# Patient Record
Sex: FEMALE | Race: WHITE | NOT HISPANIC OR LATINO | Employment: OTHER | URBAN - METROPOLITAN AREA
[De-identification: names, ages, dates, MRNs, and addresses within clinical notes are randomized per-mention and may not be internally consistent; named-entity substitution may affect disease eponyms.]

---

## 2017-11-12 RX ORDER — UREA 40 %
CREAM (GRAM) TOPICAL
Qty: 198.4 G | Refills: 0 | OUTPATIENT
Start: 2017-11-12

## 2018-02-08 ENCOUNTER — OFFICE VISIT (OUTPATIENT)
Dept: ENDOCRINOLOGY | Facility: CLINIC | Age: 68
End: 2018-02-08
Payer: COMMERCIAL

## 2018-02-08 VITALS
WEIGHT: 109 LBS | SYSTOLIC BLOOD PRESSURE: 106 MMHG | HEART RATE: 71 BPM | DIASTOLIC BLOOD PRESSURE: 62 MMHG | HEIGHT: 63 IN | BODY MASS INDEX: 19.31 KG/M2

## 2018-02-08 DIAGNOSIS — R13.10 DYSPHAGIA, UNSPECIFIED TYPE: ICD-10-CM

## 2018-02-08 DIAGNOSIS — E03.9 HYPOTHYROIDISM, UNSPECIFIED TYPE: Primary | ICD-10-CM

## 2018-02-08 PROCEDURE — 3008F BODY MASS INDEX DOCD: CPT | Mod: S$GLB,,, | Performed by: INTERNAL MEDICINE

## 2018-02-08 PROCEDURE — 99204 OFFICE O/P NEW MOD 45 MIN: CPT | Mod: S$GLB,,, | Performed by: INTERNAL MEDICINE

## 2018-02-08 PROCEDURE — 99999 PR PBB SHADOW E&M-EST. PATIENT-LVL III: CPT | Mod: PBBFAC,,, | Performed by: INTERNAL MEDICINE

## 2018-02-08 NOTE — PROGRESS NOTES
CHIEF COMPLAINT: Hypothyroid  67 year old being seen as a new patient. Hypothyroid since mid 30's. Had I-131 for hyperthyroidism. On synthroid 88 mcg. Wanted to have levels checked by a specialist. No Palpitations. No tremors. No diarrhea or constipation.       PAST MEDICAL HISTORY/PAST SURGICAL HISTORY:  Reviewed in [facility]    SOCIAL HISTORY: No T/A    FAMILY HISTORY:  + Hypothyroidism/ + hyperthyroidism. No DM    MEDICATIONS/ALLERGIES: The patient's MedCard has been updated and reviewed.      ROS:   Constitutional: No recent significant weight change  Eyes: No recent visual changes  ENT: + dysphagia. No allergies or GERD.   Cardiovascular: Denies current anginal symptoms  Respiratory: Denies current respiratory difficulty  Gastrointestinal: Denies recent bowel disturbances  GenitoUrinary - No dysuria  Skin: No new skin rash  Neurologic: No focal neurologic complaints  Remainder ROS negative        PE:    GENERAL: Well developed, well nourished.  PSYCH:  appropriate mood and affect  EYES:  PERRL, EOM intact.  ENT: Nares patent, oropharynx clear, mucosa pink,   NECK: Supple, trachea midline, thyroid firm and palpable. There is some irregularity but no palpable nodule.   CHEST: Resp even and unlabored, CTA bilateral.  CARDIAC: RRR, S1, S2 heard, no murmurs, rubs, S3, or S4  ABDOMEN: Soft, non-tender, non-distended;  No organomegaly  VASCULAR:  DP pulses +2/4 bilaterally, no edema  NEURO: Gait steady, CN II-VII grossly intact  SKIN: No areas of breakdown, no acanthosis nigracans.    LABS   Results for FARNAZ HENNING (MRN [id number]) as of 2/8/2018 08:08   Ref. Range 1/16/2018 11:23   TSH Latest Ref Range: 0.450 - 4.500 uIU/mL 0.322 (L)       ASSESSMENT/PLAN:  1. Hypothyroidism- Hx of I-131 for hyperthyroidism. Unclear if a hot nodule vs autoimmune etiology. Will get an US. She missed a few doses so will do labs in 4 weeks.     2. Dysphagia- will get US. Discussed unlikely to be du eto thyroid since unable to palpate a  significant size nodule. Discussed that if US does not show a potential etiology would need to see GI      FOLLOWUP  4 weeks Fasting TSH, Ft4, CMP, Thyroid US

## 2018-03-08 ENCOUNTER — HOSPITAL ENCOUNTER (OUTPATIENT)
Dept: RADIOLOGY | Facility: HOSPITAL | Age: 68
Discharge: HOME | End: 2018-03-08
Attending: INTERNAL MEDICINE
Payer: COMMERCIAL

## 2018-03-08 DIAGNOSIS — E03.9 HYPOTHYROIDISM, UNSPECIFIED TYPE: Primary | ICD-10-CM

## 2018-03-08 DIAGNOSIS — E03.9 HYPOTHYROIDISM, UNSPECIFIED TYPE: ICD-10-CM

## 2018-03-08 PROCEDURE — 76536 US EXAM OF HEAD AND NECK: CPT | Mod: 26,,, | Performed by: RADIOLOGY

## 2018-03-08 PROCEDURE — 76536 US EXAM OF HEAD AND NECK: CPT | Mod: TC,PO

## 2018-03-09 RX ORDER — LEVOTHYROXINE SODIUM 75 UG/1
75 TABLET ORAL DAILY
Qty: 30 TABLET | Refills: 3 | Status: SHIPPED | OUTPATIENT
Start: 2018-03-09 | End: 2018-03-09 | Stop reason: SDUPTHER

## 2018-03-09 RX ORDER — LEVOTHYROXINE SODIUM 75 UG/1
75 TABLET ORAL DAILY
Qty: 90 TABLET | Refills: 3 | Status: SHIPPED | OUTPATIENT
Start: 2018-03-09 | End: 2018-04-26

## 2018-03-09 NOTE — TELEPHONE ENCOUNTER
Spoke with pt and informed of decrease synthroid to 75 mcg wants to go to [facility]  Verbalized understanding  6 week lab booked

## 2018-03-15 ENCOUNTER — TELEPHONE (OUTPATIENT)
Dept: ENDOCRINOLOGY | Facility: CLINIC | Age: 68
End: 2018-03-15

## 2018-03-15 NOTE — TELEPHONE ENCOUNTER
----- Message from Diana Sahu sent at 3/15/2018  1:10 PM CDT -----  Contact: patient  Type:  Test Results    Who Called:  Patient  Name of Test (Lab/Mammo/Etc):  Ultrasound (thyroid)  Date of Test:  3/8/18  Ordering Provider:  Dr Keaton Aguilar  Where the test was performed:  [facility]  Best Call Back Number:    Additional Information:  N/A

## 2018-03-16 NOTE — TELEPHONE ENCOUNTER
US shows no thyroid mass. It does show some LN. Not what is causing difficulty swallowing. WIll need to f/u with PCP in regards to LN

## 2018-03-16 NOTE — TELEPHONE ENCOUNTER
----- Message from Darren Boone sent at 3/16/2018 10:52 AM CDT -----  Contact: same  Type:  Test Results    Who Called:  patient  Name of Test (Lab/Mammo/Etc):  Ultrasound  Date of Test:  3/8/18  Ordering Provider:  Keaton  Where the test was performed:  [facility]  Best Call Back Number:  [phone]  Additional Information:  Patient already received lab results that were done on the same day & never heard back about her US results.

## 2018-04-23 ENCOUNTER — LAB VISIT (OUTPATIENT)
Dept: LAB | Facility: HOSPITAL | Age: 68
End: 2018-04-23
Attending: INTERNAL MEDICINE
Payer: COMMERCIAL

## 2018-04-23 DIAGNOSIS — E03.9 HYPOTHYROIDISM, UNSPECIFIED TYPE: ICD-10-CM

## 2018-04-23 LAB
T4 FREE SERPL-MCNC: 1.2 NG/DL
TSH SERPL DL<=0.005 MIU/L-ACNC: 0.16 UIU/ML

## 2018-04-23 PROCEDURE — 36415 COLL VENOUS BLD VENIPUNCTURE: CPT | Mod: PN

## 2018-04-23 PROCEDURE — 84443 ASSAY THYROID STIM HORMONE: CPT

## 2018-04-23 PROCEDURE — 84439 ASSAY OF FREE THYROXINE: CPT

## 2018-04-26 DIAGNOSIS — E03.9 HYPOTHYROIDISM, UNSPECIFIED TYPE: Primary | ICD-10-CM

## 2018-04-26 RX ORDER — LEVOTHYROXINE SODIUM 50 UG/1
50 TABLET ORAL DAILY
Qty: 30 TABLET | Refills: 11 | Status: SHIPPED | OUTPATIENT
Start: 2018-04-26 | End: 2018-04-27 | Stop reason: SDUPTHER

## 2018-04-26 NOTE — TELEPHONE ENCOUNTER
Let her know her labs shows that she is on too much thyroid hormone. Decrease synthroid from 75 mcg to 50 mcg. Check TSH in 6 weeks.

## 2018-04-26 NOTE — TELEPHONE ENCOUNTER
----- Message from Diana Sahu sent at 4/26/2018  3:32 PM CDT -----  Contact: patient  Type:  Test Results    Who Called: Patient  Name of Test (Lab/Mammo/Etc):  Lab  Date of Test:4/23/18  Ordering Provider: Dr Jeff Pugh  Where the test was performed:  [facility]  Best Call Back Number:   Additional Information: Calling to find out if the test results are back. Please advise. Call to pod. No answer.

## 2018-04-27 RX ORDER — LEVOTHYROXINE SODIUM 50 UG/1
50 TABLET ORAL DAILY
Qty: 30 TABLET | Refills: 11 | Status: SHIPPED | OUTPATIENT
Start: 2018-04-27 | End: 2018-07-30 | Stop reason: SDUPTHER

## 2018-04-27 NOTE — TELEPHONE ENCOUNTER
Pt advised of Dr. Pugh's message and verbalized understanding.  6 week lab appt scheduled.  Would like Rx sent to mail order pharmacy.  Cancelled Rx w/ [facility].

## 2018-06-11 ENCOUNTER — LAB VISIT (OUTPATIENT)
Dept: LAB | Facility: HOSPITAL | Age: 68
End: 2018-06-11
Attending: INTERNAL MEDICINE
Payer: COMMERCIAL

## 2018-06-11 DIAGNOSIS — E03.9 HYPOTHYROIDISM, UNSPECIFIED TYPE: ICD-10-CM

## 2018-06-11 LAB — TSH SERPL DL<=0.005 MIU/L-ACNC: 2.77 UIU/ML

## 2018-06-11 PROCEDURE — 84443 ASSAY THYROID STIM HORMONE: CPT

## 2018-06-11 PROCEDURE — 36415 COLL VENOUS BLD VENIPUNCTURE: CPT | Mod: PO

## 2018-06-14 ENCOUNTER — TELEPHONE (OUTPATIENT)
Dept: ENDOCRINOLOGY | Facility: CLINIC | Age: 68
End: 2018-06-14

## 2018-06-14 NOTE — TELEPHONE ENCOUNTER
----- Message from Johnathon Kee sent at 6/14/2018  3:09 PM CDT -----  Type:  Test Results    Who Called: self   Name of Test (Lab/Mammo/Etc):  Lab   Date of Test:  063/11/2018Ordering Provider:  Dr Pugh   Where the test was performed:  [facility]   Best Call Back Number: [phone]  Additional Information:

## 2018-07-16 ENCOUNTER — PATIENT OUTREACH (OUTPATIENT)
Dept: ADMINISTRATIVE | Facility: HOSPITAL | Age: 68
End: 2018-07-16

## 2018-07-16 NOTE — LETTER
July 16, 2018    Karina Mathur  [address] [place] [address]             [facility]  [address]  [facility] [address]  Phone: [phone] Dear Mrs. Mathur:    [facility] is committed to your overall health.  To help you get the most out of each of your visits, we will review your information to make sure you are up to date on all of your recommended tests and/or procedures.      As a new patient to Dr. Mateus Cardoso, we may not have your complete medical records.  He has found that your chart shows you may be due for One time Hepatitis C screening lab test ( a viral condition that harms the liver), Hemoglobin A1C, tetanus, pneumonia and shingles vaccine, DEXA scan, colon cancer screening, annual diabetic eye and foot exam.    If you have had any of the above done at another facility, please bring the records or information with you so that your record at [facility] will be complete.      If you are currently taking medication, please bring it with you to your appointment for review.    Also, if you have any type of Advanced Directives, please bring them with you to your office visit so we may scan them into your chart.        If you have any questions or concerns, please don't hesitate to call.    Sincerely,        Jeet Recinos LPN Clinical Care Coordinator  [facility]  [address] [facility] [address]  [place] [address]  [phone] (p)   [phone] (f)

## 2018-07-30 ENCOUNTER — LAB VISIT (OUTPATIENT)
Dept: LAB | Facility: HOSPITAL | Age: 68
End: 2018-07-30
Attending: INTERNAL MEDICINE
Payer: COMMERCIAL

## 2018-07-30 ENCOUNTER — OFFICE VISIT (OUTPATIENT)
Dept: FAMILY MEDICINE | Facility: CLINIC | Age: 68
End: 2018-07-30
Payer: COMMERCIAL

## 2018-07-30 VITALS
RESPIRATION RATE: 16 BRPM | HEART RATE: 63 BPM | BODY MASS INDEX: 19.87 KG/M2 | TEMPERATURE: 98 F | HEIGHT: 63 IN | DIASTOLIC BLOOD PRESSURE: 68 MMHG | OXYGEN SATURATION: 97 % | WEIGHT: 112.13 LBS | SYSTOLIC BLOOD PRESSURE: 102 MMHG

## 2018-07-30 DIAGNOSIS — R63.6 UNDERWEIGHT: ICD-10-CM

## 2018-07-30 DIAGNOSIS — E78.00 PURE HYPERCHOLESTEROLEMIA: Primary | ICD-10-CM

## 2018-07-30 DIAGNOSIS — Z00.00 HEALTHCARE MAINTENANCE: ICD-10-CM

## 2018-07-30 DIAGNOSIS — R35.0 FREQUENCY OF MICTURITION: ICD-10-CM

## 2018-07-30 DIAGNOSIS — T88.7XXA SIDE EFFECT OF MEDICATION: ICD-10-CM

## 2018-07-30 DIAGNOSIS — E03.9 HYPOTHYROIDISM, UNSPECIFIED TYPE: ICD-10-CM

## 2018-07-30 DIAGNOSIS — M85.80 OSTEOPENIA, UNSPECIFIED LOCATION: ICD-10-CM

## 2018-07-30 DIAGNOSIS — E78.00 PURE HYPERCHOLESTEROLEMIA: ICD-10-CM

## 2018-07-30 DIAGNOSIS — Z12.11 COLON CANCER SCREENING: ICD-10-CM

## 2018-07-30 LAB
25(OH)D3+25(OH)D2 SERPL-MCNC: 36 NG/ML
ALBUMIN SERPL BCP-MCNC: 4.3 G/DL
ALP SERPL-CCNC: 95 U/L
ALT SERPL W/O P-5'-P-CCNC: 21 U/L
ANION GAP SERPL CALC-SCNC: 9 MMOL/L
AST SERPL-CCNC: 23 U/L
BASOPHILS # BLD AUTO: 0.05 K/UL
BASOPHILS NFR BLD: 0.7 %
BILIRUB SERPL-MCNC: 0.7 MG/DL
BUN SERPL-MCNC: 16 MG/DL
CALCIUM SERPL-MCNC: 9.9 MG/DL
CHLORIDE SERPL-SCNC: 101 MMOL/L
CHOLEST SERPL-MCNC: 219 MG/DL
CHOLEST/HDLC SERPL: 2.6 {RATIO}
CO2 SERPL-SCNC: 27 MMOL/L
CREAT SERPL-MCNC: 0.7 MG/DL
DIFFERENTIAL METHOD: NORMAL
EOSINOPHIL # BLD AUTO: 0.3 K/UL
EOSINOPHIL NFR BLD: 3.8 %
ERYTHROCYTE [DISTWIDTH] IN BLOOD BY AUTOMATED COUNT: 14 %
EST. GFR  (AFRICAN AMERICAN): >60 ML/MIN/1.73 M^2
EST. GFR  (NON AFRICAN AMERICAN): >60 ML/MIN/1.73 M^2
GLUCOSE SERPL-MCNC: 88 MG/DL
HCT VFR BLD AUTO: 44.6 %
HDLC SERPL-MCNC: 85 MG/DL
HDLC SERPL: 38.8 %
HGB BLD-MCNC: 14.4 G/DL
IMM GRANULOCYTES # BLD AUTO: 0.02 K/UL
IMM GRANULOCYTES NFR BLD AUTO: 0.3 %
LDLC SERPL CALC-MCNC: 122.6 MG/DL
LYMPHOCYTES # BLD AUTO: 1.9 K/UL
LYMPHOCYTES NFR BLD: 26.6 %
MCH RBC QN AUTO: 30.1 PG
MCHC RBC AUTO-ENTMCNC: 32.3 G/DL
MCV RBC AUTO: 93 FL
MONOCYTES # BLD AUTO: 0.5 K/UL
MONOCYTES NFR BLD: 6.5 %
NEUTROPHILS # BLD AUTO: 4.4 K/UL
NEUTROPHILS NFR BLD: 62.1 %
NONHDLC SERPL-MCNC: 134 MG/DL
NRBC BLD-RTO: 0 /100 WBC
PLATELET # BLD AUTO: 246 K/UL
PMV BLD AUTO: 10.7 FL
POTASSIUM SERPL-SCNC: 4.1 MMOL/L
PROT SERPL-MCNC: 7.7 G/DL
RBC # BLD AUTO: 4.79 M/UL
SODIUM SERPL-SCNC: 137 MMOL/L
T3FREE SERPL-MCNC: 2.1 PG/ML
T4 FREE SERPL-MCNC: 0.84 NG/DL
TRIGL SERPL-MCNC: 57 MG/DL
TSH SERPL DL<=0.005 MIU/L-ACNC: 5.42 UIU/ML
WBC # BLD AUTO: 7.1 K/UL

## 2018-07-30 PROCEDURE — 86803 HEPATITIS C AB TEST: CPT

## 2018-07-30 PROCEDURE — 85025 COMPLETE CBC W/AUTO DIFF WBC: CPT

## 2018-07-30 PROCEDURE — 82306 VITAMIN D 25 HYDROXY: CPT

## 2018-07-30 PROCEDURE — 80053 COMPREHEN METABOLIC PANEL: CPT

## 2018-07-30 PROCEDURE — 99999 PR PBB SHADOW E&M-EST. PATIENT-LVL IV: CPT | Mod: PBBFAC,,, | Performed by: INTERNAL MEDICINE

## 2018-07-30 PROCEDURE — 84439 ASSAY OF FREE THYROXINE: CPT

## 2018-07-30 PROCEDURE — 84481 FREE ASSAY (FT-3): CPT

## 2018-07-30 PROCEDURE — 3008F BODY MASS INDEX DOCD: CPT | Mod: CPTII,S$GLB,, | Performed by: INTERNAL MEDICINE

## 2018-07-30 PROCEDURE — 80061 LIPID PANEL: CPT

## 2018-07-30 PROCEDURE — 99204 OFFICE O/P NEW MOD 45 MIN: CPT | Mod: S$GLB,,, | Performed by: INTERNAL MEDICINE

## 2018-07-30 PROCEDURE — 36415 COLL VENOUS BLD VENIPUNCTURE: CPT | Mod: PN

## 2018-07-30 PROCEDURE — 84443 ASSAY THYROID STIM HORMONE: CPT

## 2018-07-30 RX ORDER — MULTIVITAMIN
1 TABLET ORAL DAILY
COMMUNITY

## 2018-07-30 RX ORDER — GLUCOSAMINE/CHONDR SU A SOD 750-600 MG
1 TABLET ORAL
COMMUNITY
End: 2023-01-23 | Stop reason: ALTCHOICE

## 2018-07-30 RX ORDER — LEVOTHYROXINE SODIUM 125 UG/1
TABLET ORAL
Qty: 30 TABLET | Refills: 1 | Status: SHIPPED | OUTPATIENT
Start: 2018-07-30 | End: 2018-11-15 | Stop reason: SDUPTHER

## 2018-07-30 RX ORDER — SIMVASTATIN 20 MG/1
20 TABLET, FILM COATED ORAL NIGHTLY
Qty: 30 TABLET | Refills: 2 | Status: SHIPPED | OUTPATIENT
Start: 2018-07-30 | End: 2018-11-15 | Stop reason: SDUPTHER

## 2018-07-30 RX ORDER — MELOXICAM 15 MG/1
15 TABLET ORAL DAILY
Qty: 30 TABLET | Refills: 2 | Status: SHIPPED | OUTPATIENT
Start: 2018-07-30 | End: 2018-11-15

## 2018-07-30 RX ORDER — ASCORBIC ACID 500 MG
500 TABLET ORAL DAILY
COMMUNITY

## 2018-07-30 RX ORDER — LEVOTHYROXINE SODIUM 50 UG/1
50 TABLET ORAL DAILY
Qty: 30 TABLET | Refills: 2 | Status: SHIPPED | OUTPATIENT
Start: 2018-07-30 | End: 2018-07-30

## 2018-07-30 NOTE — PROGRESS NOTES
Subjective:       Patient ID: Karina Mathur is a 67 y.o. female.    Chief Complaint: Establish Care    HPI  Here to get established w me as her PCP at [facility]. PMH/surgical hx delineated and noted. SMH/FMH also reviewed and noted. ROS obtained at length prior to physical being performed. No prior PCP in computer at [facility].  Osteopenia w last DEXA 1/18/17; repeat in 2 yrs. Reviewed w pt.  Report discussed w pt; L fem neck -1.7; Lsp -1.2. Weight bearting exercises 5x a week for 30 min; calcium and vit D supplements   1/18/17 Mammogram was neg; needs mammo this year yet; sees Gyn in [place] for her breast exams, pelvics, and paps. Needs update along w mammo.   Hypothyroidism; takes her thyroid supplements appropriately. On 50 mcg a day now; reduced in May 2018.  2010 TC w 2 polyps removed; benign; told to repeat in 5 yrs; wants to wait til 10 yrs due to finances. Will get 2020.  HLP; not on low fat high fiber diet; would help a lot; on simvastatin.  toatal time: 9647-2628; >50% time spent on counseling, discussion, and review; old records and labs reviewed as well.      Review of Systems   Constitutional: Negative for appetite change, fever and unexpected weight change.   HENT: Positive for postnasal drip. Negative for congestion, rhinorrhea and sinus pressure.         Perennial allergies.   Eyes: Negative for discharge and itching.        Dry eyes on systane drops.   Respiratory: Negative for cough, chest tightness, shortness of breath and wheezing.    Cardiovascular: Negative for chest pain, palpitations and leg swelling.   Gastrointestinal: Negative for abdominal distention, abdominal pain, blood in stool, constipation, diarrhea, nausea and vomiting.   Endocrine: Negative for polydipsia, polyphagia and polyuria.   Genitourinary: Negative for dysuria, hematuria and urgency.        Frequency for 1 year; has been hyperthyroid; strong flow. No dysuria or foul odor.   Musculoskeletal: Negative for  "arthralgias and myalgias.   Skin: Negative for rash.   Allergic/Immunologic: Negative for environmental allergies and food allergies.   Neurological: Negative for tremors, seizures and syncope.   Hematological: Negative for adenopathy. Does not bruise/bleed easily.   Psychiatric/Behavioral:        Denies anxiety or depression; worries a lot but no meds felt needed.       Objective:      Vitals:    07/30/18 0851   BP: 102/68   BP Location: Left arm   Patient Position: Sitting   Pulse: 63   Resp: 16   Temp: 97.5 °F (36.4 °C)   TempSrc: Oral   SpO2: 97%   Weight: 50.8 kg (112 lb 1.7 oz)   Height: 5' 3" (1.6 m)     Body mass index is 19.86 kg/m².    Physical Exam   Constitutional: She is oriented to person, place, and time. She appears well-developed and well-nourished.   HENT:   Head: Normocephalic and atraumatic.   Eyes: EOM are normal.   Neck: Normal range of motion. Neck supple. No thyromegaly present.   No carotid bruits heard.   Cardiovascular: Normal rate, regular rhythm and normal heart sounds.  Exam reveals no gallop.    No murmur heard.  Pulmonary/Chest: Effort normal and breath sounds normal. No respiratory distress. She has no wheezes. She has no rales.   Abdominal: Soft. Bowel sounds are normal. She exhibits no distension. There is no tenderness. There is no rebound and no guarding.   Musculoskeletal: Normal range of motion. She exhibits no edema.   Lymphadenopathy:     She has no cervical adenopathy.   Neurological: She is alert and oriented to person, place, and time.   Moves all 4 extremities fine.   Skin: No rash noted.   Psychiatric: She has a normal mood and affect. Her behavior is normal. Thought content normal.   Vitals reviewed.      Assessment:       1. Pure hypercholesterolemia    2. Osteopenia, unspecified location    3. Hypothyroidism, unspecified type    4. Side effect of medication    5. Frequency of micturition    6. Underweight    7. Colon cancer screening    8. Healthcare maintenance      "   Plan:       Pure hypercholesterolemia; Maintain low fat high fiber diet, exercise regularly. On simvastatin  -     Lipid panel; Future; Expected date: 07/30/2018  -     T3, free; Future; Expected date: 07/30/2018  -     T4, free; Future; Expected date: 07/30/2018  -     TSH; Future; Expected date: 07/30/2018    Osteopenia, unspecified location. Weight bearing exercises, begin calcium and vit D supplements. Citracal -D 2 a day; Vit D 2000 iu a day     DEXA scan at 1/18/2019  -     Vitamin D; Future; Expected date: 07/30/2018    Hypothyroidism, unspecified type; same thyroid dosing; TFT's pending.  -     T3, free; Future; Expected date: 07/30/2018  -     T4, free; Future; Expected date: 07/30/2018  -     TSH; Future; Expected date: 07/30/2018    Side effect of medication; hyperthyroid due to levothyroxine supplements which have been reduced in May, 2018. TFT's to be repeated.    Frequency of micturition; for 1 year w no signs of UTI. Estimated 10x a day; usual volume; drinks a lot of fluids during day; can decrease her fluids; 2 cups of coffee; 1 coke a day; needs to decrease her fluid; helps her w BM's; to add citrucel 1-2x a day.   -     CBC auto differential; Future; Expected date: 07/30/2018  -     Comprehensive metabolic panel; Future; Expected date: 07/30/2018  -     Urinalysis; Future; Expected date: 07/30/2018  -     Urinalysis Microscopic; Future; Expected date: 07/30/2018  -     Urine culture; Future; Expected date: 07/30/2018  -     T3, free; Future; Expected date: 07/30/2018  -     T4, free; Future; Expected date: 07/30/2018  -     TSH; Future; Expected date: 07/30/2018    Underweight  -     Comprehensive metabolic panel; Future; Expected date: 07/30/2018  -     T3, free; Future; Expected date: 07/30/2018  -     T4, free; Future; Expected date: 07/30/2018  -     TSH; Future; Expected date: 07/30/2018    Colon cancer screening; wants to wait on TC til 2020.  -     Fecal Immunochemical Test (iFOBT);  Future; Expected date: 07/30/2018    Healthcare maintenance  -     Mammo Digital Screening Bilateral With CAD; Future; Expected date: 07/30/2018

## 2018-07-30 NOTE — PATIENT INSTRUCTIONS
Pure hypercholesterolemia; Maintain low fat high fiber diet, exercise regularly. On simvastatin  -     Lipid panel; Future; Expected date: 07/30/2018  -     T3, free; Future; Expected date: 07/30/2018  -     T4, free; Future; Expected date: 07/30/2018  -     TSH; Future; Expected date: 07/30/2018    Osteopenia, unspecified location. Weight bearing exercises, begin calcium and vit D supplements. Citracal -D 2 a day; Vit D 2000 iu a day     DEXA scan at 1/18/2019  -     Vitamin D; Future; Expected date: 07/30/2018    Hypothyroidism, unspecified type; same thyroid dosing; TFT's pending.  -     T3, free; Future; Expected date: 07/30/2018  -     T4, free; Future; Expected date: 07/30/2018  -     TSH; Future; Expected date: 07/30/2018    Side effect of medication; hyperthyroid due to levothyroxine supplements which have been reduced in May, 2018. TFT's to be repeated.    Frequency of micturition; for 1 year w no signs of UTI. Estimated 10x a day; usual volume; drinks a lot of fluids during day; can decrease her fluids; 2 cups of coffee; 1 coke a day; needs to decrease her fluid; helps her w BM's; to add citrucel 1-2x a day.   -     CBC auto differential; Future; Expected date: 07/30/2018  -     Comprehensive metabolic panel; Future; Expected date: 07/30/2018  -     Urinalysis; Future; Expected date: 07/30/2018  -     Urinalysis Microscopic; Future; Expected date: 07/30/2018  -     Urine culture; Future; Expected date: 07/30/2018  -     T3, free; Future; Expected date: 07/30/2018  -     T4, free; Future; Expected date: 07/30/2018  -     TSH; Future; Expected date: 07/30/2018    Underweight  -     Comprehensive metabolic panel; Future; Expected date: 07/30/2018  -     T3, free; Future; Expected date: 07/30/2018  -     T4, free; Future; Expected date: 07/30/2018  -     TSH; Future; Expected date: 07/30/2018    Colon cancer screening; wants to wait on TC til 2020.  -     Fecal Immunochemical Test (iFOBT); Future;  Expected date: 07/30/2018    Healthcare maintenance  -     Mammo Digital Screening Bilateral With CAD; Future; Expected date: 07/30/2018

## 2018-07-31 LAB — HCV AB SERPL QL IA: NEGATIVE

## 2018-08-01 ENCOUNTER — PATIENT OUTREACH (OUTPATIENT)
Dept: ADMINISTRATIVE | Facility: HOSPITAL | Age: 68
End: 2018-08-01

## 2018-08-01 NOTE — LETTER
August 1, 2018    Karina Mathur  [address] [place] [address]             [facility]  [address]  [facility] [address]  Phone: [phone] Dear Mrs. Mathur:    [facility] is committed to your overall health.  To help you get the most out of each of your visits, we will review your information to make sure you are up to date on all of your recommended tests and/or procedures.      Dr. Cardoso has found that your chart shows you may be due for Hemoglobin A1C, tetanus, pneumonia, shingles and flu vaccine, DEXA scan, colon cancer screening, annual diabetic foot and eye exam.    If you have had any of the above done at another facility, please bring the records or information with you so that your record at [facility] will be complete.  If you would like to schedule any of these, please contact me.    If you are currently taking medication, please bring it with you to your appointment for review.    Also, if you have any type of Advanced Directives, please bring them with you to your office visit so we may scan them into your chart.        If you have any questions or concerns, please don't hesitate to call.    Sincerely,        Jeet Recinos LPN Clinical Care Coordinator  [facility]  [address] [facility] [address]  [place] [address]  [phone] (p)   [phone] (f)

## 2018-08-02 ENCOUNTER — TELEPHONE (OUTPATIENT)
Dept: FAMILY MEDICINE | Facility: CLINIC | Age: 68
End: 2018-08-02

## 2018-08-03 NOTE — TELEPHONE ENCOUNTER
Please notify patient that her urine culture was contaminated.  Please have her pass by the lab to resubmit a urine culture as a clean-catch midstream specimen for repeat cuture.

## 2018-08-08 ENCOUNTER — LAB VISIT (OUTPATIENT)
Dept: LAB | Facility: HOSPITAL | Age: 68
End: 2018-08-08
Attending: INTERNAL MEDICINE
Payer: COMMERCIAL

## 2018-08-08 DIAGNOSIS — Z12.11 COLON CANCER SCREENING: ICD-10-CM

## 2018-08-08 DIAGNOSIS — R35.0 FREQUENCY OF MICTURITION: ICD-10-CM

## 2018-08-08 PROCEDURE — 82274 ASSAY TEST FOR BLOOD FECAL: CPT

## 2018-08-08 PROCEDURE — 87086 URINE CULTURE/COLONY COUNT: CPT

## 2018-08-09 LAB — HEMOCCULT STL QL IA: NEGATIVE

## 2018-08-10 ENCOUNTER — TELEPHONE (OUTPATIENT)
Dept: FAMILY MEDICINE | Facility: CLINIC | Age: 68
End: 2018-08-10

## 2018-08-10 LAB — BACTERIA UR CULT: NO GROWTH

## 2018-08-10 NOTE — TELEPHONE ENCOUNTER
----- Message from Yoly Velarde sent at 8/10/2018  8:49 AM CDT -----  Contact: Patient  Type:  Patient Returning Call    Who Called:  Karina  Who Left Message for Patient:  Anibal  Does the patient know what this is regarding?:  Test results  Best Call Back Number:  [phone]  Additional Information:

## 2018-08-10 NOTE — TELEPHONE ENCOUNTER
"Reviewed labs with patient. Patient verbalized understanding. Patient also asked if urine culture was resulted. Notified patient urine culture states "no growth" patient verbalized understanding    "

## 2018-08-15 ENCOUNTER — OFFICE VISIT (OUTPATIENT)
Dept: FAMILY MEDICINE | Facility: CLINIC | Age: 68
End: 2018-08-15
Payer: COMMERCIAL

## 2018-08-15 VITALS
WEIGHT: 109.69 LBS | DIASTOLIC BLOOD PRESSURE: 72 MMHG | BODY MASS INDEX: 20.71 KG/M2 | TEMPERATURE: 98 F | SYSTOLIC BLOOD PRESSURE: 110 MMHG | HEART RATE: 74 BPM | HEIGHT: 61 IN

## 2018-08-15 DIAGNOSIS — J30.89 PERENNIAL ALLERGIC RHINITIS: ICD-10-CM

## 2018-08-15 DIAGNOSIS — M85.89 OSTEOPENIA OF MULTIPLE SITES: Primary | ICD-10-CM

## 2018-08-15 DIAGNOSIS — E78.00 PURE HYPERCHOLESTEROLEMIA: ICD-10-CM

## 2018-08-15 DIAGNOSIS — T88.7XXA SIDE EFFECT OF DRUG: ICD-10-CM

## 2018-08-15 DIAGNOSIS — E89.0 POSTABLATIVE HYPOTHYROIDISM: ICD-10-CM

## 2018-08-15 DIAGNOSIS — E55.9 VITAMIN D INSUFFICIENCY: ICD-10-CM

## 2018-08-15 PROCEDURE — 99999 PR PBB SHADOW E&M-EST. PATIENT-LVL III: CPT | Mod: PBBFAC,,, | Performed by: INTERNAL MEDICINE

## 2018-08-15 PROCEDURE — 3008F BODY MASS INDEX DOCD: CPT | Mod: CPTII,S$GLB,, | Performed by: INTERNAL MEDICINE

## 2018-08-15 PROCEDURE — 99214 OFFICE O/P EST MOD 30 MIN: CPT | Mod: S$GLB,,, | Performed by: INTERNAL MEDICINE

## 2018-08-15 NOTE — PATIENT INSTRUCTIONS
Osteopenia of multiple sites. Weight bearing exercises, continue calcium and vit D supplements. DEXA due after 1/18/19.    Vitamin D insufficiency; increase vit D to 2000 iu a day.    Pure hypercholesterolemia. Maintain low fat high fiber diet, exercise regularly. Cont same dose of simvastatin.  -     T3, free; Future; Expected date: 08/15/2018  -     T4, free; Future; Expected date: 08/15/2018  -     TSH; Future; Expected date: 08/15/2018  -     Lipid panel; Future; Expected date: 08/15/2018  -     Hepatic function panel; Future; Expected date: 08/15/2018    Postablative hypothyroidism; have just increased levothyroxine to 1/2 of 125 mcg a day due to elevated TSH 5.42. And low freeT3 2.1; free T4 0.8.  -     T3, free; Future; Expected date: 08/15/2018  -     T4, free; Future; Expected date: 08/15/2018  -     TSH; Future; Expected date: 08/15/2018    Side effect of drug; caffeine causing increased urination during day and pt drinking lots of fluids; improved fair amount w decreasing caffeine;   can decrease to 1/2 cut coffee or decaff.     Perennial allergic rhinitis; claritin 10 mg a day as needed for congestion/post nasal drip. Simply saline 1 spray 1-2x a day as needed for post nasal drip/congestion.

## 2018-08-15 NOTE — PROGRESS NOTES
Subjective:       Patient ID: Karina Mathur is a 67 y.o. female.    Chief Complaint: Follow-up (2 weeks)    HPI  Overall doing fine. Here to go over her labs.   HLP: on low fat diet but not all the time; high fiber; tolerates her simvastatin fine. Lipids have worsened some w thyroid as well.  Hypothyroidism; advised on proper way to take thyroid supplements; have changed thyroid supplements recently to 62.5 mcg up from 50 mcg a day due to labs.  Osteopenia; weight bearing w walking 2x a week; goal is 5x a week for 30 min. Pt does take care of her Mom who is wheelchair bound.  1/18/17 DEXa w Lsp T score -1.7; 3.7% decrease; L fem neck -1.2; 2.6% decrease; DEXA this 1/2019; discussed w pt.  Perennial allergies; active but not taking anything.  Frequency; has decreased her cokes and has helped her frequency a fair amount; still w 2 cups coffee per day as well. Would decrease caffeine further.    Review of Systems   Constitutional: Negative for appetite change, fever and unexpected weight change.   HENT: Positive for postnasal drip. Negative for congestion, rhinorrhea and sinus pressure.         Perennial allergies   Eyes: Negative for discharge and itching.   Respiratory: Negative for cough, chest tightness, shortness of breath and wheezing.    Cardiovascular: Negative for chest pain, palpitations and leg swelling.   Gastrointestinal: Negative for abdominal distention, abdominal pain, blood in stool, constipation, diarrhea, nausea and vomiting.   Endocrine: Negative for polydipsia, polyphagia and polyuria.   Genitourinary: Negative for dysuria and hematuria.   Musculoskeletal: Negative for arthralgias and myalgias.   Skin: Negative for rash.   Allergic/Immunologic: Negative for environmental allergies and food allergies.   Neurological: Negative for tremors, seizures and syncope.   Hematological: Negative for adenopathy. Does not bruise/bleed easily.   Psychiatric/Behavioral:        Denies anxiety or depresssion.  "      Objective:      Vitals:    08/15/18 0840   BP: 110/72   Pulse: 74   Temp: 98 °F (36.7 °C)   Weight: 49.7 kg (109 lb 10.9 oz)   Height: 5' 1" (1.549 m)     Body mass index is 20.72 kg/m².    Physical Exam   Constitutional: She is oriented to person, place, and time. She appears well-developed and well-nourished.   HENT:   Head: Normocephalic and atraumatic.   Eyes: EOM are normal.   Neck: Normal range of motion. Neck supple. No thyromegaly present.   Cardiovascular: Normal rate, regular rhythm and normal heart sounds. Exam reveals no gallop.   No murmur heard.  Pulmonary/Chest: Effort normal and breath sounds normal. No respiratory distress. She has no wheezes. She has no rales.   Abdominal: Soft. Bowel sounds are normal. She exhibits no distension. There is no tenderness. There is no rebound and no guarding.   Musculoskeletal: Normal range of motion. She exhibits no edema.   Lymphadenopathy:     She has no cervical adenopathy.   Neurological: She is alert and oriented to person, place, and time.   Moves all 4 extremities fine.   Skin: No rash noted.   Psychiatric: She has a normal mood and affect. Her behavior is normal. Thought content normal.   Vitals reviewed.      Assessment:       1. Osteopenia of multiple sites    2. Vitamin D insufficiency    3. Pure hypercholesterolemia    4. Postablative hypothyroidism    5. Side effect of drug    6. Perennial allergic rhinitis        Plan:       Osteopenia of multiple sites. Weight bearing exercises, continue calcium and vit D supplements. DEXA due after 1/18/19.    Vitamin D insufficiency; increase vit D to 2000 iu a day.    Pure hypercholesterolemia. Maintain low fat high fiber diet, exercise regularly. Cont same dose of simvastatin. Adhere to diet better and increase her exercise regimen.  -     T3, free; Future; Expected date: 08/15/2018  -     T4, free; Future; Expected date: 08/15/2018  -     TSH; Future; Expected date: 08/15/2018   -     Lipid panel; Future; " Expected date: 08/15/2018  -     Hepatic function panel; Future; Expected date: 08/15/2018    Postablative hypothyroidism; have just increased levothyroxine to 1/2 of 125 mcg a day due to elevated TSH 5.42. And low freeT3 2.1; free T4 0.8.  -     T3, free; Future; Expected date: 08/15/2018  -     T4, free; Future; Expected date: 08/15/2018  -     TSH; Future; Expected date: 08/15/2018    Side effect of drug; caffeine causing increased urination during day and pt drinking lots of fluids; improved fair amount w decreasing caffeine;   can decrease to 1/2 cut coffee or decaff.     Perennial allergic rhinitis; claritin 10 mg a day as needed for congestion/post nasal drip. Simply saline 1 spray 1-2x a day as needed for post nasal drip/congestion.

## 2018-11-08 ENCOUNTER — LAB VISIT (OUTPATIENT)
Dept: LAB | Facility: HOSPITAL | Age: 68
End: 2018-11-08
Attending: INTERNAL MEDICINE
Payer: COMMERCIAL

## 2018-11-08 DIAGNOSIS — E89.0 POSTABLATIVE HYPOTHYROIDISM: ICD-10-CM

## 2018-11-08 DIAGNOSIS — E78.00 PURE HYPERCHOLESTEROLEMIA: ICD-10-CM

## 2018-11-08 LAB
ALBUMIN SERPL BCP-MCNC: 3.9 G/DL
ALP SERPL-CCNC: 82 U/L
ALT SERPL W/O P-5'-P-CCNC: 17 U/L
AST SERPL-CCNC: 17 U/L
BILIRUB DIRECT SERPL-MCNC: 0.2 MG/DL
BILIRUB SERPL-MCNC: 0.6 MG/DL
CHOLEST SERPL-MCNC: 185 MG/DL
CHOLEST/HDLC SERPL: 2.2 {RATIO}
HDLC SERPL-MCNC: 84 MG/DL
HDLC SERPL: 45.4 %
LDLC SERPL CALC-MCNC: 92.4 MG/DL
NONHDLC SERPL-MCNC: 101 MG/DL
PROT SERPL-MCNC: 7 G/DL
T4 FREE SERPL-MCNC: 0.98 NG/DL
TRIGL SERPL-MCNC: 43 MG/DL
TSH SERPL DL<=0.005 MIU/L-ACNC: 0.94 UIU/ML

## 2018-11-08 PROCEDURE — 36415 COLL VENOUS BLD VENIPUNCTURE: CPT | Mod: PN

## 2018-11-08 PROCEDURE — 84439 ASSAY OF FREE THYROXINE: CPT

## 2018-11-08 PROCEDURE — 80061 LIPID PANEL: CPT

## 2018-11-08 PROCEDURE — 84443 ASSAY THYROID STIM HORMONE: CPT

## 2018-11-08 PROCEDURE — 80076 HEPATIC FUNCTION PANEL: CPT

## 2018-11-09 ENCOUNTER — TELEPHONE (OUTPATIENT)
Dept: FAMILY MEDICINE | Facility: CLINIC | Age: 68
End: 2018-11-09

## 2018-11-09 NOTE — TELEPHONE ENCOUNTER
----- Message from Johnathon Kee sent at 2018  9:42 AM CST -----  Type:  Sooner Apoointment Request    Caller is requesting a sooner appointment.  Caller declined first available appointment listed below.  Caller will not accept being placed on the waitlist and is requesting a message be sent to doctor.    Name of Caller:  self  When is the first available appointment?  2018Symptoms:    Best Call Back Number:  [phone]  Additional Information:  Patient asking to reschedule appointment for an earlier time before the month of December. Patient is going to a  on Thursday, a family friend passed.

## 2018-11-15 ENCOUNTER — OFFICE VISIT (OUTPATIENT)
Dept: FAMILY MEDICINE | Facility: CLINIC | Age: 68
End: 2018-11-15
Payer: COMMERCIAL

## 2018-11-15 VITALS
OXYGEN SATURATION: 97 % | RESPIRATION RATE: 16 BRPM | SYSTOLIC BLOOD PRESSURE: 100 MMHG | TEMPERATURE: 99 F | HEIGHT: 61 IN | DIASTOLIC BLOOD PRESSURE: 60 MMHG | BODY MASS INDEX: 20.46 KG/M2 | WEIGHT: 108.38 LBS | HEART RATE: 72 BPM

## 2018-11-15 DIAGNOSIS — E78.00 PURE HYPERCHOLESTEROLEMIA: ICD-10-CM

## 2018-11-15 DIAGNOSIS — M54.2 NECK PAIN: ICD-10-CM

## 2018-11-15 DIAGNOSIS — E55.9 VITAMIN D INSUFFICIENCY: ICD-10-CM

## 2018-11-15 DIAGNOSIS — E03.9 HYPOTHYROIDISM, UNSPECIFIED TYPE: ICD-10-CM

## 2018-11-15 DIAGNOSIS — E89.0 POSTABLATIVE HYPOTHYROIDISM: Primary | ICD-10-CM

## 2018-11-15 DIAGNOSIS — Z78.0 POST-MENOPAUSAL: ICD-10-CM

## 2018-11-15 DIAGNOSIS — M85.89 OSTEOPENIA OF MULTIPLE SITES: ICD-10-CM

## 2018-11-15 PROCEDURE — 1101F PT FALLS ASSESS-DOCD LE1/YR: CPT | Mod: CPTII,S$GLB,, | Performed by: INTERNAL MEDICINE

## 2018-11-15 PROCEDURE — 99214 OFFICE O/P EST MOD 30 MIN: CPT | Mod: S$GLB,,, | Performed by: INTERNAL MEDICINE

## 2018-11-15 PROCEDURE — 99999 PR PBB SHADOW E&M-EST. PATIENT-LVL IV: CPT | Mod: PBBFAC,,, | Performed by: INTERNAL MEDICINE

## 2018-11-15 RX ORDER — LEVOTHYROXINE SODIUM 125 UG/1
TABLET ORAL
Qty: 45 TABLET | Refills: 1 | Status: SHIPPED | OUTPATIENT
Start: 2018-11-15 | End: 2019-02-13 | Stop reason: SDUPTHER

## 2018-11-15 RX ORDER — SIMVASTATIN 20 MG/1
20 TABLET, FILM COATED ORAL NIGHTLY
Qty: 90 TABLET | Refills: 1 | Status: SHIPPED | OUTPATIENT
Start: 2018-11-15 | End: 2019-02-13 | Stop reason: SDUPTHER

## 2018-11-15 RX ORDER — MELOXICAM 15 MG/1
TABLET ORAL
Qty: 90 TABLET | Refills: 0 | Status: SHIPPED | OUTPATIENT
Start: 2018-11-15 | End: 2019-05-15

## 2018-11-15 NOTE — PROGRESS NOTES
Subjective:       Patient ID: Karina Mathur is a 68 y.o. female.    Chief Complaint: Results (lab work and medication review )    HPI  Overall doinfg fine.  HLP: on low fat high fiber diet; no problems w simvastatin; refilled for pt today.  Post-ablative hypothyroidism. Takes appropriately.  Osteopenia; weight bearing exercise needed 5x a week; presently 2x a week; does lift her Mom regularly during week w her care. Last DEXA 2 yrs or greater. Needs update  Vit D insufficiency: On vit D 2000 iu a day.  Neck pain; intermittent w lifting her mom. Doesn't want to see ortho. Will change from daily mobic 15 mg a day to as needed. Use tylenol arthritis initially for pain as needed; then go to mobic behind that as needed for pain relief. On 1-10 when present a 4. Doesn't radiate down her arms.  Total time 905-1040; >50% time spent on counseling, discussion, and review.    Review of Systems   Constitutional: Negative for appetite change and fever.   HENT: Negative for congestion, postnasal drip, rhinorrhea and sinus pressure.    Eyes: Negative for discharge and itching.   Respiratory: Negative for cough, chest tightness, shortness of breath and wheezing.    Cardiovascular: Negative for chest pain, palpitations and leg swelling.   Gastrointestinal: Negative for abdominal distention, abdominal pain, blood in stool, constipation, diarrhea, nausea and vomiting.   Endocrine: Negative for polydipsia, polyphagia and polyuria.   Genitourinary: Negative for dysuria and hematuria.   Musculoskeletal: Negative for arthralgias and myalgias.   Skin: Negative for rash.   Allergic/Immunologic: Negative for environmental allergies and food allergies.   Neurological: Negative for tremors, seizures and syncope.   Hematological: Negative for adenopathy. Does not bruise/bleed easily.       Objective:      Vitals:    11/15/18 0839   BP: 100/60   Pulse: 72   Resp: 16   Temp: 98.5 °F (36.9 °C)   TempSrc: Oral   SpO2: 97%   Weight: 49.2 kg (108  "lb 5.7 oz)   Height: 5' 1" (1.549 m)     Body mass index is 20.47 kg/m².    Physical Exam   Constitutional: She is oriented to person, place, and time. She appears well-developed and well-nourished.   HENT:   Head: Normocephalic and atraumatic.   Eyes: EOM are normal.   Neck: Normal range of motion. Neck supple. No thyromegaly present.   Cardiovascular: Normal rate, regular rhythm and normal heart sounds. Exam reveals no gallop.   No murmur heard.  Pulmonary/Chest: Effort normal and breath sounds normal. No respiratory distress. She has no wheezes. She has no rales.   Abdominal: Soft. Bowel sounds are normal. She exhibits no distension. There is no tenderness. There is no rebound and no guarding.   Musculoskeletal: Normal range of motion. She exhibits no edema.   Lymphadenopathy:     She has no cervical adenopathy.   Neurological: She is alert and oriented to person, place, and time.   Moves all 4 extremities fine.   Skin: No rash noted.   Psychiatric: She has a normal mood and affect. Her behavior is normal. Thought content normal.   Vitals reviewed.      Assessment:       1. Postablative hypothyroidism    2. Pure hypercholesterolemia    3. Osteopenia of multiple sites    4. Vitamin D insufficiency    5. Post-menopausal    6. Hypothyroidism, unspecified type    7. Neck pain        Plan:       Postablative hypothyroidism; same dose of 62.5 mcg a day of levothyroxine  -     Lipid panel; Future; Expected date: 11/15/2018  -     TSH; Future; Expected date: 11/15/2018  -     T4, free; Future; Expected date: 11/15/2018  -     T3, free; Future; Expected date: 11/15/2018    Pure hypercholesterolemia. Maintain low fat high fiber diet, exercise regularly. Cont simvastatin dose.  -     Comprehensive metabolic panel; Future; Expected date: 11/15/2018  -     Lipid panel; Future; Expected date: 11/15/2018  -     TSH; Future; Expected date: 11/15/2018  -     T4, free; Future; Expected date: 11/15/2018  -     T3, free; Future; " Expected date: 11/15/2018  -     simvastatin (ZOCOR) 20 MG tablet; Take 1 tablet (20 mg total) by mouth every evening.  Dispense: 90 tablet; Refill: 1    Osteopenia of multiple sites. Weight bearing exercises, continue calcium and vit D supplements. DEXA needed before f/u.  -     Comprehensive metabolic panel; Future; Expected date: 11/15/2018  -     Magnesium; Future; Expected date: 11/15/2018  -     DXA Bone Density Spine And Hip; Future; Expected date: 11/15/2018    Vitamin D insufficiency  -     Vitamin D; Future; Expected date: 11/15/2018  -     DXA Bone Density Spine And Hip; Future; Expected date: 11/15/2018    Post-menopausal  -     DXA Bone Density Spine And Hip; Future; Expected date: 11/15/2018    Neck pain; Neck pain; intermittent w lifting her mom. Doesn't want to see ortho. Will change from daily mobic 15 mg a day to as needed. Use tylenol arthritis initially for pain as needed; then go to mobic behind that as needed for pain relief. Cold pask applications initially for pain; after 3 days can change to thermal applications.

## 2018-11-15 NOTE — PATIENT INSTRUCTIONS
Neck pain; intermittent w lifting her mom. Doesn't want to see ortho. Will change from daily mobic 15 mg a day to as needed. Use tylenol arthritis initially for pain as needed; then go to mobic behind that as needed for pain relief.     Postablative hypothyroidism; same dose of 62.5 mcg a day of levothyroxine  -     Lipid panel; Future; Expected date: 11/15/2018  -     TSH; Future; Expected date: 11/15/2018  -     T4, free; Future; Expected date: 11/15/2018  -     T3, free; Future; Expected date: 11/15/2018    Pure hypercholesterolemia. Maintain low fat high fiber diet, exercise regularly. Cont simvastatin dose.  -     Comprehensive metabolic panel; Future; Expected date: 11/15/2018  -     Lipid panel; Future; Expected date: 11/15/2018  -     TSH; Future; Expected date: 11/15/2018  -     T4, free; Future; Expected date: 11/15/2018  -     T3, free; Future; Expected date: 11/15/2018  -     simvastatin (ZOCOR) 20 MG tablet; Take 1 tablet (20 mg total) by mouth every evening.  Dispense: 90 tablet; Refill: 1    Osteopenia of multiple sites. Weight bearing exercises, continue calcium and vit D supplements. DEXA needed before f/u.  -     Comprehensive metabolic panel; Future; Expected date: 11/15/2018  -     Magnesium; Future; Expected date: 11/15/2018  -     DXA Bone Density Spine And Hip; Future; Expected date: 11/15/2018    Vitamin D insufficiency  -     Vitamin D; Future; Expected date: 11/15/2018  -     DXA Bone Density Spine And Hip; Future; Expected date: 11/15/2018    Post-menopausal  -     DXA Bone Density Spine And Hip; Future; Expected date: 11/15/2018    Neck pain; Neck pain; intermittent w lifting her mom. Doesn't want to see ortho. Will change from daily mobic 15 mg a day to as needed. Use tylenol arthritis initially for pain as needed; then go to mobic behind that as needed for pain relief. Cold pask applications initially for pain; after 3 days can change to thermal applications.

## 2019-02-08 ENCOUNTER — OFFICE VISIT (OUTPATIENT)
Dept: FAMILY MEDICINE | Facility: CLINIC | Age: 69
End: 2019-02-08
Payer: COMMERCIAL

## 2019-02-08 ENCOUNTER — HOSPITAL ENCOUNTER (OUTPATIENT)
Dept: RADIOLOGY | Facility: HOSPITAL | Age: 69
Discharge: HOME | End: 2019-02-08
Attending: NURSE PRACTITIONER
Payer: COMMERCIAL

## 2019-02-08 VITALS
OXYGEN SATURATION: 94 % | DIASTOLIC BLOOD PRESSURE: 58 MMHG | WEIGHT: 107.06 LBS | TEMPERATURE: 98 F | HEIGHT: 61 IN | HEART RATE: 70 BPM | SYSTOLIC BLOOD PRESSURE: 94 MMHG | BODY MASS INDEX: 20.21 KG/M2

## 2019-02-08 DIAGNOSIS — R05.9 COUGH: ICD-10-CM

## 2019-02-08 DIAGNOSIS — R06.2 WHEEZE: ICD-10-CM

## 2019-02-08 DIAGNOSIS — J11.1 INFLUENZA: Primary | ICD-10-CM

## 2019-02-08 DIAGNOSIS — J11.1 INFLUENZA: ICD-10-CM

## 2019-02-08 DIAGNOSIS — E86.0 DEHYDRATION: ICD-10-CM

## 2019-02-08 PROCEDURE — 99214 OFFICE O/P EST MOD 30 MIN: CPT | Mod: S$GLB,,, | Performed by: NURSE PRACTITIONER

## 2019-02-08 PROCEDURE — 1101F PT FALLS ASSESS-DOCD LE1/YR: CPT | Mod: CPTII,S$GLB,, | Performed by: NURSE PRACTITIONER

## 2019-02-08 PROCEDURE — 99999 PR PBB SHADOW E&M-EST. PATIENT-LVL V: ICD-10-PCS | Mod: PBBFAC,,, | Performed by: NURSE PRACTITIONER

## 2019-02-08 PROCEDURE — 71046 X-RAY EXAM CHEST 2 VIEWS: CPT | Mod: 26,,, | Performed by: RADIOLOGY

## 2019-02-08 PROCEDURE — 99999 PR PBB SHADOW E&M-EST. PATIENT-LVL V: CPT | Mod: PBBFAC,,, | Performed by: NURSE PRACTITIONER

## 2019-02-08 PROCEDURE — 99214 PR OFFICE/OUTPT VISIT, EST, LEVL IV, 30-39 MIN: ICD-10-PCS | Mod: S$GLB,,, | Performed by: NURSE PRACTITIONER

## 2019-02-08 PROCEDURE — 71046 X-RAY EXAM CHEST 2 VIEWS: CPT | Mod: TC,PN

## 2019-02-08 PROCEDURE — 71046 XR CHEST PA AND LATERAL: ICD-10-PCS | Mod: 26,,, | Performed by: RADIOLOGY

## 2019-02-08 PROCEDURE — 1101F PR PT FALLS ASSESS DOC 0-1 FALLS W/OUT INJ PAST YR: ICD-10-PCS | Mod: CPTII,S$GLB,, | Performed by: NURSE PRACTITIONER

## 2019-02-08 RX ORDER — ALBUTEROL SULFATE 90 UG/1
2 AEROSOL, METERED RESPIRATORY (INHALATION) EVERY 6 HOURS PRN
Qty: 8 G | Refills: 0 | Status: SHIPPED | OUTPATIENT
Start: 2019-02-08 | End: 2019-05-15

## 2019-02-08 RX ORDER — OSELTAMIVIR PHOSPHATE 75 MG/1
75 CAPSULE ORAL DAILY
Qty: 10 CAPSULE | Refills: 0 | Status: SHIPPED | OUTPATIENT
Start: 2019-02-08 | End: 2019-02-18

## 2019-02-08 NOTE — PROGRESS NOTES
This dictation has been generated using Modal Fluency Dictation some phonetic errors may occur. Please contact author for clarification if needed.     Problem List Items Addressed This Visit     None      Visit Diagnoses     Influenza    -  Primary    Relevant Orders    X-Ray Chest PA And Lateral    Cough        Relevant Orders    X-Ray Chest PA And Lateral    Wheeze        Relevant Orders    X-Ray Chest PA And Lateral    Dehydration            Orders Placed This Encounter    X-Ray Chest PA And Lateral    albuterol (VENTOLIN HFA) 90 mcg/actuation inhaler    oseltamivir (TAMIFLU) 75 MG capsule     Influenza symptoms have been present will beyond window for treatment.  Known exposure.  I will give her a prescription for her  for Tamiflu.  Pharmacy can review meds.  Cough and wheeze.  Check chest x-ray as above.  I will review images address accordingly.  Patient Instructions   Increase your fluids. You are getting dehydrated.  If you are feeling worse or have dizziness, weakness, ms cramps go to the ER. Diarrhea would make it more important to go to the ER.      Follow-up in about 3 days (around 2/11/2019).    ________________________________________________________________  ________________________________________________________________      Chief Complaint   Patient presents with    Nasal Congestion     body aches, headache sx started tue     History of present illness  This 68 y.o. presents today for complaint of flu-like symptoms.  Patient notes Tuesday night she started having fever and chills.  Wednesday symptoms progressed.  She did have body aches and cold symptoms.  Her grandchildren have tested positive for the flu as well as her mother.  Patient started taking a Z-Jose Martin yesterday.  This is inappropriate use of antibiotics and we discussed that. She is 2 days in so I will have her complete but discussed with her that this is only preventive for secondary bacterial infection and she should have  consulted with us for Tamiflu or such.   Review of systems  No fever chills or body aches  Patient denies sinus pain or sore throat.  She has had some ear pain, Which resolved  No chest pain or shortness of breath.  She does note some wheezing which started today.  No nausea or vomiting.  Patient had couple episodes of diarrhea but seems to improve.  Patient denies rash or itching  Abnormal bruising or bleeding  Denies joint pains  Patient denies dizziness or lightheadedness.  She denies generalized weakness.    Past medical and social history reviewed.  Patient is new to me.  I am seeing her for the 1st time.  She does follow the clinic.  Her mother is known me.  Past Medical History:   Diagnosis Date    Hyperlipidemia     on simvastatin    Hypothyroidism     hx of iodine cocktail around 1988 for goiter.    Osteopenia     1/18/17; L fem neck w Tscore -1.7; Lsp -1.2; DEXA in 2 yrs.       Past Surgical History:   Procedure Laterality Date    INGUINAL HERNIA REPAIR         History reviewed. No pertinent family history.    Social History     Socioeconomic History    Marital status:      Spouse name: None    Number of children: None    Years of education: None    Highest education level: None   Social Needs    Financial resource strain: None    Food insecurity - worry: None    Food insecurity - inability: None    Transportation needs - medical: None    Transportation needs - non-medical: None   Occupational History    None   Tobacco Use    Smoking status: Never Smoker    Smokeless tobacco: Never Used   Substance and Sexual Activity    Alcohol use: Yes     Alcohol/week: 0.6 oz     Types: 1 Glasses of wine per week     Comment: 1-2 glasses a month    Drug use: No    Sexual activity: None   Other Topics Concern    None   Social History Narrative    None       Current Outpatient Medications   Medication Sig Dispense Refill    ascorbic acid, vitamin C, (VITAMIN C) 500 MG tablet Take 500 mg by  mouth once daily.      biotin 2,500 mcg Cap Take 1 capsule by mouth.       CYANOCOBALAMIN, VITAMIN B-12, (VITAMIN B-12 ORAL) Take 1 Film by mouth once daily.      levothyroxine (SYNTHROID) 125 MCG tablet Take 1/2 of 125 mcg tab po daily 45 tablet 1    multivitamin (ONE DAILY MULTIVITAMIN) per tablet Take 1 tablet by mouth once daily.      simvastatin (ZOCOR) 20 MG tablet Take 1 tablet (20 mg total) by mouth every evening. 90 tablet 1    vitamin D 1000 units Tab Take 185 mg by mouth once daily.      albuterol (VENTOLIN HFA) 90 mcg/actuation inhaler Inhale 2 puffs into the lungs every 6 (six) hours as needed for Wheezing. Rescue 8 g 0    meloxicam (MOBIC) 15 MG tablet 15 mg po as needed daily for pain. 90 tablet 0    oseltamivir (TAMIFLU) 75 MG capsule Take 1 capsule (75 mg total) by mouth once daily. Dispense to  Carlos Zimmermanhannah. for 10 days 10 capsule 0     No current facility-administered medications for this visit.        Review of patient's allergies indicates:  No Known Allergies    Physical examination  Vitals Reviewed  Gen. Well-dressed well-nourished   Skin warm dry and intact.  No rashes noted.  HEENT.  TM intact bilateral with normal light reflex.  No mastoid tenderness during percussion.  Nares patent bilateral.  Pharynx minimally erythematous changes and no exudate.  Postnasal drip is noted.  No maxillary or frontal sinus tenderness when percussed.    Neck is supple without adenopathy  Chest.  Respirations are even unlabored.  Lungs are clear to auscultation.  Cardiac regular rate and rhythm.  No chest wall adenopathy noted.  Neuro. Awake alert oriented x4.  Normal judgment and cognition noted.  Extremities no clubbing cyanosis or edema noted.      Call or return to clinic prn if these symptoms worsen or fail to improve as anticipated.

## 2019-02-12 ENCOUNTER — TELEPHONE (OUTPATIENT)
Dept: FAMILY MEDICINE | Facility: CLINIC | Age: 69
End: 2019-02-12

## 2019-02-13 DIAGNOSIS — E78.00 PURE HYPERCHOLESTEROLEMIA: ICD-10-CM

## 2019-02-13 DIAGNOSIS — E03.9 HYPOTHYROIDISM, UNSPECIFIED TYPE: ICD-10-CM

## 2019-02-16 RX ORDER — SIMVASTATIN 20 MG/1
20 TABLET, FILM COATED ORAL NIGHTLY
Qty: 90 TABLET | Refills: 1 | Status: SHIPPED | OUTPATIENT
Start: 2019-02-16 | End: 2019-05-15 | Stop reason: SDUPTHER

## 2019-02-16 RX ORDER — LEVOTHYROXINE SODIUM 125 UG/1
TABLET ORAL
Qty: 45 TABLET | Refills: 1 | Status: SHIPPED | OUTPATIENT
Start: 2019-02-16 | End: 2019-05-15

## 2019-05-02 ENCOUNTER — HOSPITAL ENCOUNTER (OUTPATIENT)
Dept: RADIOLOGY | Facility: HOSPITAL | Age: 69
Discharge: HOME | End: 2019-05-02
Attending: INTERNAL MEDICINE
Payer: COMMERCIAL

## 2019-05-02 DIAGNOSIS — Z78.0 POST-MENOPAUSAL: ICD-10-CM

## 2019-05-02 DIAGNOSIS — E55.9 VITAMIN D INSUFFICIENCY: ICD-10-CM

## 2019-05-02 DIAGNOSIS — M85.89 OSTEOPENIA OF MULTIPLE SITES: ICD-10-CM

## 2019-05-02 PROCEDURE — 77080 DXA BONE DENSITY AXIAL: CPT | Mod: 26,,, | Performed by: RADIOLOGY

## 2019-05-02 PROCEDURE — 77080 DEXA BONE DENSITY SPINE HIP: ICD-10-PCS | Mod: 26,,, | Performed by: RADIOLOGY

## 2019-05-02 PROCEDURE — 77080 DXA BONE DENSITY AXIAL: CPT | Mod: TC,PO

## 2019-05-15 ENCOUNTER — OFFICE VISIT (OUTPATIENT)
Dept: FAMILY MEDICINE | Facility: CLINIC | Age: 69
End: 2019-05-15
Payer: COMMERCIAL

## 2019-05-15 VITALS
OXYGEN SATURATION: 98 % | SYSTOLIC BLOOD PRESSURE: 96 MMHG | BODY MASS INDEX: 18.98 KG/M2 | DIASTOLIC BLOOD PRESSURE: 56 MMHG | WEIGHT: 100.5 LBS | HEART RATE: 60 BPM | TEMPERATURE: 98 F | HEIGHT: 61 IN

## 2019-05-15 DIAGNOSIS — F41.9 ANXIETY: ICD-10-CM

## 2019-05-15 DIAGNOSIS — E55.9 VITAMIN D INSUFFICIENCY: ICD-10-CM

## 2019-05-15 DIAGNOSIS — I95.9 HYPOTENSION, UNSPECIFIED HYPOTENSION TYPE: ICD-10-CM

## 2019-05-15 DIAGNOSIS — M85.89 OSTEOPENIA OF MULTIPLE SITES: ICD-10-CM

## 2019-05-15 DIAGNOSIS — E78.00 PURE HYPERCHOLESTEROLEMIA: Primary | ICD-10-CM

## 2019-05-15 DIAGNOSIS — R63.6 UNDERWEIGHT: ICD-10-CM

## 2019-05-15 DIAGNOSIS — E89.0 POSTABLATIVE HYPOTHYROIDISM: ICD-10-CM

## 2019-05-15 PROCEDURE — 1101F PT FALLS ASSESS-DOCD LE1/YR: CPT | Mod: CPTII,S$GLB,, | Performed by: INTERNAL MEDICINE

## 2019-05-15 PROCEDURE — 99999 PR PBB SHADOW E&M-EST. PATIENT-LVL III: ICD-10-PCS | Mod: PBBFAC,,, | Performed by: INTERNAL MEDICINE

## 2019-05-15 PROCEDURE — 99214 PR OFFICE/OUTPT VISIT, EST, LEVL IV, 30-39 MIN: ICD-10-PCS | Mod: S$GLB,,, | Performed by: INTERNAL MEDICINE

## 2019-05-15 PROCEDURE — 1101F PR PT FALLS ASSESS DOC 0-1 FALLS W/OUT INJ PAST YR: ICD-10-PCS | Mod: CPTII,S$GLB,, | Performed by: INTERNAL MEDICINE

## 2019-05-15 PROCEDURE — 99999 PR PBB SHADOW E&M-EST. PATIENT-LVL III: CPT | Mod: PBBFAC,,, | Performed by: INTERNAL MEDICINE

## 2019-05-15 PROCEDURE — 99214 OFFICE O/P EST MOD 30 MIN: CPT | Mod: S$GLB,,, | Performed by: INTERNAL MEDICINE

## 2019-05-15 RX ORDER — LEVOTHYROXINE SODIUM 50 UG/1
TABLET ORAL
Qty: 60 TABLET | Refills: 1 | Status: SHIPPED | OUTPATIENT
Start: 2019-05-15 | End: 2019-08-22

## 2019-05-15 RX ORDER — LEVOTHYROXINE SODIUM 125 UG/1
TABLET ORAL
Qty: 25 TABLET | Refills: 1 | Status: SHIPPED | OUTPATIENT
Start: 2019-05-15 | End: 2019-08-22

## 2019-05-15 RX ORDER — SIMVASTATIN 20 MG/1
20 TABLET, FILM COATED ORAL NIGHTLY
Qty: 90 TABLET | Refills: 1 | Status: SHIPPED | OUTPATIENT
Start: 2019-05-15 | End: 2019-11-07 | Stop reason: SDUPTHER

## 2019-05-15 NOTE — PROGRESS NOTES
Subjective:       Patient ID: Karina Mathur is a 68 y.o. female.    Chief Complaint: Follow-up    HPI  today for reassessment and go over her labs.  Overall she has been doing fine.     Hyperlipidemia:  On low-fat high-fiber diet; tolerates simvastatin fine.     Post ablated of hypothyroidism:  Takes her levothyroxine appropriately; presently on half of 125 mcg daily; free T3 and free T4 levels are therapeutic per TSH has dropped from 0.939 to  0.567; with history of osteopenia will reduce levothyroxine from 62.5 mcg a day to 50 mcg a day and recheck her levels in 3 months     Osteopenia:  05/02/2019 DEXA [id number] for lumbar spine -1.1; T-score for left femoral neck -1.0; stressed the need for regular weight-bearing exercise as well as calcium and vitamin-D supplementation.  Would repeat DEXA scan in 2 years.     Vitamin-D insufficiency:  05/02/19 hydroxy vitamin-D level therapeutic at 46. Same vit D3 supplements.     Under weight with body mass index 18.99; reducing her thyroid supplementation will help this entity as well; to increase her portions with her meals; needs to drink more fluids w her day. 6-7, 8 oz of fluid a day. Will also help her BP; 96/56.    Review of Systems   Constitutional: Negative for appetite change, fever and unexpected weight change.   HENT: Negative for congestion, postnasal drip, rhinorrhea and sinus pressure.    Eyes: Negative for discharge and itching.   Respiratory: Negative for cough, chest tightness, shortness of breath and wheezing.    Cardiovascular: Negative for chest pain, palpitations and leg swelling.   Gastrointestinal: Negative for abdominal distention, abdominal pain, blood in stool, constipation, diarrhea, nausea and vomiting.   Endocrine: Negative for polydipsia, polyphagia and polyuria.   Genitourinary: Negative for dysuria and hematuria.   Musculoskeletal: Positive for arthralgias. Negative for myalgias.        Diffuse and interm. Uses tylenol arthritis prn;  "agree w stopping mobic.   Skin: Negative for rash.   Allergic/Immunologic: Negative for environmental allergies and food allergies.   Neurological: Negative for tremors, seizures and syncope.   Hematological: Negative for adenopathy. Does not bruise/bleed easily.   Psychiatric/Behavioral:        Anxiety but no depression. Limit her caffeine. Recommend decaff.or 1/2 cut.        Objective:      Vitals:    05/15/19 0756   BP: (!) 96/56   BP Location: Right arm   Patient Position: Sitting   BP Method: Medium (Manual)   Pulse: 60   Temp: 97.6 °F (36.4 °C)   TempSrc: Oral   SpO2: 98%   Weight: 45.6 kg (100 lb 8.5 oz)   Height: 5' 1" (1.549 m)     Body mass index is 18.99 kg/m².    Physical Exam   Constitutional: She is oriented to person, place, and time. She appears well-developed and well-nourished.   HENT:   Head: Normocephalic and atraumatic.   Eyes: EOM are normal.   Neck: Normal range of motion. Neck supple. No thyromegaly present.   Cardiovascular: Normal rate, regular rhythm and normal heart sounds. Exam reveals no gallop.   No murmur heard.  Pulmonary/Chest: Effort normal and breath sounds normal. No respiratory distress. She has no wheezes. She has no rales.   Abdominal: Soft. Bowel sounds are normal. She exhibits no distension. There is no tenderness. There is no rebound and no guarding.   Musculoskeletal: Normal range of motion. She exhibits no edema.   Lymphadenopathy:     She has no cervical adenopathy.   Neurological: She is alert and oriented to person, place, and time.   Moves all 4 extremities fine.   Skin: No rash noted.   Psychiatric: She has a normal mood and affect. Her behavior is normal. Thought content normal.   Vitals reviewed.      Assessment:       1. Pure hypercholesterolemia    2. Postablative hypothyroidism    3. Osteopenia of multiple sites    4. Vitamin D insufficiency    5. Underweight    6. Anxiety    7. Hypotension, unspecified hypotension type        Plan:       Pure " hypercholesterolemia.Maintain low fat high fiber diet, exercise regularly. Cont simvastatin  -     levothyroxine (SYNTHROID) 50 MCG tablet; Take 50 mcg on Tues/Thurs/Sat/ Sun  Dispense: 60 tablet; Refill: 1  -     levothyroxine (SYNTHROID) 125 MCG tablet; 1/2 of 125 mcg po on Mon/Wed/Fri  Dispense: 25 tablet; Refill: 1  -     simvastatin (ZOCOR) 20 MG tablet; Take 1 tablet (20 mg total) by mouth every evening.  Dispense: 90 tablet; Refill: 1  -     T3, free; Future; Expected date: 05/15/2019  -     T4, free; Future; Expected date: 05/15/2019  -     TSH; Future; Expected date: 05/15/2019    Postablative hypothyroidism; decrease thyroid dosing from 1/2 of 125 mcg a day to changes below.  -     levothyroxine (SYNTHROID) 50 MCG tablet; Take 50 mcg on Tues/Thurs/Sat/ Sun  Dispense: 60 tablet; Refill: 1  -     levothyroxine (SYNTHROID) 125 MCG tablet; 1/2 of 125 mcg po on Mon/Wed/Fri  Dispense: 25 tablet; Refill: 1  -     T3, free; Future; Expected date: 05/15/2019  -     T4, free; Future; Expected date: 05/15/2019  -     TSH; Future; Expected date: 05/15/2019    Osteopenia of multiple sites.Weight bearing exercises, continue calcium and vit D supplements. DEXA scan at 2 yr intervals as needed. Decreasing thyroid supplements will help.     Vitamin D insufficiency; same vit D dosing for supplements.     Underweight. Trty to eat 3 meals a day and increase her portions some. Decreasing thyroid supplements will help.  -     levothyroxine (SYNTHROID) 50 MCG tablet; Take 50 mcg on Tues/Thurs/Sat/ Sun  Dispense: 60 tablet; Refill: 1  -     levothyroxine (SYNTHROID) 125 MCG tablet; 1/2 of 125 mcg po on Mon/Wed/Fri  Dispense: 25 tablet; Refill: 1  -     T3, free; Future; Expected date: 05/15/2019  -     T4, free; Future; Expected date: 05/15/2019  -     TSH; Future; Expected date: 05/15/2019    Anxiety; exerrcise and limit caffeine.   -     T3, free; Future; Expected date: 05/15/2019  -     T4, free; Future; Expected date:  05/15/2019  -     TSH; Future; Expected date: 05/15/2019    Hypotension; needs to drink more fluids during day; min 6-7, 8oz of fluid a day; also needs to decrease her caffeine intake as well. 96/56 today.

## 2019-05-15 NOTE — PATIENT INSTRUCTIONS
Pure hypercholesterolemia.Maintain low fat high fiber diet, exercise regularly. Cont simvastatin  -     levothyroxine (SYNTHROID) 50 MCG tablet; Take 50 mcg on Tues/Thurs/Sat/ Sun  Dispense: 60 tablet; Refill: 1  -     levothyroxine (SYNTHROID) 125 MCG tablet; 1/2 of 125 mcg po on Mon/Wed/Fri  Dispense: 25 tablet; Refill: 1  -     simvastatin (ZOCOR) 20 MG tablet; Take 1 tablet (20 mg total) by mouth every evening.  Dispense: 90 tablet; Refill: 1  -     T3, free; Future; Expected date: 05/15/2019  -     T4, free; Future; Expected date: 05/15/2019  -     TSH; Future; Expected date: 05/15/2019    Postablative hypothyroidism; decrease thyroid dosing from 1/2 of 125 mcg a day to changes below.  -     levothyroxine (SYNTHROID) 50 MCG tablet; Take 50 mcg on Tues/Thurs/Sat/ Sun  Dispense: 60 tablet; Refill: 1  -     levothyroxine (SYNTHROID) 125 MCG tablet; 1/2 of 125 mcg po on Mon/Wed/Fri  Dispense: 25 tablet; Refill: 1  -     T3, free; Future; Expected date: 05/15/2019  -     T4, free; Future; Expected date: 05/15/2019  -     TSH; Future; Expected date: 05/15/2019    Osteopenia of multiple sites.Weight bearing exercises, continue calcium and vit D supplements. DEXA scan at 2 yr intervals as needed. Decreasing thyroid supplements will help.     Vitamin D insufficiency; same vit D dosing for supplements.     Underweight. Trty to eat 3 meals a day and increase her portions some. Decreasing thyroid supplements will help.  -     levothyroxine (SYNTHROID) 50 MCG tablet; Take 50 mcg on Tues/Thurs/Sat/ Sun  Dispense: 60 tablet; Refill: 1  -     levothyroxine (SYNTHROID) 125 MCG tablet; 1/2 of 125 mcg po on Mon/Wed/Fri  Dispense: 25 tablet; Refill: 1  -     T3, free; Future; Expected date: 05/15/2019  -     T4, free; Future; Expected date: 05/15/2019  -     TSH; Future; Expected date: 05/15/2019    Anxiety; exerrcise and limit caffeine.   -     T3, free; Future; Expected date: 05/15/2019  -     T4, free; Future; Expected date:  05/15/2019  -     TSH; Future; Expected date: 05/15/2019    Hypotension; needs to drink more fluids during day; min 6-7, 8oz of fluid a day; also needs to decrease her caffeine intake as well.

## 2019-06-17 ENCOUNTER — HOSPITAL ENCOUNTER (OUTPATIENT)
Dept: RADIOLOGY | Facility: HOSPITAL | Age: 69
Discharge: HOME | End: 2019-06-17
Attending: PHYSICAL MEDICINE & REHABILITATION
Payer: COMMERCIAL

## 2019-06-17 ENCOUNTER — OFFICE VISIT (OUTPATIENT)
Dept: SPINE | Facility: CLINIC | Age: 69
End: 2019-06-17
Payer: COMMERCIAL

## 2019-06-17 VITALS
HEART RATE: 62 BPM | DIASTOLIC BLOOD PRESSURE: 52 MMHG | SYSTOLIC BLOOD PRESSURE: 94 MMHG | HEIGHT: 61 IN | BODY MASS INDEX: 18.98 KG/M2 | WEIGHT: 100.5 LBS

## 2019-06-17 DIAGNOSIS — M54.2 CERVICALGIA: ICD-10-CM

## 2019-06-17 DIAGNOSIS — M54.5 CHRONIC MIDLINE LOW BACK PAIN, WITH SCIATICA PRESENCE UNSPECIFIED: ICD-10-CM

## 2019-06-17 DIAGNOSIS — G89.29 CHRONIC MIDLINE LOW BACK PAIN, WITH SCIATICA PRESENCE UNSPECIFIED: ICD-10-CM

## 2019-06-17 DIAGNOSIS — M54.2 CERVICALGIA: Primary | ICD-10-CM

## 2019-06-17 PROCEDURE — 72040 X-RAY EXAM NECK SPINE 2-3 VW: CPT | Mod: TC,FY

## 2019-06-17 PROCEDURE — 1101F PT FALLS ASSESS-DOCD LE1/YR: CPT | Mod: ,,, | Performed by: PHYSICAL MEDICINE & REHABILITATION

## 2019-06-17 PROCEDURE — 72100 X-RAY EXAM L-S SPINE 2/3 VWS: CPT | Mod: 26,,, | Performed by: RADIOLOGY

## 2019-06-17 PROCEDURE — 72040 X-RAY EXAM NECK SPINE 2-3 VW: CPT | Mod: 26,,, | Performed by: RADIOLOGY

## 2019-06-17 PROCEDURE — 72040 XR CERVICAL SPINE AP LATERAL: ICD-10-PCS | Mod: 26,,, | Performed by: RADIOLOGY

## 2019-06-17 PROCEDURE — 99204 PR OFFICE/OUTPT VISIT, NEW, LEVL IV, 45-59 MIN: ICD-10-PCS | Mod: ,,, | Performed by: PHYSICAL MEDICINE & REHABILITATION

## 2019-06-17 PROCEDURE — 72100 X-RAY EXAM L-S SPINE 2/3 VWS: CPT | Mod: TC,FY

## 2019-06-17 PROCEDURE — 1101F PR PT FALLS ASSESS DOC 0-1 FALLS W/OUT INJ PAST YR: ICD-10-PCS | Mod: ,,, | Performed by: PHYSICAL MEDICINE & REHABILITATION

## 2019-06-17 PROCEDURE — 99204 OFFICE O/P NEW MOD 45 MIN: CPT | Mod: ,,, | Performed by: PHYSICAL MEDICINE & REHABILITATION

## 2019-06-17 PROCEDURE — 72100 XR LUMBAR SPINE AP AND LATERAL: ICD-10-PCS | Mod: 26,,, | Performed by: RADIOLOGY

## 2019-06-17 NOTE — PROGRESS NOTES
SUBJECTIVE:    Patient ID: Karina Mathur is a 68 y.o. female.    Chief Complaint: Back Pain (lower back pain x2> mo )    This is a 68-year-old woman who sees Dr. Cardoso for her primary care. Other than hyperlipidemia she has no chronic major medical problems.  She has a long history of low back and neck pain intermittently.  She presents with several months history of low back pain at the lumbosacral junction occasionally associated with left leg pain.  She has had the leg pain for several months.  No weakness or bowel or bladder dysfunction.  No fever chills sweats or unexpected weight loss.  Also has posterior neck discomfort that at times is worse than the lower back pain.  Again no radicular symptoms or arm pain.  Current pain level is 4/10.  She describes as stiffness in the morning that worsens as the day goes on.  She says that Aleve helps but she has been advised not to take that.  She is allowed to have Tylenol for pain.  She says that she attributes some of her lower back discomfort to having to assist her elderly mother who is wheelchair bound        Past Medical History:   Diagnosis Date    Hyperlipidemia     on simvastatin    Hypothyroidism     hx of iodine cocktail around 1988 for goiter.    Osteopenia     1/18/17; L fem neck w Tscore -1.7; Lsp -1.2; DEXA in 2 yrs.     Social History     Socioeconomic History    Marital status:      Spouse name: Not on file    Number of children: Not on file    Years of education: Not on file    Highest education level: Not on file   Occupational History    Not on file   Social Needs    Financial resource strain: Not on file    Food insecurity:     Worry: Not on file     Inability: Not on file    Transportation needs:     Medical: Not on file     Non-medical: Not on file   Tobacco Use    Smoking status: Never Smoker    Smokeless tobacco: Never Used   Substance and Sexual Activity    Alcohol use: Yes     Alcohol/week: 0.6 oz     Types: 1  "Glasses of wine per week     Comment: 1-2 glasses a month    Drug use: No    Sexual activity: Not on file   Lifestyle    Physical activity:     Days per week: Not on file     Minutes per session: Not on file    Stress: Not on file   Relationships    Social connections:     Talks on phone: Not on file     Gets together: Not on file     Attends [employer] service: Not on file     Active member of club or organization: Not on file     Attends meetings of clubs or organizations: Not on file     Relationship status: Not on file   Other Topics Concern    Not on file   Social History Narrative    Not on file     Past Surgical History:   Procedure Laterality Date    INGUINAL HERNIA REPAIR       History reviewed. No pertinent family history.  Vitals:    06/17/19 1405   BP: (!) 94/52   Pulse: 62   Weight: 45.6 kg (100 lb 8.5 oz)   Height: 5' 1" (1.549 m)       Review of Systems   Constitutional: Negative for chills, diaphoresis, fatigue, fever and unexpected weight change.   HENT: Negative for trouble swallowing.    Eyes: Negative for visual disturbance.   Respiratory: Negative for shortness of breath.    Cardiovascular: Negative for chest pain.   Gastrointestinal: Negative for abdominal pain, constipation, nausea and vomiting.   Genitourinary: Negative for difficulty urinating.   Musculoskeletal: Negative for arthralgias, back pain, gait problem, joint swelling, myalgias, neck pain and neck stiffness.   Neurological: Negative for dizziness, speech difficulty, weakness, light-headedness, numbness and headaches.          Objective:      Physical Exam   Constitutional: She is oriented to person, place, and time. She appears well-developed and well-nourished.   Neurological: She is alert and oriented to person, place, and time.   She is awake and in no acute distress  No point tenderness or palpable masses about the lumbar spine  She can heel and toe walk normally  Deep tendon reflexes are +2 at both knees and +1 at both " ankles  Strength is normal in both lower extremities  Straight leg raising negative bilaterally  SELINA testing negative bilaterally           Assessment:       1. Cervicalgia    2. Chronic midline low back pain, with sciatica presence unspecified           Plan:     she has a nonfocal examination from a neurological standpoint and no historical red flags.  She has low back pain and neck pain most likely on the basis of degenerative disc disease.  I recommend outpatient physical therapy.  We will get some x-rays of the neck and the low back.  I can report those to her over the phone unless I see something unexpected.  Otherwise follow-up in 6 weeks      Cervicalgia  -     X-Ray Cervical Spine AP And Lateral; Future; Expected date: 06/17/2019  -     Ambulatory Referral to Physical/Occupational Therapy    Chronic midline low back pain, with sciatica presence unspecified  -     X-Ray Lumbar Spine Ap And Lateral; Future; Expected date: 06/17/2019  -     Ambulatory Referral to Physical/Occupational Therapy

## 2019-06-18 NOTE — PROGRESS NOTES
She has expected degenerative disc disease of the neck and low back.  No unexpected findings.  Proceed with physical therapy

## 2019-06-24 ENCOUNTER — TELEPHONE (OUTPATIENT)
Dept: SPINE | Facility: CLINIC | Age: 69
End: 2019-06-24

## 2019-06-24 NOTE — TELEPHONE ENCOUNTER
----- Message from Arthur Zapata sent at 6/24/2019 11:56 AM CDT -----  Contact: Patient  Type: Needs Medical Advice    Who Called:  Patient  Best Call Back Number: [phone]  Additional Information: Patient would like to discuss test results. Please call to advise. Thanks!

## 2019-07-01 ENCOUNTER — CLINICAL SUPPORT (OUTPATIENT)
Dept: REHABILITATION | Facility: HOSPITAL | Age: 69
End: 2019-07-01
Attending: PHYSICAL MEDICINE & REHABILITATION
Payer: COMMERCIAL

## 2019-07-01 DIAGNOSIS — G89.29 CHRONIC MIDLINE LOW BACK PAIN, WITH SCIATICA PRESENCE UNSPECIFIED: ICD-10-CM

## 2019-07-01 DIAGNOSIS — M54.5 CHRONIC MIDLINE LOW BACK PAIN, WITH SCIATICA PRESENCE UNSPECIFIED: ICD-10-CM

## 2019-07-01 DIAGNOSIS — M54.2 CERVICALGIA: Primary | ICD-10-CM

## 2019-07-01 PROBLEM — M54.50 CHRONIC MIDLINE LOW BACK PAIN: Status: ACTIVE | Noted: 2019-07-01

## 2019-07-01 PROCEDURE — 97161 PT EVAL LOW COMPLEX 20 MIN: CPT | Mod: PN

## 2019-07-01 NOTE — PLAN OF CARE
TIME RECORD    Date: 07/01/2019    Start Time:  1700  Stop Time:  1750    PROCEDURES:    TIMED  Procedure Time Min.    Start:  Stop:     Start:  Stop:     Start:  Stop:     Start:  Stop:          UNTIMED  Procedure Time Min.   EVAL Start:  Stop:     Start:  Stop:      Total Timed Minutes:    Total Timed Units:    Total Untimed Units:  1  Charges Billed/# of units:  1    OUTPATIENT PHYSICAL THERAPY   PATIENT EVALUATION  Onset Date: Insidious.  Primary Diagnosis:   1. Cervicalgia     2. Chronic midline low back pain, with sciatica presence unspecified       Treatment Diagnosis: Neck pain,LBP  Past Medical History:   Diagnosis Date    Hyperlipidemia     on simvastatin    Hypothyroidism     hx of iodine cocktail around 1988 for goiter.    Osteopenia     1/18/17; L fem neck w Tscore -1.7; Lsp -1.2; DEXA in 2 yrs.     Precautions: STANDARD.  Prior Therapy: No.  Medications: Karina Mathur has a current medication list which includes the following prescription(s): ascorbic acid (vitamin c), biotin, cyanocobalamin (vitamin b-12), levothyroxine, levothyroxine, multivitamin, simvastatin, and vitamin d.  Nutrition:  Normal  History of Present Illness: Neck pain and LBP started ~ 3/12 ago without trauma.  Prior Level of Function: Independent  Social History: Retired.  Place of Residence (Steps/Adaptations): In [address] house with .  Functional Deficits Leading to Referral/Nature of Injury: Difficulties with ADLs,functional activities,homemaking.  Patient Therapy Goals: Decrease pain.    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    Pain:  Location: back  and neck   Description: Dull, Throbbing, Sharp and Variable  Activities Which Increase Pain: Standing, Bending, Walking, Extension and Lifting  Activities Which Decrease Pain: relaxation, pain medication and rest  Pain Scale: 3/10 at best 5/10 now  7/10 at worst    Objective     Posture: Head  forward,scoliosis,th/kyphosis, left shoulder elevated.  Palpation: C3-T1 paraspinals, both upper traps tender  L2-S1 tender bilaterally.   Sensation: Intact.  DTRs:   Range of Motion/Strength: CS strength 3-/5.  Cervical/Thoracic/Lumbar AROM: Pain/Dysfunction with Movement:   Flexion   WNL    Extension  WNL    Right side bending   25    Left side bending  30    Right rotation   60    Left rotation   50       ROM of LS is grossly WFL/WNL. Strength is ~ 3+/5 in all planes.  Flexibility: Both HS tight.  Gait: Without AD  Analysis: SBA - guarded,flexed hips.  Bed Mobility:Independent  Transfers: Independent  Special Tests: Slump test neg (-) bilaterally. SLR  LT;90, RT;90 neg (-).  Other: NDI 9/50 18% IMPAIRED. OSWESTRY 10/50 20% IMPAIRED.  Treatment: EVAL    Assessment       Initial Assessment (Pertinent finding, problem list and factors affecting outcome): Pt presents ROM and strength deficits,poor posture,decreased functional status due to pain and above listed deficits.  Rehab Potiential: good    Short Term Goals (3 Weeks): 1.Decrease pain x 1 grade. 2.Start HEP.  Long Term Goals (6 Weeks): 1.Pain 0-4/10. 2.Independent HEP. 3.ROM WFL/WNL. 4.Strength 5-/5. 5. NDI 6/50. 6.OSWESTRY 7/50.     Plan     Certification Period: 7/1/19 to 9/1/19.  Recommended Treatment Plan: 2 times per week for 6 weeks: Cervical/Lumbar Traction, Electrical Stimulation PRN, Manual Therapy, Moist Heat/ Ice, Patient Education, Therapeutic Activites, Therapeutic Exercise and Ultrasound  Other Recommendations: Dry needling PRN. IMS PRN.      Therapist: Steven Curran, PT    I CERTIFY THE NEED FOR THESE SERVICES FURNISHED UNDER THIS PLAN OF TREATMENT AND WHILE UNDER MY CARE    Physician's comments: ________________________________________________________________________________________________________________________________________________      Physician's Name: ___________________________________

## 2019-08-08 ENCOUNTER — PATIENT OUTREACH (OUTPATIENT)
Dept: ADMINISTRATIVE | Facility: HOSPITAL | Age: 69
End: 2019-08-08

## 2019-08-08 ENCOUNTER — CLINICAL SUPPORT (OUTPATIENT)
Dept: REHABILITATION | Facility: HOSPITAL | Age: 69
End: 2019-08-08
Attending: PHYSICAL MEDICINE & REHABILITATION
Payer: COMMERCIAL

## 2019-08-08 DIAGNOSIS — G89.29 CHRONIC MIDLINE LOW BACK PAIN, WITH SCIATICA PRESENCE UNSPECIFIED: ICD-10-CM

## 2019-08-08 DIAGNOSIS — M54.5 CHRONIC MIDLINE LOW BACK PAIN, WITH SCIATICA PRESENCE UNSPECIFIED: ICD-10-CM

## 2019-08-08 DIAGNOSIS — M54.2 CERVICALGIA: Primary | ICD-10-CM

## 2019-08-08 PROCEDURE — 97110 THERAPEUTIC EXERCISES: CPT | Mod: PN

## 2019-08-08 NOTE — PROGRESS NOTES
"Name: Karina Mathur  Clinic Number: [id number]  Date of Treatment: 08/08/2019   Diagnosis:   Encounter Diagnoses   Name Primary?    Cervicalgia Yes    Chronic midline low back pain, with sciatica presence unspecified        Time in: 1605  Time Out: 1705  Total Treatment Time: 60    Subjective:    Karina reports L sided neck and UT discomfort which travels down her L side of her back to her LB. Pain in LB is equal R/L. All pain is described as aching. Sharp pains in her L UT area sometimes when she is doing shores around the house.  Patient reports their pain to be 5/10 on a 0-10 scale with 0 being no pain and 10 being the worst pain imaginable. "I think it may be arthritis and this (PT) might not help)." Reports occasional L thigh discomfort.     Objective:    Patient received individual therapy to increase strength, endurance, ROM, flexibility, posture and core stabilization with activities as follows:     Karina received therapeutic exercises to develop strength, endurance, ROM, flexibility, posture and core stabilization for 60 minutes including:     NuStep L2 10' UE's and LE's  Seated TE:     C/s AROM 10     Chin tucks 10/2     Scap retraction B 10/2  Seated hamstring stretches 3/30s L/R  Supine TE 10/2:     PPT     Clams RTB     Ballsquemarii  Standing lat trunk shift correction R 10 x 10s    Purse weight 4.07#    Written and pictorial HEP of ex's in EPIC reviewed and issued to pt. Pt instructed to perform HEP daily and stop if symptoms increase.  Issued RTB  for HEP with instruction to keep away from small children, animals, and anyone with latex allergies. Pt verbalizes understanding. Issued handout on purse safety with instruction to decrease load.    Pt demo good understanding of the education provided. Patient demonstrated good return demonstration of activities.     Assessment:     Good tolerance for ex's. L thigh discomfort with lat shift correction reported afterwards, which resolved after stopping " activity.     Pt will continue to benefit from skilled PT intervention. Medical Necessity is demonstrated by:  Requires skilled supervision to complete and progress HEP and Weakness.    Patient is making good progress towards established goals.    Plan:    Continue with established Plan of Care towards PT goals.

## 2019-08-08 NOTE — PATIENT INSTRUCTIONS
AROM: Neck Rotation        Turn head slowly to look over one shoulder, then the other. Hold each position ____ seconds.  Repeat ____ times per set. Do ____ sets per session. Do ____ sessions per day.     https://[employer]/294     Copyright © [employer]. All rights reserved.   AROM: Lateral Neck Flexion        Slowly tilt head toward one shoulder, then the other. Hold each position ____ seconds.  Repeat ____ times per set. Do ____ sets per session. Do ____ sessions per day.     https://[employer]/296     Copyright © [employer]. All rights reserved.   AROM: Neck Flexion        Bend head forward. Hold ____ seconds.  Repeat ____ times per set. Do ____ sets per session. Do ____ sessions per day.     https://[employer]/298     Copyright © [employer]. All rights reserved.   AROM: Neck Extension        Bend head backward. Hold ____ seconds.  Repeat ____ times per set. Do ____ sets per session. Do ____ sessions per day.     https://[employer]/300     Copyright © [employer]. All rights reserved.   Flexibility: Neck Retraction        Pull head straight back, keeping eyes and jaw level.  Repeat ____ times per set. Do ____ sets per session. Do ____ sessions per day.     https://[employer]/344     Copyright © [employer]. All rights reserved.   Flexibility: Neck Stretch        Grasp left arm above wrist and pull down across body while gently tilting head same direction. Hold ____ seconds. Relax.  Repeat ____ times per set. Do ____ sets per session. Do ____ sessions per day.     https://[employer]/346     Copyright © [employer]. All rights reserved.   Scapular Retraction: Elbow Flexion (Standing)        With elbows bent to 90°, pinch shoulder blades together and rotate arms out, keeping elbows bent.  Repeat ____ times per set. Do ____ sets per session. Do ____ sessions per day.     https://[employer]/948     Copyright © [employer]. All rights reserved.   Pelvic Tilt        Flatten back by tightening stomach muscles and buttocks.  Repeat ____ times per set. Do ____  sets per session. Do ____ sessions per day.     [email or web address]://[email or web address]/134     Copyright © [employer]. All rights reserved.   Strengthening: Hip Adduction - Isometric        With ball or folded pillow between knees, squeeze knees together. Hold ____ seconds.  Repeat ____ times per set. Do ____ sets per session. Do ____ sessions per day.     [email or web address]://[email or web address]/612     Copyright © [employer]. All rights reserved.   Strengthening: Hip Abductor - Resisted        With band looped around both legs above knees, push thighs apart.  Repeat ____ times per set. Do ____ sets per session. Do ____ sessions per day.     [email or web address]/688     Copyright © [employer]. All rights reserved.

## 2019-08-12 ENCOUNTER — CLINICAL SUPPORT (OUTPATIENT)
Dept: REHABILITATION | Facility: HOSPITAL | Age: 69
End: 2019-08-12
Attending: PHYSICAL MEDICINE & REHABILITATION
Payer: COMMERCIAL

## 2019-08-12 DIAGNOSIS — M54.2 NECK PAIN: Primary | ICD-10-CM

## 2019-08-12 PROCEDURE — 97012 MECHANICAL TRACTION THERAPY: CPT | Mod: PN

## 2019-08-12 PROCEDURE — 97110 THERAPEUTIC EXERCISES: CPT | Mod: PN

## 2019-08-12 NOTE — PROGRESS NOTES
TIME RECORD    Date:  08/12/2019  Start Time:  1608  Stop Time:  1702    PROCEDURES:    TIMED  Procedure Time Min.   TE Start:1608  Stop:1646 38    Start:  Stop:     Start:  Stop:     Start:  Stop:          UNTIMED  Procedure Time Min.   TX Start:1647  Stop:1702 15    Start:  Stop:      Total Timed Minutes:  38  Total Timed Units:  3  Total Untimed Units:  1  Charges Billed/# of units:  4      Progress/Current Status    Subjective:     Patient ID: Karina Mathur is a 68 y.o. female.  Diagnosis:   1. Neck pain       Pain: 5 /10      Objective:     TE for ROM,strength f/b ICTX X 15' @ 12/0# 40/10S.    Assessment:     Relief following TX.    Patient Education/Response:     Posture ed.    Plans and Goals:     Cont per POC.

## 2019-08-12 NOTE — PROGRESS NOTES
08/12/19 1700   Cx/Thoracic Exercises   Shoulder Shrugs Rep/Sets/Weight 30,ROLLS/RETR 30   Cervical Flexion Stretch Reps/Sets/Hold Time ROM 20 EACH.   Shoulder Exercises   Overhead Pulley 6'   Additional Exercises   Additional Exercise UBE 5/5   Additional Exercise ROM CS 20,CHIN TUCKS30   Additional Exercise BIKE 10'

## 2019-08-15 ENCOUNTER — LAB VISIT (OUTPATIENT)
Dept: LAB | Facility: HOSPITAL | Age: 69
End: 2019-08-15
Attending: INTERNAL MEDICINE
Payer: COMMERCIAL

## 2019-08-15 ENCOUNTER — CLINICAL SUPPORT (OUTPATIENT)
Dept: REHABILITATION | Facility: HOSPITAL | Age: 69
End: 2019-08-15
Attending: PHYSICAL MEDICINE & REHABILITATION
Payer: COMMERCIAL

## 2019-08-15 DIAGNOSIS — M54.2 CERVICALGIA: ICD-10-CM

## 2019-08-15 DIAGNOSIS — G89.29 CHRONIC MIDLINE LOW BACK PAIN, WITH SCIATICA PRESENCE UNSPECIFIED: Primary | ICD-10-CM

## 2019-08-15 DIAGNOSIS — E78.00 PURE HYPERCHOLESTEROLEMIA: ICD-10-CM

## 2019-08-15 DIAGNOSIS — R63.6 UNDERWEIGHT: ICD-10-CM

## 2019-08-15 DIAGNOSIS — M54.2 NECK PAIN: ICD-10-CM

## 2019-08-15 DIAGNOSIS — F41.9 ANXIETY: ICD-10-CM

## 2019-08-15 DIAGNOSIS — M54.5 CHRONIC MIDLINE LOW BACK PAIN, WITH SCIATICA PRESENCE UNSPECIFIED: Primary | ICD-10-CM

## 2019-08-15 DIAGNOSIS — E89.0 POSTABLATIVE HYPOTHYROIDISM: ICD-10-CM

## 2019-08-15 LAB
T3FREE SERPL-MCNC: 2.5 PG/ML (ref 2.3–4.2)
T3FREE SERPL-MCNC: 2.5 PG/ML (ref 2.3–4.2)
T4 FREE SERPL-MCNC: 0.9 NG/DL (ref 0.71–1.51)
TSH SERPL DL<=0.005 MIU/L-ACNC: 1.89 UIU/ML (ref 0.4–4)

## 2019-08-15 PROCEDURE — 97110 THERAPEUTIC EXERCISES: CPT | Mod: PN

## 2019-08-15 PROCEDURE — 36415 COLL VENOUS BLD VENIPUNCTURE: CPT

## 2019-08-15 PROCEDURE — 84439 ASSAY OF FREE THYROXINE: CPT

## 2019-08-15 PROCEDURE — 97140 MANUAL THERAPY 1/> REGIONS: CPT | Mod: PN

## 2019-08-15 PROCEDURE — 84481 FREE ASSAY (FT-3): CPT

## 2019-08-15 PROCEDURE — 84443 ASSAY THYROID STIM HORMONE: CPT

## 2019-08-15 NOTE — PROGRESS NOTES
"[id number]  Date of Treatment: 08/15/2019   Diagnosis:   Encounter Diagnoses   Name Primary?    Chronic midline low back pain, with sciatica presence unspecified Yes    Neck pain     Cervicalgia        Time in: 0905  Time Out: 1005  Total Treatment Time: 60  Group Time: no      Subjective:    Karina reports she is hurting and tight on her right side from her shoulder to her low back. 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  Patient reports their pain to be 5/10 on a 0-10 scale with 0 being no pain and 10 being the worst pain imaginable. After therapy today her pain had decreased to a 4/10 but she had normal ROM to the cervical spine and lateral movement on both sides and stated she felt much better. Patient stated she also felt much better after STM to upper traps.    Objective    Karina received therapeutic exercises to develop strength, ROM, flexibility and posture for 45 minutes including:   UBE 5/5  Bike L-3 x 10 min  OH pulley flexion/extension x 2 min  Seated Cervical ROM: flexion/extension, side bending, chin tucks, retro rolls 3 x 10 ea  Pulley 20# rows for scapular retraction and strengthening 3 x 10  Lateral side bending to R against wall to increase cervical motion and stretch L side lats. 3 x 30"       Karina received the following manual therapy techniques: Soft tissue Mobilization were applied to the left scapular muscular muscles and left side upper traps: 15 for minutes.       Written Home Exercises Provided: continue with current  Pt demo fair understanding of the education provided. Karina demonstrated fair return demonstration of activities.     Assessment:     Patient may do well with some added core strengthening as well with upper body strengthening due to heavy lifting required of her on a daily basis with her elderly mother. She is very weak in middle traps and " "had trouble with rows on pulley lacking strength to pull 20#. She also had a large 2" diameter muscle spasm present on the left upper trap which was relieved some with STM. She is not able to tolerate myofascial release but may do well with IASTM.    Pt will continue to benefit from skilled PT intervention. Medical Necessity is demonstrated by:  Pain limits function of effected part for some activities, Requires skilled supervision to complete and progress HEP and Weakness.    Patient is making fair progress towards established goals.    New/Revised goals: no      Plan:  Continue with established Plan of Care towards PT goals.   "

## 2019-08-19 ENCOUNTER — CLINICAL SUPPORT (OUTPATIENT)
Dept: REHABILITATION | Facility: HOSPITAL | Age: 69
End: 2019-08-19
Attending: PHYSICAL MEDICINE & REHABILITATION
Payer: COMMERCIAL

## 2019-08-19 DIAGNOSIS — M54.2 CERVICALGIA: Primary | ICD-10-CM

## 2019-08-19 DIAGNOSIS — G89.29 CHRONIC MIDLINE LOW BACK PAIN, WITH SCIATICA PRESENCE UNSPECIFIED: ICD-10-CM

## 2019-08-19 DIAGNOSIS — M54.5 CHRONIC MIDLINE LOW BACK PAIN, WITH SCIATICA PRESENCE UNSPECIFIED: ICD-10-CM

## 2019-08-19 PROCEDURE — 97150 GROUP THERAPEUTIC PROCEDURES: CPT | Mod: PN

## 2019-08-19 PROCEDURE — 97140 MANUAL THERAPY 1/> REGIONS: CPT | Mod: PN

## 2019-08-19 PROCEDURE — 97110 THERAPEUTIC EXERCISES: CPT | Mod: PN

## 2019-08-22 ENCOUNTER — CLINICAL SUPPORT (OUTPATIENT)
Dept: REHABILITATION | Facility: HOSPITAL | Age: 69
End: 2019-08-22
Attending: PHYSICAL MEDICINE & REHABILITATION
Payer: COMMERCIAL

## 2019-08-22 ENCOUNTER — OFFICE VISIT (OUTPATIENT)
Dept: FAMILY MEDICINE | Facility: CLINIC | Age: 69
End: 2019-08-22
Payer: COMMERCIAL

## 2019-08-22 VITALS
WEIGHT: 105.63 LBS | SYSTOLIC BLOOD PRESSURE: 82 MMHG | OXYGEN SATURATION: 98 % | BODY MASS INDEX: 19.94 KG/M2 | HEIGHT: 61 IN | HEART RATE: 67 BPM | DIASTOLIC BLOOD PRESSURE: 60 MMHG | TEMPERATURE: 98 F

## 2019-08-22 DIAGNOSIS — G89.29 CHRONIC MIDLINE LOW BACK PAIN, WITH SCIATICA PRESENCE UNSPECIFIED: ICD-10-CM

## 2019-08-22 DIAGNOSIS — M54.5 CHRONIC MIDLINE LOW BACK PAIN, WITH SCIATICA PRESENCE UNSPECIFIED: ICD-10-CM

## 2019-08-22 DIAGNOSIS — Z12.11 COLON CANCER SCREENING: ICD-10-CM

## 2019-08-22 DIAGNOSIS — E89.0 POSTABLATIVE HYPOTHYROIDISM: ICD-10-CM

## 2019-08-22 DIAGNOSIS — E78.00 PURE HYPERCHOLESTEROLEMIA: ICD-10-CM

## 2019-08-22 DIAGNOSIS — M54.2 NECK PAIN: Primary | ICD-10-CM

## 2019-08-22 DIAGNOSIS — E89.0 POSTABLATIVE HYPOTHYROIDISM: Primary | ICD-10-CM

## 2019-08-22 DIAGNOSIS — R63.6 UNDERWEIGHT: ICD-10-CM

## 2019-08-22 PROCEDURE — 99999 PR PBB SHADOW E&M-EST. PATIENT-LVL IV: CPT | Mod: PBBFAC,,, | Performed by: NURSE PRACTITIONER

## 2019-08-22 PROCEDURE — 97110 THERAPEUTIC EXERCISES: CPT | Mod: PN

## 2019-08-22 PROCEDURE — 97012 MECHANICAL TRACTION THERAPY: CPT | Mod: PN

## 2019-08-22 PROCEDURE — 1101F PR PT FALLS ASSESS DOC 0-1 FALLS W/OUT INJ PAST YR: ICD-10-PCS | Mod: CPTII,S$GLB,, | Performed by: NURSE PRACTITIONER

## 2019-08-22 PROCEDURE — 99215 OFFICE O/P EST HI 40 MIN: CPT | Mod: S$GLB,,, | Performed by: NURSE PRACTITIONER

## 2019-08-22 PROCEDURE — 1101F PT FALLS ASSESS-DOCD LE1/YR: CPT | Mod: CPTII,S$GLB,, | Performed by: NURSE PRACTITIONER

## 2019-08-22 PROCEDURE — 99215 PR OFFICE/OUTPT VISIT, EST, LEVL V, 40-54 MIN: ICD-10-PCS | Mod: S$GLB,,, | Performed by: NURSE PRACTITIONER

## 2019-08-22 PROCEDURE — 99999 PR PBB SHADOW E&M-EST. PATIENT-LVL IV: ICD-10-PCS | Mod: PBBFAC,,, | Performed by: NURSE PRACTITIONER

## 2019-08-22 RX ORDER — TIZANIDINE 2 MG/1
4 TABLET ORAL EVERY 6 HOURS PRN
Qty: 60 TABLET | Refills: 0 | Status: SHIPPED | OUTPATIENT
Start: 2019-08-22 | End: 2020-07-13

## 2019-08-22 RX ORDER — LEVOTHYROXINE SODIUM 50 UG/1
TABLET ORAL
Qty: 60 TABLET | Refills: 1 | Status: SHIPPED | OUTPATIENT
Start: 2019-08-22 | End: 2019-11-13 | Stop reason: SDUPTHER

## 2019-08-22 RX ORDER — LEVOTHYROXINE SODIUM 125 UG/1
TABLET ORAL
Qty: 25 TABLET | Refills: 1 | Status: SHIPPED | OUTPATIENT
Start: 2019-08-22 | End: 2019-11-13 | Stop reason: SDUPTHER

## 2019-08-22 NOTE — PROGRESS NOTES
This dictation has been generated using Modal Fluency Dictation some phonetic errors may occur. Please contact author for clarification if needed.     Problem List Items Addressed This Visit     Hyperlipidemia    Overview     on simvastatin         Relevant Medications    levothyroxine (SYNTHROID) 50 MCG tablet    levothyroxine (SYNTHROID) 125 MCG tablet    Other Relevant Orders    T3, free    T4, free    TSH    Lipid panel    Postablative hypothyroidism - Primary    Relevant Medications    levothyroxine (SYNTHROID) 50 MCG tablet    levothyroxine (SYNTHROID) 125 MCG tablet    Other Relevant Orders    T3, free    T4, free    TSH    Lipid panel    Chronic midline low back pain      Other Visit Diagnoses     Underweight        Relevant Medications    levothyroxine (SYNTHROID) 50 MCG tablet    levothyroxine (SYNTHROID) 125 MCG tablet        Orders Placed This Encounter    T3, free    T4, free    TSH    Lipid panel    levothyroxine (SYNTHROID) 50 MCG tablet    levothyroxine (SYNTHROID) 125 MCG tablet    tiZANidine (ZANAFLEX) 2 MG tablet     Hypothyroidism post ablation.  We had thought to change her to a straight 50mcg dosing however last year she was on that dosing with TSH at greater than 5.  Weight was higher but controlled.  We will adjust her levothyroxine dosing today from 125 mcg 4 times a week down to dosing of 125 mcg on Monday and Thursday.  She will use the levothyroxine 50 mcg all other days.  This should move her TSH up into a target range of 3 or 4.  Hopefully she will gain another 5-10 lb.  Also will bring some benefit for her bones and cholesterol.  Labs in 3 months prior to follow-up with us.  Back spasm med tizanidine  Lipids monitor. Recheck November.     Follow up in about 3 months (around 11/22/2019).  In excessive of 45 minutes spent with patient with greater than 50% of time dedicated to education on symptoms, diagnosis, treatments, and coordination of care.      ________________________________________________________________  ________________________________________________________________      Chief Complaint   Patient presents with    Medication Refill     History of present illness  This 68 y.o. presents today for complaint of following up on thyroid.  She had an adjustment this year on her thyroid dosing.  Her TSH was 0.567.  Levothyroxine adjusted and patient here for follow-up.  She does note some weight gain(5#).  She does feel better.  She does have mildly abnormal DEXA scan numbers reflective of osteopenia.  We reviewed the old chest x-ray images and note some disc space loss, and kyphosis due to mild vertebral changes.  She is going to physical therapy for back spasms currently and they are working on her posture.  She does ask for a muscle spasm medicine.  She had discussed this with physical therapy.  She had tizanidine more than 18 months ago which was helpful and without side effects.  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  Mother beginning to have some difficulty with ambulation requiring assistance.    Reviewed cholesterol panel and will repeat that again in 3 months with thyroid levels.  Complete review of systems otherwise unremarkable    Past medical and social history reviewed.  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  Mother beginning to have some difficulty with ambulation requiring assistance.    Past Medical History:   Diagnosis Date    Hyperlipidemia     on simvastatin    Hypothyroidism     hx of iodine cocktail around 1988 for goiter.    Osteopenia     1/18/17; L fem neck w Tscore -1.7; Lsp -1.2; DEXA in 2 yrs.       Past Surgical History:   Procedure Laterality Date    INGUINAL HERNIA REPAIR         History reviewed. No pertinent family history.    Social History     Socioeconomic History    Marital status:      Spouse name: Not on file    Number of children: Not on file    Years of education: Not on file    Highest education level: Not  on file   Occupational History    Not on file   Social Needs    Financial resource strain: Not on file    Food insecurity:     Worry: Not on file     Inability: Not on file    Transportation needs:     Medical: Not on file     Non-medical: Not on file   Tobacco Use    Smoking status: Never Smoker    Smokeless tobacco: Never Used   Substance and Sexual Activity    Alcohol use: Yes     Alcohol/week: 0.6 oz     Types: 1 Glasses of wine per week     Comment: 1-2 glasses a month    Drug use: No    Sexual activity: Not on file   Lifestyle    Physical activity:     Days per week: Not on file     Minutes per session: Not on file    Stress: Not on file   Relationships    Social connections:     Talks on phone: Not on file     Gets together: Not on file     Attends [employer] service: Not on file     Active member of club or organization: Not on file     Attends meetings of clubs or organizations: Not on file     Relationship status: Not on file   Other Topics Concern    Not on file   Social History Narrative    Not on file       Current Outpatient Medications   Medication Sig Dispense Refill    ascorbic acid, vitamin C, (VITAMIN C) 500 MG tablet Take 500 mg by mouth once daily.      biotin 2,500 mcg Cap Take 1 capsule by mouth.       CYANOCOBALAMIN, VITAMIN B-12, (VITAMIN B-12 ORAL) Take 1 Film by mouth once daily.      levothyroxine (SYNTHROID) 125 MCG tablet 1/2 of 125 mcg po on Mon and Thur 25 tablet 1    levothyroxine (SYNTHROID) 50 MCG tablet Take 50 mcg on Tues/Wed/Fri/Sat/ Sun 60 tablet 1    multivitamin (ONE DAILY MULTIVITAMIN) per tablet Take 1 tablet by mouth once daily.      simvastatin (ZOCOR) 20 MG tablet Take 1 tablet (20 mg total) by mouth every evening. 90 tablet 1    vitamin D 1000 units Tab Take 1,000 Units by mouth once daily.       tiZANidine (ZANAFLEX) 2 MG tablet Take 2 tablets (4 mg total) by mouth every 6 (six) hours as needed (back spasms). 60 tablet 0     No current  facility-administered medications for this visit.        Review of patient's allergies indicates:  No Known Allergies    Physical examination  Vitals Reviewed  Gen. Well-dressed well-nourished   Skin warm dry and intact.  No rashes noted.  Neck is supple without adenopathy  Chest.  Respirations are even unlabored.     Neuro. Awake alert oriented x4.  Normal judgment and cognition noted.  Extremities no clubbing cyanosis or edema noted. Minimal kyphosis noted. No midline tenderness with palpation.  She does have palpable spasms of the thoracic and lumbar spine.  Patient states tenderness during muscle palpation.    Call or return to clinic prn if these symptoms worsen or fail to improve as anticipated.

## 2019-08-22 NOTE — PROGRESS NOTES
TIME RECORD    Date:  08/22/2019    Start Time:  1614  Stop Time:  1655    PROCEDURES:    TIMED  Procedure Time Min.   TE Start:1614  Stop:1639 25    Start:  Stop:     Start:  Stop:     Start:  Stop:          UNTIMED  Procedure Time Min.   TX Start:1640  Stop:1655 15    Start:  Stop:      Total Timed Minutes:  25  Total Timed Units:  2  Total Untimed Units:  1  Charges Billed/# of units:  3      Progress/Current Status    Subjective:     Patient ID: Karina Mathur is a 68 y.o. female.  Diagnosis:   1. Neck pain     2. Chronic midline low back pain, with sciatica presence unspecified       Pain: 5 /10      Objective:     TE for ROM,strength stabilization f/b ICTX x 15' @ 12/2#. 40/10s.    Assessment:     Relief following TX.    Patient Education/Response:     Posture ed.    Plans and Goals:     Cont per POC.

## 2019-08-22 NOTE — PROGRESS NOTES
08/22/19 1600   Shoulder Exercises   Overhead Pulley 5'   Additional Exercises   Additional Exercise UBE 5/5   Additional Exercise NUSTEP 10' L2

## 2019-08-26 ENCOUNTER — CLINICAL SUPPORT (OUTPATIENT)
Dept: REHABILITATION | Facility: HOSPITAL | Age: 69
End: 2019-08-26
Attending: PHYSICAL MEDICINE & REHABILITATION
Payer: COMMERCIAL

## 2019-08-26 DIAGNOSIS — M54.2 CERVICALGIA: ICD-10-CM

## 2019-08-26 DIAGNOSIS — M54.5 CHRONIC MIDLINE LOW BACK PAIN, WITH SCIATICA PRESENCE UNSPECIFIED: ICD-10-CM

## 2019-08-26 DIAGNOSIS — G89.29 CHRONIC MIDLINE LOW BACK PAIN, WITH SCIATICA PRESENCE UNSPECIFIED: ICD-10-CM

## 2019-08-26 PROCEDURE — 97012 MECHANICAL TRACTION THERAPY: CPT | Mod: PN

## 2019-08-26 PROCEDURE — 97110 THERAPEUTIC EXERCISES: CPT | Mod: PN

## 2019-08-26 NOTE — PROGRESS NOTES
Name: Karina Mathur  [facility] Number: [id number]  Date of Treatment: 08/26/2019   Diagnosis:   Encounter Diagnoses   Name Primary?    Cervicalgia     Chronic midline low back pain, with sciatica presence unspecified        Time in: 1702  Time Out: 1828  Total Treatment Time: 75    Subjective:    Karina reports L post shoulder constant aching.  Patient reports their pain to be 5/10 on a 0-10 scale with 0 being no pain and 10 being the worst pain imaginable.    Objective    Patient received individual therapy to increase strength, endurance, ROM, flexibility, posture and core stabilization with activities as follows:     Karina received therapeutic exercises to develop strength, endurance, ROM, flexibility, posture and core stabilization for 58 minutes including:     Seated UBE 5/5 L1  NuStep L2 10' UE's and LE's  Seated TE:     C/s AROM 10     Chin tucks 10     T/s extn over foam roll 10/3  Supine TE 10/2:     PPT with ballsqueeze     Clams RTB  Standing lat trunk shift correction R 10 x 10s  Seated trunk flexion 20    Mechanical cervical traction to pt supine with hips/knees at 90*/90* x 15 minutes at 12#/2# hold/rest for 30s hold, 10s rests.    Continue HEP daily.    Pt demo good understanding of the education provided. Patient demonstrated good return demonstration of activities.     Assessment:     Improved pain reported after session.     Pt will continue to benefit from skilled PT intervention. Medical Necessity is demonstrated by:  Requires skilled supervision to complete and progress HEP and Weakness.    Patient is making good progress towards established goals.    Plan:    Continue with established Plan of Care towards PT goals.

## 2019-08-26 NOTE — PROGRESS NOTES
"[id number]  Date of Treatment: 08/19/2019   Diagnosis:   Encounter Diagnoses   Name Primary?    Cervicalgia Yes    Chronic midline low back pain, with sciatica presence unspecified        Time in: 1603  Time Out: 1700  Total Treatment Time: 53  Group Time: 0      Subjective:    Karina reports My back feels better but my neck is more of the problem.  It's really stiff".  Patient reports their pain to be 5/10 on a 0-10 scale with 0 being no pain and 10 being the worst pain imaginable.    Objective    Patient received individual therapy to increase ROM, flexibility and posture with 0 patients with activities as follows:     Karina received therapeutic exercises to develop ROM, flexibility and posture for 43 minutes including:    UBE L1 5'/5'   Cervical ROM:      Flex/ext x 10    SB x 10 ea    Rot x 10 ea    Chin tuck x 10 ea   Scap retraction with green tband 3x10   Upper traps stretch 3x30s ea   Levator scap stretch 3x30s ea   Standing lateral shift correction 10x10s   Seated trunk flexion 10x10s   Supine     Posterior pelvic tilt 2x10    Hook lying ball squeeze 2x10    Hook lying clamshell w/gtb 2x10    Karina received the following manual therapy techniques: subocc release, grade 2 manual cervical traction, PROM were applied to the: C-spine for 10 minutes.     Written Home Exercises Provided: Reviewed  Pt demo good understanding of the education provided. Karina demonstrated good return demonstration of activities.     Assessment:   Pt reports improvement of symptoms following manual therapy.  Decreased muscle tension following manual therapy technique.      Pt will continue to benefit from skilled PT intervention. Medical Necessity is demonstrated by:  Unable to participate fully in daily activities and Requires skilled supervision to complete and progress HEP.    Patient is making fair progress towards established goals.    New/Revised goals: N/A      Plan:  Continue with " established Plan of Care towards PT goals. Incorporate manual therapy techniques to facilitate decreased stiffness of cervical spine.

## 2019-08-28 ENCOUNTER — CLINICAL SUPPORT (OUTPATIENT)
Dept: REHABILITATION | Facility: HOSPITAL | Age: 69
End: 2019-08-28
Attending: PHYSICAL MEDICINE & REHABILITATION
Payer: COMMERCIAL

## 2019-08-28 DIAGNOSIS — G89.29 CHRONIC MIDLINE LOW BACK PAIN, WITH SCIATICA PRESENCE UNSPECIFIED: ICD-10-CM

## 2019-08-28 DIAGNOSIS — M54.2 CERVICALGIA: ICD-10-CM

## 2019-08-28 DIAGNOSIS — M54.5 CHRONIC MIDLINE LOW BACK PAIN, WITH SCIATICA PRESENCE UNSPECIFIED: ICD-10-CM

## 2019-08-28 PROCEDURE — 97140 MANUAL THERAPY 1/> REGIONS: CPT | Mod: PN,59

## 2019-08-28 PROCEDURE — 97012 MECHANICAL TRACTION THERAPY: CPT | Mod: PN

## 2019-08-28 PROCEDURE — 97110 THERAPEUTIC EXERCISES: CPT | Mod: PN

## 2019-08-28 PROCEDURE — 97010 HOT OR COLD PACKS THERAPY: CPT | Mod: PN

## 2019-08-28 NOTE — PROGRESS NOTES
"Name: Karina Mathur  [facility] Number: [id number]  Date of Treatment: 08/28/2019   Diagnosis:   Encounter Diagnoses   Name Primary?    Cervicalgia     Chronic midline low back pain, with sciatica presence unspecified        Time in: 1505  Time Out: 1605  Total Treatment Time: 60        Subjective:    Karina reports "I never stop doing anything but the pain never goes away".  Pt states the cervical traction really helps her the most.  Patient reports their pain to be 5/10 on a 0-10 scale with 0 being no pain and 10 being the worst pain imaginable.    Objective      Karina received therapeutic exercises to develop strength, endurance, ROM, flexibility and posture for 37 minutes including:     UBE 5'/5'  nustep x 10 min L-4 UE/LE  Cervical ROM flex/ext, rotation, side bending x 10 reps each      Patient received intermittent cervical traction with MHP x 15 min at 12# max 30 sec hold 10 sec rest      Karina received the following manual therapy techniques: Soft tissue Mobilization were applied to the: L cervical/rhomboid area for 8 minutes.     Pt demo good understanding of the education provided. Karina demonstrated good return demonstration of activities.     Assessment:   Several trigger points noted in rhomboid and upper traps with STM.  Pt reports decreased pain and soreness following STM and Cx traction    Pt will continue to benefit from skilled PT intervention. Medical Necessity is demonstrated by:  Pain limits function of effected part for some activities, Requires skilled supervision to complete and progress HEP and Weakness.    Patient is making fair progress towards established goals.      Plan:  Continue with established Plan of Care towards PT goals.   "

## 2019-08-30 ENCOUNTER — TELEPHONE (OUTPATIENT)
Dept: FAMILY MEDICINE | Facility: CLINIC | Age: 69
End: 2019-08-30

## 2019-08-30 DIAGNOSIS — E78.49 OTHER HYPERLIPIDEMIA: ICD-10-CM

## 2019-08-30 DIAGNOSIS — M25.50 ARTHRALGIA, UNSPECIFIED JOINT: Primary | ICD-10-CM

## 2019-08-30 NOTE — TELEPHONE ENCOUNTER
Please notify patient that I entered labs to be drawn and YUE rheumatoid factor and sed rate as well as his CPK and I would like to have her obtain that blood work for further review I would also like her to schedule an appointment so we can go over the results and for further assessment as well

## 2019-09-03 ENCOUNTER — CLINICAL SUPPORT (OUTPATIENT)
Dept: REHABILITATION | Facility: HOSPITAL | Age: 69
End: 2019-09-03
Attending: PHYSICAL MEDICINE & REHABILITATION
Payer: COMMERCIAL

## 2019-09-03 DIAGNOSIS — M54.5 LOW BACK PAIN, UNSPECIFIED BACK PAIN LATERALITY, UNSPECIFIED CHRONICITY, WITH SCIATICA PRESENCE UNSPECIFIED: ICD-10-CM

## 2019-09-03 DIAGNOSIS — M54.2 NECK PAIN: Primary | ICD-10-CM

## 2019-09-03 PROCEDURE — 97012 MECHANICAL TRACTION THERAPY: CPT | Mod: PN

## 2019-09-03 PROCEDURE — 97110 THERAPEUTIC EXERCISES: CPT | Mod: PN

## 2019-09-03 NOTE — TELEPHONE ENCOUNTER
Pt is scheduled for routine labs on 11/15/19 and a f/u with you on 11/22/19. Does pt need these labs done sooner or can we link them to the lab appt she has in Nov?

## 2019-09-03 NOTE — PROGRESS NOTES
TIME RECORD    Date:  09/03/2019    Start Time:  1557  Stop Time:  1553    PROCEDURES:    TIMED  Procedure Time Min.   TE Start:1557  Stop:1637 40    Start:  Stop:     Start:  Stop:     Start:  Stop:          UNTIMED  Procedure Time Min.   TX Start:1638  Stop:1653 15    Start:  Stop:      Total Timed Minutes:  45  Total Timed Units:  3  Total Untimed Units:  1  Charges Billed/# of units:  4      Progress/Current Status    Subjective:     Patient ID: Karina Mathur is a 68 y.o. female.  Diagnosis:   1. Neck pain     2. Low back pain, unspecified back pain laterality, unspecified chronicity, with sciatica presence unspecified       Pain: 4 /10      Objective:     TE for ROM,strength,stabilization F/B ICTX X 15' @ 13/3# 40/10SEC H/R.    Assessment:     Improving.    Patient Education/Response:     Posture ed.    Plans and Goals:     Cont per POC.

## 2019-09-03 NOTE — PROGRESS NOTES
09/03/19 1600   Cx/Thoracic Exercises   Shoulder Shrugs Rep/Sets/Weight 30   Cervical Flexion Stretch Reps/Sets/Hold Time ROM CS 20 EACH   Shoulder Exercises   Overhead Pulley 6'   Lumbar Exercises   Double Knee To Chest Stretch Reps/Sets/Hold Time 30   Bridging Reps/Sets/Hold Time 3X10   Additional Exercises   Additional Exercise UBE 5/5',NUSTEP 10' L4

## 2019-09-03 NOTE — TELEPHONE ENCOUNTER
Please add a CMP to her scheduled labs upon further review; orders have been placed by me.  The timing of 1 week before her appointment to have her labs done is perfect; please let me know if I can be of any further assistance; please ensure she gets her lipid panel with a TSH, free T3, and free T4  ordered on 08/22/2019 with her scheduled labs as well as those that were ordered on 08/30/2019 by me as well.

## 2019-09-04 NOTE — TELEPHONE ENCOUNTER
All lab orders have been linked to her lab appointment on 11/15/2019 before her follow up on 11/22/2019.

## 2019-09-11 ENCOUNTER — CLINICAL SUPPORT (OUTPATIENT)
Dept: REHABILITATION | Facility: HOSPITAL | Age: 69
End: 2019-09-11
Attending: PHYSICAL MEDICINE & REHABILITATION
Payer: COMMERCIAL

## 2019-09-11 DIAGNOSIS — M54.2 NECK PAIN: Primary | ICD-10-CM

## 2019-09-11 DIAGNOSIS — G89.29 CHRONIC MIDLINE LOW BACK PAIN, WITH SCIATICA PRESENCE UNSPECIFIED: ICD-10-CM

## 2019-09-11 DIAGNOSIS — M54.5 CHRONIC MIDLINE LOW BACK PAIN, WITH SCIATICA PRESENCE UNSPECIFIED: ICD-10-CM

## 2019-09-11 PROCEDURE — 97110 THERAPEUTIC EXERCISES: CPT | Mod: PN

## 2019-09-11 PROCEDURE — 97012 MECHANICAL TRACTION THERAPY: CPT | Mod: PN

## 2019-09-11 NOTE — PROGRESS NOTES
TIME RECORD    Date: 9/11/19    Start Time:  0704  Stop Time:  0806    PROCEDURES:    TIMED  Procedure Time Min.   TE  Start:0715  Stop:0753 38    Start:  Stop:     Start:  Stop:     Start:  Stop:          UNTIMED  Procedure Time Min.   TX Start:0754  Stop:0806 12    Start:  Stop:      Total Timed Minutes:  38  Total Timed Units:  3  Total Untimed Units:  1  Charges Billed/# of units:  4REHAB SERVICES OUTPATIENT DISCHARGE SUMMARY  Physical Therapy      Name:  Karina Mathur  Date:  9/11/19.  Date of Evaluation:  7/1/19.  Physician:  YAMILKA  Total # Of Visits:  10  Cancelled:    No Shows:    Diagnosis:    1. Neck pain     2. Chronic midline low back pain, with sciatica presence unspecified         Physical/Functional Status:  At time of discharge, patient was able to Pt reports pain level 4/10.  C/spine ROM;WFL. Strength 5-/5.  L/spine ROM;WFL. Strength 5-/5.   NDI ;9/50. OSWESTRY 15/50.  The patient is to be discharged from our Therapy service for the following reason(s):  Patient has reached the maximum rehab potential for the present time    Degree of Goal Achievement:  Patient has partially met goals.Pt reached MMI.    Patient Education:  HEP issued.    Discharge Plan:  Home Program:  3-5 x wk/PRN.

## 2019-09-11 NOTE — PROGRESS NOTES
09/11/19 0900   Cx/Thoracic Exercises   Shoulder Shrugs Rep/Sets/Weight 30   Cervical Flexion Stretch Reps/Sets/Hold Time ROM CS 20   Shoulder Exercises   Overhead Pulley 6'   Additional Exercises   Additional Exercise UBE 5/5.   Additional Exercise NUSTEP 10'   Additional Exercise HEP 10'.

## 2019-10-07 DIAGNOSIS — Z12.39 BREAST CANCER SCREENING: ICD-10-CM

## 2019-10-23 ENCOUNTER — OFFICE VISIT (OUTPATIENT)
Dept: OPTOMETRY | Facility: CLINIC | Age: 69
End: 2019-10-23
Payer: COMMERCIAL

## 2019-10-23 DIAGNOSIS — Z13.5 GLAUCOMA SCREENING: ICD-10-CM

## 2019-10-23 DIAGNOSIS — H25.13 NUCLEAR SCLEROSIS, BILATERAL: Primary | ICD-10-CM

## 2019-10-23 DIAGNOSIS — H43.813 POSTERIOR VITREOUS DETACHMENT, BILATERAL: ICD-10-CM

## 2019-10-23 DIAGNOSIS — H52.203 HYPEROPIA WITH ASTIGMATISM AND PRESBYOPIA, BILATERAL: ICD-10-CM

## 2019-10-23 DIAGNOSIS — H04.123 DRY EYES, BILATERAL: ICD-10-CM

## 2019-10-23 DIAGNOSIS — H52.4 HYPEROPIA WITH ASTIGMATISM AND PRESBYOPIA, BILATERAL: ICD-10-CM

## 2019-10-23 DIAGNOSIS — H52.03 HYPEROPIA WITH ASTIGMATISM AND PRESBYOPIA, BILATERAL: ICD-10-CM

## 2019-10-23 PROCEDURE — 92015 DETERMINE REFRACTIVE STATE: CPT | Mod: S$GLB,,, | Performed by: OPTOMETRIST

## 2019-10-23 PROCEDURE — 99999 PR PBB SHADOW E&M-EST. PATIENT-LVL III: CPT | Mod: PBBFAC,,, | Performed by: OPTOMETRIST

## 2019-10-23 PROCEDURE — 92004 PR EYE EXAM, NEW PATIENT,COMPREHESV: ICD-10-PCS | Mod: S$GLB,,, | Performed by: OPTOMETRIST

## 2019-10-23 PROCEDURE — 92015 PR REFRACTION: ICD-10-PCS | Mod: S$GLB,,, | Performed by: OPTOMETRIST

## 2019-10-23 PROCEDURE — 92004 COMPRE OPH EXAM NEW PT 1/>: CPT | Mod: S$GLB,,, | Performed by: OPTOMETRIST

## 2019-10-23 PROCEDURE — 99999 PR PBB SHADOW E&M-EST. PATIENT-LVL III: ICD-10-PCS | Mod: PBBFAC,,, | Performed by: OPTOMETRIST

## 2019-11-07 ENCOUNTER — HOSPITAL ENCOUNTER (OUTPATIENT)
Dept: RADIOLOGY | Facility: HOSPITAL | Age: 69
Discharge: HOME | End: 2019-11-07
Attending: INTERNAL MEDICINE
Payer: COMMERCIAL

## 2019-11-07 ENCOUNTER — OFFICE VISIT (OUTPATIENT)
Dept: FAMILY MEDICINE | Facility: CLINIC | Age: 69
End: 2019-11-07
Payer: COMMERCIAL

## 2019-11-07 ENCOUNTER — LAB VISIT (OUTPATIENT)
Dept: LAB | Facility: HOSPITAL | Age: 69
End: 2019-11-07
Attending: INTERNAL MEDICINE
Payer: COMMERCIAL

## 2019-11-07 VITALS
OXYGEN SATURATION: 96 % | HEART RATE: 59 BPM | BODY MASS INDEX: 19.78 KG/M2 | DIASTOLIC BLOOD PRESSURE: 62 MMHG | TEMPERATURE: 98 F | WEIGHT: 104.75 LBS | HEIGHT: 61 IN | SYSTOLIC BLOOD PRESSURE: 84 MMHG

## 2019-11-07 DIAGNOSIS — E89.0 POSTABLATIVE HYPOTHYROIDISM: ICD-10-CM

## 2019-11-07 DIAGNOSIS — M25.531 RIGHT WRIST PAIN: ICD-10-CM

## 2019-11-07 DIAGNOSIS — E78.49 OTHER HYPERLIPIDEMIA: ICD-10-CM

## 2019-11-07 DIAGNOSIS — Z12.11 COLON CANCER SCREENING: ICD-10-CM

## 2019-11-07 DIAGNOSIS — E55.9 VITAMIN D INSUFFICIENCY: ICD-10-CM

## 2019-11-07 DIAGNOSIS — M25.572 ACUTE LEFT ANKLE PAIN: ICD-10-CM

## 2019-11-07 DIAGNOSIS — M25.50 ARTHRALGIA, UNSPECIFIED JOINT: ICD-10-CM

## 2019-11-07 DIAGNOSIS — E86.1 HYPOTENSION DUE TO HYPOVOLEMIA: Primary | ICD-10-CM

## 2019-11-07 DIAGNOSIS — E78.00 PURE HYPERCHOLESTEROLEMIA: ICD-10-CM

## 2019-11-07 DIAGNOSIS — M85.89 OSTEOPENIA OF MULTIPLE SITES: ICD-10-CM

## 2019-11-07 DIAGNOSIS — R63.6 UNDERWEIGHT: ICD-10-CM

## 2019-11-07 LAB
25(OH)D3+25(OH)D2 SERPL-MCNC: 52 NG/ML (ref 30–96)
ALBUMIN SERPL BCP-MCNC: 4.4 G/DL (ref 3.5–5.2)
ALP SERPL-CCNC: 88 U/L (ref 55–135)
ALT SERPL W/O P-5'-P-CCNC: 24 U/L (ref 10–44)
ANION GAP SERPL CALC-SCNC: 12 MMOL/L (ref 8–16)
AST SERPL-CCNC: 23 U/L (ref 10–40)
BILIRUB SERPL-MCNC: 0.8 MG/DL (ref 0.1–1)
BUN SERPL-MCNC: 12 MG/DL (ref 8–23)
CALCIUM SERPL-MCNC: 10 MG/DL (ref 8.7–10.5)
CHLORIDE SERPL-SCNC: 101 MMOL/L (ref 95–110)
CHOLEST SERPL-MCNC: 224 MG/DL (ref 120–199)
CHOLEST/HDLC SERPL: 2.5 {RATIO} (ref 2–5)
CK SERPL-CCNC: 68 U/L (ref 20–180)
CO2 SERPL-SCNC: 27 MMOL/L (ref 23–29)
CREAT SERPL-MCNC: 0.7 MG/DL (ref 0.5–1.4)
ERYTHROCYTE [SEDIMENTATION RATE] IN BLOOD BY WESTERGREN METHOD: 3 MM/HR (ref 0–36)
EST. GFR  (AFRICAN AMERICAN): >60 ML/MIN/1.73 M^2
EST. GFR  (NON AFRICAN AMERICAN): >60 ML/MIN/1.73 M^2
GLUCOSE SERPL-MCNC: 84 MG/DL (ref 70–110)
HDLC SERPL-MCNC: 88 MG/DL (ref 40–75)
HDLC SERPL: 39.3 % (ref 20–50)
LDLC SERPL CALC-MCNC: 124.2 MG/DL (ref 63–159)
NONHDLC SERPL-MCNC: 136 MG/DL
POTASSIUM SERPL-SCNC: 4 MMOL/L (ref 3.5–5.1)
PROT SERPL-MCNC: 7.5 G/DL (ref 6–8.4)
RHEUMATOID FACT SERPL-ACNC: <10 IU/ML (ref 0–15)
SODIUM SERPL-SCNC: 140 MMOL/L (ref 136–145)
T3FREE SERPL-MCNC: 2.3 PG/ML (ref 2.3–4.2)
T4 FREE SERPL-MCNC: 0.91 NG/DL (ref 0.71–1.51)
TRIGL SERPL-MCNC: 59 MG/DL (ref 30–150)
TSH SERPL DL<=0.005 MIU/L-ACNC: 2.83 UIU/ML (ref 0.4–4)

## 2019-11-07 PROCEDURE — 73610 X-RAY EXAM OF ANKLE: CPT | Mod: TC,PN,LT

## 2019-11-07 PROCEDURE — 86431 RHEUMATOID FACTOR QUANT: CPT

## 2019-11-07 PROCEDURE — 82550 ASSAY OF CK (CPK): CPT

## 2019-11-07 PROCEDURE — 80053 COMPREHEN METABOLIC PANEL: CPT

## 2019-11-07 PROCEDURE — 73610 XR ANKLE COMPLETE 3 VIEW LEFT: ICD-10-PCS | Mod: 26,LT,, | Performed by: RADIOLOGY

## 2019-11-07 PROCEDURE — 73110 X-RAY EXAM OF WRIST: CPT | Mod: 26,RT,, | Performed by: RADIOLOGY

## 2019-11-07 PROCEDURE — 73110 X-RAY EXAM OF WRIST: CPT | Mod: TC,PN,RT

## 2019-11-07 PROCEDURE — 73110 XR WRIST COMPLETE 3 VIEWS RIGHT: ICD-10-PCS | Mod: 26,RT,, | Performed by: RADIOLOGY

## 2019-11-07 PROCEDURE — 36415 COLL VENOUS BLD VENIPUNCTURE: CPT | Mod: PN

## 2019-11-07 PROCEDURE — 80061 LIPID PANEL: CPT

## 2019-11-07 PROCEDURE — 1101F PT FALLS ASSESS-DOCD LE1/YR: CPT | Mod: CPTII,S$GLB,, | Performed by: INTERNAL MEDICINE

## 2019-11-07 PROCEDURE — 85652 RBC SED RATE AUTOMATED: CPT

## 2019-11-07 PROCEDURE — 99999 PR PBB SHADOW E&M-EST. PATIENT-LVL III: CPT | Mod: PBBFAC,,, | Performed by: INTERNAL MEDICINE

## 2019-11-07 PROCEDURE — 99999 PR PBB SHADOW E&M-EST. PATIENT-LVL III: ICD-10-PCS | Mod: PBBFAC,,, | Performed by: INTERNAL MEDICINE

## 2019-11-07 PROCEDURE — 99214 PR OFFICE/OUTPT VISIT, EST, LEVL IV, 30-39 MIN: ICD-10-PCS | Mod: S$GLB,,, | Performed by: INTERNAL MEDICINE

## 2019-11-07 PROCEDURE — 84439 ASSAY OF FREE THYROXINE: CPT

## 2019-11-07 PROCEDURE — 84443 ASSAY THYROID STIM HORMONE: CPT

## 2019-11-07 PROCEDURE — 1101F PR PT FALLS ASSESS DOC 0-1 FALLS W/OUT INJ PAST YR: ICD-10-PCS | Mod: CPTII,S$GLB,, | Performed by: INTERNAL MEDICINE

## 2019-11-07 PROCEDURE — 82306 VITAMIN D 25 HYDROXY: CPT

## 2019-11-07 PROCEDURE — 99214 OFFICE O/P EST MOD 30 MIN: CPT | Mod: S$GLB,,, | Performed by: INTERNAL MEDICINE

## 2019-11-07 PROCEDURE — 73610 X-RAY EXAM OF ANKLE: CPT | Mod: 26,LT,, | Performed by: RADIOLOGY

## 2019-11-07 PROCEDURE — 84481 FREE ASSAY (FT-3): CPT

## 2019-11-07 PROCEDURE — 86038 ANTINUCLEAR ANTIBODIES: CPT

## 2019-11-07 RX ORDER — SIMVASTATIN 20 MG/1
20 TABLET, FILM COATED ORAL NIGHTLY
Qty: 90 TABLET | Refills: 3 | Status: SHIPPED | OUTPATIENT
Start: 2019-11-07 | End: 2020-02-12 | Stop reason: SDUPTHER

## 2019-11-07 NOTE — PATIENT INSTRUCTIONS
Postablative hypothyroidism; same thyroid dosing; TFT pending.     Pure hypercholesterolemia.Maintain low fat high fiber diet, exercise regularly. Weight reduction where indicated. Cot simvastatin; renewed.   -     simvastatin (ZOCOR) 20 MG tablet; Take 1 tablet (20 mg total) by mouth every evening.  Dispense: 90 tablet; Refill: 3    Osteopenia of multiple sites.Weight bearing exercises, continue calcium and vit D supplements. DEXA scabn at 2 yr intervals as needed.  Due 5/2/2021.   -     Vitamin D; Future; Expected date: 11/07/2019    Vitamin D insufficiency; Vit D3 at 1000 iu a day. Add citracal 600-D one a day.   -     Vitamin D; Future; Expected date: 11/07/2019    Acute left ankle pain; aleve otc 1-2x a day as needed for pain; can also use tylenol otc for pain; thermal heat applications  -     X-Ray Ankle Complete Left; Future; Expected date: 11/07/2019    Right wrist pain; aleve otc 1-2x a day as needed for pain; can also use tylenol otc for pain; thermal heat applications  -     X-Ray Wrist Complete Right; Future; Expected date: 11/07/2019    Colon cancer screen: iFOBT stool kit to be picked up today.

## 2019-11-07 NOTE — PROGRESS NOTES
Subjective:       Patient ID: Karina Mathur is a 69 y.o. female.    Chief Complaint: Medication Refill and testing for arthritis    HPI  Here today for reassessment; and to go over labs, but hasn't had time to get them; fasting today and will draw them, and call w results.   Hypothyroidism/underweight: On levothyroxine at 1/2 of 125 mcg q MWF; 50 mcg Tue/Thur/Sat/Sun. TFT pending.   Hypotension: needs to drink a lot more fluid during the day; presently 12 ounces of fluid only a day.  Goal 6-7,  8 oz glasses of fluid a day.  No nausea vomiting or diarrhea; patient claims good appetite; denies depression.  Had dental work yesterday and her blood pressure was systolic at 94.  She is reportedly eating and drinking okay.  She has been on a soft diet the last 2 weeks for TMJ.  Reportedly does not drink a lot of fluid only 12 oz of fluid in a day.  Systolic blood pressure here is mildly reduced at 84/62 with a pulse of 59; she is appears to be tolerating it fine and being asymptomatic.  HLP: on low fat high fiber diet or tries; tolerates simvastatin fine at 20 mg a day. Refilled.   Osteopenia w vit D insuff: walking 3x a week for 30 min; needs 4-5x a week; needs to resume calcium w vit D.  05/02/2019 DEXA scan:  Lumbar spine T-score was -1.1 or a 1.5% increased from the previous scan; left femoral neck T scar at -1.0 or 8 3.7% increase from previous scan.  Patient needs a 2 year repeat DEXA scan.  Arthritis: has injured base right thumb w catching box overhead bending thumb back; 4 weeks ago; also left ankle pain  For last 2 mos.       Review of Systems   Constitutional: Negative for fever and unexpected weight change.   HENT: Negative for congestion, postnasal drip and rhinorrhea.    Respiratory: Negative for cough, chest tightness, shortness of breath and wheezing.    Cardiovascular: Negative for chest pain, palpitations and leg swelling.   Gastrointestinal: Negative for abdominal pain, blood in stool, constipation,  "diarrhea, nausea and vomiting.   Genitourinary: Negative for dysuria and hematuria.   Musculoskeletal: Positive for arthralgias. Negative for myalgias.        Left medial ankle discomfort recently with right hand 1st metacarpal approximately with discomfort as well recently.  She does have rather significant bony nodules in the PIP is a DIP's   Psychiatric/Behavioral: Negative for dysphoric mood. The patient is not nervous/anxious.        Objective:      Vitals:    11/07/19 0818   BP: (!) 84/62   BP Location: Left arm   Patient Position: Sitting   BP Method: Medium (Manual)   Pulse: (!) 59   Temp: 97.5 °F (36.4 °C)   TempSrc: Oral   SpO2: 96%   Weight: 47.5 kg (104 lb 11.5 oz)   Height: 5' 1" (1.549 m)     Body mass index is 19.79 kg/m².    Physical Exam   Constitutional: She is oriented to person, place, and time. She appears well-developed and well-nourished.   HENT:   Head: Normocephalic and atraumatic.   Eyes: EOM are normal.   Neck: Normal range of motion. Neck supple. No thyromegaly present.   Cardiovascular: Normal rate, regular rhythm and normal heart sounds. Exam reveals no gallop.   No murmur heard.  Pulmonary/Chest: Effort normal and breath sounds normal. No respiratory distress. She has no wheezes. She has no rales.   Abdominal: Soft. Bowel sounds are normal. She exhibits no distension. There is no tenderness. There is no rebound and no guarding.   Musculoskeletal: Normal range of motion. She exhibits no edema.   Left ankle medially w slight edema; nontender to palp; no ecchymosis; good motion. Right thumb w good ROM; wrist nontender w good ROM as well; base of thumb nontender and bony nodules noted at its joints. Bony nodules noted to fingers at PIP's and DIP's.     Lymphadenopathy:     She has no cervical adenopathy.   Neurological: She is alert and oriented to person, place, and time.   Moves all 4 extremities fine.   Skin: No rash noted.   Psychiatric: She has a normal mood and affect. Her behavior " is normal. Thought content normal.   Vitals reviewed.      Assessment:       1. Hypotension due to hypovolemia    2. Pure hypercholesterolemia    3. Postablative hypothyroidism    4. Osteopenia of multiple sites    5. Vitamin D insufficiency    6. Acute left ankle pain    7. Right wrist pain    8. Colon cancer screening    9. Underweight        Plan:       Hypotension due to suspected hypovolemia; advised of the need to increase her fluids to 6-7 of 8 oz glasses of fluid a day; include Ensure with protein or Boost with protein 1-2 cans a day to help her weight    Postablative hypothyroidism; same thyroid dosing; TFT pending.     Pure hypercholesterolemia.Maintain low fat high fiber diet, exercise regularly. Weight reduction where indicated. Cont simvastatin; renewed.   -     simvastatin (ZOCOR) 20 MG tablet; Take 1 tablet (20 mg total) by mouth every evening.  Dispense: 90 tablet; Refill: 3    Osteopenia of multiple sites.Weight bearing exercises, continue calcium and vit D supplements. DEXA scabn at 2 yr intervals as needed.  Due 5/2/2021.   -     Vitamin D; Future; Expected date: 11/07/2019    Vitamin D insufficiency; Vit D3 at 1000 iu a day. Add citracal 600-D one a day.   -     Vitamin D; Future; Expected date: 11/07/2019    Acute left ankle pain; aleve otc 1-2x a day as needed for pain; can also use tylenol otc for pain; thermal heat applications  -     X-Ray Ankle Complete Left; Future; Expected date: 11/07/2019    Right wrist pain; aleve otc 1-2x a day as needed for pain; can also use tylenol otc for pain; thermal heat applications  -     X-Ray Wrist Complete Right; Future; Expected date: 11/07/2019    Colon cancer screen: iFOBT stool kit to be picked up today.     Under weight:  Have her try to increase heard dietary intake as well as her fluid intake; goal 6-7 of 8 oz glasses of fluid a day and 1-2 cans of Ensure with protein or Boost with protein a day.  Thyroid function test repeat pending

## 2019-11-08 LAB — ANA SER QL IF: NORMAL

## 2019-11-13 ENCOUNTER — TELEPHONE (OUTPATIENT)
Dept: FAMILY MEDICINE | Facility: CLINIC | Age: 69
End: 2019-11-13

## 2019-11-13 DIAGNOSIS — E89.0 POSTABLATIVE HYPOTHYROIDISM: ICD-10-CM

## 2019-11-13 DIAGNOSIS — E78.00 PURE HYPERCHOLESTEROLEMIA: ICD-10-CM

## 2019-11-13 DIAGNOSIS — E78.49 OTHER HYPERLIPIDEMIA: Primary | ICD-10-CM

## 2019-11-13 DIAGNOSIS — R63.6 UNDERWEIGHT: ICD-10-CM

## 2019-11-13 RX ORDER — LEVOTHYROXINE SODIUM 125 UG/1
TABLET ORAL
Qty: 25 TABLET | Refills: 1 | Status: SHIPPED | OUTPATIENT
Start: 2019-11-13 | End: 2020-02-12 | Stop reason: SDUPTHER

## 2019-11-13 RX ORDER — LEVOTHYROXINE SODIUM 50 UG/1
TABLET ORAL
Qty: 60 TABLET | Refills: 1 | Status: SHIPPED | OUTPATIENT
Start: 2019-11-13 | End: 2020-02-12 | Stop reason: SDUPTHER

## 2019-11-13 NOTE — TELEPHONE ENCOUNTER
Please review labs and xrays and advise. She needs refills of her thyroid meds if no adjustment will be made to her doses.

## 2019-11-13 NOTE — TELEPHONE ENCOUNTER
----- Message from Lidia Kumar sent at 11/13/2019  2:01 PM CST -----  Type:  Test Results    Who Called:  Karina  Name of Test (Lab/Mammo/Etc):  Lab and xray  Date of Test:  11/7  Ordering Provider:  Dr Cardoso  Where the test was performed:  [facility]  Best Call Back Number:  [phone]  Additional Information:  Thank you!

## 2019-11-15 NOTE — TELEPHONE ENCOUNTER
----- Message from Johnathon Kee sent at 11/15/2019  9:55 AM CST -----  Type: Needs Medical Advice    Who Called:  Self   Symptoms (please be specific):  NA How long has patient had these symptoms:  [facility]   Best Call Back Number: [phone]   Additional Information: Patient called asking for an update for lab results.

## 2019-11-20 ENCOUNTER — TELEPHONE (OUTPATIENT)
Dept: FAMILY MEDICINE | Facility: CLINIC | Age: 69
End: 2019-11-20

## 2019-11-20 NOTE — TELEPHONE ENCOUNTER
----- Message from Princess SAMMIE Hidalgo sent at 11/20/2019 10:17 AM CST -----  Contact: Reny chapin/ [facility]  Type: Needs Medical Advice    Who Called:  Reny chapin/ [facility]  Best Call Back Number:    reference #: [id number]  Additional Information: requesting a call back in regards to the patient.

## 2019-12-10 ENCOUNTER — LAB VISIT (OUTPATIENT)
Dept: LAB | Facility: HOSPITAL | Age: 69
End: 2019-12-10
Attending: INTERNAL MEDICINE
Payer: COMMERCIAL

## 2019-12-10 DIAGNOSIS — Z12.11 COLON CANCER SCREENING: ICD-10-CM

## 2019-12-10 PROCEDURE — 82274 ASSAY TEST FOR BLOOD FECAL: CPT

## 2019-12-11 LAB — HEMOCCULT STL QL IA: NEGATIVE

## 2020-02-05 ENCOUNTER — LAB VISIT (OUTPATIENT)
Dept: LAB | Facility: HOSPITAL | Age: 70
End: 2020-02-05
Attending: INTERNAL MEDICINE
Payer: COMMERCIAL

## 2020-02-05 DIAGNOSIS — E78.49 OTHER HYPERLIPIDEMIA: ICD-10-CM

## 2020-02-05 LAB
ALBUMIN SERPL BCP-MCNC: 4.1 G/DL (ref 3.5–5.2)
ALP SERPL-CCNC: 68 U/L (ref 55–135)
ALT SERPL W/O P-5'-P-CCNC: 19 U/L (ref 10–44)
ANION GAP SERPL CALC-SCNC: 8 MMOL/L (ref 8–16)
AST SERPL-CCNC: 19 U/L (ref 10–40)
BILIRUB SERPL-MCNC: 0.5 MG/DL (ref 0.1–1)
BUN SERPL-MCNC: 17 MG/DL (ref 8–23)
CALCIUM SERPL-MCNC: 9.4 MG/DL (ref 8.7–10.5)
CHLORIDE SERPL-SCNC: 103 MMOL/L (ref 95–110)
CHOLEST SERPL-MCNC: 201 MG/DL (ref 120–199)
CHOLEST/HDLC SERPL: 2.4 {RATIO} (ref 2–5)
CO2 SERPL-SCNC: 31 MMOL/L (ref 23–29)
CREAT SERPL-MCNC: 0.7 MG/DL (ref 0.5–1.4)
EST. GFR  (AFRICAN AMERICAN): >60 ML/MIN/1.73 M^2
EST. GFR  (NON AFRICAN AMERICAN): >60 ML/MIN/1.73 M^2
GLUCOSE SERPL-MCNC: 89 MG/DL (ref 70–110)
HDLC SERPL-MCNC: 85 MG/DL (ref 40–75)
HDLC SERPL: 42.3 % (ref 20–50)
LDLC SERPL CALC-MCNC: 108.6 MG/DL (ref 63–159)
NONHDLC SERPL-MCNC: 116 MG/DL
POTASSIUM SERPL-SCNC: 4.3 MMOL/L (ref 3.5–5.1)
PROT SERPL-MCNC: 7.1 G/DL (ref 6–8.4)
SODIUM SERPL-SCNC: 142 MMOL/L (ref 136–145)
TRIGL SERPL-MCNC: 37 MG/DL (ref 30–150)

## 2020-02-05 PROCEDURE — 36415 COLL VENOUS BLD VENIPUNCTURE: CPT | Mod: PN

## 2020-02-05 PROCEDURE — 80053 COMPREHEN METABOLIC PANEL: CPT

## 2020-02-05 PROCEDURE — 80061 LIPID PANEL: CPT

## 2020-02-11 ENCOUNTER — PATIENT OUTREACH (OUTPATIENT)
Dept: ADMINISTRATIVE | Facility: HOSPITAL | Age: 70
End: 2020-02-11

## 2020-02-11 NOTE — PROGRESS NOTES
Spoke with patient about scheduling her for her Annual Mammo, patient stated she's not ready and will call back to schedule when she is ready.

## 2020-02-12 ENCOUNTER — OFFICE VISIT (OUTPATIENT)
Dept: FAMILY MEDICINE | Facility: CLINIC | Age: 70
End: 2020-02-12
Payer: COMMERCIAL

## 2020-02-12 VITALS
HEIGHT: 61 IN | WEIGHT: 106.5 LBS | TEMPERATURE: 98 F | HEART RATE: 56 BPM | DIASTOLIC BLOOD PRESSURE: 62 MMHG | SYSTOLIC BLOOD PRESSURE: 106 MMHG | BODY MASS INDEX: 20.11 KG/M2 | OXYGEN SATURATION: 97 %

## 2020-02-12 DIAGNOSIS — R63.6 UNDERWEIGHT: ICD-10-CM

## 2020-02-12 DIAGNOSIS — E78.00 PURE HYPERCHOLESTEROLEMIA: ICD-10-CM

## 2020-02-12 DIAGNOSIS — M85.89 OSTEOPENIA OF MULTIPLE SITES: ICD-10-CM

## 2020-02-12 DIAGNOSIS — E55.9 VITAMIN D INSUFFICIENCY: ICD-10-CM

## 2020-02-12 DIAGNOSIS — E78.49 OTHER HYPERLIPIDEMIA: Primary | ICD-10-CM

## 2020-02-12 DIAGNOSIS — E89.0 POSTABLATIVE HYPOTHYROIDISM: ICD-10-CM

## 2020-02-12 PROCEDURE — 99999 PR PBB SHADOW E&M-EST. PATIENT-LVL III: ICD-10-PCS | Mod: PBBFAC,,, | Performed by: INTERNAL MEDICINE

## 2020-02-12 PROCEDURE — 99214 PR OFFICE/OUTPT VISIT, EST, LEVL IV, 30-39 MIN: ICD-10-PCS | Mod: S$GLB,,, | Performed by: INTERNAL MEDICINE

## 2020-02-12 PROCEDURE — 99214 OFFICE O/P EST MOD 30 MIN: CPT | Mod: S$GLB,,, | Performed by: INTERNAL MEDICINE

## 2020-02-12 PROCEDURE — 99999 PR PBB SHADOW E&M-EST. PATIENT-LVL III: CPT | Mod: PBBFAC,,, | Performed by: INTERNAL MEDICINE

## 2020-02-12 PROCEDURE — 1101F PT FALLS ASSESS-DOCD LE1/YR: CPT | Mod: CPTII,S$GLB,, | Performed by: INTERNAL MEDICINE

## 2020-02-12 PROCEDURE — 1159F PR MEDICATION LIST DOCUMENTED IN MEDICAL RECORD: ICD-10-PCS | Mod: S$GLB,,, | Performed by: INTERNAL MEDICINE

## 2020-02-12 PROCEDURE — 1159F MED LIST DOCD IN RCRD: CPT | Mod: S$GLB,,, | Performed by: INTERNAL MEDICINE

## 2020-02-12 PROCEDURE — 1101F PR PT FALLS ASSESS DOC 0-1 FALLS W/OUT INJ PAST YR: ICD-10-PCS | Mod: CPTII,S$GLB,, | Performed by: INTERNAL MEDICINE

## 2020-02-12 RX ORDER — LEVOTHYROXINE SODIUM 125 UG/1
TABLET ORAL
Qty: 25 TABLET | Refills: 1 | Status: SHIPPED | OUTPATIENT
Start: 2020-02-12 | End: 2020-10-16

## 2020-02-12 RX ORDER — LEVOTHYROXINE SODIUM 50 UG/1
TABLET ORAL
Qty: 60 TABLET | Refills: 1 | Status: SHIPPED | OUTPATIENT
Start: 2020-02-12 | End: 2020-10-16

## 2020-02-12 RX ORDER — SIMVASTATIN 20 MG/1
20 TABLET, FILM COATED ORAL NIGHTLY
Qty: 90 TABLET | Refills: 3 | Status: SHIPPED | OUTPATIENT
Start: 2020-02-12 | End: 2021-04-14 | Stop reason: SDUPTHER

## 2020-02-12 NOTE — PROGRESS NOTES
"Subjective:       Patient ID: Karina Mathur is a 69 y.o. female.    Chief Complaint: Follow-up (review lab results) and Medication Refill (synthroid, simvastatin)    HPI  Here for reassessment; go over labs, and med refill.     Review of Systems   Constitutional: Negative for fever and unexpected weight change.   HENT: Negative for congestion, postnasal drip and rhinorrhea.    Respiratory: Negative for cough, chest tightness, shortness of breath and wheezing.    Cardiovascular: Negative for chest pain, palpitations and leg swelling.   Gastrointestinal: Negative for abdominal pain, blood in stool, constipation, diarrhea, nausea and vomiting.   Genitourinary: Negative for dysuria and hematuria.   Musculoskeletal: Positive for arthralgias. Negative for myalgias.        Diffuse arthralgias at times; advised to use tylenol otc; has been using ibuprofen at times.    Skin: Negative for rash.   Psychiatric/Behavioral: Negative for dysphoric mood. The patient is not nervous/anxious.        Objective:      Vitals:    02/12/20 1102   BP: 106/62   BP Location: Left arm   Patient Position: Sitting   BP Method: Medium (Manual)   Pulse: (!) 56   Temp: 97.8 °F (36.6 °C)   TempSrc: Oral   SpO2: 97%   Weight: 48.3 kg (106 lb 7.7 oz)   Height: 5' 1" (1.549 m)     Body mass index is 20.12 kg/m².  Wt Readings from Last 3 Encounters:   02/12/20 48.3 kg (106 lb 7.7 oz)   11/07/19 47.5 kg (104 lb 11.5 oz)   08/22/19 47.9 kg (105 lb 9.6 oz)        Physical Exam   Constitutional: She is oriented to person, place, and time. She appears well-developed and well-nourished.   HENT:   Head: Normocephalic and atraumatic.   Eyes: EOM are normal.   Neck: Normal range of motion. Neck supple. No thyromegaly present.   No carotid bruits heard     Cardiovascular: Normal rate, regular rhythm and normal heart sounds. Exam reveals no gallop.   No murmur heard.  Pulmonary/Chest: Effort normal and breath sounds normal. No respiratory distress. She has no " wheezes. She has no rales.   Abdominal: Soft. Bowel sounds are normal. She exhibits no distension. There is no tenderness. There is no rebound and no guarding.   Musculoskeletal: Normal range of motion. She exhibits no edema.   Lymphadenopathy:     She has no cervical adenopathy.   Neurological: She is alert and oriented to person, place, and time.   Moves all 4 extremities fine.   Skin: No rash noted.   Psychiatric: She has a normal mood and affect. Her behavior is normal. Thought content normal.   Vitals reviewed.      Assessment:       1. Other hyperlipidemia    2. Postablative hypothyroidism    3. Osteopenia of multiple sites    4. Vitamin D insufficiency    5. Underweight    6. Pure hypercholesterolemia    7. Postablative hypothyroidism        Plan:       Other hyperlipidemia.Maintain low fat high fiber diet, exercise regularly. Weight reduction where indicated. Cont simvastatin.   -     Lipid panel; Future; Expected date: 02/12/2020  -     Comprehensive metabolic panel; Future; Expected date: 02/12/2020  -     CK; Future; Expected date: 02/12/2020  -     simvastatin (ZOCOR) 20 MG tablet; Take 1 tablet (20 mg total) by mouth every evening.  Dispense: 90 tablet; Refill: 3    Postablative hypothyroidism; same thyroid dosing; TFT in 6 mos; meds renewed.   -     TSH; Future; Expected date: 02/12/2020  -     T4, free; Future; Expected date: 02/12/2020  -     T3, free; Future; Expected date: 02/12/2020  -     levothyroxine (SYNTHROID) 125 MCG tablet; 1/2 of 125 mcg po on Mon, Wed & Fri  Dispense: 25 tablet; Refill: 1  -     levothyroxine (SYNTHROID) 50 MCG tablet; Take 50 mcg on Tues/Thu/Sat/ Sun  Dispense: 60 tablet; Refill: 1    Osteopenia of multiple sites.Weight bearing exercises, continue calcium and vit D supplements. DEXA scabn at 2 yr intervals as needed. Due 5/2/2021.   -     Vitamin D; Future; Expected date: 02/12/2020  -     Comprehensive metabolic panel; Future; Expected date: 02/12/2020    Vitamin D  insufficiency  -     Vitamin D; Future; Expected date: 02/12/2020    Underweight; Ensure w protein 1 can a day to every 3rd day to every other day.   -     TSH; Future; Expected date: 02/12/2020  -     T4, free; Future; Expected date: 02/12/2020  -     T3, free; Future; Expected date: 02/12/2020  -     levothyroxine (SYNTHROID) 125 MCG tablet; 1/2 of 125 mcg po on Mon, Wed & Fri  Dispense: 25 tablet; Refill: 1  -     levothyroxine (SYNTHROID) 50 MCG tablet; Take 50 mcg on Tues/Thu/Sat/ Sun  Dispense: 60 tablet; Refill: 1

## 2020-02-12 NOTE — PATIENT INSTRUCTIONS
Other hyperlipidemia.Maintain low fat high fiber diet, exercise regularly. Weight reduction where indicated. Cont simvastatin.   -     Lipid panel; Future; Expected date: 02/12/2020  -     Comprehensive metabolic panel; Future; Expected date: 02/12/2020  -     CK; Future; Expected date: 02/12/2020  -     simvastatin (ZOCOR) 20 MG tablet; Take 1 tablet (20 mg total) by mouth every evening.  Dispense: 90 tablet; Refill: 3    Postablative hypothyroidism; same thyroid dosing; TFT in 6 mos; meds renewed.   -     TSH; Future; Expected date: 02/12/2020  -     T4, free; Future; Expected date: 02/12/2020  -     T3, free; Future; Expected date: 02/12/2020  -     levothyroxine (SYNTHROID) 125 MCG tablet; 1/2 of 125 mcg po on Mon, Wed & Fri  Dispense: 25 tablet; Refill: 1  -     levothyroxine (SYNTHROID) 50 MCG tablet; Take 50 mcg on Tues/Thu/Sat/ Sun  Dispense: 60 tablet; Refill: 1    Osteopenia of multiple sites.Weight bearing exercises, continue calcium and vit D supplements. DEXA scabn at 2 yr intervals as needed. Due 5/2/2021.   -     Vitamin D; Future; Expected date: 02/12/2020  -     Comprehensive metabolic panel; Future; Expected date: 02/12/2020    Vitamin D insufficiency  -     Vitamin D; Future; Expected date: 02/12/2020    Underweight; Ensure w protein 1 can a day to every 3rd day to every other day.   -     TSH; Future; Expected date: 02/12/2020  -     T4, free; Future; Expected date: 02/12/2020  -     T3, free; Future; Expected date: 02/12/2020  -     levothyroxine (SYNTHROID) 125 MCG tablet; 1/2 of 125 mcg po on Mon, Wed & Fri  Dispense: 25 tablet; Refill: 1  -     levothyroxine (SYNTHROID) 50 MCG tablet; Take 50 mcg on Tues/Thu/Sat/ Sun  Dispense: 60 tablet; Refill: 1

## 2020-03-02 ENCOUNTER — PATIENT OUTREACH (OUTPATIENT)
Dept: ADMINISTRATIVE | Facility: OTHER | Age: 70
End: 2020-03-02

## 2020-03-03 ENCOUNTER — HOSPITAL ENCOUNTER (OUTPATIENT)
Dept: RADIOLOGY | Facility: HOSPITAL | Age: 70
Discharge: HOME | End: 2020-03-03
Attending: INTERNAL MEDICINE
Payer: COMMERCIAL

## 2020-03-03 ENCOUNTER — OFFICE VISIT (OUTPATIENT)
Dept: OBSTETRICS AND GYNECOLOGY | Facility: CLINIC | Age: 70
End: 2020-03-03
Payer: COMMERCIAL

## 2020-03-03 VITALS — BODY MASS INDEX: 20.11 KG/M2 | WEIGHT: 106.5 LBS | HEIGHT: 61 IN

## 2020-03-03 VITALS — SYSTOLIC BLOOD PRESSURE: 120 MMHG | DIASTOLIC BLOOD PRESSURE: 64 MMHG | WEIGHT: 106.5 LBS | BODY MASS INDEX: 20.12 KG/M2

## 2020-03-03 DIAGNOSIS — Z12.4 PAP SMEAR FOR CERVICAL CANCER SCREENING: Primary | ICD-10-CM

## 2020-03-03 DIAGNOSIS — Z12.31 SCREENING MAMMOGRAM, ENCOUNTER FOR: ICD-10-CM

## 2020-03-03 DIAGNOSIS — Z12.39 BREAST CANCER SCREENING: ICD-10-CM

## 2020-03-03 PROCEDURE — 88175 CYTOPATH C/V AUTO FLUID REDO: CPT

## 2020-03-03 PROCEDURE — 77063 MAMMO DIGITAL SCREENING BILAT WITH TOMOSYNTHESIS_CAD: ICD-10-PCS | Mod: 26,,, | Performed by: RADIOLOGY

## 2020-03-03 PROCEDURE — 77063 BREAST TOMOSYNTHESIS BI: CPT | Mod: 26,,, | Performed by: RADIOLOGY

## 2020-03-03 PROCEDURE — 99999 PR PBB SHADOW E&M-EST. PATIENT-LVL III: CPT | Mod: PBBFAC,,, | Performed by: OBSTETRICS & GYNECOLOGY

## 2020-03-03 PROCEDURE — 99999 PR PBB SHADOW E&M-EST. PATIENT-LVL III: ICD-10-PCS | Mod: PBBFAC,,, | Performed by: OBSTETRICS & GYNECOLOGY

## 2020-03-03 PROCEDURE — 77067 MAMMO DIGITAL SCREENING BILAT WITH TOMOSYNTHESIS_CAD: ICD-10-PCS | Mod: 26,,, | Performed by: RADIOLOGY

## 2020-03-03 PROCEDURE — 77067 SCR MAMMO BI INCL CAD: CPT | Mod: TC,PN

## 2020-03-03 PROCEDURE — 77067 SCR MAMMO BI INCL CAD: CPT | Mod: 26,,, | Performed by: RADIOLOGY

## 2020-03-03 PROCEDURE — 99387 INIT PM E/M NEW PAT 65+ YRS: CPT | Mod: S$GLB,,, | Performed by: OBSTETRICS & GYNECOLOGY

## 2020-03-03 PROCEDURE — 99387 PR PREVENTIVE VISIT,NEW,65 & OVER: ICD-10-PCS | Mod: S$GLB,,, | Performed by: OBSTETRICS & GYNECOLOGY

## 2020-03-03 PROCEDURE — 87624 HPV HI-RISK TYP POOLED RSLT: CPT

## 2020-03-03 NOTE — PROGRESS NOTES
Chief Complaint   Patient presents with    Well Woman       History and Physical:  No LMP recorded. Patient is postmenopausal.       Karina Mathur is a 69 y.o.  female who presents today for her routine annual GYN exam. The patient has no Gynecology complaints today. No bowel or bladder complaints. Routine screening, no Vaginal Bleeding - counseled .      Allergies: Review of patient's allergies indicates:  No Known Allergies    Past Medical History:   Diagnosis Date    Hyperlipidemia     on simvastatin    Hypothyroidism     hx of iodine cocktail around  for goiter.    Osteopenia     19; L fem neck w Tscore -1.7; Lsp -1.2; DEXA in 2 yrs.       Past Surgical History:   Procedure Laterality Date    INGUINAL HERNIA REPAIR         MEDS:   Current Outpatient Medications on File Prior to Visit   Medication Sig Dispense Refill    ascorbic acid, vitamin C, (VITAMIN C) 500 MG tablet Take 500 mg by mouth once daily.      biotin 2,500 mcg Cap Take 1 capsule by mouth.       calcium citrate/vitamin D3 (CITRACAL REGULAR ORAL) Take by mouth.      CYANOCOBALAMIN, VITAMIN B-12, (VITAMIN B-12 ORAL) Take 1 Film by mouth once daily.      levothyroxine (SYNTHROID) 125 MCG tablet 1/2 of 125 mcg po on Mon, Wed & Fri 25 tablet 1    levothyroxine (SYNTHROID) 50 MCG tablet Take 50 mcg on Tues/Thu/Sat/ Sun 60 tablet 1    multivitamin (ONE DAILY MULTIVITAMIN) per tablet Take 1 tablet by mouth once daily.      simvastatin (ZOCOR) 20 MG tablet Take 1 tablet (20 mg total) by mouth every evening. 90 tablet 3    vitamin D 1000 units Tab Take 1,000 Units by mouth once daily.       tiZANidine (ZANAFLEX) 2 MG tablet Take 2 tablets (4 mg total) by mouth every 6 (six) hours as needed (back spasms). (Patient not taking: Reported on 3/3/2020) 60 tablet 0     No current facility-administered medications on file prior to visit.        OB History        3    Para   3    Term   3            AB        Living            SAB        TAB        Ectopic        Multiple        Live Births                     Social History     Socioeconomic History    Marital status:      Spouse name: Not on file    Number of children: Not on file    Years of education: Not on file    Highest education level: Not on file   Occupational History    Not on file   Social Needs    Financial resource strain: Not on file    Food insecurity:     Worry: Not on file     Inability: Not on file    Transportation needs:     Medical: Not on file     Non-medical: Not on file   Tobacco Use    Smoking status: Never Smoker    Smokeless tobacco: Never Used   Substance and Sexual Activity    Alcohol use: Yes     Alcohol/week: 1.0 standard drinks     Types: 1 Glasses of wine per week     Comment: 1-2 glasses a month    Drug use: No    Sexual activity: Not on file   Lifestyle    Physical activity:     Days per week: Not on file     Minutes per session: Not on file    Stress: Not on file   Relationships    Social connections:     Talks on phone: Not on file     Gets together: Not on file     Attends Sabianist service: Not on file     Active member of club or organization: Not on file     Attends meetings of clubs or organizations: Not on file     Relationship status: Not on file   Other Topics Concern    Not on file   Social History Narrative    Not on file       History reviewed. No pertinent family history.      Past medical and surgical history reviewed.   I have reviewed the patient's medical history in detail and updated the computerized patient record.        Review of System:   General: no chills, fever, night sweats, weight gain or weight loss  Psychological: no depression or suicidal ideation  Breasts: no new or changing breast lumps, nipple discharge or masses.  Respiratory: no cough, shortness of breath, or wheezing  Cardiovascular: no chest pain or dyspnea on exertion  Gastrointestinal: no abdominal pain, change in bowel habits,  or black or bloody stools  Genito-Urinary: no incontinence, urinary frequency/urgency or vulvar/vaginal symptoms, pelvic pain or abnormal vaginal bleeding.  Musculoskeletal: no gait disturbance or muscular weakness      Physical Exam:   /64   Wt 48.3 kg (106 lb 7.7 oz)   BMI 20.12 kg/m²   Constitutional: She appears alert and responsive. She appears well-developed, well-groomed, and well-nourished. No distress. Thin  HENT:   Head: Normocephalic and atraumatic.   Eyes: Conjunctivae and EOM are normal. No scleral icterus.   Neck: Symmetrical. Normal range of motion. Neck supple. No tracheal deviation present. THYROID: without masses or tenderness.  Cardiovascular: Normal rate, no rhythm abnormality noted. Extremities without swelling or edema, warm.    Pulmonary/Chest: Normal respiratory Effort. No distress or retractions. She exhibits no tenderness.  Breasts: are symmetrical.   Right breast exhibits no inverted nipple, no mass, no nipple discharge, no skin change and no tenderness.   Left breast exhibits no inverted nipple, no mass, no nipple discharge, no skin change and no tenderness.  Abdominal: Soft. She exhibits no distension, hernias or masses. There is no tenderness. No enlargement of liver edge or spleen.  There is no rebound and no guarding.   Genitourinary:    External rectal exam shows no thrombosed external hemorrhoids, no lesions.     Pelvic exam was performed with patient supine.   No labial fusion, and symmetrical.    There is no rash, lesion or injury on the right labia.   There is no rash, lesion or injury on the left labia.   No bleeding and no signs of injury around the vaginal introitus, urethral meatus is normal size and without prolapse or lesions, urethra well supported. The cervix is visualized with no discharge, lesions or friability.   No vaginal discharge found.   No significant Cystocele, Enterocele or rectocele, and cervix and uterus well supported.   Bimanual exam:   The urethra  is normal to palpation and there are no palpable vaginal wall masses.   Uterus is not deviated, not enlarged, not fixed, normal shape and not tender.   Cervix exhibits no motion tenderness.    Right adnexum displays no mass or nodularity and no tenderness.   Left adnexum displays no mass or nodularity and no tenderness.  Musculoskeletal: Normal range of motion.   Lymphadenopathy: No inguinal adenopathy present.   Neurological: She is alert and oriented to person, place, and time. Coordination normal.   Skin: Skin is warm and dry. She is not diaphoretic. No rashes, lesions or ulcers.   Psychiatric: She has a normal mood and affect, oriented to person, place, and time.      Assessment:   Normal annual GYN exam  1. Pap smear for cervical cancer screening  Liquid-Based Pap Smear, Screening    HPV High Risk Genotypes, PCR       Plan:   PAP every 2 years  Mammogram annually   Follow up in 2 years.  Patient informed will be contacted with results within 2 weeks. Encouraged to please call back or email if she has not heard from us by then.

## 2020-03-11 LAB
HPV HR 12 DNA SPEC QL NAA+PROBE: NEGATIVE
HPV16 AG SPEC QL: NEGATIVE
HPV18 DNA SPEC QL NAA+PROBE: NEGATIVE

## 2020-03-31 ENCOUNTER — TELEPHONE (OUTPATIENT)
Dept: OBSTETRICS AND GYNECOLOGY | Facility: CLINIC | Age: 70
End: 2020-03-31

## 2020-03-31 LAB
FINAL PATHOLOGIC DIAGNOSIS: NORMAL
Lab: NORMAL

## 2020-03-31 NOTE — TELEPHONE ENCOUNTER
Patient called and notified results of pap per Dr. Mancilla and inform to follow up as scheduled. Patient stated appreciated the call.

## 2020-03-31 NOTE — TELEPHONE ENCOUNTER
----- Message from Arthur Mancilla MD sent at 3/31/2020  7:51 AM CDT -----  Please inform The results of your most recent Pap smear are normal. This means that no cancerous or precancerous cells were seen. We recommend that you follow up for repeat PAP screening as scheduled.

## 2020-05-31 ENCOUNTER — PATIENT OUTREACH (OUTPATIENT)
Dept: ADMINISTRATIVE | Facility: OTHER | Age: 70
End: 2020-05-31

## 2020-05-31 DIAGNOSIS — Z79.4 TYPE 2 DIABETES MELLITUS WITHOUT COMPLICATION, WITH LONG-TERM CURRENT USE OF INSULIN: Primary | ICD-10-CM

## 2020-05-31 DIAGNOSIS — E11.9 TYPE 2 DIABETES MELLITUS WITHOUT COMPLICATION, WITH LONG-TERM CURRENT USE OF INSULIN: Primary | ICD-10-CM

## 2020-05-31 NOTE — PROGRESS NOTES
Patient's chart was reviewed.   Requested updates within Care Everywhere.  Immunizations reconciled.    Health Maintenance was updated.  Order placed: hemoglobin A1c

## 2020-06-03 ENCOUNTER — HOSPITAL ENCOUNTER (OUTPATIENT)
Dept: RADIOLOGY | Facility: HOSPITAL | Age: 70
Discharge: HOME | End: 2020-06-03
Attending: PODIATRIST
Payer: COMMERCIAL

## 2020-06-03 ENCOUNTER — OFFICE VISIT (OUTPATIENT)
Dept: PODIATRY | Facility: CLINIC | Age: 70
End: 2020-06-03
Payer: COMMERCIAL

## 2020-06-03 VITALS
SYSTOLIC BLOOD PRESSURE: 89 MMHG | HEIGHT: 61 IN | DIASTOLIC BLOOD PRESSURE: 47 MMHG | WEIGHT: 106.5 LBS | TEMPERATURE: 98 F | HEART RATE: 65 BPM | BODY MASS INDEX: 20.11 KG/M2

## 2020-06-03 DIAGNOSIS — M79.671 PAIN IN BOTH FEET: ICD-10-CM

## 2020-06-03 DIAGNOSIS — L84 HELOMA MOLLE: ICD-10-CM

## 2020-06-03 DIAGNOSIS — M79.671 PAIN IN BOTH FEET: Primary | ICD-10-CM

## 2020-06-03 DIAGNOSIS — M20.41 HAMMER TOES OF BOTH FEET: ICD-10-CM

## 2020-06-03 DIAGNOSIS — M79.672 PAIN IN BOTH FEET: Primary | ICD-10-CM

## 2020-06-03 DIAGNOSIS — M20.42 HAMMER TOES OF BOTH FEET: ICD-10-CM

## 2020-06-03 DIAGNOSIS — M79.672 PAIN IN BOTH FEET: ICD-10-CM

## 2020-06-03 DIAGNOSIS — M20.10 HAV (HALLUX ABDUCTO VALGUS), UNSPECIFIED LATERALITY: ICD-10-CM

## 2020-06-03 PROCEDURE — 1159F MED LIST DOCD IN RCRD: CPT | Mod: S$GLB,,, | Performed by: PODIATRIST

## 2020-06-03 PROCEDURE — 1159F PR MEDICATION LIST DOCUMENTED IN MEDICAL RECORD: ICD-10-PCS | Mod: S$GLB,,, | Performed by: PODIATRIST

## 2020-06-03 PROCEDURE — 1125F AMNT PAIN NOTED PAIN PRSNT: CPT | Mod: S$GLB,,, | Performed by: PODIATRIST

## 2020-06-03 PROCEDURE — 73630 XR FOOT COMPLETE 3 VIEW BILATERAL: ICD-10-PCS | Mod: 26,LT,, | Performed by: RADIOLOGY

## 2020-06-03 PROCEDURE — 1125F PR PAIN SEVERITY QUANTIFIED, PAIN PRESENT: ICD-10-PCS | Mod: S$GLB,,, | Performed by: PODIATRIST

## 2020-06-03 PROCEDURE — 73630 X-RAY EXAM OF FOOT: CPT | Mod: TC,50,PO

## 2020-06-03 PROCEDURE — 73630 X-RAY EXAM OF FOOT: CPT | Mod: 26,LT,, | Performed by: RADIOLOGY

## 2020-06-03 PROCEDURE — 73630 X-RAY EXAM OF FOOT: CPT | Mod: 26,RT,, | Performed by: RADIOLOGY

## 2020-06-03 PROCEDURE — 1101F PR PT FALLS ASSESS DOC 0-1 FALLS W/OUT INJ PAST YR: ICD-10-PCS | Mod: CPTII,S$GLB,, | Performed by: PODIATRIST

## 2020-06-03 PROCEDURE — 99999 PR PBB SHADOW E&M-EST. PATIENT-LVL IV: ICD-10-PCS | Mod: PBBFAC,,, | Performed by: PODIATRIST

## 2020-06-03 PROCEDURE — 99204 PR OFFICE/OUTPT VISIT, NEW, LEVL IV, 45-59 MIN: ICD-10-PCS | Mod: S$GLB,,, | Performed by: PODIATRIST

## 2020-06-03 PROCEDURE — 1101F PT FALLS ASSESS-DOCD LE1/YR: CPT | Mod: CPTII,S$GLB,, | Performed by: PODIATRIST

## 2020-06-03 PROCEDURE — 99204 OFFICE O/P NEW MOD 45 MIN: CPT | Mod: S$GLB,,, | Performed by: PODIATRIST

## 2020-06-03 PROCEDURE — 99999 PR PBB SHADOW E&M-EST. PATIENT-LVL IV: CPT | Mod: PBBFAC,,, | Performed by: PODIATRIST

## 2020-06-03 NOTE — PATIENT INSTRUCTIONS
- Wear supportive shoes such as sneakers with cushioned insoles.    - Look for cushioned insoles.    - Rest/reduce ambulation to necessary activities of daily living.    - Ice foot for no more than 20 minutes/per hour.    - Elevate foot as much as possible throughout the day.    - Perform toe stretches as much as possible throughout the day.    - Apply mix of hydrocortisone cream and lidocaine cream to foot as needed for pain relief when massaging/stretching.    - Apply Cera Ve SA/Rough & Bumpy skin cream on callouses daily and file with pumice stone or [place] board once weekly.    - Notify clinic if pain worsens or fails to improve.

## 2020-06-04 NOTE — PROGRESS NOTES
Subjective:      Patient ID: Karina Mathur is a 69 y.o. female.    Chief Complaint: Foot Pain (bilateral-right foot pain worse than left. ), Flat Foot (and plantar faciitis), and Callouses (rt second toe-affecting great toe as well)      HPI:  Karina Mathur is a 69 y.o. female who presents to clinic with a chief complaint of painful callouses, bunions, and hammertoes.  Patient reports worsening bunions and hammertoes over the past decade with callouses developing within the past couple of years, which she is no longer able to manage on her own.  She relates trimming callouses down but cannot trim enough out to reduce pain and is fearful she is going to create a wound.  She would like to avoid surgery at this time.  She rates pain as 6/10 on the pain scale and describes it as discomfort, pressure, soreness, sharp pain at site of callouses.  She informs of trying to moisturize and wear good shoes.  She relays that the right foot hurts worse than the left.  Patient denies any other pedal complaints at this time.    PCP:   Mateus Cardoso MD  Date last seen: 2/12/2020    Review of Systems   Constitutional: Negative for appetite change, fever, chills, fatigue and unexpected weight change.   Respiratory: Negative for cough, wheezing, and shortness of breath.   Cardiovascular: Negative for chest pain, claudication, cyanosis, and leg swelling.  Endocrine:  Negative for intolerance to cold, intolerance to heat, polydipsia, polyphagia, and polyuria.    Gastrointestinal: Negative for nausea, vomiting, diarrhea, and constipation.   Musculoskeletal: Positive for back pain, arthritis, joint pain, joint swelling, myalgias, and stiffness.   Skin: Negative for nail bed changes, discoloration, rash, itching, poor wound healing, unusual hair distribution.  Positive for suspicious lesion.  Neurological: Negative for loss of balance, sensory change, paresthesias, and numbness.  Positive for pain.  Hematological: Negative for  "adenopathy, bleeding, and bruising easily.   Psychiatric/Behavioral: The patient is not nervous/anxious.  Negative for altered mental status.    No results found for: HGBA1C    Past Medical History:   Diagnosis Date    Hyperlipidemia     on simvastatin    Hypothyroidism     hx of iodine cocktail around 1988 for goiter.    Osteopenia     1/18/17; L fem neck w Tscore -1.7; Lsp -1.2; DEXA in 2 yrs.     Past Surgical History:   Procedure Laterality Date    INGUINAL HERNIA REPAIR       History reviewed. No pertinent family history.  Social History     Socioeconomic History    Marital status:      Spouse name: Not on file    Number of children: Not on file    Years of education: Not on file    Highest education level: Not on file   Occupational History    Not on file   Social Needs    Financial resource strain: Not on file    Food insecurity     Worry: Not on file     Inability: Not on file    Transportation needs     Medical: Not on file     Non-medical: Not on file   Tobacco Use    Smoking status: Never Smoker    Smokeless tobacco: Never Used   Substance and Sexual Activity    Alcohol use: Yes     Alcohol/week: 1.0 standard drinks     Types: 1 Glasses of wine per week     Comment: 1-2 glasses a month    Drug use: No    Sexual activity: Not on file   Lifestyle    Physical activity     Days per week: Not on file     Minutes per session: Not on file    Stress: Not on file   Relationships    Social connections     Talks on phone: Not on file     Gets together: Not on file     Attends Islam service: Not on file     Active member of club or organization: Not on file     Attends meetings of clubs or organizations: Not on file     Relationship status: Not on file   Other Topics Concern    Not on file   Social History Narrative    Not on file           Objective:        BP (!) 89/47 Comment: pt states normal BP for her  Pulse 65   Temp 98.3 °F (36.8 °C) (Temporal)   Ht 5' 1" (1.549 m)   Wt " 48.3 kg (106 lb 7.7 oz)   BMI 20.12 kg/m²     Physical Exam   Constitutional: Patient is oriented to person, place, and time. Patient appears well-developed and well-nourished. No acute distress.     Psychiatric: Patient has a normal mood and affect. Patient's speech is normal and behavior is normal. Judgment is normal. Cognition and memory are normal.     Bilateral pedal exam was performed today.  Vascular: Pedal pulses palpable 1/4 DP & PT.  CFT is = 3 seconds to the hallux.  Skin temperature is warm to cool proximal tibia to distal toes without localized increase in calor noted.  No erythema, edema, or ecchymosis noted to the foot or ankle.  Hair growth decreased distally to the LE.     Musculoskeletal: Ankle joint ROM is decreased. Subtalar joint ROM is decreased.  Midtarsal joint ROM is decreased.  1st ray ROM is decreased.  1st MTPJ ROM is decreased.  Ankle joint dorsiflexion is restricted with the knee extended and flexed per [place] exam.  Muscle strength is 5/5 for all LE muscle groups tested.    Neurological:  Epicritic sensation is grossly Intact to the foot.   Achilles DTR and [place]addock STR are Intact to the LE.   Negative Babinski sign bilateral lower extremities.  Negative clonus sign bilateral lower extremities.  Tenderness to palpation noted to hyperkeratotic lesions.    Dermatological: Toenails 1-5 bilateral are WNL in length and thickness.  Webspaces 1-4 bilateral are clean, dry, and intact.  Skin turgor is supple.  No dry, flaky skin noted to the LE.  No open wounds or suspicious pigmented lesions appreciable to the foot or ankle.  Hyperkeratotic lesions noted lateral right HIPJ, medial right 2nd PIPJ, and sub B/L 2nd metatarsal head without underlying ulceration of hemosiderin deposition present.    Nursing note and vitals reviewed.        Assessment:       Encounter Diagnoses   Name Primary?    Pain in both feet Yes    Heloma molle     Hav (hallux abducto valgus), unspecified laterality      Hammer toes of both feet          Plan:       Karina was seen today for foot pain, flat foot and callouses.    Diagnoses and all orders for this visit:    Pain in both feet  -     X-Ray Foot Complete Bilateral; Future    Fredy armstrong    Hav (hallux abducto valgus), unspecified laterality    Hammer toes of both feet      I counseled the patient on her conditions, their implications and medical management.    - Ordered and reviewed xrays with patient.    - With patient's permission, cleansed hyperkeratotic lesions lateral right HIPJ, medial right 2nd PIPJ, and sub B/L 2nd metatarsal head with alcohol swabs and pared lesions down to the level of normal skin to patient's tolerance without incident.    - Educated patient that conservative or surgical correction of the deformities would not only improve gait but also reduce callousing and slow progression of deformities.  Patient opted for conservative treatment at this time.  Informed her about recommended bunion splint, toe spacers, Cera VE SA cream, and cushioned insoles.    - Advised patient to bring cushioned insoles to next visit for metatarsal pads to be added to them.  Patient verbalized all understanding.    Patient was given the following recommendations and instructions:  Patient Instructions   - Wear supportive shoes such as sneakers with cushioned insoles.    - Look for cushioned insoles.    - Rest/reduce ambulation to necessary activities of daily living.    - Ice foot for no more than 20 minutes/per hour.    - Elevate foot as much as possible throughout the day.    - Perform toe stretches as much as possible throughout the day.    - Apply mix of hydrocortisone cream and lidocaine cream to foot as needed for pain relief when massaging/stretching.    - Apply Cera Ve SA/Rough & Bumpy skin cream on callouses daily and file with pumice stone or [place] board once weekly.    - Notify clinic if pain worsens or fails to improve.                  Mary Kate MICHELLE  SPENSER Teague        Dictation was performed using M*Modal Fluency.  Transcription errors may be present.

## 2020-06-19 ENCOUNTER — TELEPHONE (OUTPATIENT)
Dept: FAMILY MEDICINE | Facility: CLINIC | Age: 70
End: 2020-06-19

## 2020-06-19 NOTE — TELEPHONE ENCOUNTER
----- Message from Johnathon Kee sent at 6/19/2020 10:52 AM CDT -----  Type:  Sooner Apoointment Request    Caller is requesting a sooner appointment.  Caller declined first available appointment listed below.  Caller will not accept being placed on the waitlist and is requesting a message be sent to doctor.    Name of Caller:  self   When is the first available appointment?  08/2020   Symptoms:    Best Call Back Number:  [phone]  Additional Information:  Patient requesting to be seen next month.

## 2020-07-12 ENCOUNTER — PATIENT OUTREACH (OUTPATIENT)
Dept: ADMINISTRATIVE | Facility: OTHER | Age: 70
End: 2020-07-12

## 2020-07-12 NOTE — PROGRESS NOTES
Requested updates within Care Everywhere.  Patient's chart was reviewed for overdue CHAR topics.  Immunizations reconciled.

## 2020-07-13 ENCOUNTER — OFFICE VISIT (OUTPATIENT)
Dept: PODIATRY | Facility: CLINIC | Age: 70
End: 2020-07-13
Payer: COMMERCIAL

## 2020-07-13 VITALS — TEMPERATURE: 99 F | HEIGHT: 61 IN | WEIGHT: 106 LBS | BODY MASS INDEX: 20.01 KG/M2

## 2020-07-13 DIAGNOSIS — M79.671 PAIN IN BOTH FEET: ICD-10-CM

## 2020-07-13 DIAGNOSIS — M20.10 HAV (HALLUX ABDUCTO VALGUS), UNSPECIFIED LATERALITY: Primary | ICD-10-CM

## 2020-07-13 DIAGNOSIS — M79.672 PAIN IN BOTH FEET: ICD-10-CM

## 2020-07-13 DIAGNOSIS — M20.42 HAMMER TOES OF BOTH FEET: ICD-10-CM

## 2020-07-13 DIAGNOSIS — M20.41 HAMMER TOES OF BOTH FEET: ICD-10-CM

## 2020-07-13 PROCEDURE — 99212 OFFICE O/P EST SF 10 MIN: CPT | Mod: S$GLB,,, | Performed by: PODIATRIST

## 2020-07-13 PROCEDURE — 1125F AMNT PAIN NOTED PAIN PRSNT: CPT | Mod: S$GLB,,, | Performed by: PODIATRIST

## 2020-07-13 PROCEDURE — 1159F PR MEDICATION LIST DOCUMENTED IN MEDICAL RECORD: ICD-10-PCS | Mod: S$GLB,,, | Performed by: PODIATRIST

## 2020-07-13 PROCEDURE — 99999 PR PBB SHADOW E&M-EST. PATIENT-LVL III: CPT | Mod: PBBFAC,,, | Performed by: PODIATRIST

## 2020-07-13 PROCEDURE — 1125F PR PAIN SEVERITY QUANTIFIED, PAIN PRESENT: ICD-10-PCS | Mod: S$GLB,,, | Performed by: PODIATRIST

## 2020-07-13 PROCEDURE — 1159F MED LIST DOCD IN RCRD: CPT | Mod: S$GLB,,, | Performed by: PODIATRIST

## 2020-07-13 PROCEDURE — 99999 PR PBB SHADOW E&M-EST. PATIENT-LVL III: ICD-10-PCS | Mod: PBBFAC,,, | Performed by: PODIATRIST

## 2020-07-13 PROCEDURE — 3008F BODY MASS INDEX DOCD: CPT | Mod: CPTII,S$GLB,, | Performed by: PODIATRIST

## 2020-07-13 PROCEDURE — 1101F PT FALLS ASSESS-DOCD LE1/YR: CPT | Mod: CPTII,S$GLB,, | Performed by: PODIATRIST

## 2020-07-13 PROCEDURE — 1101F PR PT FALLS ASSESS DOC 0-1 FALLS W/OUT INJ PAST YR: ICD-10-PCS | Mod: CPTII,S$GLB,, | Performed by: PODIATRIST

## 2020-07-13 PROCEDURE — 3008F PR BODY MASS INDEX (BMI) DOCUMENTED: ICD-10-PCS | Mod: CPTII,S$GLB,, | Performed by: PODIATRIST

## 2020-07-13 PROCEDURE — 99212 PR OFFICE/OUTPT VISIT, EST, LEVL II, 10-19 MIN: ICD-10-PCS | Mod: S$GLB,,, | Performed by: PODIATRIST

## 2020-07-16 ENCOUNTER — LAB VISIT (OUTPATIENT)
Dept: LAB | Facility: HOSPITAL | Age: 70
End: 2020-07-16
Attending: INTERNAL MEDICINE
Payer: COMMERCIAL

## 2020-07-16 DIAGNOSIS — R63.6 UNDERWEIGHT: ICD-10-CM

## 2020-07-16 DIAGNOSIS — M85.89 OSTEOPENIA OF MULTIPLE SITES: ICD-10-CM

## 2020-07-16 DIAGNOSIS — E89.0 POSTABLATIVE HYPOTHYROIDISM: ICD-10-CM

## 2020-07-16 DIAGNOSIS — E11.9 TYPE 2 DIABETES MELLITUS WITHOUT COMPLICATION, WITH LONG-TERM CURRENT USE OF INSULIN: ICD-10-CM

## 2020-07-16 DIAGNOSIS — E55.9 VITAMIN D INSUFFICIENCY: ICD-10-CM

## 2020-07-16 DIAGNOSIS — E78.49 OTHER HYPERLIPIDEMIA: ICD-10-CM

## 2020-07-16 DIAGNOSIS — Z79.4 TYPE 2 DIABETES MELLITUS WITHOUT COMPLICATION, WITH LONG-TERM CURRENT USE OF INSULIN: ICD-10-CM

## 2020-07-16 LAB
25(OH)D3+25(OH)D2 SERPL-MCNC: 46 NG/ML (ref 30–96)
ALBUMIN SERPL BCP-MCNC: 4.1 G/DL (ref 3.5–5.2)
ALP SERPL-CCNC: 68 U/L (ref 55–135)
ALT SERPL W/O P-5'-P-CCNC: 23 U/L (ref 10–44)
ANION GAP SERPL CALC-SCNC: 6 MMOL/L (ref 8–16)
AST SERPL-CCNC: 22 U/L (ref 10–40)
BILIRUB SERPL-MCNC: 0.8 MG/DL (ref 0.1–1)
BUN SERPL-MCNC: 18 MG/DL (ref 8–23)
CALCIUM SERPL-MCNC: 9.5 MG/DL (ref 8.7–10.5)
CHLORIDE SERPL-SCNC: 103 MMOL/L (ref 95–110)
CHOLEST SERPL-MCNC: 188 MG/DL (ref 120–199)
CHOLEST/HDLC SERPL: 2.2 {RATIO} (ref 2–5)
CK SERPL-CCNC: 85 U/L (ref 20–180)
CO2 SERPL-SCNC: 31 MMOL/L (ref 23–29)
CREAT SERPL-MCNC: 0.7 MG/DL (ref 0.5–1.4)
EST. GFR  (AFRICAN AMERICAN): >60 ML/MIN/1.73 M^2
EST. GFR  (NON AFRICAN AMERICAN): >60 ML/MIN/1.73 M^2
GLUCOSE SERPL-MCNC: 81 MG/DL (ref 70–110)
HDLC SERPL-MCNC: 86 MG/DL (ref 40–75)
HDLC SERPL: 45.7 % (ref 20–50)
LDLC SERPL CALC-MCNC: 93.4 MG/DL (ref 63–159)
NONHDLC SERPL-MCNC: 102 MG/DL
POTASSIUM SERPL-SCNC: 4.3 MMOL/L (ref 3.5–5.1)
PROT SERPL-MCNC: 6.9 G/DL (ref 6–8.4)
SODIUM SERPL-SCNC: 140 MMOL/L (ref 136–145)
T3FREE SERPL-MCNC: 2.7 PG/ML (ref 2.3–4.2)
T4 FREE SERPL-MCNC: 0.87 NG/DL (ref 0.71–1.51)
TRIGL SERPL-MCNC: 43 MG/DL (ref 30–150)
TSH SERPL DL<=0.005 MIU/L-ACNC: 2.14 UIU/ML (ref 0.4–4)

## 2020-07-16 PROCEDURE — 83036 HEMOGLOBIN GLYCOSYLATED A1C: CPT

## 2020-07-16 PROCEDURE — 80061 LIPID PANEL: CPT

## 2020-07-16 PROCEDURE — 82306 VITAMIN D 25 HYDROXY: CPT

## 2020-07-16 PROCEDURE — 36415 COLL VENOUS BLD VENIPUNCTURE: CPT | Mod: PN

## 2020-07-16 PROCEDURE — 84443 ASSAY THYROID STIM HORMONE: CPT

## 2020-07-16 PROCEDURE — 80053 COMPREHEN METABOLIC PANEL: CPT

## 2020-07-16 PROCEDURE — 82550 ASSAY OF CK (CPK): CPT

## 2020-07-16 PROCEDURE — 84439 ASSAY OF FREE THYROXINE: CPT

## 2020-07-16 PROCEDURE — 84481 FREE ASSAY (FT-3): CPT

## 2020-07-17 LAB
ESTIMATED AVG GLUCOSE: 103 MG/DL (ref 68–131)
HBA1C MFR BLD HPLC: 5.2 % (ref 4–5.6)

## 2020-07-19 PROBLEM — M79.671 PAIN IN BOTH FEET: Status: ACTIVE | Noted: 2020-07-19

## 2020-07-19 PROBLEM — M20.42 HAMMER TOES OF BOTH FEET: Status: ACTIVE | Noted: 2020-07-19

## 2020-07-19 PROBLEM — M79.672 PAIN IN BOTH FEET: Status: ACTIVE | Noted: 2020-07-19

## 2020-07-19 PROBLEM — M20.10 HAV (HALLUX ABDUCTO VALGUS), UNSPECIFIED LATERALITY: Status: ACTIVE | Noted: 2020-07-19

## 2020-07-19 PROBLEM — M20.41 HAMMER TOES OF BOTH FEET: Status: ACTIVE | Noted: 2020-07-19

## 2020-07-20 ENCOUNTER — OFFICE VISIT (OUTPATIENT)
Dept: FAMILY MEDICINE | Facility: CLINIC | Age: 70
End: 2020-07-20
Payer: COMMERCIAL

## 2020-07-20 VITALS
OXYGEN SATURATION: 97 % | HEART RATE: 61 BPM | TEMPERATURE: 98 F | SYSTOLIC BLOOD PRESSURE: 92 MMHG | HEIGHT: 61 IN | DIASTOLIC BLOOD PRESSURE: 52 MMHG | WEIGHT: 104.38 LBS | BODY MASS INDEX: 19.71 KG/M2

## 2020-07-20 DIAGNOSIS — E55.9 VITAMIN D INSUFFICIENCY: ICD-10-CM

## 2020-07-20 DIAGNOSIS — Z12.4 CERVICAL CANCER SCREENING: ICD-10-CM

## 2020-07-20 DIAGNOSIS — Z71.85 VACCINE COUNSELING: ICD-10-CM

## 2020-07-20 DIAGNOSIS — Z23 NEED FOR VACCINATION AGAINST STREPTOCOCCUS PNEUMONIAE USING PNEUMOCOCCAL CONJUGATE VACCINE 13: ICD-10-CM

## 2020-07-20 DIAGNOSIS — M85.89 OSTEOPENIA OF MULTIPLE SITES: ICD-10-CM

## 2020-07-20 DIAGNOSIS — Z12.39 BREAST CANCER SCREENING: ICD-10-CM

## 2020-07-20 DIAGNOSIS — I95.9 HYPOTENSION, UNSPECIFIED HYPOTENSION TYPE: ICD-10-CM

## 2020-07-20 DIAGNOSIS — E78.49 OTHER HYPERLIPIDEMIA: Primary | ICD-10-CM

## 2020-07-20 DIAGNOSIS — Z23 NEED FOR SHINGLES VACCINE: ICD-10-CM

## 2020-07-20 DIAGNOSIS — E89.0 POSTABLATIVE HYPOTHYROIDISM: ICD-10-CM

## 2020-07-20 PROCEDURE — 1101F PT FALLS ASSESS-DOCD LE1/YR: CPT | Mod: CPTII,S$GLB,, | Performed by: INTERNAL MEDICINE

## 2020-07-20 PROCEDURE — 1159F MED LIST DOCD IN RCRD: CPT | Mod: S$GLB,,, | Performed by: INTERNAL MEDICINE

## 2020-07-20 PROCEDURE — 99999 PR PBB SHADOW E&M-EST. PATIENT-LVL IV: CPT | Mod: PBBFAC,,, | Performed by: INTERNAL MEDICINE

## 2020-07-20 PROCEDURE — 90471 IMMUNIZATION ADMIN: CPT | Mod: S$GLB,,, | Performed by: INTERNAL MEDICINE

## 2020-07-20 PROCEDURE — 99999 PR PBB SHADOW E&M-EST. PATIENT-LVL IV: ICD-10-PCS | Mod: PBBFAC,,, | Performed by: INTERNAL MEDICINE

## 2020-07-20 PROCEDURE — 3008F BODY MASS INDEX DOCD: CPT | Mod: CPTII,S$GLB,, | Performed by: INTERNAL MEDICINE

## 2020-07-20 PROCEDURE — 1159F PR MEDICATION LIST DOCUMENTED IN MEDICAL RECORD: ICD-10-PCS | Mod: S$GLB,,, | Performed by: INTERNAL MEDICINE

## 2020-07-20 PROCEDURE — 3008F PR BODY MASS INDEX (BMI) DOCUMENTED: ICD-10-PCS | Mod: CPTII,S$GLB,, | Performed by: INTERNAL MEDICINE

## 2020-07-20 PROCEDURE — 90670 PNEUMOCOCCAL CONJUGATE VACCINE 13-VALENT LESS THAN 5YO & GREATER THAN: ICD-10-PCS | Mod: S$GLB,,, | Performed by: INTERNAL MEDICINE

## 2020-07-20 PROCEDURE — 1101F PR PT FALLS ASSESS DOC 0-1 FALLS W/OUT INJ PAST YR: ICD-10-PCS | Mod: CPTII,S$GLB,, | Performed by: INTERNAL MEDICINE

## 2020-07-20 PROCEDURE — 99214 PR OFFICE/OUTPT VISIT, EST, LEVL IV, 30-39 MIN: ICD-10-PCS | Mod: 25,S$GLB,, | Performed by: INTERNAL MEDICINE

## 2020-07-20 PROCEDURE — 90670 PCV13 VACCINE IM: CPT | Mod: S$GLB,,, | Performed by: INTERNAL MEDICINE

## 2020-07-20 PROCEDURE — 99214 OFFICE O/P EST MOD 30 MIN: CPT | Mod: 25,S$GLB,, | Performed by: INTERNAL MEDICINE

## 2020-07-20 PROCEDURE — 90471 PNEUMOCOCCAL CONJUGATE VACCINE 13-VALENT LESS THAN 5YO & GREATER THAN: ICD-10-PCS | Mod: S$GLB,,, | Performed by: INTERNAL MEDICINE

## 2020-07-20 NOTE — PROGRESS NOTES
Subjective:       Patient ID: Karina Mathur is a 69 y.o. female.    Chief Complaint: Follow-up (review lab results) and Medication Refill (Levothyroxine 125mcg, Levothyroxine 50mcg, Simvastatin 20mg)    HPI  patient here today for reassessment and go over labs.     Other hyperlipidemia:  On low-fat high-fiber diet or least tries; tolerates simvastatin 20 mg a day fine.  Lipid profile:  Cholesterol 188/triglyceride 43/HDL 86/LDL 93.     Post ablated hypothyroidism:  Takes levothyroxine appropriately on levothyroxine 62.5 mcg Monday Wednesday and Friday and 50 mcg the other 4 days; TSH 2.137     Hypotension:  Blood pressure here today is 92/52 asymptomatic with the bicarb 31.     Osteopenia:  05/02/2019 DEXA scan with lumbar spine T-score -1.1 and left femoral neck-1.0.  DEXA scan due after 05/02/2021.  Weight-bearing exercises calcium and vitamin-D supplementation     Vitamin-D insufficiency:  Vitamin-D level 46 levels appropriate.     Vaccine counseling:  Please see below patient received Prevnar 13 script here in the office and shingles vaccine script given     Total time 3:07 p.m. to 3:53 p.m..  Greater than 50% of the time spent in discussion, counseling, and review.  Labs reviewed and ordered for follow-up.  DEXA scan also reviewed with patient.  Medications reviewed and addressed.  15 extra minutes spent on supplemental documentation and review.    Review of Systems   Constitutional: Negative for fever and unexpected weight change.   HENT: Negative for congestion, postnasal drip and rhinorrhea.    Respiratory: Negative for cough, chest tightness, shortness of breath and wheezing.    Cardiovascular: Negative for chest pain, palpitations and leg swelling.   Gastrointestinal: Negative for abdominal pain, blood in stool, constipation, diarrhea, nausea and vomiting.   Genitourinary: Negative for dysuria and hematuria.   Psychiatric/Behavioral: Negative for dysphoric mood. The patient is not nervous/anxious.       "  Objective:      Vitals:    07/20/20 1433   BP: (!) 92/52   BP Location: Left arm   Patient Position: Sitting   BP Method: Medium (Manual)   Pulse: 61   Temp: 98.2 °F (36.8 °C)   TempSrc: Temporal   SpO2: 97%   Weight: 47.3 kg (104 lb 6.2 oz)   Height: 5' 1" (1.549 m)     Body mass index is 19.72 kg/m².  Wt Readings from Last 3 Encounters:   07/20/20 47.3 kg (104 lb 6.2 oz)   07/13/20 48.1 kg (106 lb)   06/03/20 48.3 kg (106 lb 7.7 oz)        Physical Exam  Vitals signs reviewed.   Constitutional:       Appearance: She is well-developed.   HENT:      Head: Normocephalic and atraumatic.   Neck:      Musculoskeletal: Normal range of motion and neck supple.      Thyroid: No thyromegaly.      Vascular: No carotid bruit.   Cardiovascular:      Rate and Rhythm: Normal rate and regular rhythm.      Heart sounds: Normal heart sounds. No murmur. No gallop.    Pulmonary:      Effort: Pulmonary effort is normal. No respiratory distress.      Breath sounds: Normal breath sounds. No wheezing or rales.   Abdominal:      General: Bowel sounds are normal. There is no distension.      Palpations: Abdomen is soft.      Tenderness: There is no abdominal tenderness. There is no guarding or rebound.   Musculoskeletal: Normal range of motion.   Lymphadenopathy:      Cervical: No cervical adenopathy.   Skin:     Findings: No rash.   Neurological:      Mental Status: She is alert and oriented to person, place, and time.      Comments: Moves all 4 extremities fine.   Psychiatric:         Behavior: Behavior normal.         Thought Content: Thought content normal.         Assessment:       1. Other hyperlipidemia    2. Postablative hypothyroidism    3. Osteopenia of multiple sites    4. Vitamin D insufficiency    5. Hypotension, unspecified hypotension type    6. Need for vaccination against Streptococcus pneumoniae using pneumococcal conjugate vaccine 13    7. Vaccine counseling    8. Need for shingles vaccine        Plan:       Other " hyperlipidemia: Maintain low fat high fiber diet, exercise regularly. Weight reduction where indicated. Cont simvastatin  -     Lipid Panel; Future; Expected date: 07/20/2020  -     Comprehensive metabolic panel; Future; Expected date: 07/20/2020    Postablative hypothyroidism: same thyroid dosing; TFT on f/u.   -     TSH; Future; Expected date: 07/20/2020  -     T4, free; Future; Expected date: 07/20/2020  -     T3, free; Future; Expected date: 07/20/2020    Osteopenia of multiple sites: Weight bearing exercises, continue calcium and vit D supplements. DEXA scabn at 2 yr intervals as needed. DEXA after 5/2/2021.   -     Comprehensive metabolic panel; Future; Expected date: 07/20/2020  -     Vitamin D; Future; Expected date: 07/20/2020    Vitamin D insufficiency: same vit D dosing.   -     Vitamin D; Future; Expected date: 07/20/2020    Hypotension with manual blood pressure here 92/52; patient reportedly has a chronic history of low blood pressure.  She is asymptomatic over the bicarb of 31; she is to push fluids more during the day    Need for vaccination against Streptococcus pneumoniae using pneumococcal conjugate vaccine 13  -     (In Office Administered) Pneumococcal Conjugate Vaccine (13 Valent) (IM)    Vaccine counseling    Need for shingles vaccine: given script for Shingles #1; call in 2 mos for orders for #2 if tolerated.     Cervical cancer screening:  Saw her gyn  in 02/2020 breast exam, pelvic and Pap performed.    Breast cancer screening:  Screening mammogram 03/03/2020 with no mammographic evidence of malignancy; repeat screening mammogram in 1 year.

## 2020-07-20 NOTE — PATIENT INSTRUCTIONS
- Wear supportive shoes such as sneakers with cushioned insoles.    - Look for cushioned insoles.    - Rest/reduce ambulation to necessary activities of daily living.    - Ice foot for no more than 20 minutes/per hour.    - Elevate foot as much as possible throughout the day.    - Perform toe stretches as much as possible throughout the day.    - Apply mix of hydrocortisone cream and lidocaine cream to foot as needed for pain relief when massaging/stretching.    - Apply Cera Ve SA/Rough & Bumpy skin cream on callouses daily and file with pumice stone or [place] board once weekly.    - Notify clinic if pain worsens or fails to improve.

## 2020-07-20 NOTE — PATIENT INSTRUCTIONS
Other hyperlipidemia: Maintain low fat high fiber diet, exercise regularly. Weight reduction where indicated. Cont simvastatin  -     Lipid Panel; Future; Expected date: 07/20/2020  -     Comprehensive metabolic panel; Future; Expected date: 07/20/2020    Postablative hypothyroidism: same thyroid dosing; TFT on f/u.   -     TSH; Future; Expected date: 07/20/2020  -     T4, free; Future; Expected date: 07/20/2020  -     T3, free; Future; Expected date: 07/20/2020    Osteopenia of multiple sites: Weight bearing exercises, continue calcium and vit D supplements. DEXA scabn at 2 yr intervals as needed. DEXA after 5/2/2021.   -     Comprehensive metabolic panel; Future; Expected date: 07/20/2020  -     Vitamin D; Future; Expected date: 07/20/2020    Vitamin D insufficiency: same vit D dosing.   -     Vitamin D; Future; Expected date: 07/20/2020    Need for vaccination against Streptococcus pneumoniae using pneumococcal conjugate vaccine 13  -     (In Office Administered) Pneumococcal Conjugate Vaccine (13 Valent) (IM)    Vaccine counseling    Need for shingles vaccine: given script for Shingles #1; call in 2 mos for orders for #2 if tolerated.

## 2020-07-20 NOTE — Clinical Note
"Please remove this pt from my DM listing as she has no hx of DM that I can find; Dr Carley's note from 7/19/16 gives her no dx of DM; although care gaps mentions " diabetes and DM foot exam performed" She is also on my counter for needing HgA1C every 6 mos. FBS have been nl and HgA1C wnl as well; can you fix this error. "

## 2020-07-20 NOTE — Clinical Note
Please correct errors in short period is reading newly diagnosed diabetic within the last 90 days needs eye and foot exam; patient was a runner sleep named diabetic foot exam performed 07/19/2016 Dr. Howe; however detailed review of his note shows no mention of diabetes on his noted all.  Recent hemoglobin A1c is normal at 5.2 in fasting blood sugar 81.  Please also ensure that she has been taken off my list of diabetic patient.  She does not have a diagnosis of diabetes.  Thanks for all your help time Jose Ramon!

## 2020-08-19 ENCOUNTER — TELEPHONE (OUTPATIENT)
Dept: FAMILY MEDICINE | Facility: CLINIC | Age: 70
End: 2020-08-19

## 2020-08-19 NOTE — TELEPHONE ENCOUNTER
----- Message from Mana Perkins sent at 8/19/2020  8:16 AM CDT -----  Regarding: appointment  Contact: patient  Type:  Same Day Appointment Request    Caller is requesting a same day appointment.  Caller declined first available appointment listed below.      Name of Caller:  self  When is the first available appointment?    Symptoms:  needs shingle vaccine injection  Best Call Back Number:  [phone] (home)   Additional Information:   Patient has an order that expires 08/20/20. Please call patient to advise if you give the injection. Thanks!

## 2020-08-20 ENCOUNTER — IMMUNIZATION (OUTPATIENT)
Dept: PHARMACY | Facility: CLINIC | Age: 70
End: 2020-08-20
Payer: COMMERCIAL

## 2020-08-24 ENCOUNTER — OFFICE VISIT (OUTPATIENT)
Dept: PODIATRY | Facility: CLINIC | Age: 70
End: 2020-08-24
Payer: COMMERCIAL

## 2020-08-24 VITALS — WEIGHT: 104 LBS | HEIGHT: 61 IN | BODY MASS INDEX: 19.63 KG/M2

## 2020-08-24 DIAGNOSIS — M79.672 PAIN IN BOTH FEET: ICD-10-CM

## 2020-08-24 DIAGNOSIS — M20.41 HAMMER TOES OF BOTH FEET: ICD-10-CM

## 2020-08-24 DIAGNOSIS — M79.671 PAIN IN BOTH FEET: ICD-10-CM

## 2020-08-24 DIAGNOSIS — L84 HELOMA MOLLE: ICD-10-CM

## 2020-08-24 DIAGNOSIS — M20.10 HAV (HALLUX ABDUCTO VALGUS), UNSPECIFIED LATERALITY: Primary | ICD-10-CM

## 2020-08-24 DIAGNOSIS — M20.42 HAMMER TOES OF BOTH FEET: ICD-10-CM

## 2020-08-24 PROCEDURE — 3008F PR BODY MASS INDEX (BMI) DOCUMENTED: ICD-10-PCS | Mod: CPTII,S$GLB,, | Performed by: PODIATRIST

## 2020-08-24 PROCEDURE — 99213 PR OFFICE/OUTPT VISIT, EST, LEVL III, 20-29 MIN: ICD-10-PCS | Mod: S$GLB,,, | Performed by: PODIATRIST

## 2020-08-24 PROCEDURE — 3008F BODY MASS INDEX DOCD: CPT | Mod: CPTII,S$GLB,, | Performed by: PODIATRIST

## 2020-08-24 PROCEDURE — 1101F PT FALLS ASSESS-DOCD LE1/YR: CPT | Mod: CPTII,S$GLB,, | Performed by: PODIATRIST

## 2020-08-24 PROCEDURE — 1101F PR PT FALLS ASSESS DOC 0-1 FALLS W/OUT INJ PAST YR: ICD-10-PCS | Mod: CPTII,S$GLB,, | Performed by: PODIATRIST

## 2020-08-24 PROCEDURE — 1125F AMNT PAIN NOTED PAIN PRSNT: CPT | Mod: S$GLB,,, | Performed by: PODIATRIST

## 2020-08-24 PROCEDURE — 99999 PR PBB SHADOW E&M-EST. PATIENT-LVL III: ICD-10-PCS | Mod: PBBFAC,,, | Performed by: PODIATRIST

## 2020-08-24 PROCEDURE — 99213 OFFICE O/P EST LOW 20 MIN: CPT | Mod: S$GLB,,, | Performed by: PODIATRIST

## 2020-08-24 PROCEDURE — 99999 PR PBB SHADOW E&M-EST. PATIENT-LVL III: CPT | Mod: PBBFAC,,, | Performed by: PODIATRIST

## 2020-08-24 PROCEDURE — 1125F PR PAIN SEVERITY QUANTIFIED, PAIN PRESENT: ICD-10-PCS | Mod: S$GLB,,, | Performed by: PODIATRIST

## 2020-08-24 PROCEDURE — 1159F MED LIST DOCD IN RCRD: CPT | Mod: S$GLB,,, | Performed by: PODIATRIST

## 2020-08-24 PROCEDURE — 1159F PR MEDICATION LIST DOCUMENTED IN MEDICAL RECORD: ICD-10-PCS | Mod: S$GLB,,, | Performed by: PODIATRIST

## 2020-08-31 PROBLEM — L84 HELOMA MOLLE: Status: ACTIVE | Noted: 2020-08-31

## 2020-08-31 NOTE — PATIENT INSTRUCTIONS
- Wear supportive shoes such as sneakers with cushioned insoles.    - Look for cushioned insoles.    - Rest/reduce ambulation to necessary activities of daily living.    - Ice foot for no more than 20 minutes/per hour.    - Elevate foot as much as possible throughout the day.    - Perform toe stretches as much as possible throughout the day.    - Apply mix of hydrocortisone cream and lidocaine cream to foot as needed for pain relief when massaging/stretching.    - Apply Cera Ve SA/Rough & Bumpy skin cream on callouses daily and file with pumice stone or [place] board once weekly.    - Notify clinic if pain worsens or fails to improve.

## 2020-08-31 NOTE — PROGRESS NOTES
Subjective:      Patient ID: Karina Mathur is a 69 y.o. female.    Chief Complaint: Foot Pain (same), Nail Problem (R foot great toe), and Bunions (has not been able to wear bunion splint for 2wks. She developed blisters when wearing it. )      HPI:  Karina Mathur is a 69 y.o. female who presents to clinic with a chief complaint of painful bunions and hammertoes.  Patient reports has significantly improved with use of bunion splints.  She relates having changed her shoes as well which is also help.  She denies performing PRICE therapy as often as she needs to but is happy with her progress.  She rates her pain today as 2/10 on pain scale and describes it as achy.  Patient denies any other pedal complaints at this time.    PCP:   Mateus Cardoso MD  Date last seen: 7/20/2020    Review of Systems   Constitutional: Negative for appetite change, fever, chills, fatigue and unexpected weight change.   Respiratory: Negative for cough, wheezing, and shortness of breath.   Cardiovascular: Negative for chest pain, claudication, cyanosis, and leg swelling.  Musculoskeletal: Positive for back pain, arthritis, joint pain, joint swelling, myalgias, and stiffness.   Skin: Negative for nail bed changes, discoloration, rash, itching, poor wound healing, unusual hair distribution.  Positive for suspicious lesion.  Neurological: Negative for loss of balance, sensory change, paresthesias, and numbness.  Positive for pain.  Hematological: Negative for adenopathy, bleeding, and bruising easily.   Psychiatric/Behavioral: The patient is not nervous/anxious.  Negative for altered mental status.    Hemoglobin A1C   Date Value Ref Range Status   07/16/2020 5.2 4.0 - 5.6 % Final     Comment:     ADA Screening Guidelines:  5.7-6.4%  Consistent with prediabetes  >or=6.5%  Consistent with diabetes  High levels of fetal hemoglobin interfere with the HbA1C  assay. Heterozygous hemoglobin variants (HbS, HgC, etc)do  not significantly  "interfere with this assay.   However, presence of multiple variants may affect accuracy.         Past Medical History:   Diagnosis Date    Hyperlipidemia     on simvastatin    Hypothyroidism     hx of iodine cocktail around 1988 for goiter.    Osteopenia     1/18/17; L fem neck w Tscore -1.7; Lsp -1.2; DEXA in 2 yrs.     Past Surgical History:   Procedure Laterality Date    INGUINAL HERNIA REPAIR       History reviewed. No pertinent family history.  Social History     Socioeconomic History    Marital status:      Spouse name: Not on file    Number of children: Not on file    Years of education: Not on file    Highest education level: Not on file   Occupational History    Not on file   Social Needs    Financial resource strain: Not on file    Food insecurity     Worry: Not on file     Inability: Not on file    Transportation needs     Medical: Not on file     Non-medical: Not on file   Tobacco Use    Smoking status: Never Smoker    Smokeless tobacco: Never Used   Substance and Sexual Activity    Alcohol use: Yes     Alcohol/week: 1.0 standard drinks     Types: 1 Glasses of wine per week     Comment: 1-2 glasses a month    Drug use: No    Sexual activity: Not on file   Lifestyle    Physical activity     Days per week: Not on file     Minutes per session: Not on file    Stress: Not on file   Relationships    Social connections     Talks on phone: Not on file     Gets together: Not on file     Attends Shinto service: Not on file     Active member of club or organization: Not on file     Attends meetings of clubs or organizations: Not on file     Relationship status: Not on file   Other Topics Concern    Not on file   Social History Narrative    Not on file           Objective:        Ht 5' 1" (1.549 m)   Wt 47.2 kg (104 lb)   BMI 19.65 kg/m²     Physical Exam   Constitutional: Patient is oriented to person, place, and time. Patient appears well-developed and well-nourished. No acute " distress.     Psychiatric: Patient has a normal mood and affect. Patient's speech is normal and behavior is normal. Judgment is normal. Cognition and memory are normal.     Bilateral pedal exam was performed today.  Vascular: Pedal pulses palpable 1/4 DP & PT.  CFT is = 3 seconds to the hallux.  Skin temperature is warm to cool proximal tibia to distal toes without localized increase in calor noted.  No erythema, edema, or ecchymosis noted to the foot or ankle.  Hair growth decreased distally to the LE.     Musculoskeletal: Ankle joint ROM is decreased. Subtalar joint ROM is decreased.  Midtarsal joint ROM is decreased.  1st ray ROM is decreased.  1st MTPJ ROM is decreased.  Ankle joint dorsiflexion is restricted with the knee extended and flexed per Silfverskiold exam.  Muscle strength is 5/5 for all LE muscle groups tested.  The hallux is abducted on the 1st ray.  Flexion contracture of lesser digits appreciable.    Neurological:  Epicritic sensation is grossly Intact to the foot.   Achilles DTR and Chaddock STR are Intact to the LE.   Tenderness to palpation noted to B/L 1st MTPJ.    Dermatological: Toenails 1-5 bilateral are WNL in length and thickness.  Webspaces 1-4 bilateral are clean, dry, and intact.  Skin turgor is supple.  No dry, flaky skin noted to the LE.  No open wounds or suspicious pigmented lesions appreciable to the foot or ankle.      Nursing note and vitals reviewed.        Assessment:       Encounter Diagnoses   Name Primary?    Hav (hallux abducto valgus), unspecified laterality Yes    Hammer toes of both feet     Heloma molle     Pain in both feet          Plan:       Karina was seen today for foot pain, nail problem and bunions.    Diagnoses and all orders for this visit:    Hav (hallux abducto valgus), unspecified laterality    Hammer toes of both feet    Heloma molle    Pain in both feet      I counseled the patient on her conditions, their implications and medical management.    -  Reviewed with patient bunion splint, toe spacers, Cera VE SA cream, and cushioned insoles.    - Advised patient to bring cushioned insoles to next visit for metatarsal pads to be added to them.  Patient verbalized all understanding.    Patient was given the following recommendations and instructions:  Patient Instructions   - Wear supportive shoes such as sneakers with cushioned insoles.    - Look for cushioned insoles.    - Rest/reduce ambulation to necessary activities of daily living.    - Ice foot for no more than 20 minutes/per hour.    - Elevate foot as much as possible throughout the day.    - Perform toe stretches as much as possible throughout the day.    - Apply mix of hydrocortisone cream and lidocaine cream to foot as needed for pain relief when massaging/stretching.    - Apply Cera Ve SA/Rough & Bumpy skin cream on callouses daily and file with pumice stone or [place] board once weekly.    - Notify clinic if pain worsens or fails to improve.                  Mary Kate Teague DPM        Dictation was performed using M*Modal Fluency.  Transcription errors may be present.

## 2020-10-22 ENCOUNTER — IMMUNIZATION (OUTPATIENT)
Dept: PHARMACY | Facility: CLINIC | Age: 70
End: 2020-10-22
Payer: COMMERCIAL

## 2020-12-02 ENCOUNTER — PATIENT MESSAGE (OUTPATIENT)
Dept: ADMINISTRATIVE | Facility: HOSPITAL | Age: 70
End: 2020-12-02

## 2020-12-04 ENCOUNTER — TELEPHONE (OUTPATIENT)
Dept: FAMILY MEDICINE | Facility: CLINIC | Age: 70
End: 2020-12-04

## 2020-12-04 NOTE — TELEPHONE ENCOUNTER
----- Message from Darren Boone sent at 12/4/2020 10:11 AM CST -----  Contact: patient  Patient called in and stated she currently sees Dr. Cardoso but would like to switch to Dr. Davalos as a new patient because her  sees Dr. Davalos.  Patient stated it would just be easier for them.    System would not allow us to schedule a new patient appointment and patient would like a call back at [phone].

## 2020-12-30 DIAGNOSIS — Z12.11 COLON CANCER SCREENING: ICD-10-CM

## 2021-01-04 ENCOUNTER — PATIENT MESSAGE (OUTPATIENT)
Dept: ADMINISTRATIVE | Facility: HOSPITAL | Age: 71
End: 2021-01-04

## 2021-01-11 ENCOUNTER — LAB VISIT (OUTPATIENT)
Dept: LAB | Facility: HOSPITAL | Age: 71
End: 2021-01-11
Attending: INTERNAL MEDICINE
Payer: MEDICARE

## 2021-01-11 ENCOUNTER — OFFICE VISIT (OUTPATIENT)
Dept: FAMILY MEDICINE | Facility: CLINIC | Age: 71
End: 2021-01-11
Payer: MEDICARE

## 2021-01-11 VITALS
WEIGHT: 101.06 LBS | HEART RATE: 71 BPM | BODY MASS INDEX: 19.08 KG/M2 | HEIGHT: 61 IN | TEMPERATURE: 99 F | DIASTOLIC BLOOD PRESSURE: 60 MMHG | OXYGEN SATURATION: 96 % | SYSTOLIC BLOOD PRESSURE: 102 MMHG

## 2021-01-11 DIAGNOSIS — Z12.31 ENCOUNTER FOR SCREENING MAMMOGRAM FOR MALIGNANT NEOPLASM OF BREAST: ICD-10-CM

## 2021-01-11 DIAGNOSIS — L84 HELOMA MOLLE: ICD-10-CM

## 2021-01-11 DIAGNOSIS — E78.49 OTHER HYPERLIPIDEMIA: ICD-10-CM

## 2021-01-11 DIAGNOSIS — M85.89 OSTEOPENIA OF MULTIPLE SITES: ICD-10-CM

## 2021-01-11 DIAGNOSIS — E89.0 POSTABLATIVE HYPOTHYROIDISM: ICD-10-CM

## 2021-01-11 DIAGNOSIS — Z12.39 ENCOUNTER FOR SCREENING FOR MALIGNANT NEOPLASM OF BREAST, UNSPECIFIED SCREENING MODALITY: ICD-10-CM

## 2021-01-11 DIAGNOSIS — Z12.11 SCREENING FOR COLON CANCER: ICD-10-CM

## 2021-01-11 DIAGNOSIS — E55.9 VITAMIN D INSUFFICIENCY: ICD-10-CM

## 2021-01-11 DIAGNOSIS — G47.00 ACUTE INSOMNIA: ICD-10-CM

## 2021-01-11 DIAGNOSIS — G47.00 ACUTE INSOMNIA: Primary | ICD-10-CM

## 2021-01-11 LAB
25(OH)D3+25(OH)D2 SERPL-MCNC: 46 NG/ML (ref 30–96)
ALBUMIN SERPL BCP-MCNC: 4.5 G/DL (ref 3.5–5.2)
ALP SERPL-CCNC: 71 U/L (ref 55–135)
ALT SERPL W/O P-5'-P-CCNC: 19 U/L (ref 10–44)
ANION GAP SERPL CALC-SCNC: 9 MMOL/L (ref 8–16)
AST SERPL-CCNC: 18 U/L (ref 10–40)
BASOPHILS # BLD AUTO: 0.05 K/UL (ref 0–0.2)
BASOPHILS NFR BLD: 0.7 % (ref 0–1.9)
BILIRUB SERPL-MCNC: 0.7 MG/DL (ref 0.1–1)
BUN SERPL-MCNC: 15 MG/DL (ref 8–23)
CALCIUM SERPL-MCNC: 10 MG/DL (ref 8.7–10.5)
CHLORIDE SERPL-SCNC: 99 MMOL/L (ref 95–110)
CHOLEST SERPL-MCNC: 215 MG/DL (ref 120–199)
CHOLEST/HDLC SERPL: 2.2 {RATIO} (ref 2–5)
CO2 SERPL-SCNC: 29 MMOL/L (ref 23–29)
CREAT SERPL-MCNC: 0.7 MG/DL (ref 0.5–1.4)
DIFFERENTIAL METHOD: ABNORMAL
EOSINOPHIL # BLD AUTO: 0 K/UL (ref 0–0.5)
EOSINOPHIL NFR BLD: 0.6 % (ref 0–8)
ERYTHROCYTE [DISTWIDTH] IN BLOOD BY AUTOMATED COUNT: 13.4 % (ref 11.5–14.5)
EST. GFR  (AFRICAN AMERICAN): >60 ML/MIN/1.73 M^2
EST. GFR  (NON AFRICAN AMERICAN): >60 ML/MIN/1.73 M^2
GLUCOSE SERPL-MCNC: 97 MG/DL (ref 70–110)
HCT VFR BLD AUTO: 46.3 % (ref 37–48.5)
HDLC SERPL-MCNC: 98 MG/DL (ref 40–75)
HDLC SERPL: 45.6 % (ref 20–50)
HGB BLD-MCNC: 14.8 G/DL (ref 12–16)
IMM GRANULOCYTES # BLD AUTO: 0.02 K/UL (ref 0–0.04)
IMM GRANULOCYTES NFR BLD AUTO: 0.3 % (ref 0–0.5)
LDLC SERPL CALC-MCNC: 105 MG/DL (ref 63–159)
LYMPHOCYTES # BLD AUTO: 1.2 K/UL (ref 1–4.8)
LYMPHOCYTES NFR BLD: 16.8 % (ref 18–48)
MCH RBC QN AUTO: 30 PG (ref 27–31)
MCHC RBC AUTO-ENTMCNC: 32 G/DL (ref 32–36)
MCV RBC AUTO: 94 FL (ref 82–98)
MONOCYTES # BLD AUTO: 0.4 K/UL (ref 0.3–1)
MONOCYTES NFR BLD: 5.9 % (ref 4–15)
NEUTROPHILS # BLD AUTO: 5.5 K/UL (ref 1.8–7.7)
NEUTROPHILS NFR BLD: 75.7 % (ref 38–73)
NONHDLC SERPL-MCNC: 117 MG/DL
NRBC BLD-RTO: 0 /100 WBC
PLATELET # BLD AUTO: 265 K/UL (ref 150–350)
PMV BLD AUTO: 10.4 FL (ref 9.2–12.9)
POTASSIUM SERPL-SCNC: 4.2 MMOL/L (ref 3.5–5.1)
PROT SERPL-MCNC: 7.5 G/DL (ref 6–8.4)
RBC # BLD AUTO: 4.93 M/UL (ref 4–5.4)
SODIUM SERPL-SCNC: 137 MMOL/L (ref 136–145)
T3FREE SERPL-MCNC: 2 PG/ML (ref 2.3–4.2)
T4 FREE SERPL-MCNC: 0.96 NG/DL (ref 0.71–1.51)
TRIGL SERPL-MCNC: 60 MG/DL (ref 30–150)
TSH SERPL DL<=0.005 MIU/L-ACNC: 3.02 UIU/ML (ref 0.4–4)
WBC # BLD AUTO: 7.27 K/UL (ref 3.9–12.7)

## 2021-01-11 PROCEDURE — 99214 PR OFFICE/OUTPT VISIT, EST, LEVL IV, 30-39 MIN: ICD-10-PCS | Mod: S$GLB,,, | Performed by: INTERNAL MEDICINE

## 2021-01-11 PROCEDURE — 36415 COLL VENOUS BLD VENIPUNCTURE: CPT | Mod: PO

## 2021-01-11 PROCEDURE — 85025 COMPLETE CBC W/AUTO DIFF WBC: CPT

## 2021-01-11 PROCEDURE — 80061 LIPID PANEL: CPT

## 2021-01-11 PROCEDURE — 80053 COMPREHEN METABOLIC PANEL: CPT

## 2021-01-11 PROCEDURE — 99214 OFFICE O/P EST MOD 30 MIN: CPT | Mod: PBBFAC,PO | Performed by: INTERNAL MEDICINE

## 2021-01-11 PROCEDURE — 99999 PR PBB SHADOW E&M-EST. PATIENT-LVL IV: CPT | Mod: PBBFAC,,, | Performed by: INTERNAL MEDICINE

## 2021-01-11 PROCEDURE — 82306 VITAMIN D 25 HYDROXY: CPT

## 2021-01-11 PROCEDURE — 99214 OFFICE O/P EST MOD 30 MIN: CPT | Mod: S$GLB,,, | Performed by: INTERNAL MEDICINE

## 2021-01-11 PROCEDURE — 84481 FREE ASSAY (FT-3): CPT

## 2021-01-11 PROCEDURE — 84439 ASSAY OF FREE THYROXINE: CPT

## 2021-01-11 PROCEDURE — 99999 PR PBB SHADOW E&M-EST. PATIENT-LVL IV: ICD-10-PCS | Mod: PBBFAC,,, | Performed by: INTERNAL MEDICINE

## 2021-01-11 PROCEDURE — 84443 ASSAY THYROID STIM HORMONE: CPT

## 2021-01-11 RX ORDER — INFLUENZA A VIRUS A/MICHIGAN/45/2015 X-275 (H1N1) ANTIGEN (FORMALDEHYDE INACTIVATED), INFLUENZA A VIRUS A/SINGAPORE/INFIMH-16-0019/2016 IVR-186 (H3N2) ANTIGEN (FORMALDEHYDE INACTIVATED), INFLUENZA B VIRUS B/PHUKET/3073/2013 ANTIGEN (FORMALDEHYDE INACTIVATED), AND INFLUENZA B VIRUS B/MARYLAND/15/2016 BX-69A ANTIGEN (FORMALDEHYDE INACTIVATED) 60; 60; 60; 60 UG/.7ML; UG/.7ML; UG/.7ML; UG/.7ML
INJECTION, SUSPENSION INTRAMUSCULAR
COMMUNITY
Start: 2020-11-18 | End: 2021-03-17 | Stop reason: ALTCHOICE

## 2021-01-15 ENCOUNTER — PATIENT MESSAGE (OUTPATIENT)
Dept: FAMILY MEDICINE | Facility: CLINIC | Age: 71
End: 2021-01-15

## 2021-01-18 ENCOUNTER — PATIENT MESSAGE (OUTPATIENT)
Dept: FAMILY MEDICINE | Facility: CLINIC | Age: 71
End: 2021-01-18

## 2021-01-18 ENCOUNTER — CLINICAL SUPPORT (OUTPATIENT)
Dept: URGENT CARE | Facility: CLINIC | Age: 71
End: 2021-01-18
Payer: MEDICARE

## 2021-01-18 DIAGNOSIS — Z11.52 ENCOUNTER FOR SCREENING LABORATORY TESTING FOR COVID-19 VIRUS: ICD-10-CM

## 2021-01-18 PROCEDURE — U0003 INFECTIOUS AGENT DETECTION BY NUCLEIC ACID (DNA OR RNA); SEVERE ACUTE RESPIRATORY SYNDROME CORONAVIRUS 2 (SARS-COV-2) (CORONAVIRUS DISEASE [COVID-19]), AMPLIFIED PROBE TECHNIQUE, MAKING USE OF HIGH THROUGHPUT TECHNOLOGIES AS DESCRIBED BY CMS-2020-01-R: HCPCS

## 2021-01-19 LAB — SARS-COV-2 RNA RESP QL NAA+PROBE: NOT DETECTED

## 2021-01-20 ENCOUNTER — TELEPHONE (OUTPATIENT)
Dept: URGENT CARE | Facility: CLINIC | Age: 71
End: 2021-01-20

## 2021-01-20 ENCOUNTER — PATIENT MESSAGE (OUTPATIENT)
Dept: FAMILY MEDICINE | Facility: CLINIC | Age: 71
End: 2021-01-20

## 2021-01-21 ENCOUNTER — TELEPHONE (OUTPATIENT)
Dept: FAMILY MEDICINE | Facility: CLINIC | Age: 71
End: 2021-01-21

## 2021-01-21 ENCOUNTER — PATIENT MESSAGE (OUTPATIENT)
Dept: FAMILY MEDICINE | Facility: CLINIC | Age: 71
End: 2021-01-21

## 2021-01-21 ENCOUNTER — LAB VISIT (OUTPATIENT)
Dept: LAB | Facility: HOSPITAL | Age: 71
End: 2021-01-21
Attending: INTERNAL MEDICINE
Payer: MEDICARE

## 2021-01-21 DIAGNOSIS — Z12.11 SCREENING FOR COLON CANCER: ICD-10-CM

## 2021-01-21 DIAGNOSIS — G47.00 ACUTE INSOMNIA: Primary | ICD-10-CM

## 2021-01-21 PROCEDURE — 82274 ASSAY TEST FOR BLOOD FECAL: CPT

## 2021-01-21 RX ORDER — TRAZODONE HYDROCHLORIDE 50 MG/1
50 TABLET ORAL NIGHTLY
Qty: 30 TABLET | Refills: 11 | Status: SHIPPED | OUTPATIENT
Start: 2021-01-21 | End: 2021-03-03

## 2021-01-26 LAB — HEMOCCULT STL QL IA: NEGATIVE

## 2021-01-28 ENCOUNTER — TELEPHONE (OUTPATIENT)
Dept: FAMILY MEDICINE | Facility: CLINIC | Age: 71
End: 2021-01-28

## 2021-01-28 ENCOUNTER — TELEPHONE (OUTPATIENT)
Dept: PSYCHIATRY | Facility: CLINIC | Age: 71
End: 2021-01-28

## 2021-01-28 ENCOUNTER — OFFICE VISIT (OUTPATIENT)
Dept: FAMILY MEDICINE | Facility: CLINIC | Age: 71
End: 2021-01-28
Payer: MEDICARE

## 2021-01-28 VITALS
TEMPERATURE: 98 F | HEIGHT: 61 IN | HEART RATE: 61 BPM | WEIGHT: 98.13 LBS | OXYGEN SATURATION: 97 % | SYSTOLIC BLOOD PRESSURE: 114 MMHG | DIASTOLIC BLOOD PRESSURE: 70 MMHG | BODY MASS INDEX: 18.53 KG/M2

## 2021-01-28 DIAGNOSIS — Z63.6 CAREGIVER STRESS: Primary | ICD-10-CM

## 2021-01-28 DIAGNOSIS — F32.A MILD DEPRESSION: ICD-10-CM

## 2021-01-28 DIAGNOSIS — F41.1 GAD (GENERALIZED ANXIETY DISORDER): ICD-10-CM

## 2021-01-28 PROCEDURE — 99999 PR PBB SHADOW E&M-EST. PATIENT-LVL IV: ICD-10-PCS | Mod: PBBFAC,,, | Performed by: INTERNAL MEDICINE

## 2021-01-28 PROCEDURE — 3288F PR FALLS RISK ASSESSMENT DOCUMENTED: ICD-10-PCS | Mod: CPTII,S$GLB,, | Performed by: INTERNAL MEDICINE

## 2021-01-28 PROCEDURE — 1101F PR PT FALLS ASSESS DOC 0-1 FALLS W/OUT INJ PAST YR: ICD-10-PCS | Mod: CPTII,S$GLB,, | Performed by: INTERNAL MEDICINE

## 2021-01-28 PROCEDURE — 1126F AMNT PAIN NOTED NONE PRSNT: CPT | Mod: S$GLB,,, | Performed by: INTERNAL MEDICINE

## 2021-01-28 PROCEDURE — 1126F PR PAIN SEVERITY QUANTIFIED, NO PAIN PRESENT: ICD-10-PCS | Mod: S$GLB,,, | Performed by: INTERNAL MEDICINE

## 2021-01-28 PROCEDURE — 3008F BODY MASS INDEX DOCD: CPT | Mod: CPTII,S$GLB,, | Performed by: INTERNAL MEDICINE

## 2021-01-28 PROCEDURE — 1159F MED LIST DOCD IN RCRD: CPT | Mod: S$GLB,,, | Performed by: INTERNAL MEDICINE

## 2021-01-28 PROCEDURE — 99214 OFFICE O/P EST MOD 30 MIN: CPT | Mod: S$GLB,,, | Performed by: INTERNAL MEDICINE

## 2021-01-28 PROCEDURE — 99999 PR PBB SHADOW E&M-EST. PATIENT-LVL IV: CPT | Mod: PBBFAC,,, | Performed by: INTERNAL MEDICINE

## 2021-01-28 PROCEDURE — 99499 UNLISTED E&M SERVICE: CPT | Mod: S$GLB,,, | Performed by: INTERNAL MEDICINE

## 2021-01-28 PROCEDURE — 99214 PR OFFICE/OUTPT VISIT, EST, LEVL IV, 30-39 MIN: ICD-10-PCS | Mod: S$GLB,,, | Performed by: INTERNAL MEDICINE

## 2021-01-28 PROCEDURE — 3288F FALL RISK ASSESSMENT DOCD: CPT | Mod: CPTII,S$GLB,, | Performed by: INTERNAL MEDICINE

## 2021-01-28 PROCEDURE — 1101F PT FALLS ASSESS-DOCD LE1/YR: CPT | Mod: CPTII,S$GLB,, | Performed by: INTERNAL MEDICINE

## 2021-01-28 PROCEDURE — 1159F PR MEDICATION LIST DOCUMENTED IN MEDICAL RECORD: ICD-10-PCS | Mod: S$GLB,,, | Performed by: INTERNAL MEDICINE

## 2021-01-28 PROCEDURE — 99499 RISK ADDL DX/OHS AUDIT: ICD-10-PCS | Mod: S$GLB,,, | Performed by: INTERNAL MEDICINE

## 2021-01-28 PROCEDURE — 3008F PR BODY MASS INDEX (BMI) DOCUMENTED: ICD-10-PCS | Mod: CPTII,S$GLB,, | Performed by: INTERNAL MEDICINE

## 2021-01-28 SDOH — SOCIAL DETERMINANTS OF HEALTH (SDOH): DEPENDENT RELATIVE NEEDING CARE AT HOME: Z63.6

## 2021-01-31 ENCOUNTER — PATIENT MESSAGE (OUTPATIENT)
Dept: FAMILY MEDICINE | Facility: CLINIC | Age: 71
End: 2021-01-31

## 2021-02-08 ENCOUNTER — OFFICE VISIT (OUTPATIENT)
Dept: PSYCHIATRY | Facility: CLINIC | Age: 71
End: 2021-02-08
Payer: MEDICARE

## 2021-02-08 DIAGNOSIS — F32.A MILD DEPRESSION: ICD-10-CM

## 2021-02-08 DIAGNOSIS — F41.9 ANXIETY: ICD-10-CM

## 2021-02-08 DIAGNOSIS — F41.1 GAD (GENERALIZED ANXIETY DISORDER): ICD-10-CM

## 2021-02-08 DIAGNOSIS — Z63.6 CAREGIVER STRESS: ICD-10-CM

## 2021-02-08 DIAGNOSIS — F32.A DEPRESSION, UNSPECIFIED DEPRESSION TYPE: Primary | ICD-10-CM

## 2021-02-08 PROCEDURE — 90791 PR PSYCHIATRIC DIAGNOSTIC EVALUATION: ICD-10-PCS | Mod: S$GLB,,, | Performed by: COUNSELOR

## 2021-02-08 PROCEDURE — 90791 PSYCH DIAGNOSTIC EVALUATION: CPT | Mod: S$GLB,,, | Performed by: COUNSELOR

## 2021-02-08 SDOH — SOCIAL DETERMINANTS OF HEALTH (SDOH): DEPENDENT RELATIVE NEEDING CARE AT HOME: Z63.6

## 2021-03-01 ENCOUNTER — OFFICE VISIT (OUTPATIENT)
Dept: PSYCHIATRY | Facility: CLINIC | Age: 71
End: 2021-03-01
Payer: COMMERCIAL

## 2021-03-01 ENCOUNTER — TELEPHONE (OUTPATIENT)
Dept: FAMILY MEDICINE | Facility: CLINIC | Age: 71
End: 2021-03-01

## 2021-03-01 DIAGNOSIS — F40.9 INSOMNIA DUE TO ANXIETY AND FEAR: ICD-10-CM

## 2021-03-01 DIAGNOSIS — F32.A DEPRESSION, UNSPECIFIED DEPRESSION TYPE: Primary | ICD-10-CM

## 2021-03-01 DIAGNOSIS — F41.9 ANXIETY: ICD-10-CM

## 2021-03-01 DIAGNOSIS — F51.05 INSOMNIA DUE TO ANXIETY AND FEAR: ICD-10-CM

## 2021-03-01 PROCEDURE — 90834 PSYTX W PT 45 MINUTES: CPT | Mod: S$GLB,,, | Performed by: COUNSELOR

## 2021-03-01 PROCEDURE — 90834 PR PSYCHOTHERAPY W/PATIENT, 45 MIN: ICD-10-PCS | Mod: S$GLB,,, | Performed by: COUNSELOR

## 2021-03-03 ENCOUNTER — OFFICE VISIT (OUTPATIENT)
Dept: FAMILY MEDICINE | Facility: CLINIC | Age: 71
End: 2021-03-03
Payer: MEDICARE

## 2021-03-03 VITALS
DIASTOLIC BLOOD PRESSURE: 62 MMHG | SYSTOLIC BLOOD PRESSURE: 106 MMHG | HEIGHT: 61 IN | HEART RATE: 58 BPM | WEIGHT: 100.5 LBS | TEMPERATURE: 98 F | BODY MASS INDEX: 18.98 KG/M2 | OXYGEN SATURATION: 97 %

## 2021-03-03 DIAGNOSIS — G47.00 INSOMNIA, UNSPECIFIED TYPE: ICD-10-CM

## 2021-03-03 DIAGNOSIS — F41.9 ANXIETY: Primary | ICD-10-CM

## 2021-03-03 PROCEDURE — 99214 OFFICE O/P EST MOD 30 MIN: CPT | Mod: S$GLB,,, | Performed by: INTERNAL MEDICINE

## 2021-03-03 PROCEDURE — 1159F MED LIST DOCD IN RCRD: CPT | Mod: S$GLB,,, | Performed by: INTERNAL MEDICINE

## 2021-03-03 PROCEDURE — 1126F PR PAIN SEVERITY QUANTIFIED, NO PAIN PRESENT: ICD-10-PCS | Mod: S$GLB,,, | Performed by: INTERNAL MEDICINE

## 2021-03-03 PROCEDURE — 3288F PR FALLS RISK ASSESSMENT DOCUMENTED: ICD-10-PCS | Mod: CPTII,S$GLB,, | Performed by: INTERNAL MEDICINE

## 2021-03-03 PROCEDURE — 1101F PR PT FALLS ASSESS DOC 0-1 FALLS W/OUT INJ PAST YR: ICD-10-PCS | Mod: CPTII,S$GLB,, | Performed by: INTERNAL MEDICINE

## 2021-03-03 PROCEDURE — 99999 PR PBB SHADOW E&M-EST. PATIENT-LVL IV: CPT | Mod: PBBFAC,,, | Performed by: INTERNAL MEDICINE

## 2021-03-03 PROCEDURE — 1126F AMNT PAIN NOTED NONE PRSNT: CPT | Mod: S$GLB,,, | Performed by: INTERNAL MEDICINE

## 2021-03-03 PROCEDURE — 1101F PT FALLS ASSESS-DOCD LE1/YR: CPT | Mod: CPTII,S$GLB,, | Performed by: INTERNAL MEDICINE

## 2021-03-03 PROCEDURE — 99214 PR OFFICE/OUTPT VISIT, EST, LEVL IV, 30-39 MIN: ICD-10-PCS | Mod: S$GLB,,, | Performed by: INTERNAL MEDICINE

## 2021-03-03 PROCEDURE — 99999 PR PBB SHADOW E&M-EST. PATIENT-LVL IV: ICD-10-PCS | Mod: PBBFAC,,, | Performed by: INTERNAL MEDICINE

## 2021-03-03 PROCEDURE — 3008F BODY MASS INDEX DOCD: CPT | Mod: CPTII,S$GLB,, | Performed by: INTERNAL MEDICINE

## 2021-03-03 PROCEDURE — 3288F FALL RISK ASSESSMENT DOCD: CPT | Mod: CPTII,S$GLB,, | Performed by: INTERNAL MEDICINE

## 2021-03-03 PROCEDURE — 3008F PR BODY MASS INDEX (BMI) DOCUMENTED: ICD-10-PCS | Mod: CPTII,S$GLB,, | Performed by: INTERNAL MEDICINE

## 2021-03-03 PROCEDURE — 1159F PR MEDICATION LIST DOCUMENTED IN MEDICAL RECORD: ICD-10-PCS | Mod: S$GLB,,, | Performed by: INTERNAL MEDICINE

## 2021-03-03 RX ORDER — MIRTAZAPINE 7.5 MG/1
7.5 TABLET, FILM COATED ORAL NIGHTLY
Qty: 30 TABLET | Refills: 11 | Status: SHIPPED | OUTPATIENT
Start: 2021-03-03 | End: 2021-03-17 | Stop reason: DRUGHIGH

## 2021-03-17 ENCOUNTER — OFFICE VISIT (OUTPATIENT)
Dept: FAMILY MEDICINE | Facility: CLINIC | Age: 71
End: 2021-03-17
Payer: MEDICARE

## 2021-03-17 VITALS
SYSTOLIC BLOOD PRESSURE: 100 MMHG | OXYGEN SATURATION: 96 % | DIASTOLIC BLOOD PRESSURE: 58 MMHG | WEIGHT: 105.38 LBS | HEART RATE: 60 BPM | BODY MASS INDEX: 19.91 KG/M2

## 2021-03-17 DIAGNOSIS — Z63.6 CAREGIVER STRESS: ICD-10-CM

## 2021-03-17 DIAGNOSIS — G47.00 ACUTE INSOMNIA: Primary | ICD-10-CM

## 2021-03-17 PROCEDURE — 99213 PR OFFICE/OUTPT VISIT, EST, LEVL III, 20-29 MIN: ICD-10-PCS | Mod: S$GLB,,, | Performed by: PHYSICIAN ASSISTANT

## 2021-03-17 PROCEDURE — 99999 PR PBB SHADOW E&M-EST. PATIENT-LVL IV: ICD-10-PCS | Mod: PBBFAC,,, | Performed by: PHYSICIAN ASSISTANT

## 2021-03-17 PROCEDURE — 3008F BODY MASS INDEX DOCD: CPT | Mod: CPTII,S$GLB,, | Performed by: PHYSICIAN ASSISTANT

## 2021-03-17 PROCEDURE — 1159F MED LIST DOCD IN RCRD: CPT | Mod: S$GLB,,, | Performed by: PHYSICIAN ASSISTANT

## 2021-03-17 PROCEDURE — 1159F PR MEDICATION LIST DOCUMENTED IN MEDICAL RECORD: ICD-10-PCS | Mod: S$GLB,,, | Performed by: PHYSICIAN ASSISTANT

## 2021-03-17 PROCEDURE — 3288F FALL RISK ASSESSMENT DOCD: CPT | Mod: CPTII,S$GLB,, | Performed by: PHYSICIAN ASSISTANT

## 2021-03-17 PROCEDURE — 99999 PR PBB SHADOW E&M-EST. PATIENT-LVL IV: CPT | Mod: PBBFAC,,, | Performed by: PHYSICIAN ASSISTANT

## 2021-03-17 PROCEDURE — 3288F PR FALLS RISK ASSESSMENT DOCUMENTED: ICD-10-PCS | Mod: CPTII,S$GLB,, | Performed by: PHYSICIAN ASSISTANT

## 2021-03-17 PROCEDURE — 3008F PR BODY MASS INDEX (BMI) DOCUMENTED: ICD-10-PCS | Mod: CPTII,S$GLB,, | Performed by: PHYSICIAN ASSISTANT

## 2021-03-17 PROCEDURE — 99213 OFFICE O/P EST LOW 20 MIN: CPT | Mod: S$GLB,,, | Performed by: PHYSICIAN ASSISTANT

## 2021-03-17 PROCEDURE — 1101F PT FALLS ASSESS-DOCD LE1/YR: CPT | Mod: CPTII,S$GLB,, | Performed by: PHYSICIAN ASSISTANT

## 2021-03-17 PROCEDURE — 1101F PR PT FALLS ASSESS DOC 0-1 FALLS W/OUT INJ PAST YR: ICD-10-PCS | Mod: CPTII,S$GLB,, | Performed by: PHYSICIAN ASSISTANT

## 2021-03-17 RX ORDER — MIRTAZAPINE 15 MG/1
15 TABLET, FILM COATED ORAL NIGHTLY
Qty: 30 TABLET | Refills: 11 | Status: SHIPPED | OUTPATIENT
Start: 2021-03-17 | End: 2021-12-17

## 2021-03-17 SDOH — SOCIAL DETERMINANTS OF HEALTH (SDOH): DEPENDENT RELATIVE NEEDING CARE AT HOME: Z63.6

## 2021-04-06 ENCOUNTER — PATIENT MESSAGE (OUTPATIENT)
Dept: ADMINISTRATIVE | Facility: HOSPITAL | Age: 71
End: 2021-04-06

## 2021-04-14 ENCOUNTER — PATIENT MESSAGE (OUTPATIENT)
Dept: FAMILY MEDICINE | Facility: CLINIC | Age: 71
End: 2021-04-14

## 2021-04-14 DIAGNOSIS — E78.49 OTHER HYPERLIPIDEMIA: ICD-10-CM

## 2021-04-15 RX ORDER — SIMVASTATIN 20 MG/1
20 TABLET, FILM COATED ORAL NIGHTLY
Qty: 90 TABLET | Refills: 3 | Status: SHIPPED | OUTPATIENT
Start: 2021-04-15 | End: 2021-09-20 | Stop reason: SDUPTHER

## 2021-04-19 ENCOUNTER — OFFICE VISIT (OUTPATIENT)
Dept: FAMILY MEDICINE | Facility: CLINIC | Age: 71
End: 2021-04-19
Payer: MEDICARE

## 2021-04-19 ENCOUNTER — HOSPITAL ENCOUNTER (OUTPATIENT)
Dept: RADIOLOGY | Facility: HOSPITAL | Age: 71
Discharge: HOME | End: 2021-04-19
Attending: INTERNAL MEDICINE
Payer: MEDICARE

## 2021-04-19 VITALS
DIASTOLIC BLOOD PRESSURE: 68 MMHG | TEMPERATURE: 98 F | HEART RATE: 65 BPM | WEIGHT: 111.56 LBS | BODY MASS INDEX: 21.06 KG/M2 | HEIGHT: 61 IN | OXYGEN SATURATION: 97 % | SYSTOLIC BLOOD PRESSURE: 110 MMHG

## 2021-04-19 DIAGNOSIS — G47.00 INSOMNIA, UNSPECIFIED TYPE: Primary | ICD-10-CM

## 2021-04-19 DIAGNOSIS — Z12.39 ENCOUNTER FOR SCREENING FOR MALIGNANT NEOPLASM OF BREAST, UNSPECIFIED SCREENING MODALITY: ICD-10-CM

## 2021-04-19 DIAGNOSIS — Z12.31 ENCOUNTER FOR SCREENING MAMMOGRAM FOR MALIGNANT NEOPLASM OF BREAST: ICD-10-CM

## 2021-04-19 DIAGNOSIS — F32.A MILD DEPRESSION: ICD-10-CM

## 2021-04-19 PROCEDURE — 99213 OFFICE O/P EST LOW 20 MIN: CPT | Mod: S$GLB,,, | Performed by: PHYSICIAN ASSISTANT

## 2021-04-19 PROCEDURE — 77063 BREAST TOMOSYNTHESIS BI: CPT | Mod: 26,,, | Performed by: RADIOLOGY

## 2021-04-19 PROCEDURE — 1159F MED LIST DOCD IN RCRD: CPT | Mod: S$GLB,,, | Performed by: PHYSICIAN ASSISTANT

## 2021-04-19 PROCEDURE — 77063 MAMMO DIGITAL SCREENING BILAT WITH TOMO: ICD-10-PCS | Mod: 26,,, | Performed by: RADIOLOGY

## 2021-04-19 PROCEDURE — 3008F BODY MASS INDEX DOCD: CPT | Mod: CPTII,S$GLB,, | Performed by: PHYSICIAN ASSISTANT

## 2021-04-19 PROCEDURE — 77067 SCR MAMMO BI INCL CAD: CPT | Mod: 26,,, | Performed by: RADIOLOGY

## 2021-04-19 PROCEDURE — 99999 PR PBB SHADOW E&M-EST. PATIENT-LVL IV: CPT | Mod: PBBFAC,,, | Performed by: PHYSICIAN ASSISTANT

## 2021-04-19 PROCEDURE — 3008F PR BODY MASS INDEX (BMI) DOCUMENTED: ICD-10-PCS | Mod: CPTII,S$GLB,, | Performed by: PHYSICIAN ASSISTANT

## 2021-04-19 PROCEDURE — 99999 PR PBB SHADOW E&M-EST. PATIENT-LVL IV: ICD-10-PCS | Mod: PBBFAC,,, | Performed by: PHYSICIAN ASSISTANT

## 2021-04-19 PROCEDURE — 99499 RISK ADDL DX/OHS AUDIT: ICD-10-PCS | Mod: S$GLB,,, | Performed by: PHYSICIAN ASSISTANT

## 2021-04-19 PROCEDURE — 1159F PR MEDICATION LIST DOCUMENTED IN MEDICAL RECORD: ICD-10-PCS | Mod: S$GLB,,, | Performed by: PHYSICIAN ASSISTANT

## 2021-04-19 PROCEDURE — 1101F PT FALLS ASSESS-DOCD LE1/YR: CPT | Mod: CPTII,S$GLB,, | Performed by: PHYSICIAN ASSISTANT

## 2021-04-19 PROCEDURE — 99213 PR OFFICE/OUTPT VISIT, EST, LEVL III, 20-29 MIN: ICD-10-PCS | Mod: S$GLB,,, | Performed by: PHYSICIAN ASSISTANT

## 2021-04-19 PROCEDURE — 1126F AMNT PAIN NOTED NONE PRSNT: CPT | Mod: S$GLB,,, | Performed by: PHYSICIAN ASSISTANT

## 2021-04-19 PROCEDURE — 3288F PR FALLS RISK ASSESSMENT DOCUMENTED: ICD-10-PCS | Mod: CPTII,S$GLB,, | Performed by: PHYSICIAN ASSISTANT

## 2021-04-19 PROCEDURE — 1126F PR PAIN SEVERITY QUANTIFIED, NO PAIN PRESENT: ICD-10-PCS | Mod: S$GLB,,, | Performed by: PHYSICIAN ASSISTANT

## 2021-04-19 PROCEDURE — 77067 SCR MAMMO BI INCL CAD: CPT | Mod: TC,PO

## 2021-04-19 PROCEDURE — 1101F PR PT FALLS ASSESS DOC 0-1 FALLS W/OUT INJ PAST YR: ICD-10-PCS | Mod: CPTII,S$GLB,, | Performed by: PHYSICIAN ASSISTANT

## 2021-04-19 PROCEDURE — 77067 MAMMO DIGITAL SCREENING BILAT WITH TOMO: ICD-10-PCS | Mod: 26,,, | Performed by: RADIOLOGY

## 2021-04-19 PROCEDURE — 99499 UNLISTED E&M SERVICE: CPT | Mod: S$GLB,,, | Performed by: PHYSICIAN ASSISTANT

## 2021-04-19 PROCEDURE — 3288F FALL RISK ASSESSMENT DOCD: CPT | Mod: CPTII,S$GLB,, | Performed by: PHYSICIAN ASSISTANT

## 2021-04-29 ENCOUNTER — PATIENT MESSAGE (OUTPATIENT)
Dept: RESEARCH | Facility: HOSPITAL | Age: 71
End: 2021-04-29

## 2021-06-03 ENCOUNTER — TELEPHONE (OUTPATIENT)
Dept: FAMILY MEDICINE | Facility: CLINIC | Age: 71
End: 2021-06-03

## 2021-06-03 ENCOUNTER — OFFICE VISIT (OUTPATIENT)
Dept: FAMILY MEDICINE | Facility: CLINIC | Age: 71
End: 2021-06-03
Payer: MEDICARE

## 2021-06-03 VITALS
DIASTOLIC BLOOD PRESSURE: 72 MMHG | OXYGEN SATURATION: 98 % | SYSTOLIC BLOOD PRESSURE: 102 MMHG | WEIGHT: 112.88 LBS | HEART RATE: 66 BPM | BODY MASS INDEX: 21.31 KG/M2 | HEIGHT: 61 IN

## 2021-06-03 DIAGNOSIS — J06.9 VIRAL URI: Primary | ICD-10-CM

## 2021-06-03 PROCEDURE — 99999 PR PBB SHADOW E&M-EST. PATIENT-LVL III: ICD-10-PCS | Mod: PBBFAC,,, | Performed by: INTERNAL MEDICINE

## 2021-06-03 PROCEDURE — 99213 OFFICE O/P EST LOW 20 MIN: CPT | Mod: S$GLB,,, | Performed by: INTERNAL MEDICINE

## 2021-06-03 PROCEDURE — 99213 PR OFFICE/OUTPT VISIT, EST, LEVL III, 20-29 MIN: ICD-10-PCS | Mod: S$GLB,,, | Performed by: INTERNAL MEDICINE

## 2021-06-03 PROCEDURE — 3008F BODY MASS INDEX DOCD: CPT | Mod: CPTII,S$GLB,, | Performed by: INTERNAL MEDICINE

## 2021-06-03 PROCEDURE — 3008F PR BODY MASS INDEX (BMI) DOCUMENTED: ICD-10-PCS | Mod: CPTII,S$GLB,, | Performed by: INTERNAL MEDICINE

## 2021-06-03 PROCEDURE — 1159F MED LIST DOCD IN RCRD: CPT | Mod: S$GLB,,, | Performed by: INTERNAL MEDICINE

## 2021-06-03 PROCEDURE — 1159F PR MEDICATION LIST DOCUMENTED IN MEDICAL RECORD: ICD-10-PCS | Mod: S$GLB,,, | Performed by: INTERNAL MEDICINE

## 2021-06-03 PROCEDURE — 99999 PR PBB SHADOW E&M-EST. PATIENT-LVL III: CPT | Mod: PBBFAC,,, | Performed by: INTERNAL MEDICINE

## 2021-06-03 RX ORDER — AMOXICILLIN AND CLAVULANATE POTASSIUM 875; 125 MG/1; MG/1
1 TABLET, FILM COATED ORAL EVERY 12 HOURS
Qty: 10 TABLET | Refills: 0 | Status: SHIPPED | OUTPATIENT
Start: 2021-06-03 | End: 2021-06-08

## 2021-06-03 RX ORDER — AMOXICILLIN AND CLAVULANATE POTASSIUM 875; 125 MG/1; MG/1
1 TABLET, FILM COATED ORAL EVERY 12 HOURS
Qty: 10 TABLET | Refills: 0 | Status: SHIPPED | OUTPATIENT
Start: 2021-06-03 | End: 2021-06-03 | Stop reason: SDUPTHER

## 2021-09-20 ENCOUNTER — IMMUNIZATION (OUTPATIENT)
Dept: PHARMACY | Facility: CLINIC | Age: 71
End: 2021-09-20
Payer: MEDICARE

## 2021-09-20 ENCOUNTER — OFFICE VISIT (OUTPATIENT)
Dept: FAMILY MEDICINE | Facility: CLINIC | Age: 71
End: 2021-09-20
Payer: MEDICARE

## 2021-09-20 VITALS
SYSTOLIC BLOOD PRESSURE: 106 MMHG | OXYGEN SATURATION: 97 % | HEART RATE: 63 BPM | HEIGHT: 61 IN | BODY MASS INDEX: 20.73 KG/M2 | WEIGHT: 109.81 LBS | DIASTOLIC BLOOD PRESSURE: 66 MMHG

## 2021-09-20 DIAGNOSIS — E89.0 POSTABLATIVE HYPOTHYROIDISM: ICD-10-CM

## 2021-09-20 DIAGNOSIS — Z23 NEED FOR 23-POLYVALENT PNEUMOCOCCAL POLYSACCHARIDE VACCINE: ICD-10-CM

## 2021-09-20 DIAGNOSIS — E55.9 VITAMIN D INSUFFICIENCY: ICD-10-CM

## 2021-09-20 DIAGNOSIS — E78.49 OTHER HYPERLIPIDEMIA: ICD-10-CM

## 2021-09-20 DIAGNOSIS — Z00.00 PREVENTATIVE HEALTH CARE: Primary | ICD-10-CM

## 2021-09-20 DIAGNOSIS — R41.3 MEMORY CHANGE: ICD-10-CM

## 2021-09-20 PROCEDURE — 99499 RISK ADDL DX/OHS AUDIT: ICD-10-PCS | Mod: S$GLB,,, | Performed by: PHYSICIAN ASSISTANT

## 2021-09-20 PROCEDURE — 90732 PPSV23 VACC 2 YRS+ SUBQ/IM: CPT | Mod: S$GLB,,, | Performed by: PHYSICIAN ASSISTANT

## 2021-09-20 PROCEDURE — 3074F SYST BP LT 130 MM HG: CPT | Mod: CPTII,S$GLB,, | Performed by: PHYSICIAN ASSISTANT

## 2021-09-20 PROCEDURE — 99999 PR PBB SHADOW E&M-EST. PATIENT-LVL IV: ICD-10-PCS | Mod: PBBFAC,,, | Performed by: PHYSICIAN ASSISTANT

## 2021-09-20 PROCEDURE — 3078F PR MOST RECENT DIASTOLIC BLOOD PRESSURE < 80 MM HG: ICD-10-PCS | Mod: CPTII,S$GLB,, | Performed by: PHYSICIAN ASSISTANT

## 2021-09-20 PROCEDURE — 3288F FALL RISK ASSESSMENT DOCD: CPT | Mod: CPTII,S$GLB,, | Performed by: PHYSICIAN ASSISTANT

## 2021-09-20 PROCEDURE — 99499 UNLISTED E&M SERVICE: CPT | Mod: S$GLB,,, | Performed by: PHYSICIAN ASSISTANT

## 2021-09-20 PROCEDURE — 1159F MED LIST DOCD IN RCRD: CPT | Mod: CPTII,S$GLB,, | Performed by: PHYSICIAN ASSISTANT

## 2021-09-20 PROCEDURE — 3074F PR MOST RECENT SYSTOLIC BLOOD PRESSURE < 130 MM HG: ICD-10-PCS | Mod: CPTII,S$GLB,, | Performed by: PHYSICIAN ASSISTANT

## 2021-09-20 PROCEDURE — 1159F PR MEDICATION LIST DOCUMENTED IN MEDICAL RECORD: ICD-10-PCS | Mod: CPTII,S$GLB,, | Performed by: PHYSICIAN ASSISTANT

## 2021-09-20 PROCEDURE — 90732 PNEUMOCOCCAL POLYSACCHARIDE VACCINE 23-VALENT =>2YO SQ IM: ICD-10-PCS | Mod: S$GLB,,, | Performed by: PHYSICIAN ASSISTANT

## 2021-09-20 PROCEDURE — 3008F BODY MASS INDEX DOCD: CPT | Mod: CPTII,S$GLB,, | Performed by: PHYSICIAN ASSISTANT

## 2021-09-20 PROCEDURE — 3288F PR FALLS RISK ASSESSMENT DOCUMENTED: ICD-10-PCS | Mod: CPTII,S$GLB,, | Performed by: PHYSICIAN ASSISTANT

## 2021-09-20 PROCEDURE — 1126F AMNT PAIN NOTED NONE PRSNT: CPT | Mod: CPTII,S$GLB,, | Performed by: PHYSICIAN ASSISTANT

## 2021-09-20 PROCEDURE — 1101F PR PT FALLS ASSESS DOC 0-1 FALLS W/OUT INJ PAST YR: ICD-10-PCS | Mod: CPTII,S$GLB,, | Performed by: PHYSICIAN ASSISTANT

## 2021-09-20 PROCEDURE — G0009 ADMIN PNEUMOCOCCAL VACCINE: HCPCS | Mod: S$GLB,,, | Performed by: PHYSICIAN ASSISTANT

## 2021-09-20 PROCEDURE — 3078F DIAST BP <80 MM HG: CPT | Mod: CPTII,S$GLB,, | Performed by: PHYSICIAN ASSISTANT

## 2021-09-20 PROCEDURE — 99214 PR OFFICE/OUTPT VISIT, EST, LEVL IV, 30-39 MIN: ICD-10-PCS | Mod: S$GLB,,, | Performed by: PHYSICIAN ASSISTANT

## 2021-09-20 PROCEDURE — 3008F PR BODY MASS INDEX (BMI) DOCUMENTED: ICD-10-PCS | Mod: CPTII,S$GLB,, | Performed by: PHYSICIAN ASSISTANT

## 2021-09-20 PROCEDURE — G0009 PNEUMOCOCCAL POLYSACCHARIDE VACCINE 23-VALENT =>2YO SQ IM: ICD-10-PCS | Mod: S$GLB,,, | Performed by: PHYSICIAN ASSISTANT

## 2021-09-20 PROCEDURE — 99999 PR PBB SHADOW E&M-EST. PATIENT-LVL IV: CPT | Mod: PBBFAC,,, | Performed by: PHYSICIAN ASSISTANT

## 2021-09-20 PROCEDURE — 99214 OFFICE O/P EST MOD 30 MIN: CPT | Mod: S$GLB,,, | Performed by: PHYSICIAN ASSISTANT

## 2021-09-20 PROCEDURE — 1101F PT FALLS ASSESS-DOCD LE1/YR: CPT | Mod: CPTII,S$GLB,, | Performed by: PHYSICIAN ASSISTANT

## 2021-09-20 PROCEDURE — 1126F PR PAIN SEVERITY QUANTIFIED, NO PAIN PRESENT: ICD-10-PCS | Mod: CPTII,S$GLB,, | Performed by: PHYSICIAN ASSISTANT

## 2021-09-20 RX ORDER — LEVOTHYROXINE SODIUM 50 UG/1
TABLET ORAL
Qty: 50 TABLET | Refills: 3 | Status: SHIPPED | OUTPATIENT
Start: 2021-09-20 | End: 2021-12-17

## 2021-09-20 RX ORDER — LEVOTHYROXINE SODIUM 125 UG/1
TABLET ORAL
Qty: 20 TABLET | Refills: 3 | Status: SHIPPED | OUTPATIENT
Start: 2021-09-20 | End: 2022-01-24 | Stop reason: SDUPTHER

## 2021-09-20 RX ORDER — SIMVASTATIN 20 MG/1
20 TABLET, FILM COATED ORAL NIGHTLY
Qty: 90 TABLET | Refills: 3 | Status: SHIPPED | OUTPATIENT
Start: 2021-09-20 | End: 2022-05-23 | Stop reason: SDUPTHER

## 2021-10-13 ENCOUNTER — OFFICE VISIT (OUTPATIENT)
Dept: OPTOMETRY | Facility: CLINIC | Age: 71
End: 2021-10-13
Payer: MEDICARE

## 2021-10-13 DIAGNOSIS — H02.402 PTOSIS, LEFT: ICD-10-CM

## 2021-10-13 DIAGNOSIS — H25.13 NUCLEAR SCLEROSIS, BILATERAL: Primary | ICD-10-CM

## 2021-10-13 DIAGNOSIS — H04.123 DRY EYES, BILATERAL: ICD-10-CM

## 2021-10-13 PROCEDURE — 92014 PR EYE EXAM, EST PATIENT,COMPREHESV: ICD-10-PCS | Mod: S$GLB,,, | Performed by: OPTOMETRIST

## 2021-10-13 PROCEDURE — 3288F FALL RISK ASSESSMENT DOCD: CPT | Mod: CPTII,S$GLB,, | Performed by: OPTOMETRIST

## 2021-10-13 PROCEDURE — 99499 RISK ADDL DX/OHS AUDIT: ICD-10-PCS | Mod: S$GLB,,, | Performed by: OPTOMETRIST

## 2021-10-13 PROCEDURE — 1159F PR MEDICATION LIST DOCUMENTED IN MEDICAL RECORD: ICD-10-PCS | Mod: CPTII,S$GLB,, | Performed by: OPTOMETRIST

## 2021-10-13 PROCEDURE — 99999 PR PBB SHADOW E&M-EST. PATIENT-LVL III: CPT | Mod: PBBFAC,,, | Performed by: OPTOMETRIST

## 2021-10-13 PROCEDURE — 1126F AMNT PAIN NOTED NONE PRSNT: CPT | Mod: CPTII,S$GLB,, | Performed by: OPTOMETRIST

## 2021-10-13 PROCEDURE — 1160F PR REVIEW ALL MEDS BY PRESCRIBER/CLIN PHARMACIST DOCUMENTED: ICD-10-PCS | Mod: CPTII,S$GLB,, | Performed by: OPTOMETRIST

## 2021-10-13 PROCEDURE — 92014 COMPRE OPH EXAM EST PT 1/>: CPT | Mod: S$GLB,,, | Performed by: OPTOMETRIST

## 2021-10-13 PROCEDURE — 1160F RVW MEDS BY RX/DR IN RCRD: CPT | Mod: CPTII,S$GLB,, | Performed by: OPTOMETRIST

## 2021-10-13 PROCEDURE — 99999 PR PBB SHADOW E&M-EST. PATIENT-LVL III: ICD-10-PCS | Mod: PBBFAC,,, | Performed by: OPTOMETRIST

## 2021-10-13 PROCEDURE — 1126F PR PAIN SEVERITY QUANTIFIED, NO PAIN PRESENT: ICD-10-PCS | Mod: CPTII,S$GLB,, | Performed by: OPTOMETRIST

## 2021-10-13 PROCEDURE — 1159F MED LIST DOCD IN RCRD: CPT | Mod: CPTII,S$GLB,, | Performed by: OPTOMETRIST

## 2021-10-13 PROCEDURE — 3288F PR FALLS RISK ASSESSMENT DOCUMENTED: ICD-10-PCS | Mod: CPTII,S$GLB,, | Performed by: OPTOMETRIST

## 2021-10-13 PROCEDURE — 99499 UNLISTED E&M SERVICE: CPT | Mod: S$GLB,,, | Performed by: OPTOMETRIST

## 2021-10-13 PROCEDURE — 1101F PT FALLS ASSESS-DOCD LE1/YR: CPT | Mod: CPTII,S$GLB,, | Performed by: OPTOMETRIST

## 2021-10-13 PROCEDURE — 1101F PR PT FALLS ASSESS DOC 0-1 FALLS W/OUT INJ PAST YR: ICD-10-PCS | Mod: CPTII,S$GLB,, | Performed by: OPTOMETRIST

## 2021-10-13 RX ORDER — FLUOROMETHOLONE 1 MG/ML
1 SUSPENSION/ DROPS OPHTHALMIC 4 TIMES DAILY
Qty: 5 ML | Refills: 0 | Status: SHIPPED | OUTPATIENT
Start: 2021-10-13 | End: 2021-11-22

## 2021-10-20 ENCOUNTER — TELEPHONE (OUTPATIENT)
Dept: NEUROLOGY | Facility: CLINIC | Age: 71
End: 2021-10-20

## 2021-10-21 ENCOUNTER — LAB VISIT (OUTPATIENT)
Dept: LAB | Facility: HOSPITAL | Age: 71
End: 2021-10-21
Payer: MEDICARE

## 2021-10-21 DIAGNOSIS — E89.0 POSTABLATIVE HYPOTHYROIDISM: ICD-10-CM

## 2021-10-21 DIAGNOSIS — E55.9 VITAMIN D INSUFFICIENCY: ICD-10-CM

## 2021-10-21 DIAGNOSIS — E78.49 OTHER HYPERLIPIDEMIA: ICD-10-CM

## 2021-10-21 DIAGNOSIS — R41.3 MEMORY CHANGE: ICD-10-CM

## 2021-10-21 DIAGNOSIS — Z00.00 PREVENTATIVE HEALTH CARE: ICD-10-CM

## 2021-10-21 LAB
25(OH)D3+25(OH)D2 SERPL-MCNC: 53 NG/ML (ref 30–96)
ALBUMIN SERPL BCP-MCNC: 3.9 G/DL (ref 3.5–5.2)
ALP SERPL-CCNC: 59 U/L (ref 55–135)
ALT SERPL W/O P-5'-P-CCNC: 17 U/L (ref 10–44)
ANION GAP SERPL CALC-SCNC: 7 MMOL/L (ref 8–16)
AST SERPL-CCNC: 20 U/L (ref 10–40)
BASOPHILS # BLD AUTO: 0.05 K/UL (ref 0–0.2)
BASOPHILS NFR BLD: 1.1 % (ref 0–1.9)
BILIRUB SERPL-MCNC: 0.6 MG/DL (ref 0.1–1)
BUN SERPL-MCNC: 18 MG/DL (ref 8–23)
CALCIUM SERPL-MCNC: 9.7 MG/DL (ref 8.7–10.5)
CHLORIDE SERPL-SCNC: 103 MMOL/L (ref 95–110)
CHOLEST SERPL-MCNC: 191 MG/DL (ref 120–199)
CHOLEST/HDLC SERPL: 2.3 {RATIO} (ref 2–5)
CO2 SERPL-SCNC: 29 MMOL/L (ref 23–29)
CREAT SERPL-MCNC: 0.8 MG/DL (ref 0.5–1.4)
DIFFERENTIAL METHOD: NORMAL
EOSINOPHIL # BLD AUTO: 0.2 K/UL (ref 0–0.5)
EOSINOPHIL NFR BLD: 3.4 % (ref 0–8)
ERYTHROCYTE [DISTWIDTH] IN BLOOD BY AUTOMATED COUNT: 13.3 % (ref 11.5–14.5)
EST. GFR  (AFRICAN AMERICAN): >60 ML/MIN/1.73 M^2
EST. GFR  (NON AFRICAN AMERICAN): >60 ML/MIN/1.73 M^2
GLUCOSE SERPL-MCNC: 82 MG/DL (ref 70–110)
HCT VFR BLD AUTO: 40 % (ref 37–48.5)
HDLC SERPL-MCNC: 82 MG/DL (ref 40–75)
HDLC SERPL: 42.9 % (ref 20–50)
HGB BLD-MCNC: 13.1 G/DL (ref 12–16)
IMM GRANULOCYTES # BLD AUTO: 0.01 K/UL (ref 0–0.04)
IMM GRANULOCYTES NFR BLD AUTO: 0.2 % (ref 0–0.5)
LDLC SERPL CALC-MCNC: 99.2 MG/DL (ref 63–159)
LYMPHOCYTES # BLD AUTO: 1.6 K/UL (ref 1–4.8)
LYMPHOCYTES NFR BLD: 32.7 % (ref 18–48)
MCH RBC QN AUTO: 30.3 PG (ref 27–31)
MCHC RBC AUTO-ENTMCNC: 32.8 G/DL (ref 32–36)
MCV RBC AUTO: 92 FL (ref 82–98)
MONOCYTES # BLD AUTO: 0.3 K/UL (ref 0.3–1)
MONOCYTES NFR BLD: 6.8 % (ref 4–15)
NEUTROPHILS # BLD AUTO: 2.7 K/UL (ref 1.8–7.7)
NEUTROPHILS NFR BLD: 55.8 % (ref 38–73)
NONHDLC SERPL-MCNC: 109 MG/DL
NRBC BLD-RTO: 0 /100 WBC
PLATELET # BLD AUTO: 213 K/UL (ref 150–450)
PMV BLD AUTO: 10.7 FL (ref 9.2–12.9)
POTASSIUM SERPL-SCNC: 4.2 MMOL/L (ref 3.5–5.1)
PROT SERPL-MCNC: 6.8 G/DL (ref 6–8.4)
RBC # BLD AUTO: 4.33 M/UL (ref 4–5.4)
SODIUM SERPL-SCNC: 139 MMOL/L (ref 136–145)
T4 FREE SERPL-MCNC: 0.83 NG/DL (ref 0.71–1.51)
TRIGL SERPL-MCNC: 49 MG/DL (ref 30–150)
TSH SERPL DL<=0.005 MIU/L-ACNC: 7.3 UIU/ML (ref 0.4–4)
WBC # BLD AUTO: 4.74 K/UL (ref 3.9–12.7)

## 2021-10-21 PROCEDURE — 80053 COMPREHEN METABOLIC PANEL: CPT | Performed by: PHYSICIAN ASSISTANT

## 2021-10-21 PROCEDURE — 84443 ASSAY THYROID STIM HORMONE: CPT | Performed by: PHYSICIAN ASSISTANT

## 2021-10-21 PROCEDURE — 85025 COMPLETE CBC W/AUTO DIFF WBC: CPT | Performed by: PHYSICIAN ASSISTANT

## 2021-10-21 PROCEDURE — 80061 LIPID PANEL: CPT | Performed by: PHYSICIAN ASSISTANT

## 2021-10-21 PROCEDURE — 82306 VITAMIN D 25 HYDROXY: CPT | Performed by: PHYSICIAN ASSISTANT

## 2021-10-21 PROCEDURE — 84439 ASSAY OF FREE THYROXINE: CPT | Performed by: PHYSICIAN ASSISTANT

## 2021-10-21 PROCEDURE — 36415 COLL VENOUS BLD VENIPUNCTURE: CPT | Mod: PN | Performed by: PHYSICIAN ASSISTANT

## 2021-10-27 ENCOUNTER — PATIENT MESSAGE (OUTPATIENT)
Dept: FAMILY MEDICINE | Facility: CLINIC | Age: 71
End: 2021-10-27
Payer: MEDICARE

## 2021-10-27 DIAGNOSIS — E89.0 POSTABLATIVE HYPOTHYROIDISM: Primary | ICD-10-CM

## 2021-11-01 ENCOUNTER — TELEPHONE (OUTPATIENT)
Dept: OPHTHALMOLOGY | Facility: CLINIC | Age: 71
End: 2021-11-01
Payer: MEDICARE

## 2021-11-26 ENCOUNTER — PATIENT MESSAGE (OUTPATIENT)
Dept: FAMILY MEDICINE | Facility: CLINIC | Age: 71
End: 2021-11-26
Payer: MEDICARE

## 2021-12-02 ENCOUNTER — LAB VISIT (OUTPATIENT)
Dept: LAB | Facility: HOSPITAL | Age: 71
End: 2021-12-02
Attending: PHYSICIAN ASSISTANT
Payer: MEDICARE

## 2021-12-02 DIAGNOSIS — E89.0 POSTABLATIVE HYPOTHYROIDISM: ICD-10-CM

## 2021-12-02 PROCEDURE — 84443 ASSAY THYROID STIM HORMONE: CPT | Performed by: PHYSICIAN ASSISTANT

## 2021-12-02 PROCEDURE — 36415 COLL VENOUS BLD VENIPUNCTURE: CPT | Mod: PN | Performed by: PHYSICIAN ASSISTANT

## 2021-12-03 LAB — TSH SERPL DL<=0.005 MIU/L-ACNC: 2.06 UIU/ML (ref 0.4–4)

## 2021-12-17 ENCOUNTER — OFFICE VISIT (OUTPATIENT)
Dept: NEUROLOGY | Facility: CLINIC | Age: 71
End: 2021-12-17
Payer: MEDICARE

## 2021-12-17 VITALS
WEIGHT: 105.06 LBS | HEART RATE: 72 BPM | RESPIRATION RATE: 18 BRPM | DIASTOLIC BLOOD PRESSURE: 58 MMHG | BODY MASS INDEX: 19.83 KG/M2 | SYSTOLIC BLOOD PRESSURE: 90 MMHG | HEIGHT: 61 IN

## 2021-12-17 DIAGNOSIS — R41.3 MEMORY CHANGE: ICD-10-CM

## 2021-12-17 PROCEDURE — 99205 PR OFFICE/OUTPT VISIT, NEW, LEVL V, 60-74 MIN: ICD-10-PCS | Mod: S$GLB,,, | Performed by: NURSE PRACTITIONER

## 2021-12-17 PROCEDURE — 99999 PR PBB SHADOW E&M-EST. PATIENT-LVL V: CPT | Mod: PBBFAC,,, | Performed by: NURSE PRACTITIONER

## 2021-12-17 PROCEDURE — 99999 PR PBB SHADOW E&M-EST. PATIENT-LVL V: ICD-10-PCS | Mod: PBBFAC,,, | Performed by: NURSE PRACTITIONER

## 2021-12-17 PROCEDURE — 99205 OFFICE O/P NEW HI 60 MIN: CPT | Mod: S$GLB,,, | Performed by: NURSE PRACTITIONER

## 2021-12-18 ENCOUNTER — PATIENT MESSAGE (OUTPATIENT)
Dept: FAMILY MEDICINE | Facility: CLINIC | Age: 71
End: 2021-12-18
Payer: MEDICARE

## 2021-12-20 ENCOUNTER — OFFICE VISIT (OUTPATIENT)
Dept: FAMILY MEDICINE | Facility: CLINIC | Age: 71
End: 2021-12-20
Payer: MEDICARE

## 2021-12-20 VITALS
SYSTOLIC BLOOD PRESSURE: 116 MMHG | HEIGHT: 61 IN | BODY MASS INDEX: 20.27 KG/M2 | WEIGHT: 107.38 LBS | OXYGEN SATURATION: 98 % | DIASTOLIC BLOOD PRESSURE: 78 MMHG | HEART RATE: 67 BPM

## 2021-12-20 DIAGNOSIS — F41.9 ANXIETY: ICD-10-CM

## 2021-12-20 DIAGNOSIS — F51.04 CHRONIC INSOMNIA: ICD-10-CM

## 2021-12-20 PROCEDURE — 99214 OFFICE O/P EST MOD 30 MIN: CPT | Mod: S$GLB,,, | Performed by: INTERNAL MEDICINE

## 2021-12-20 PROCEDURE — 99999 PR PBB SHADOW E&M-EST. PATIENT-LVL IV: ICD-10-PCS | Mod: PBBFAC,,, | Performed by: INTERNAL MEDICINE

## 2021-12-20 PROCEDURE — 99214 PR OFFICE/OUTPT VISIT, EST, LEVL IV, 30-39 MIN: ICD-10-PCS | Mod: S$GLB,,, | Performed by: INTERNAL MEDICINE

## 2021-12-20 PROCEDURE — 99999 PR PBB SHADOW E&M-EST. PATIENT-LVL IV: CPT | Mod: PBBFAC,,, | Performed by: INTERNAL MEDICINE

## 2021-12-20 RX ORDER — CITALOPRAM 10 MG/1
10 TABLET ORAL DAILY
Qty: 30 TABLET | Refills: 11 | Status: SHIPPED | OUTPATIENT
Start: 2021-12-20 | End: 2022-02-19

## 2022-01-02 ENCOUNTER — PATIENT MESSAGE (OUTPATIENT)
Dept: ADMINISTRATIVE | Facility: HOSPITAL | Age: 72
End: 2022-01-02
Payer: MEDICARE

## 2022-01-23 ENCOUNTER — PATIENT MESSAGE (OUTPATIENT)
Dept: FAMILY MEDICINE | Facility: CLINIC | Age: 72
End: 2022-01-23
Payer: MEDICARE

## 2022-01-23 DIAGNOSIS — E89.0 POSTABLATIVE HYPOTHYROIDISM: ICD-10-CM

## 2022-01-24 RX ORDER — LEVOTHYROXINE SODIUM 125 UG/1
125 TABLET ORAL
Qty: 45 TABLET | Refills: 3 | Status: SHIPPED | OUTPATIENT
Start: 2022-01-24 | End: 2022-05-10

## 2022-02-02 DIAGNOSIS — Z12.11 COLON CANCER SCREENING: ICD-10-CM

## 2022-02-11 ENCOUNTER — PES CALL (OUTPATIENT)
Dept: ADMINISTRATIVE | Facility: CLINIC | Age: 72
End: 2022-02-11
Payer: MEDICARE

## 2022-02-16 ENCOUNTER — PES CALL (OUTPATIENT)
Dept: ADMINISTRATIVE | Facility: CLINIC | Age: 72
End: 2022-02-16
Payer: MEDICARE

## 2022-02-18 ENCOUNTER — PATIENT MESSAGE (OUTPATIENT)
Dept: FAMILY MEDICINE | Facility: CLINIC | Age: 72
End: 2022-02-18
Payer: MEDICARE

## 2022-02-19 RX ORDER — CITALOPRAM 20 MG/1
20 TABLET, FILM COATED ORAL DAILY
Qty: 30 TABLET | Refills: 2 | Status: SHIPPED | OUTPATIENT
Start: 2022-02-19 | End: 2022-05-10 | Stop reason: SDUPTHER

## 2022-03-07 ENCOUNTER — PATIENT MESSAGE (OUTPATIENT)
Dept: FAMILY MEDICINE | Facility: CLINIC | Age: 72
End: 2022-03-07
Payer: MEDICARE

## 2022-03-07 DIAGNOSIS — R41.3 MEMORY CHANGE: ICD-10-CM

## 2022-03-07 DIAGNOSIS — E89.0 POSTABLATIVE HYPOTHYROIDISM: ICD-10-CM

## 2022-03-07 DIAGNOSIS — R53.83 FATIGUE, UNSPECIFIED TYPE: Primary | ICD-10-CM

## 2022-03-09 ENCOUNTER — LAB VISIT (OUTPATIENT)
Dept: LAB | Facility: HOSPITAL | Age: 72
End: 2022-03-09
Attending: INTERNAL MEDICINE
Payer: MEDICARE

## 2022-03-09 DIAGNOSIS — E89.0 POSTABLATIVE HYPOTHYROIDISM: ICD-10-CM

## 2022-03-09 DIAGNOSIS — R41.3 MEMORY CHANGE: ICD-10-CM

## 2022-03-09 DIAGNOSIS — R53.83 FATIGUE, UNSPECIFIED TYPE: ICD-10-CM

## 2022-03-09 LAB
BASOPHILS # BLD AUTO: 0.04 K/UL (ref 0–0.2)
BASOPHILS NFR BLD: 0.6 % (ref 0–1.9)
DIFFERENTIAL METHOD: ABNORMAL
EOSINOPHIL # BLD AUTO: 0.1 K/UL (ref 0–0.5)
EOSINOPHIL NFR BLD: 1.6 % (ref 0–8)
ERYTHROCYTE [DISTWIDTH] IN BLOOD BY AUTOMATED COUNT: 14.2 % (ref 11.5–14.5)
HCT VFR BLD AUTO: 45 % (ref 37–48.5)
HGB BLD-MCNC: 14.6 G/DL (ref 12–16)
IMM GRANULOCYTES # BLD AUTO: 0.02 K/UL (ref 0–0.04)
IMM GRANULOCYTES NFR BLD AUTO: 0.3 % (ref 0–0.5)
LYMPHOCYTES # BLD AUTO: 1.3 K/UL (ref 1–4.8)
LYMPHOCYTES NFR BLD: 18.9 % (ref 18–48)
MCH RBC QN AUTO: 31.1 PG (ref 27–31)
MCHC RBC AUTO-ENTMCNC: 32.4 G/DL (ref 32–36)
MCV RBC AUTO: 96 FL (ref 82–98)
MONOCYTES # BLD AUTO: 0.4 K/UL (ref 0.3–1)
MONOCYTES NFR BLD: 5.9 % (ref 4–15)
NEUTROPHILS # BLD AUTO: 5 K/UL (ref 1.8–7.7)
NEUTROPHILS NFR BLD: 72.7 % (ref 38–73)
NRBC BLD-RTO: 0 /100 WBC
PLATELET # BLD AUTO: 202 K/UL (ref 150–450)
PMV BLD AUTO: 11.2 FL (ref 9.2–12.9)
RBC # BLD AUTO: 4.69 M/UL (ref 4–5.4)
TSH SERPL DL<=0.005 MIU/L-ACNC: 0.98 UIU/ML (ref 0.4–4)
VIT B12 SERPL-MCNC: 402 PG/ML (ref 210–950)
WBC # BLD AUTO: 6.83 K/UL (ref 3.9–12.7)

## 2022-03-09 PROCEDURE — 82607 VITAMIN B-12: CPT | Performed by: INTERNAL MEDICINE

## 2022-03-09 PROCEDURE — 84443 ASSAY THYROID STIM HORMONE: CPT | Performed by: INTERNAL MEDICINE

## 2022-03-09 PROCEDURE — 36415 COLL VENOUS BLD VENIPUNCTURE: CPT | Mod: PN | Performed by: INTERNAL MEDICINE

## 2022-03-09 PROCEDURE — 85025 COMPLETE CBC W/AUTO DIFF WBC: CPT | Performed by: INTERNAL MEDICINE

## 2022-03-10 ENCOUNTER — TELEPHONE (OUTPATIENT)
Dept: FAMILY MEDICINE | Facility: CLINIC | Age: 72
End: 2022-03-10

## 2022-03-10 ENCOUNTER — OFFICE VISIT (OUTPATIENT)
Dept: FAMILY MEDICINE | Facility: CLINIC | Age: 72
End: 2022-03-10
Payer: MEDICARE

## 2022-03-10 VITALS
HEART RATE: 55 BPM | HEIGHT: 61 IN | SYSTOLIC BLOOD PRESSURE: 112 MMHG | DIASTOLIC BLOOD PRESSURE: 70 MMHG | BODY MASS INDEX: 20.02 KG/M2 | WEIGHT: 106.06 LBS | OXYGEN SATURATION: 98 %

## 2022-03-10 DIAGNOSIS — Z12.11 SCREENING FOR COLON CANCER: ICD-10-CM

## 2022-03-10 DIAGNOSIS — F41.9 ANXIETY AND DEPRESSION: Primary | ICD-10-CM

## 2022-03-10 DIAGNOSIS — F32.A ANXIETY AND DEPRESSION: Primary | ICD-10-CM

## 2022-03-10 DIAGNOSIS — F51.04 CHRONIC INSOMNIA: ICD-10-CM

## 2022-03-10 PROCEDURE — 99999 PR PBB SHADOW E&M-EST. PATIENT-LVL IV: CPT | Mod: PBBFAC,,, | Performed by: INTERNAL MEDICINE

## 2022-03-10 PROCEDURE — 1160F PR REVIEW ALL MEDS BY PRESCRIBER/CLIN PHARMACIST DOCUMENTED: ICD-10-PCS | Mod: CPTII,S$GLB,, | Performed by: INTERNAL MEDICINE

## 2022-03-10 PROCEDURE — 3288F PR FALLS RISK ASSESSMENT DOCUMENTED: ICD-10-PCS | Mod: CPTII,S$GLB,, | Performed by: INTERNAL MEDICINE

## 2022-03-10 PROCEDURE — 1160F RVW MEDS BY RX/DR IN RCRD: CPT | Mod: CPTII,S$GLB,, | Performed by: INTERNAL MEDICINE

## 2022-03-10 PROCEDURE — 99214 OFFICE O/P EST MOD 30 MIN: CPT | Mod: S$GLB,,, | Performed by: INTERNAL MEDICINE

## 2022-03-10 PROCEDURE — 3078F DIAST BP <80 MM HG: CPT | Mod: CPTII,S$GLB,, | Performed by: INTERNAL MEDICINE

## 2022-03-10 PROCEDURE — 3074F PR MOST RECENT SYSTOLIC BLOOD PRESSURE < 130 MM HG: ICD-10-PCS | Mod: CPTII,S$GLB,, | Performed by: INTERNAL MEDICINE

## 2022-03-10 PROCEDURE — 99214 PR OFFICE/OUTPT VISIT, EST, LEVL IV, 30-39 MIN: ICD-10-PCS | Mod: S$GLB,,, | Performed by: INTERNAL MEDICINE

## 2022-03-10 PROCEDURE — 1126F PR PAIN SEVERITY QUANTIFIED, NO PAIN PRESENT: ICD-10-PCS | Mod: CPTII,S$GLB,, | Performed by: INTERNAL MEDICINE

## 2022-03-10 PROCEDURE — 3288F FALL RISK ASSESSMENT DOCD: CPT | Mod: CPTII,S$GLB,, | Performed by: INTERNAL MEDICINE

## 2022-03-10 PROCEDURE — 3008F PR BODY MASS INDEX (BMI) DOCUMENTED: ICD-10-PCS | Mod: CPTII,S$GLB,, | Performed by: INTERNAL MEDICINE

## 2022-03-10 PROCEDURE — 1101F PR PT FALLS ASSESS DOC 0-1 FALLS W/OUT INJ PAST YR: ICD-10-PCS | Mod: CPTII,S$GLB,, | Performed by: INTERNAL MEDICINE

## 2022-03-10 PROCEDURE — 99999 PR PBB SHADOW E&M-EST. PATIENT-LVL IV: ICD-10-PCS | Mod: PBBFAC,,, | Performed by: INTERNAL MEDICINE

## 2022-03-10 PROCEDURE — 3078F PR MOST RECENT DIASTOLIC BLOOD PRESSURE < 80 MM HG: ICD-10-PCS | Mod: CPTII,S$GLB,, | Performed by: INTERNAL MEDICINE

## 2022-03-10 PROCEDURE — 1159F MED LIST DOCD IN RCRD: CPT | Mod: CPTII,S$GLB,, | Performed by: INTERNAL MEDICINE

## 2022-03-10 PROCEDURE — 1159F PR MEDICATION LIST DOCUMENTED IN MEDICAL RECORD: ICD-10-PCS | Mod: CPTII,S$GLB,, | Performed by: INTERNAL MEDICINE

## 2022-03-10 PROCEDURE — 1126F AMNT PAIN NOTED NONE PRSNT: CPT | Mod: CPTII,S$GLB,, | Performed by: INTERNAL MEDICINE

## 2022-03-10 PROCEDURE — 1101F PT FALLS ASSESS-DOCD LE1/YR: CPT | Mod: CPTII,S$GLB,, | Performed by: INTERNAL MEDICINE

## 2022-03-10 PROCEDURE — 3074F SYST BP LT 130 MM HG: CPT | Mod: CPTII,S$GLB,, | Performed by: INTERNAL MEDICINE

## 2022-03-10 PROCEDURE — 3008F BODY MASS INDEX DOCD: CPT | Mod: CPTII,S$GLB,, | Performed by: INTERNAL MEDICINE

## 2022-03-10 RX ORDER — MIRTAZAPINE 15 MG/1
15 TABLET, FILM COATED ORAL NIGHTLY
Qty: 30 TABLET | Refills: 11 | Status: SHIPPED | OUTPATIENT
Start: 2022-03-10 | End: 2022-03-10

## 2022-03-10 RX ORDER — MIRTAZAPINE 15 MG/1
15 TABLET, FILM COATED ORAL NIGHTLY
Qty: 90 TABLET | Refills: 1 | Status: SHIPPED | OUTPATIENT
Start: 2022-03-10 | End: 2022-05-10

## 2022-03-10 NOTE — PROGRESS NOTES
Patient ID: Karina Mathur is a 71 y.o. female.    Chief Complaint: Anxiety (For a few months ) and discuss lab result       Assessment and Plan      Continue mirtazapine at 15 mg q.h.s..  Continue Celexa at 20 mg daily.  Will refer to counseling here.  Discussed that she does not have dementia and this is a relief to her.    1. Anxiety and depression  Ambulatory referral/consult to Psychology    mirtazapine (REMERON) 15 MG tablet    DISCONTINUED: mirtazapine (REMERON) 15 MG tablet   2. Chronic insomnia  mirtazapine (REMERON) 15 MG tablet    DISCONTINUED: mirtazapine (REMERON) 15 MG tablet   3. Screening for colon cancer  Fecal Immunochemical Test (iFOBT)     HPI     Follow-up anxiety and labs. Since last week has been taking mirtazapine again and this has helped some with sleep. Getting about 5 hrs of sleep. She is doing better with mood.  She still has low desire to do anything. Having some anhedonia and having negative thoughts at times. MMSE with neuro was 29/30 in December.  Celexa was increased to 20 mg in February.  No suicidal or homicidal ideation.    Need colon cancer screening  .[employer]Kit was given to patient on 3/10/2022 1:59 PM       Review of Systems   Constitutional: Negative for fever.   Respiratory: Negative for shortness of breath.    Cardiovascular: Negative for chest pain.   Gastrointestinal: Negative for abdominal pain.   Psychiatric/Behavioral: Positive for dysphoric mood.        Vitals:    03/10/22 1321   BP: 112/70   Pulse: (!) 55     Physical Exam  Cardiovascular:      Rate and Rhythm: Normal rate and regular rhythm.      Heart sounds: No murmur heard.    No gallop.   Pulmonary:      Breath sounds: Normal breath sounds. No wheezing or rhonchi.   Abdominal:      General: Bowel sounds are normal.      Palpations: Abdomen is soft.      Tenderness: There is no abdominal tenderness.   Musculoskeletal:         General: Normal range of motion.      Cervical back: Neck supple.   Skin:     General:  Skin is warm.      Findings: No rash.   Neurological:      Mental Status: She is alert.   Psychiatric:         Behavior: Behavior normal.         I personally reviewed past medical, family and social history.  Medication List with Changes/Refills   New Medications    MIRTAZAPINE (REMERON) 15 MG TABLET    Take 1 tablet (15 mg total) by mouth every evening.   Current Medications    ASCORBIC ACID, VITAMIN C, (VITAMIN C) 500 MG TABLET    Take 500 mg by mouth once daily.    BIOTIN 2,500 MCG CAP    Take 1 capsule by mouth.     CALCIUM CITRATE/VITAMIN D3 (CITRACAL REGULAR ORAL)    Take by mouth.    CITALOPRAM (CELEXA) 20 MG TABLET    Take 1 tablet (20 mg total) by mouth once daily.    CYANOCOBALAMIN, VITAMIN B-12, (VITAMIN B-12 ORAL)    Take 1 Film by mouth once daily.    FLU VAC 2021 65UP-MKUTY65W,PF, (FLUAD QUAD 2021-22,65Y UP,,PF,) 60 MCG (15 MCG X 4)/0.5 ML SYRG    Inject 0.5ml as directed    FLUOROMETHOLONE 0.1% (FML) 0.1 % DRPS    SHAKE LIQUID AND INSTILL 1 DROP IN BOTH EYES FOUR TIMES DAILY FOR 10 DAYS    LEVOTHYROXINE (SYNTHROID) 125 MCG TABLET    Take 1 tablet (125 mcg total) by mouth before breakfast. TAKE ONE-HALF TABLET BY  MOUTH DAILY    MULTIVITAMIN (THERAGRAN) PER TABLET    Take 1 tablet by mouth once daily.    SIMVASTATIN (ZOCOR) 20 MG TABLET    Take 1 tablet (20 mg total) by mouth every evening.    VITAMIN D 1000 UNITS TAB    Take 1,000 Units by mouth once daily.

## 2022-03-21 NOTE — PROGRESS NOTES
Primary Care Behavioral Health: Initial   Patient Name: Karina Mathur  Date:  3/22/2022   Site:  [facility]  Referral source: Mohan Davalos DO    Chief complaint/reason for encounter:  Insomnia and depression    History of present illness:  Ms. Karina Mathur is a 71 y.o. Not  or /a female referred by Mohan Davalos DO.  Patient was seen, examined and chart was reviewed.  Karina Mathur reviewed and agreed to informed consent and the limits of confidentiality. Patient shared that she has been having difficulties sleeping for the past year. She noted that she believes it started due to helping care for her mother who  in 2021. She shared that she has sleep maintenance difficulties and that she thinks of her family when unable to sleep. Patient reported that she and her  have been  for almost 48 years and that she lives with her  and son. Patient acknowledged that she has been having symptoms of decreased interest in activities. Patient also noted constant slight ringing in her ears but stated it is like background noise and that she did not feel the need to have it checked out at this time.    Past Medical History:   Diagnosis Date    Cataract     Dry eyes, bilateral     Hyperlipidemia     on simvastatin    Hypothyroidism     hx of iodine cocktail around  for goiter.    Osteopenia     17; L fem neck w Tscore -1.7; Lsp -1.2; DEXA in 2 yrs.    PVD (posterior vitreous detachment), bilateral          Current Outpatient Medications:     ascorbic acid, vitamin C, (VITAMIN C) 500 MG tablet, Take 500 mg by mouth once daily., Disp: , Rfl:     biotin 2,500 mcg Cap, Take 1 capsule by mouth. , Disp: , Rfl:     calcium citrate/vitamin D3 (CITRACAL REGULAR ORAL), Take by mouth., Disp: , Rfl:     citalopram (CELEXA) 20 MG tablet, Take 1 tablet (20 mg total) by mouth once daily., Disp: 30 tablet, Rfl: 2    CYANOCOBALAMIN, VITAMIN B-12,  "(VITAMIN B-12 ORAL), Take 1 Film by mouth once daily., Disp: , Rfl:     flu vac 2021 65up-etnCT02C,PF, (FLUAD QUAD 2021-22,65Y UP,,PF,) 60 mcg (15 mcg x 4)/0.5 mL Syrg, Inject 0.5ml as directed, Disp: 0.5 mL, Rfl: 0    fluorometholone 0.1% (FML) 0.1 % DrpS, SHAKE LIQUID AND INSTILL 1 DROP IN BOTH EYES FOUR TIMES DAILY FOR 10 DAYS, Disp: 5 mL, Rfl: 0    levothyroxine (SYNTHROID) 125 MCG tablet, Take 1 tablet (125 mcg total) by mouth before breakfast. TAKE ONE-HALF TABLET BY  MOUTH DAILY, Disp: 45 tablet, Rfl: 3    mirtazapine (REMERON) 15 MG tablet, Take 1 tablet (15 mg total) by mouth every evening., Disp: 90 tablet, Rfl: 1    multivitamin (THERAGRAN) per tablet, Take 1 tablet by mouth once daily., Disp: , Rfl:     simvastatin (ZOCOR) 20 MG tablet, Take 1 tablet (20 mg total) by mouth every evening., Disp: 90 tablet, Rfl: 3    vitamin D 1000 units Tab, Take 1,000 Units by mouth once daily. , Disp: , Rfl:       Psychiatric history:  Previous diagnosis: Depression  Previous medications: Patient denied  Previous hospitalizations: Patient denied  History of outpatient treatment: Patient reportedly saw Missy Sullivan LCSW for a couple of therapy sessions in February and March 2021  Previous suicide attempt:  Patient denies.  Family history of psychiatric illness:  Patient denied    Current and past substance use:  Alcohol:  Drinks wine one or two a month.  Denied history of abuse or dependency.  Drugs:  Denied current use.  Denied history of abuse or dependency.  Tobacco:  Denied current use.  Caffeine:  One and a half to two cups of coffee daily    Psychiatric symptoms:  Depression:  Increased appetite (stated it could be due to medication), decreased interest in activities, concentration "not 100%", increased motivation but still somewhat diminished.  She denied suicidal ideation.  Leyla/Hypomania:  Denied.  She denied periods of elevated mood or abnormally increased energy or goal-directed " activity.  Anxiety:  Reportedly has difficulty relaxing, tenseness feeling at times  Thoughts:  Denied delusions, hallucinations.  Suicidal thoughts/behaviors: Patient denied suicidal and homicidal ideation, plan and intent.  Patient noted agreement to call 911/and or present to the ED if she experienced suicidal or homicidal ideation, plan or intent.    Self-injury:  Patient denies.  Sleep: Difficulty sleeping for past year; 5 hours of sleep per night; sleep maintenance difficulties; racing thoughts of her family when unable to sleep    Mental Status Exam:  General appearance:  appears stated age, neatly dressed, well groomed  Speech:  Clear and intelligible, normal rate, normal tone, normal pitch, normal volume  Level of cooperation:  cooperative  Thought processes:  Linear, logical, goal-directed  Mood:  Generally euthymic and some nervousness noted at times  Thought content:  Relevant and appropriate, no illusions, no visual hallucinations, no auditory hallucinations, no delusions, no active or passive homicidal thoughts, no active or passive suicidal ideation, no obsessions, no compulsions, no violence  Affect:  Appropriate and mood congruent  Orientation:  oriented to person, place, situation and date  Memory/Attention/Concentration:  No gross cognitive deficits made evident during conversation.  Judgment and insight: fair  Language:  intact    Over the last 2 weeks, how often have you been bothered by any of the following problems?  Little interest or pleasure in doing things: Several days  Feeling down, depressed, or hopeless: Several days  Trouble falling or staying asleep, or sleeping too much: Several days  Feeling tired or having little energy: Not at all  Poor appetite or overeating: Nearly every day  Feeling bad about yourself - or that you are a failure or have let yourself or your family down: Several days  Trouble concentrating on things, such as reading the newspaper or watching television: Several  days  Moving or speaking so slowly that other people could have noticed. Or the opposite - being so fidgety or restless that you have been moving around a lot more than usual: Not at all  Thoughts that you would be better off dead, or of hurting yourself in some way: Not at all  PHQ-9 Total Score: 8  If you checked off any problems, how difficult have these problems made it for you to do your work, take care of things at home, or get along with other people?: Somewhat difficult      GAD7 3/22/2022   1. Feeling nervous, anxious, or on edge? 1   2. Not being able to stop or control worrying? 1   3. Worrying too much about different things? 1   4. Trouble relaxing? 3   5. Being so restless that it is hard to sit still? 1   6. Becoming easily annoyed or irritable? 1   7. Feeling afraid as if something awful might happen? 1   GISELL-7 Score 9         Impression:     Patient appears to have symptoms of depression including decreased interest in activities and PHQ-9 score of 8. Patient also reported difficulties with sleep that has lasted for a year. Patient asked about relaxation exercises during the session. Practiced with patient diaphragmatic breathing and visual imagery exercises during the session. Patient stated she liked the diaphragmatic breathing exercise and would practice it daily. Patient would also benefit from increasing sleep quality. Increasing sleep quality could help increase positive mood. Patient was provided a sleep journal to complete and bring to next session.    Diagnosis:     1. Mild depression  Ambulatory referral/consult to Psychology   2. Psychophysiological insomnia          Plan:      1) Practice Diaphragmatic Breathing  2) Complete Sleep Journal  3) Behavioral Activation (Future)  4) CBT-I to increase sleep quality (Future)  5) Follow-up in one week    Length of Session: 45 minutes        Alan Israel License #[id number]

## 2022-03-22 ENCOUNTER — OFFICE VISIT (OUTPATIENT)
Dept: PSYCHIATRY | Facility: CLINIC | Age: 72
End: 2022-03-22
Payer: MEDICARE

## 2022-03-22 DIAGNOSIS — F51.04 PSYCHOPHYSIOLOGICAL INSOMNIA: ICD-10-CM

## 2022-03-22 DIAGNOSIS — F32.A MILD DEPRESSION: Primary | ICD-10-CM

## 2022-03-22 PROCEDURE — 90791 PSYCH DIAGNOSTIC EVALUATION: CPT | Mod: S$GLB,,, | Performed by: CASE MANAGER/CARE COORDINATOR

## 2022-03-22 PROCEDURE — 90791 PR PSYCHIATRIC DIAGNOSTIC EVALUATION: ICD-10-PCS | Mod: S$GLB,,, | Performed by: CASE MANAGER/CARE COORDINATOR

## 2022-03-28 ENCOUNTER — TELEPHONE (OUTPATIENT)
Dept: OTOLARYNGOLOGY | Facility: CLINIC | Age: 72
End: 2022-03-28
Payer: MEDICARE

## 2022-03-28 ENCOUNTER — OFFICE VISIT (OUTPATIENT)
Dept: PSYCHIATRY | Facility: CLINIC | Age: 72
End: 2022-03-28
Payer: MEDICARE

## 2022-03-28 DIAGNOSIS — F32.A MILD DEPRESSION: Primary | ICD-10-CM

## 2022-03-28 DIAGNOSIS — F51.04 PSYCHOPHYSIOLOGICAL INSOMNIA: ICD-10-CM

## 2022-03-28 PROCEDURE — 90834 PSYTX W PT 45 MINUTES: CPT | Mod: S$GLB,,, | Performed by: CASE MANAGER/CARE COORDINATOR

## 2022-03-28 PROCEDURE — 90834 PR PSYCHOTHERAPY W/PATIENT, 45 MIN: ICD-10-PCS | Mod: S$GLB,,, | Performed by: CASE MANAGER/CARE COORDINATOR

## 2022-03-28 NOTE — TELEPHONE ENCOUNTER
----- Message from Leslye Holman sent at 3/28/2022  7:06 AM CDT -----  Type:  Same Day Appointment Request    Caller is requesting a same day appointment.  Caller declined first available appointment listed below.      Name of Caller:  PT  When is the first available appointment?  April 27  Symptoms:  Ear Clogged  Best Call Back Number:  [phone]  Additional Information:

## 2022-03-28 NOTE — TELEPHONE ENCOUNTER
Attempted to contact pt per Richelle Montesle to scheduling. Pt did not answer; left voicemail msg for call back.

## 2022-03-28 NOTE — PROGRESS NOTES
Individual Psychotherapy Note    Date: 3/28/2022   Site:  [facility]      Therapeutic Intervention: Met with patient.  Outpatient - Behavior modifying psychotherapy 45 min - CPT code 97574 and Outpatient - Supportive psychotherapy 45 min - CPT Code 24819      Patient was last seen by me on 3/22/2022    Problem list  Patient Active Problem List   Diagnosis    Other hyperlipidemia    Osteopenia    Vitamin D insufficiency    Postablative hypothyroidism    Cervicalgia    Chronic midline low back pain    Hav (hallux abducto valgus), unspecified laterality    Hammer toes of both feet    Pain in both feet    Heloma molle    Memory change    Anxiety    Chronic insomnia       Chief complaint/reason for encounter: sleep       Current Medications  Current Outpatient Medications   Medication    ascorbic acid, vitamin C, (VITAMIN C) 500 MG tablet    biotin 2,500 mcg Cap    calcium citrate/vitamin D3 (CITRACAL REGULAR ORAL)    citalopram (CELEXA) 20 MG tablet    CYANOCOBALAMIN, VITAMIN B-12, (VITAMIN B-12 ORAL)    flu vac 2021 65up-[id number],PF, (FLUAD QUAD 2021-22,65Y UP,,PF,) 60 mcg (15 mcg x 4)/0.5 mL Syrg    fluorometholone 0.1% (FML) 0.1 % DrpS    levothyroxine (SYNTHROID) 125 MCG tablet    mirtazapine (REMERON) 15 MG tablet    multivitamin (THERAGRAN) per tablet    simvastatin (ZOCOR) 20 MG tablet    vitamin D 1000 units Tab     No current facility-administered medications for this visit.       Objective:  Karina Mathur arrived promptly for the session.  Ms. Mathur was independently ambulatory at the time of session. The patient was fully cooperative throughout the session.  Appearance: age appropriate, appropriately  dressed, adequately  groomed  Behavior/Cooperation: friendly and cooperative  Speech: normal in rate, volume, and tone and appropriate quality, quantity and organization of sentences  Mood: euthymic  Affect: appropriate and calm  Thought Process: goal-directed, logical  Thought  Content: normal,  No delusions or paranoia; did not appear to be responding to internal stimuli during the session  Orientation: grossly intact  Memory: grossly intact  Attention Span/Concentration: Attends to session without distraction  Fund of Knowledge: average  Estimate of Intelligence: average from verbal skills and history  Cognition: grossly intact  Insight: patient has awareness of illness; good insight into own behavior and behavior of others  Judgment: the patient's behavior is adequate to circumstances    Interval history and content of current session: Patient completed sleep journal and brought to session. She reportedly took Mirtazapine every night before bed since last session. She reported via the sleep journal taking at least 30 minutes to fall asleep for five out of the last six nights and three nights of sleep maintenance difficulties for more than 30 minutes in last six night. Patient also included on the sleep journal that she had slept at least 7 hours only two nights in the past six nights.  Discussed current sleep habits and ways to improve sleep quality. This session covered the first session of CBT-I which focuses on sleep education and cognitive restructuring. Patient appeared attentive throughout the session. She shared that she recently went a couple of months without driving because she was nervous about being alert enough without much sleep. She shared that she believes her sleep has improved in the past week and that today was the first time in a [place] Patient noted she was fine with the drive today. Discussed with patient negative sleep thoughts and how they can lead to more sleep difficulties. Patient reported that she enjoys spending time with family. Patient was advised to continue taking part in enjoyable activities such as spending time with family to increase her positive mood. Patient noted that she often finds herself trying to please other people  more than herself. Patient was recommended to make a list of tasks she has done for herself and others between now and next session. Patient stated she would do this.    PHQ-9 = 6  GISELL-7 = 5     Risk parameters:   Patient reports no suicidal ideation  Patient reports no homicidal ideation  Patient reports no self-injurious behavior  Patient reports no violent behavior   Safety needs:  None at this time      Verbal deficits: None     Patient's response to intervention:The patient's response to intervention is accepting.     Progress toward goals and other mental status changes:  The patient's progress toward goals is good.      Progress to date:Progress as Expected      Goals from last visit: Attempted, partially met        Patient Strengths: verbal, intelligent, successful, good familial support, good insight, commitment to wellness      Treatment Plan:individual psychotherapy  · Target symptoms: mild depression, Sleep  · Why chosen therapy is appropriate versus another modality: relevant to diagnosis, evidence based practice  · Outcome monitoring methods: self-report, sleep journal  · Therapeutic intervention type: behavior modifying psychotherapy, supportive psychotherapy  · Prognosis: Good      Behavioral goals:      Social engagement: Continue doing enjoyable activities with family   Therapy: Make list of tasks she does for herself and others and bring to next session   Sleep: Complete Sleep Journal    Return to clinic: 1 week     Length of Service (minutes direct face-to-face contact): 50 minutes    Diagnosis:     ICD-10-CM ICD-9-CM   1. Mild depression  F32.0 311   2. Psychophysiological insomnia  F51.04 307.42           Alan Israel License #[id number]

## 2022-03-28 NOTE — TELEPHONE ENCOUNTER
Isabel please call this patient back she would like an urgent work in [place] for clogged ear.  We have nothing today that will work with her schedule.  Thanks

## 2022-04-01 ENCOUNTER — PATIENT MESSAGE (OUTPATIENT)
Dept: PSYCHIATRY | Facility: CLINIC | Age: 72
End: 2022-04-01
Payer: MEDICARE

## 2022-04-01 ENCOUNTER — OFFICE VISIT (OUTPATIENT)
Dept: OTOLARYNGOLOGY | Facility: CLINIC | Age: 72
End: 2022-04-01
Payer: MEDICARE

## 2022-04-01 VITALS — HEIGHT: 61 IN | BODY MASS INDEX: 21.44 KG/M2 | WEIGHT: 113.56 LBS

## 2022-04-01 DIAGNOSIS — H69.90 DYSFUNCTION OF EUSTACHIAN TUBE, UNSPECIFIED LATERALITY: ICD-10-CM

## 2022-04-01 DIAGNOSIS — H65.03 NON-RECURRENT ACUTE SEROUS OTITIS MEDIA OF BOTH EARS: ICD-10-CM

## 2022-04-01 DIAGNOSIS — H73.893 TYMPANIC MEMBRANE RETRACTION, BILATERAL: ICD-10-CM

## 2022-04-01 DIAGNOSIS — H61.22 IMPACTED CERUMEN OF LEFT EAR: Primary | ICD-10-CM

## 2022-04-01 PROCEDURE — 1126F AMNT PAIN NOTED NONE PRSNT: CPT | Mod: CPTII,S$GLB,, | Performed by: PHYSICIAN ASSISTANT

## 2022-04-01 PROCEDURE — 1126F PR PAIN SEVERITY QUANTIFIED, NO PAIN PRESENT: ICD-10-PCS | Mod: CPTII,S$GLB,, | Performed by: PHYSICIAN ASSISTANT

## 2022-04-01 PROCEDURE — 99999 PR PBB SHADOW E&M-EST. PATIENT-LVL III: ICD-10-PCS | Mod: PBBFAC,,, | Performed by: PHYSICIAN ASSISTANT

## 2022-04-01 PROCEDURE — 99203 OFFICE O/P NEW LOW 30 MIN: CPT | Mod: 25,S$GLB,, | Performed by: PHYSICIAN ASSISTANT

## 2022-04-01 PROCEDURE — 99999 PR PBB SHADOW E&M-EST. PATIENT-LVL III: CPT | Mod: PBBFAC,,, | Performed by: PHYSICIAN ASSISTANT

## 2022-04-01 PROCEDURE — 1159F MED LIST DOCD IN RCRD: CPT | Mod: CPTII,S$GLB,, | Performed by: PHYSICIAN ASSISTANT

## 2022-04-01 PROCEDURE — 69210 EAR CERUMEN REMOVAL: ICD-10-PCS | Mod: S$GLB,,, | Performed by: PHYSICIAN ASSISTANT

## 2022-04-01 PROCEDURE — 3008F BODY MASS INDEX DOCD: CPT | Mod: CPTII,S$GLB,, | Performed by: PHYSICIAN ASSISTANT

## 2022-04-01 PROCEDURE — 69210 REMOVE IMPACTED EAR WAX UNI: CPT | Mod: S$GLB,,, | Performed by: PHYSICIAN ASSISTANT

## 2022-04-01 PROCEDURE — 1160F PR REVIEW ALL MEDS BY PRESCRIBER/CLIN PHARMACIST DOCUMENTED: ICD-10-PCS | Mod: CPTII,S$GLB,, | Performed by: PHYSICIAN ASSISTANT

## 2022-04-01 PROCEDURE — 1160F RVW MEDS BY RX/DR IN RCRD: CPT | Mod: CPTII,S$GLB,, | Performed by: PHYSICIAN ASSISTANT

## 2022-04-01 PROCEDURE — 1159F PR MEDICATION LIST DOCUMENTED IN MEDICAL RECORD: ICD-10-PCS | Mod: CPTII,S$GLB,, | Performed by: PHYSICIAN ASSISTANT

## 2022-04-01 PROCEDURE — 3008F PR BODY MASS INDEX (BMI) DOCUMENTED: ICD-10-PCS | Mod: CPTII,S$GLB,, | Performed by: PHYSICIAN ASSISTANT

## 2022-04-01 PROCEDURE — 99203 PR OFFICE/OUTPT VISIT, NEW, LEVL III, 30-44 MIN: ICD-10-PCS | Mod: 25,S$GLB,, | Performed by: PHYSICIAN ASSISTANT

## 2022-04-01 RX ORDER — LORATADINE 10 MG/1
10 TABLET ORAL DAILY
Qty: 30 TABLET | Refills: 2 | Status: SHIPPED | OUTPATIENT
Start: 2022-04-01 | End: 2023-01-23 | Stop reason: ALTCHOICE

## 2022-04-01 RX ORDER — FLUTICASONE PROPIONATE 50 MCG
1 SPRAY, SUSPENSION (ML) NASAL 2 TIMES DAILY
Qty: 16 G | Refills: 2 | Status: SHIPPED | OUTPATIENT
Start: 2022-04-01 | End: 2023-01-23 | Stop reason: ALTCHOICE

## 2022-04-01 RX ORDER — PREDNISONE 20 MG/1
40 TABLET ORAL DAILY
Qty: 14 TABLET | Refills: 0 | Status: SHIPPED | OUTPATIENT
Start: 2022-04-01 | End: 2022-04-08

## 2022-04-01 NOTE — PROCEDURES
Ear Cerumen Removal    Date/Time: 4/1/2022 10:30 AM  Performed by: Beto Farfan PA-C  Authorized by: Beto Farfan PA-C     Consent Done?:  Yes (Verbal)    Local anesthetic:  None  Location details:  Left ear  Procedure type: curette    Cerumen  Removal Results:  Cerumen completely removed  Patient tolerance:  Patient tolerated the procedure well with no immediate complications

## 2022-04-01 NOTE — PROGRESS NOTES
[facility]    Subjective:      Patient: Karina Mathur Patient PCP: Mohan Davalos DO         :  1950     Sex:  female      MRN:  [id number]          Date of Visit: 2022      Chief Complaint: Fluid in ears    Patient ID: Karina Mathur is a 71 y.o. female who was self-referred for fluid in ears. Pt states that she had aural fullness last Friday R>L. Pt states that it is been gradually worsening. Pt states that when she mariana over, she hears a swishing noise in the right side. Pt did ear irriations and swimmer's ear drops and did not have any ear wax coming out. Pt states that she has had dysequilibrium the past few days without true vertigo. Pt state this was the first incident happened while pt was washing her hair. This was the first incident. It happened 3 times this week and has lasted for a few seconds. Pt endorses high-pitched bilateral non-pulsatile tinnitus x 2 years, prior to her recent issues. Pt wears  at night for teeth grinding. 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 Pt denies subjective hearing loss, fluctuations in hearing, or h/o SHL. Pt states that she has really good hearing. There is not a family history of hearing loss at a young age. There is not a prior history of ear surgery. There is not a prior history of ear infections. Pt still has tonsils and adenoids. She denies a history of significant noise exposure. She does not wear hearing aids currently. She has not had a hearing test recently. Pt denies h/o migraine or head trauma.     Review of Systems   Constitutional: Negative for chills and fever.   HENT: Positive for tinnitus (bilateral high-pitched non pulsatile). Negative for hearing loss.         R>L aural fullness   Neurological: Positive for dizziness (dysequilibrium). Negative for headaches.      Past Medical History  She has a past medical history of Cataract, Dry eyes, bilateral, Hyperlipidemia, Hypothyroidism, Osteopenia, and PVD (posterior vitreous detachment),  bilateral.    Family History  Her family history includes Hypertension in her father and sister; Hypothyroidism in her sister; Rheum arthritis in her mother.    Past Surgical History:   Procedure Laterality Date    INGUINAL HERNIA REPAIR       Social History     Tobacco Use    Smoking status: Never Smoker    Smokeless tobacco: Never Used   Substance and Sexual Activity    Alcohol use: Yes     Alcohol/week: 1.0 standard drink     Types: 1 Glasses of wine per week     Comment: 1-2 glasses a month    Drug use: No    Sexual activity: Not on file     Medications  She has a current medication list which includes the following prescription(s): ascorbic acid (vitamin c), biotin, calcium citrate/vitamin d3, citalopram, cyanocobalamin (vitamin b-12), fluad quad 2021-22(65y up)(pf), fluorometholone 0.1%, fluticasone propionate, levothyroxine, loratadine, mirtazapine, multivitamin, prednisone, simvastatin, and vitamin d.    Review of patient's allergies indicates:  No Known Allergies  All medications, allergies, and past history have been reviewed.    Objective:      Vitals:  Vitals - 1 value per visit 3/10/2022 4/1/2022 4/1/2022   SYSTOLIC 112 - -   DIASTOLIC 70 - -   Pulse 55 - -   Temp - - -   Resp - - -   SPO2 98 - -   Weight (lb) 106.04 - 113.54   Weight (kg) 48.1 - 51.5   Height 61 - 61   BMI (Calculated) 20 - 21.5   VISIT REPORT - - -   Pain Score  - 0 -       Body surface area is 1.49 meters squared.    Physical Exam  Constitutional:       General: She is not in acute distress.     Appearance: Normal appearance. She is not ill-appearing.   HENT:      Head: Normocephalic and atraumatic.      Right Ear: Ear canal and external ear normal. A middle ear effusion (serous) is present. Tympanic membrane is retracted.      Left Ear: Ear canal and external ear normal. A middle ear effusion (serous) is present. There is impacted cerumen. Tympanic membrane is retracted.      Nose: Nose normal.      Mouth/Throat:      Lips:  Pink. No lesions.      Mouth: Mucous membranes are moist. No oral lesions.      Tongue: No lesions.      Palate: No lesions.      Pharynx: Oropharynx is clear. Uvula midline. No pharyngeal swelling, oropharyngeal exudate, posterior oropharyngeal erythema or uvula swelling.   Eyes:      General:         Right eye: No discharge.         Left eye: No discharge.      Extraocular Movements: Extraocular movements intact.      Conjunctiva/sclera: Conjunctivae normal.   Pulmonary:      Effort: Pulmonary effort is normal.   Neurological:      General: No focal deficit present.      Mental Status: She is alert and oriented to person, place, and time. Mental status is at baseline.   Psychiatric:         Mood and Affect: Mood normal.         Behavior: Behavior normal.         Thought Content: Thought content normal.         Judgment: Judgment normal.         Procedure:  Cerumen removal performed.  See procedure note.    Labs:  WBC   Date Value Ref Range Status   03/09/2022 6.83 3.90 - 12.70 K/uL Final     Platelets   Date Value Ref Range Status   03/09/2022 202 150 - 450 K/uL Final     Creatinine   Date Value Ref Range Status   10/21/2021 0.8 0.5 - 1.4 mg/dL Final     TSH   Date Value Ref Range Status   03/09/2022 0.980 0.400 - 4.000 uIU/mL Final     Glucose   Date Value Ref Range Status   10/21/2021 82 70 - 110 mg/dL Final     Hemoglobin A1C   Date Value Ref Range Status   07/16/2020 5.2 4.0 - 5.6 % Final     Comment:     ADA Screening Guidelines:  5.7-6.4%  Consistent with prediabetes  >or=6.5%  Consistent with diabetes  High levels of fetal hemoglobin interfere with the HbA1C  assay. Heterozygous hemoglobin variants (HbS, HgC, etc)do  not significantly interfere with this assay.   However, presence of multiple variants may affect accuracy.       All lab results and imaging results have been reviewed.    Assessment:        ICD-10-CM ICD-9-CM   1. Impacted cerumen of left ear  H61.22 380.4   2. Dysfunction of Eustachian tube,  unspecified laterality  H69.80 381.81   3. Tympanic membrane retraction, bilateral  H73.893 384.82   4. Non-recurrent acute serous otitis media of both ears  H65.03 381.01              Plan:      Impacted cerumen of left ear  -     Ear Cerumen Removal. Please see procedure note.    Dysfunction of Eustachian tube, unspecified laterality  -     loratadine (CLARITIN) 10 mg tablet; Take 1 tablet (10 mg total) by mouth once daily.  Dispense: 30 tablet; Refill: 2  -     fluticasone propionate (FLONASE) 50 mcg/actuation nasal spray; 1 spray (50 mcg total) by Each Nostril route 2 (two) times daily.  Dispense: 16 g; Refill: 2  -     predniSONE (DELTASONE) 20 MG tablet; Take 2 tablets (40 mg total) by mouth once daily. for 7 days  Dispense: 14 tablet; Refill: 0    Tympanic membrane retraction, bilateral  -     loratadine (CLARITIN) 10 mg tablet; Take 1 tablet (10 mg total) by mouth once daily.  Dispense: 30 tablet; Refill: 2  -     fluticasone propionate (FLONASE) 50 mcg/actuation nasal spray; 1 spray (50 mcg total) by Each Nostril route 2 (two) times daily.  Dispense: 16 g; Refill: 2  -     predniSONE (DELTASONE) 20 MG tablet; Take 2 tablets (40 mg total) by mouth once daily. for 7 days  Dispense: 14 tablet; Refill: 0    Non-recurrent acute serous otitis media of both ears  -     loratadine (CLARITIN) 10 mg tablet; Take 1 tablet (10 mg total) by mouth once daily.  Dispense: 30 tablet; Refill: 2  -     fluticasone propionate (FLONASE) 50 mcg/actuation nasal spray; 1 spray (50 mcg total) by Each Nostril route 2 (two) times daily.  Dispense: 16 g; Refill: 2  -     predniSONE (DELTASONE) 20 MG tablet; Take 2 tablets (40 mg total) by mouth once daily. for 7 days  Dispense: 14 tablet; Refill: 0    Prednisone take 40mg by mouth once daily for 7 days.  Pop ears (valsalva) as shown in office 4-6 times a day for 2 weeks.  Use flonase 1 puff to each nostril twice daily for 6 weeks.  Take claritin (loratadine) once daily for 6  weeks.  Follow up in office in 6 weeks for ear recheck.

## 2022-04-01 NOTE — PATIENT INSTRUCTIONS
Prednisone take 40mg by mouth once daily for 7 days.  Pop ears (valsalva) as shown in office 4-6 times a day for 2 weeks.  Use flonase 1 puff to each nostril twice daily for 6 weeks.  Take claritin (loratadine) once daily for 6 weeks.  Follow up in office in 6 weeks for ear recheck.

## 2022-04-04 ENCOUNTER — OFFICE VISIT (OUTPATIENT)
Dept: PSYCHIATRY | Facility: CLINIC | Age: 72
End: 2022-04-04
Payer: MEDICARE

## 2022-04-04 DIAGNOSIS — F43.21 ADJUSTMENT DISORDER WITH DEPRESSED MOOD: Primary | ICD-10-CM

## 2022-04-04 PROCEDURE — 90834 PSYTX W PT 45 MINUTES: CPT | Mod: S$GLB,,, | Performed by: CASE MANAGER/CARE COORDINATOR

## 2022-04-04 PROCEDURE — 90834 PR PSYCHOTHERAPY W/PATIENT, 45 MIN: ICD-10-PCS | Mod: S$GLB,,, | Performed by: CASE MANAGER/CARE COORDINATOR

## 2022-04-04 NOTE — PROGRESS NOTES
Primary Care Behavioral Health: Follow-up  Patient Name: Karina Mathur  Date:  4/4/2022   Site:  [facility]  Referral source: Mohan Davalos DO    Chief complaint/reason for encounter:  Depression    History of present illness:  Ms. Karina Mathur is a 71 y.o. Not  or /a female referred by Mohan Davalos DO.  Patient was seen, examined and chart was reviewed.  Karina Mathur reviewed and agreed to informed consent and the limits of confidentiality. Patient completed sleep journal and brought to session. She noted on it that she had two nights in the past week with less than 5 hours of sleep and that three nights in the past week she had sleep maintenance difficulties of at least one hour. Patient reported that she sees improving including increased energy and that she has been getting out of the house more often to do activities such as going to [employer]. She shared that she has been practicing the diaphragmatic breathing exercise and finds it somewhat helpful at night. Patient shared that she still has some anxiety about if she ever has to manage finances since her  does all of it. She also noted that she has been adjusting to her  being around the house more often since he has decreased from working three days a week to one day a week.      Past Medical History:   Diagnosis Date    Cataract     Dry eyes, bilateral     Hyperlipidemia     on simvastatin    Hypothyroidism     hx of iodine cocktail around 1988 for goiter.    Osteopenia     1/18/17; L fem neck w Tscore -1.7; Lsp -1.2; DEXA in 2 yrs.    PVD (posterior vitreous detachment), bilateral          Current Outpatient Medications:     ascorbic acid, vitamin C, (VITAMIN C) 500 MG tablet, Take 500 mg by mouth once daily., Disp: , Rfl:     biotin 2,500 mcg Cap, Take 1 capsule by mouth. , Disp: , Rfl:     calcium citrate/vitamin D3 (CITRACAL REGULAR ORAL), Take by mouth., Disp: , Rfl:     citalopram  (CELEXA) 20 MG tablet, Take 1 tablet (20 mg total) by mouth once daily., Disp: 30 tablet, Rfl: 2    CYANOCOBALAMIN, VITAMIN B-12, (VITAMIN B-12 ORAL), Take 1 Film by mouth once daily., Disp: , Rfl:     flu vac 2021 [id number],PF, (FLUAD QUAD 2021-22,65Y UP,,PF,) 60 mcg (15 mcg x 4)/0.5 mL Syrg, Inject 0.5ml as directed, Disp: 0.5 mL, Rfl: 0    fluorometholone 0.1% (FML) 0.1 % DrpS, SHAKE LIQUID AND INSTILL 1 DROP IN BOTH EYES FOUR TIMES DAILY FOR 10 DAYS, Disp: 5 mL, Rfl: 0    fluticasone propionate (FLONASE) 50 mcg/actuation nasal spray, 1 spray (50 mcg total) by Each Nostril route 2 (two) times daily., Disp: 16 g, Rfl: 2    levothyroxine (SYNTHROID) 125 MCG tablet, Take 1 tablet (125 mcg total) by mouth before breakfast. TAKE ONE-HALF TABLET BY  MOUTH DAILY, Disp: 45 tablet, Rfl: 3    loratadine (CLARITIN) 10 mg tablet, Take 1 tablet (10 mg total) by mouth once daily., Disp: 30 tablet, Rfl: 2    mirtazapine (REMERON) 15 MG tablet, Take 1 tablet (15 mg total) by mouth every evening., Disp: 90 tablet, Rfl: 1    multivitamin (THERAGRAN) per tablet, Take 1 tablet by mouth once daily., Disp: , Rfl:     predniSONE (DELTASONE) 20 MG tablet, Take 2 tablets (40 mg total) by mouth once daily. for 7 days, Disp: 14 tablet, Rfl: 0    simvastatin (ZOCOR) 20 MG tablet, Take 1 tablet (20 mg total) by mouth every evening., Disp: 90 tablet, Rfl: 3    vitamin D 1000 units Tab, Take 1,000 Units by mouth once daily. , Disp: , Rfl:       Psychiatric symptoms:  Depression:  Increased appetite (stated it could be due to medication), reported she has had more motivation recently to take part in activities she enjoys.  She denied suicidal ideation.  Leyla/Hypomania:  Not assessed at current session  Anxiety:  Some anxiety reported when thinking about if she ever had to manage her own finances since her  manages it for the most part.  Thoughts:  Did not assess at current session  Suicidal thoughts/behaviors: Patient  denied suicidal and homicidal ideation, plan and intent.  Patient noted agreement to call 911/and or present to the ED if she experienced suicidal or homicidal ideation, plan or intent.    Self-injury:  Patient denies.  Sleep: Two nights of sleeping less than 5 hours in past week and three nights awake at least one hour during the night in last week    Mental Status Exam:  General appearance:  appears stated age, neatly dressed, well groomed  Speech:  Clear and intelligible, normal rate, normal tone, normal pitch, normal volume  Level of cooperation:  cooperative  Thought processes:  Linear, logical, goal-directed  Mood:  Generally calm  Thought content:  Relevant and appropriate, no illusions, no visual hallucinations, no auditory hallucinations, no delusions, no active or passive homicidal thoughts, no active or passive suicidal ideation, no obsessions, no compulsions, no violence  Affect:  Appropriate and mood congruent  Orientation:  oriented to person, place, situation and date  Memory/Attention/Concentration:  No gross cognitive deficits made evident during conversation.  Judgment and insight: fair  Language:  intact    Over the last 2 weeks, how often have you been bothered by any of the following problems?  Little interest or pleasure in doing things: Not at all  Feeling down, depressed, or hopeless: Not at all  Trouble falling or staying asleep, or sleeping too much: Several days  Feeling tired or having little energy: Not at all  Poor appetite or overeating: Several days  Feeling bad about yourself - or that you are a failure or have let yourself or your family down: Several days  Trouble concentrating on things, such as reading the newspaper or watching television: Several days  Moving or speaking so slowly that other people could have noticed. Or the opposite - being so fidgety or restless that you have been moving around a lot more than usual: Not at all  Thoughts that you would be better off dead, or  of hurting yourself in some way: Not at all  PHQ-9 Total Score: 4  If you checked off any problems, how difficult have these problems made it for you to do your work, take care of things at home, or get along with other people?: Not difficult at all    Past PHQ-9 Scores: 6 (3/28/2022), 8 (3/22/2022)    GAD7 4/4/2022 3/28/2022 3/22/2022   1. Feeling nervous, anxious, or on edge? 1 1 1   2. Not being able to stop or control worrying? 1 1 1   3. Worrying too much about different things? 1 1 1   4. Trouble relaxing? 1 1 3   5. Being so restless that it is hard to sit still? 0 1 1   6. Becoming easily annoyed or irritable? 0 0 1   7. Feeling afraid as if something awful might happen? 0 0 1   GISELL-7 Score 4 5 9         Impression:     Patient appears to have seen a decrease in her symptoms of anxiety and depression as evidenced by her PHQ-9 and GISELL-7 scores decreasing. Patient appears to still have some difficulties with sleep but reported that she does not want to focus therapy on sleep hygiene as she stated she is consent right now with taking prescribed medication to help her sleep. Patient has been increasing her out of the house activities and reportedly has been enjoying them. It appears patient is having slight difficulties adjusting to her  being around the house more, but appears to moving towards positive adjustment towards this. Patient was recommended to continue taking part in activities she enjoys to continue increasing positive mood. Patient agreed to meet with me in one month to assess her symptoms of depression and on adjustment to her  being home more often.      Diagnosis:     1. Adjustment disorder with depressed mood          Plan:      1) Continue Behavioral Activation  2) Continue adjusting to  being home more often  3) Follow-up in one month    Length of Session: 40 minutes        Alan Israel License #[id number]

## 2022-04-12 ENCOUNTER — PATIENT MESSAGE (OUTPATIENT)
Dept: ADMINISTRATIVE | Facility: HOSPITAL | Age: 72
End: 2022-04-12
Payer: MEDICARE

## 2022-04-12 ENCOUNTER — PATIENT OUTREACH (OUTPATIENT)
Dept: ADMINISTRATIVE | Facility: HOSPITAL | Age: 72
End: 2022-04-12
Payer: MEDICARE

## 2022-04-20 ENCOUNTER — PATIENT OUTREACH (OUTPATIENT)
Dept: ADMINISTRATIVE | Facility: OTHER | Age: 72
End: 2022-04-20
Payer: MEDICARE

## 2022-04-20 DIAGNOSIS — Z12.31 ENCOUNTER FOR SCREENING MAMMOGRAM FOR MALIGNANT NEOPLASM OF BREAST: Primary | ICD-10-CM

## 2022-04-20 NOTE — PROGRESS NOTES
Health Maintenance Due   Topic Date Due    Colorectal Cancer Screening  01/26/2021    Mammogram  04/19/2022    DEXA Scan  05/02/2022     Updates were requested from care everywhere.  Chart was reviewed for overdue Proactive [facility]ters (CHAR) topics (CRS, Breast Cancer Screening, Eye exam)  Health Maintenance has been updated.  LINKS immunization registry triggered.  Immunizations were reconciled.

## 2022-05-05 ENCOUNTER — LAB VISIT (OUTPATIENT)
Dept: LAB | Facility: HOSPITAL | Age: 72
End: 2022-05-05
Attending: INTERNAL MEDICINE
Payer: MEDICARE

## 2022-05-05 DIAGNOSIS — Z12.11 SCREENING FOR COLON CANCER: ICD-10-CM

## 2022-05-05 DIAGNOSIS — Z12.11 COLON CANCER SCREENING: ICD-10-CM

## 2022-05-05 PROCEDURE — 82274 ASSAY TEST FOR BLOOD FECAL: CPT | Performed by: INTERNAL MEDICINE

## 2022-05-09 ENCOUNTER — PATIENT MESSAGE (OUTPATIENT)
Dept: SMOKING CESSATION | Facility: CLINIC | Age: 72
End: 2022-05-09
Payer: MEDICARE

## 2022-05-09 ENCOUNTER — TELEPHONE (OUTPATIENT)
Dept: PSYCHIATRY | Facility: CLINIC | Age: 72
End: 2022-05-09
Payer: MEDICARE

## 2022-05-10 ENCOUNTER — OFFICE VISIT (OUTPATIENT)
Dept: FAMILY MEDICINE | Facility: CLINIC | Age: 72
End: 2022-05-10
Payer: MEDICARE

## 2022-05-10 VITALS
HEART RATE: 76 BPM | BODY MASS INDEX: 21.56 KG/M2 | WEIGHT: 114.19 LBS | DIASTOLIC BLOOD PRESSURE: 74 MMHG | HEIGHT: 61 IN | OXYGEN SATURATION: 95 % | SYSTOLIC BLOOD PRESSURE: 112 MMHG

## 2022-05-10 DIAGNOSIS — Z78.0 POST-MENOPAUSAL: ICD-10-CM

## 2022-05-10 DIAGNOSIS — F41.9 ANXIETY AND DEPRESSION: ICD-10-CM

## 2022-05-10 DIAGNOSIS — E89.0 POSTABLATIVE HYPOTHYROIDISM: ICD-10-CM

## 2022-05-10 DIAGNOSIS — M85.80 OSTEOPENIA, UNSPECIFIED LOCATION: ICD-10-CM

## 2022-05-10 DIAGNOSIS — R25.1 TREMOR: Primary | ICD-10-CM

## 2022-05-10 DIAGNOSIS — F32.A ANXIETY AND DEPRESSION: ICD-10-CM

## 2022-05-10 LAB
HEMOCCULT STL QL IA: NEGATIVE
HEMOCCULT STL QL IA: NEGATIVE

## 2022-05-10 PROCEDURE — 99214 OFFICE O/P EST MOD 30 MIN: CPT | Mod: S$GLB,,, | Performed by: INTERNAL MEDICINE

## 2022-05-10 PROCEDURE — 1160F RVW MEDS BY RX/DR IN RCRD: CPT | Mod: CPTII,S$GLB,, | Performed by: INTERNAL MEDICINE

## 2022-05-10 PROCEDURE — 1101F PR PT FALLS ASSESS DOC 0-1 FALLS W/OUT INJ PAST YR: ICD-10-PCS | Mod: CPTII,S$GLB,, | Performed by: INTERNAL MEDICINE

## 2022-05-10 PROCEDURE — 3008F BODY MASS INDEX DOCD: CPT | Mod: CPTII,S$GLB,, | Performed by: INTERNAL MEDICINE

## 2022-05-10 PROCEDURE — 3074F SYST BP LT 130 MM HG: CPT | Mod: CPTII,S$GLB,, | Performed by: INTERNAL MEDICINE

## 2022-05-10 PROCEDURE — 3078F DIAST BP <80 MM HG: CPT | Mod: CPTII,S$GLB,, | Performed by: INTERNAL MEDICINE

## 2022-05-10 PROCEDURE — 99999 PR PBB SHADOW E&M-EST. PATIENT-LVL III: ICD-10-PCS | Mod: PBBFAC,,, | Performed by: INTERNAL MEDICINE

## 2022-05-10 PROCEDURE — 99214 PR OFFICE/OUTPT VISIT, EST, LEVL IV, 30-39 MIN: ICD-10-PCS | Mod: S$GLB,,, | Performed by: INTERNAL MEDICINE

## 2022-05-10 PROCEDURE — 1126F AMNT PAIN NOTED NONE PRSNT: CPT | Mod: CPTII,S$GLB,, | Performed by: INTERNAL MEDICINE

## 2022-05-10 PROCEDURE — 3288F FALL RISK ASSESSMENT DOCD: CPT | Mod: CPTII,S$GLB,, | Performed by: INTERNAL MEDICINE

## 2022-05-10 PROCEDURE — 3074F PR MOST RECENT SYSTOLIC BLOOD PRESSURE < 130 MM HG: ICD-10-PCS | Mod: CPTII,S$GLB,, | Performed by: INTERNAL MEDICINE

## 2022-05-10 PROCEDURE — 1159F MED LIST DOCD IN RCRD: CPT | Mod: CPTII,S$GLB,, | Performed by: INTERNAL MEDICINE

## 2022-05-10 PROCEDURE — 3008F PR BODY MASS INDEX (BMI) DOCUMENTED: ICD-10-PCS | Mod: CPTII,S$GLB,, | Performed by: INTERNAL MEDICINE

## 2022-05-10 PROCEDURE — 3078F PR MOST RECENT DIASTOLIC BLOOD PRESSURE < 80 MM HG: ICD-10-PCS | Mod: CPTII,S$GLB,, | Performed by: INTERNAL MEDICINE

## 2022-05-10 PROCEDURE — 1159F PR MEDICATION LIST DOCUMENTED IN MEDICAL RECORD: ICD-10-PCS | Mod: CPTII,S$GLB,, | Performed by: INTERNAL MEDICINE

## 2022-05-10 PROCEDURE — 3288F PR FALLS RISK ASSESSMENT DOCUMENTED: ICD-10-PCS | Mod: CPTII,S$GLB,, | Performed by: INTERNAL MEDICINE

## 2022-05-10 PROCEDURE — 99999 PR PBB SHADOW E&M-EST. PATIENT-LVL III: CPT | Mod: PBBFAC,,, | Performed by: INTERNAL MEDICINE

## 2022-05-10 PROCEDURE — 1160F PR REVIEW ALL MEDS BY PRESCRIBER/CLIN PHARMACIST DOCUMENTED: ICD-10-PCS | Mod: CPTII,S$GLB,, | Performed by: INTERNAL MEDICINE

## 2022-05-10 PROCEDURE — 1126F PR PAIN SEVERITY QUANTIFIED, NO PAIN PRESENT: ICD-10-PCS | Mod: CPTII,S$GLB,, | Performed by: INTERNAL MEDICINE

## 2022-05-10 PROCEDURE — 1101F PT FALLS ASSESS-DOCD LE1/YR: CPT | Mod: CPTII,S$GLB,, | Performed by: INTERNAL MEDICINE

## 2022-05-10 RX ORDER — LEVOTHYROXINE SODIUM 50 UG/1
50 TABLET ORAL
Qty: 30 TABLET | Refills: 2 | Status: SHIPPED | OUTPATIENT
Start: 2022-05-10 | End: 2022-08-04

## 2022-05-10 RX ORDER — CITALOPRAM 20 MG/1
20 TABLET, FILM COATED ORAL DAILY
Qty: 90 TABLET | Refills: 1 | Status: SHIPPED | OUTPATIENT
Start: 2022-05-10 | End: 2022-08-04

## 2022-05-10 NOTE — PROGRESS NOTES
Patient ID: Karina Mathur is a 71 y.o. female.    Chief Complaint: follow up aniety and depression       Assessment and Plan      Reduce levothyroxine to 50 mcg to see if this helps with tremor.  Continue Celexa  DEXA scan ordered    1. Tremor  TSH   2. Postablative hypothyroidism  TSH    levothyroxine (SYNTHROID) 50 MCG tablet   3. Anxiety and depression  citalopram (CELEXA) 20 MG tablet   4. Osteopenia, unspecified location  DXA Bone Density Spine And Hip   5. Post-menopausal  DXA Bone Density Spine And Hip      HPI     Follow-up anxiety depression.  Seeing Psychiatry and doing well   Stopped mirtazapine and still sleeping 5-6 hrs nightly.   Taking celexa and happy with effects.   She has been having tremor and queasiness. Thinks it may be thyroid dose.  Discussed that it could be Celexa.    Mammogram ordered- states she will schd         Review of Systems   Constitutional: Negative for fever.   Respiratory: Negative for shortness of breath.    Cardiovascular: Negative for chest pain.   Gastrointestinal: Negative for abdominal pain.   Neurological: Positive for tremors.        Vitals:    05/10/22 0944   BP: 112/74   Pulse: 76     Physical Exam  Cardiovascular:      Rate and Rhythm: Normal rate and regular rhythm.      Heart sounds: No murmur heard.    No gallop.   Pulmonary:      Breath sounds: Normal breath sounds. No wheezing or rhonchi.   Abdominal:      General: Bowel sounds are normal.      Palpations: Abdomen is soft.      Tenderness: There is no abdominal tenderness.   Musculoskeletal:         General: Normal range of motion.      Cervical back: Neck supple.   Skin:     General: Skin is warm.      Findings: No rash.   Neurological:      Mental Status: She is alert.      Comments: Fine tremor   Psychiatric:         Behavior: Behavior normal.         I personally reviewed past medical, family and social history.  Medication List with Changes/Refills   New Medications    LEVOTHYROXINE (SYNTHROID) 50 MCG  TABLET    Take 1 tablet (50 mcg total) by mouth before breakfast.   Current Medications    ASCORBIC ACID, VITAMIN C, (VITAMIN C) 500 MG TABLET    Take 500 mg by mouth once daily.    BIOTIN 2,500 MCG CAP    Take 1 capsule by mouth.     CALCIUM CITRATE/VITAMIN D3 (CITRACAL REGULAR ORAL)    Take by mouth.    CYANOCOBALAMIN, VITAMIN B-12, (VITAMIN B-12 ORAL)    Take 1 Film by mouth once daily.    FLU VAC 2021 65UP-OENYI11P,PF, (FLUAD QUAD 2021-22,65Y UP,,PF,) 60 MCG (15 MCG X 4)/0.5 ML SYRG    Inject 0.5ml as directed    FLUOROMETHOLONE 0.1% (FML) 0.1 % DRPS    SHAKE LIQUID AND INSTILL 1 DROP IN BOTH EYES FOUR TIMES DAILY FOR 10 DAYS    FLUTICASONE PROPIONATE (FLONASE) 50 MCG/ACTUATION NASAL SPRAY    1 spray (50 mcg total) by Each Nostril route 2 (two) times daily.    LORATADINE (CLARITIN) 10 MG TABLET    Take 1 tablet (10 mg total) by mouth once daily.    MULTIVITAMIN (THERAGRAN) PER TABLET    Take 1 tablet by mouth once daily.    SIMVASTATIN (ZOCOR) 20 MG TABLET    Take 1 tablet (20 mg total) by mouth every evening.    VITAMIN D 1000 UNITS TAB    Take 1,000 Units by mouth once daily.    Changed and/or Refilled Medications    Modified Medication Previous Medication    CITALOPRAM (CELEXA) 20 MG TABLET citalopram (CELEXA) 20 MG tablet       Take 1 tablet (20 mg total) by mouth once daily.    Take 1 tablet (20 mg total) by mouth once daily.   Discontinued Medications    LEVOTHYROXINE (SYNTHROID) 125 MCG TABLET    Take 1 tablet (125 mcg total) by mouth before breakfast. TAKE ONE-HALF TABLET BY  MOUTH DAILY    MIRTAZAPINE (REMERON) 15 MG TABLET    Take 1 tablet (15 mg total) by mouth every evening.

## 2022-05-13 ENCOUNTER — OFFICE VISIT (OUTPATIENT)
Dept: OTOLARYNGOLOGY | Facility: CLINIC | Age: 72
End: 2022-05-13
Payer: MEDICARE

## 2022-05-13 VITALS — TEMPERATURE: 99 F | WEIGHT: 114 LBS | BODY MASS INDEX: 21.52 KG/M2 | HEIGHT: 61 IN

## 2022-05-13 DIAGNOSIS — H93.13 TINNITUS OF BOTH EARS: Primary | ICD-10-CM

## 2022-05-13 PROCEDURE — 3008F PR BODY MASS INDEX (BMI) DOCUMENTED: ICD-10-PCS | Mod: CPTII,S$GLB,, | Performed by: PHYSICIAN ASSISTANT

## 2022-05-13 PROCEDURE — 1160F RVW MEDS BY RX/DR IN RCRD: CPT | Mod: CPTII,S$GLB,, | Performed by: PHYSICIAN ASSISTANT

## 2022-05-13 PROCEDURE — 1126F PR PAIN SEVERITY QUANTIFIED, NO PAIN PRESENT: ICD-10-PCS | Mod: CPTII,S$GLB,, | Performed by: PHYSICIAN ASSISTANT

## 2022-05-13 PROCEDURE — 3008F BODY MASS INDEX DOCD: CPT | Mod: CPTII,S$GLB,, | Performed by: PHYSICIAN ASSISTANT

## 2022-05-13 PROCEDURE — 1160F PR REVIEW ALL MEDS BY PRESCRIBER/CLIN PHARMACIST DOCUMENTED: ICD-10-PCS | Mod: CPTII,S$GLB,, | Performed by: PHYSICIAN ASSISTANT

## 2022-05-13 PROCEDURE — 1126F AMNT PAIN NOTED NONE PRSNT: CPT | Mod: CPTII,S$GLB,, | Performed by: PHYSICIAN ASSISTANT

## 2022-05-13 PROCEDURE — 99999 PR PBB SHADOW E&M-EST. PATIENT-LVL III: ICD-10-PCS | Mod: PBBFAC,,, | Performed by: PHYSICIAN ASSISTANT

## 2022-05-13 PROCEDURE — 99213 OFFICE O/P EST LOW 20 MIN: CPT | Mod: S$GLB,,, | Performed by: PHYSICIAN ASSISTANT

## 2022-05-13 PROCEDURE — 99999 PR PBB SHADOW E&M-EST. PATIENT-LVL III: CPT | Mod: PBBFAC,,, | Performed by: PHYSICIAN ASSISTANT

## 2022-05-13 PROCEDURE — 1159F PR MEDICATION LIST DOCUMENTED IN MEDICAL RECORD: ICD-10-PCS | Mod: CPTII,S$GLB,, | Performed by: PHYSICIAN ASSISTANT

## 2022-05-13 PROCEDURE — 1159F MED LIST DOCD IN RCRD: CPT | Mod: CPTII,S$GLB,, | Performed by: PHYSICIAN ASSISTANT

## 2022-05-13 PROCEDURE — 99213 PR OFFICE/OUTPT VISIT, EST, LEVL III, 20-29 MIN: ICD-10-PCS | Mod: S$GLB,,, | Performed by: PHYSICIAN ASSISTANT

## 2022-05-13 NOTE — PROGRESS NOTES
[facility]    Subjective:      Patient: Karina Mathur Patient PCP: Mohan Davalos DO         :  1950     Sex:  female      MRN:  [id number]          Date of Visit: 2022      Chief Complaint: Serous otitis media follow up    Patient ID 2022: Karina Mathur is a 71 y.o. female who was self-referred for fluid in ears. Pt states that she had aural fullness last Friday R>L. Pt states that it is been gradually worsening. Pt states that when she mariana over, she hears a swishing noise in the right side. Pt did ear irriations and swimmer's ear drops and did not have any ear wax coming out. Pt states that she has had dysequilibrium the past few days without true vertigo. Pt state this was the first incident happened while pt was washing her hair. This was the first incident. It happened 3 times this week and has lasted for a few seconds. Pt endorses high-pitched bilateral non-pulsatile tinnitus x 2 years, prior to her recent issues. Pt wears  at night for teeth grinding. 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 Pt denies subjective hearing loss, fluctuations in hearing, or h/o SHL. Pt states that she has really good hearing. There is not a family history of hearing loss at a young age. There is not a prior history of ear surgery. There is not a prior history of ear infections. Pt still has tonsils and adenoids. She denies a history of significant noise exposure. She does not wear hearing aids currently. She has not had a hearing test recently. Pt denies h/o migraine or head trauma.     Interval History 2022: Pt seen at last visit and had noted bilateral TM retraction with bilateral serous otitis media. Pt placed on prednisone 40mg PO x 7 days and Flonase 1 puff to each nostril twice daily and take oral claritin once daily fo 6 weeks and advised to pop her ears 4-6 times a day x 2 weeks. Pt states that her ears are feeling better today in office. Pt denies fever/chills, aural fullness. Pt  states she continues to have bilateral high-pitched non-pulsatile tinnitus. Pt denies subjective hearing loss.      Review of Systems   Constitutional: Negative for chills and fever.   HENT: Positive for tinnitus. Negative for hearing loss.       Past Medical History  She has a past medical history of Cataract, Dry eyes, bilateral, Hyperlipidemia, Hypothyroidism, Osteopenia, and PVD (posterior vitreous detachment), bilateral.    Family History  Her family history includes Hypertension in her father and sister; Hypothyroidism in her sister; Rheum arthritis in her mother.    Past Surgical History:   Procedure Laterality Date    INGUINAL HERNIA REPAIR       Social History     Tobacco Use    Smoking status: Never Smoker    Smokeless tobacco: Never Used   Substance and Sexual Activity    Alcohol use: Yes     Alcohol/week: 1.0 standard drink     Types: 1 Glasses of wine per week     Comment: 1-2 glasses a month    Drug use: No    Sexual activity: Not on file     Medications  She has a current medication list which includes the following prescription(s): ascorbic acid (vitamin c), biotin, calcium citrate/vitamin d3, citalopram, cyanocobalamin (vitamin b-12), fluad quad 2021-22(65y up)(pf), fluorometholone 0.1%, fluticasone propionate, levothyroxine, loratadine, multivitamin, simvastatin, and vitamin d.    Review of patient's allergies indicates:  No Known Allergies  All medications, allergies, and past history have been reviewed.    Objective:      Vitals:  Vitals - 1 value per visit 5/10/2022 5/13/2022 5/13/2022   SYSTOLIC 112 - -   DIASTOLIC 74 - -   Pulse 76 - -   Temp - - 99   Resp - - -   SPO2 95 - -   Weight (lb) 114.2 - 113.98   Weight (kg) 51.8 - 51.7   Height 61 - 61   BMI (Calculated) 21.6 - 21.5   VISIT REPORT - - -   Pain Score  - 0 -       Body surface area is 1.49 meters squared.    Physical Exam  Constitutional:       General: She is not in acute distress.     Appearance: Normal appearance. She is not  ill-appearing.   HENT:      Head: Normocephalic and atraumatic.      Right Ear: Tympanic membrane, ear canal and external ear normal.      Left Ear: Tympanic membrane, ear canal and external ear normal.      Nose: Nose normal.      Mouth/Throat:      Lips: Pink. No lesions.      Mouth: Mucous membranes are moist. No oral lesions.      Tongue: No lesions.      Palate: No lesions.      Pharynx: Oropharynx is clear. Uvula midline. No pharyngeal swelling, oropharyngeal exudate, posterior oropharyngeal erythema or uvula swelling.   Eyes:      General:         Right eye: No discharge.         Left eye: No discharge.      Extraocular Movements: Extraocular movements intact.      Conjunctiva/sclera: Conjunctivae normal.   Pulmonary:      Effort: Pulmonary effort is normal.   Neurological:      General: No focal deficit present.      Mental Status: She is alert and oriented to person, place, and time. Mental status is at baseline.   Psychiatric:         Mood and Affect: Mood normal.         Behavior: Behavior normal.         Thought Content: Thought content normal.         Judgment: Judgment normal.       Labs:  WBC   Date Value Ref Range Status   03/09/2022 6.83 3.90 - 12.70 K/uL Final     Platelets   Date Value Ref Range Status   03/09/2022 202 150 - 450 K/uL Final     Creatinine   Date Value Ref Range Status   10/21/2021 0.8 0.5 - 1.4 mg/dL Final     TSH   Date Value Ref Range Status   03/09/2022 0.980 0.400 - 4.000 uIU/mL Final     Glucose   Date Value Ref Range Status   10/21/2021 82 70 - 110 mg/dL Final     Hemoglobin A1C   Date Value Ref Range Status   07/16/2020 5.2 4.0 - 5.6 % Final     Comment:     ADA Screening Guidelines:  5.7-6.4%  Consistent with prediabetes  >or=6.5%  Consistent with diabetes  High levels of fetal hemoglobin interfere with the HbA1C  assay. Heterozygous hemoglobin variants (HbS, HgC, etc)do  not significantly interfere with this assay.   However, presence of multiple variants may affect  accuracy.       All lab results and imaging results have been reviewed.    Assessment:        ICD-10-CM ICD-9-CM   1. Tinnitus of both ears  H93.13 388.30            Plan:      Serous otitis media has resolved. I advised pt on masking techniques for tinnitus to include light background music and white noise machine at night to help mask tinnitus. Pt would not like to proceed with audiogram at this time to check her hearing. Pt states she will follow up as needed for ENT issues.

## 2022-05-16 ENCOUNTER — HOSPITAL ENCOUNTER (OUTPATIENT)
Dept: RADIOLOGY | Facility: HOSPITAL | Age: 72
Discharge: HOME | End: 2022-05-16
Attending: INTERNAL MEDICINE
Payer: MEDICARE

## 2022-05-16 DIAGNOSIS — M85.80 OSTEOPENIA, UNSPECIFIED LOCATION: ICD-10-CM

## 2022-05-16 DIAGNOSIS — Z78.0 POST-MENOPAUSAL: ICD-10-CM

## 2022-05-16 PROCEDURE — 77080 DXA BONE DENSITY AXIAL: CPT | Mod: TC,PO

## 2022-05-16 PROCEDURE — 77080 DEXA BONE DENSITY SPINE HIP: ICD-10-PCS | Mod: 26,,, | Performed by: RADIOLOGY

## 2022-05-16 PROCEDURE — 77080 DXA BONE DENSITY AXIAL: CPT | Mod: 26,,, | Performed by: RADIOLOGY

## 2022-05-22 ENCOUNTER — PATIENT MESSAGE (OUTPATIENT)
Dept: FAMILY MEDICINE | Facility: CLINIC | Age: 72
End: 2022-05-22
Payer: MEDICARE

## 2022-05-22 DIAGNOSIS — E78.49 OTHER HYPERLIPIDEMIA: ICD-10-CM

## 2022-05-23 RX ORDER — SIMVASTATIN 20 MG/1
20 TABLET, FILM COATED ORAL NIGHTLY
Qty: 90 TABLET | Refills: 3 | Status: SHIPPED | OUTPATIENT
Start: 2022-05-23 | End: 2023-05-30 | Stop reason: SDUPTHER

## 2022-05-23 NOTE — TELEPHONE ENCOUNTER
Vitamin-D 1000 units daily  Biotin, I usually do not prescribe or recommend this.  If you are going to take it, take the lowest dose that they have at the pharmacy.

## 2022-05-23 NOTE — TELEPHONE ENCOUNTER
No new care gaps identified.  [facility] Embedded Care Gaps. Reference number: [id number]. 5/23/2022   8:23:56 AM SAKSHIT

## 2022-05-31 ENCOUNTER — PATIENT MESSAGE (OUTPATIENT)
Dept: ADMINISTRATIVE | Facility: HOSPITAL | Age: 72
End: 2022-05-31
Payer: MEDICARE

## 2022-05-31 ENCOUNTER — OFFICE VISIT (OUTPATIENT)
Dept: OPTOMETRY | Facility: CLINIC | Age: 72
End: 2022-05-31
Payer: MEDICARE

## 2022-05-31 DIAGNOSIS — H52.203 HYPEROPIA WITH ASTIGMATISM AND PRESBYOPIA, BILATERAL: ICD-10-CM

## 2022-05-31 DIAGNOSIS — H25.13 NUCLEAR SCLEROSIS, BILATERAL: Primary | ICD-10-CM

## 2022-05-31 DIAGNOSIS — H52.4 HYPEROPIA WITH ASTIGMATISM AND PRESBYOPIA, BILATERAL: ICD-10-CM

## 2022-05-31 DIAGNOSIS — H52.03 HYPEROPIA WITH ASTIGMATISM AND PRESBYOPIA, BILATERAL: ICD-10-CM

## 2022-05-31 PROCEDURE — 92012 PR EYE EXAM, EST PATIENT,INTERMED: ICD-10-PCS | Mod: S$GLB,,, | Performed by: OPTOMETRIST

## 2022-05-31 PROCEDURE — 92012 INTRM OPH EXAM EST PATIENT: CPT | Mod: S$GLB,,, | Performed by: OPTOMETRIST

## 2022-05-31 NOTE — PROGRESS NOTES
ROS     Positive for: Eyes    Negative for: Constitutional, Gastrointestinal, Neurological, Skin,   Genitourinary, Musculoskeletal, HENT, Endocrine, Cardiovascular,   Respiratory, Psychiatric, Allergic/Imm, Heme/Lymph    Last edited by LUKE Cole, OD on 5/31/2022 10:57 AM. (History)        Assessment /Plan     For exam results, see Encounter Report.    Nuclear sclerosis, bilateral    Hyperopia with astigmatism and presbyopia, bilateral      1. Vis sig NS w/ glare and night vision complaint  Not ready for consult  Gave info, reassured  2. Recheck specs Rx--slight changes only in near / add portion  Discussed with patient  Could continue with current    Message if any issues    RTC full exam 11/2022

## 2022-06-20 ENCOUNTER — LAB VISIT (OUTPATIENT)
Dept: LAB | Facility: HOSPITAL | Age: 72
End: 2022-06-20
Attending: INTERNAL MEDICINE
Payer: MEDICARE

## 2022-06-20 DIAGNOSIS — E89.0 POSTABLATIVE HYPOTHYROIDISM: ICD-10-CM

## 2022-06-20 DIAGNOSIS — R25.1 TREMOR: ICD-10-CM

## 2022-06-20 LAB
T4 FREE SERPL-MCNC: 0.72 NG/DL (ref 0.71–1.51)
TSH SERPL DL<=0.005 MIU/L-ACNC: 7.14 UIU/ML (ref 0.4–4)

## 2022-06-20 PROCEDURE — 84439 ASSAY OF FREE THYROXINE: CPT | Performed by: INTERNAL MEDICINE

## 2022-06-20 PROCEDURE — 36415 COLL VENOUS BLD VENIPUNCTURE: CPT | Mod: PN | Performed by: INTERNAL MEDICINE

## 2022-06-20 PROCEDURE — 84443 ASSAY THYROID STIM HORMONE: CPT | Performed by: INTERNAL MEDICINE

## 2022-06-27 ENCOUNTER — TELEPHONE (OUTPATIENT)
Dept: FAMILY MEDICINE | Facility: CLINIC | Age: 72
End: 2022-06-27
Payer: MEDICARE

## 2022-06-27 NOTE — TELEPHONE ENCOUNTER
----- Message from Balbina Ledesma sent at 6/27/2022  3:28 PM CDT -----  Contact: Self  Type:  Patient Returning Call    Who Called:  Patient  Who Left Message for Patient:  Juliette  Does the patient know what this is regarding?:  lab results  Best Call Back Number:  [phone]  Additional Information:  Thank You

## 2022-06-27 NOTE — TELEPHONE ENCOUNTER
Pt was told that her lab appt was scheduled and ask that it be changed to the [place] location as well as rescheduling her follow up visit with Dr Davalos until after her lab result is completed to discuss doing visit.

## 2022-08-02 ENCOUNTER — LAB VISIT (OUTPATIENT)
Dept: LAB | Facility: HOSPITAL | Age: 72
End: 2022-08-02
Attending: INTERNAL MEDICINE
Payer: MEDICARE

## 2022-08-02 DIAGNOSIS — E89.0 POSTABLATIVE HYPOTHYROIDISM: ICD-10-CM

## 2022-08-02 LAB
T4 FREE SERPL-MCNC: 0.88 NG/DL (ref 0.71–1.51)
TSH SERPL DL<=0.005 MIU/L-ACNC: 4.27 UIU/ML (ref 0.4–4)

## 2022-08-02 PROCEDURE — 36415 COLL VENOUS BLD VENIPUNCTURE: CPT | Mod: PO | Performed by: INTERNAL MEDICINE

## 2022-08-02 PROCEDURE — 84443 ASSAY THYROID STIM HORMONE: CPT | Performed by: INTERNAL MEDICINE

## 2022-08-02 PROCEDURE — 84439 ASSAY OF FREE THYROXINE: CPT | Performed by: INTERNAL MEDICINE

## 2022-08-04 ENCOUNTER — OFFICE VISIT (OUTPATIENT)
Dept: FAMILY MEDICINE | Facility: CLINIC | Age: 72
End: 2022-08-04
Payer: MEDICARE

## 2022-08-04 VITALS
WEIGHT: 111.13 LBS | HEIGHT: 61 IN | OXYGEN SATURATION: 95 % | HEART RATE: 65 BPM | SYSTOLIC BLOOD PRESSURE: 114 MMHG | DIASTOLIC BLOOD PRESSURE: 78 MMHG | BODY MASS INDEX: 20.98 KG/M2

## 2022-08-04 DIAGNOSIS — F32.A ANXIETY AND DEPRESSION: ICD-10-CM

## 2022-08-04 DIAGNOSIS — E89.0 POSTABLATIVE HYPOTHYROIDISM: Primary | ICD-10-CM

## 2022-08-04 DIAGNOSIS — F41.9 ANXIETY AND DEPRESSION: ICD-10-CM

## 2022-08-04 DIAGNOSIS — R35.0 URINARY FREQUENCY: ICD-10-CM

## 2022-08-04 DIAGNOSIS — E78.49 OTHER HYPERLIPIDEMIA: ICD-10-CM

## 2022-08-04 LAB
BILIRUB UR QL STRIP: NEGATIVE
CLARITY UR: CLEAR
COLOR UR: YELLOW
GLUCOSE UR QL STRIP: NEGATIVE
HGB UR QL STRIP: NEGATIVE
KETONES UR QL STRIP: ABNORMAL
LEUKOCYTE ESTERASE UR QL STRIP: NEGATIVE
NITRITE UR QL STRIP: NEGATIVE
PH UR STRIP: 6 [PH] (ref 5–8)
PROT UR QL STRIP: NEGATIVE
SP GR UR STRIP: 1.01 (ref 1–1.03)
URN SPEC COLLECT METH UR: ABNORMAL

## 2022-08-04 PROCEDURE — 1126F AMNT PAIN NOTED NONE PRSNT: CPT | Mod: CPTII,S$GLB,, | Performed by: INTERNAL MEDICINE

## 2022-08-04 PROCEDURE — 3074F SYST BP LT 130 MM HG: CPT | Mod: CPTII,S$GLB,, | Performed by: INTERNAL MEDICINE

## 2022-08-04 PROCEDURE — 1159F MED LIST DOCD IN RCRD: CPT | Mod: CPTII,S$GLB,, | Performed by: INTERNAL MEDICINE

## 2022-08-04 PROCEDURE — 3008F PR BODY MASS INDEX (BMI) DOCUMENTED: ICD-10-PCS | Mod: CPTII,S$GLB,, | Performed by: INTERNAL MEDICINE

## 2022-08-04 PROCEDURE — 1159F PR MEDICATION LIST DOCUMENTED IN MEDICAL RECORD: ICD-10-PCS | Mod: CPTII,S$GLB,, | Performed by: INTERNAL MEDICINE

## 2022-08-04 PROCEDURE — 99999 PR PBB SHADOW E&M-EST. PATIENT-LVL III: ICD-10-PCS | Mod: PBBFAC,,, | Performed by: INTERNAL MEDICINE

## 2022-08-04 PROCEDURE — 3008F BODY MASS INDEX DOCD: CPT | Mod: CPTII,S$GLB,, | Performed by: INTERNAL MEDICINE

## 2022-08-04 PROCEDURE — 81003 URINALYSIS AUTO W/O SCOPE: CPT | Mod: PO | Performed by: INTERNAL MEDICINE

## 2022-08-04 PROCEDURE — 1160F PR REVIEW ALL MEDS BY PRESCRIBER/CLIN PHARMACIST DOCUMENTED: ICD-10-PCS | Mod: CPTII,S$GLB,, | Performed by: INTERNAL MEDICINE

## 2022-08-04 PROCEDURE — 3074F PR MOST RECENT SYSTOLIC BLOOD PRESSURE < 130 MM HG: ICD-10-PCS | Mod: CPTII,S$GLB,, | Performed by: INTERNAL MEDICINE

## 2022-08-04 PROCEDURE — 99999 PR PBB SHADOW E&M-EST. PATIENT-LVL III: CPT | Mod: PBBFAC,,, | Performed by: INTERNAL MEDICINE

## 2022-08-04 PROCEDURE — 3288F PR FALLS RISK ASSESSMENT DOCUMENTED: ICD-10-PCS | Mod: CPTII,S$GLB,, | Performed by: INTERNAL MEDICINE

## 2022-08-04 PROCEDURE — 3288F FALL RISK ASSESSMENT DOCD: CPT | Mod: CPTII,S$GLB,, | Performed by: INTERNAL MEDICINE

## 2022-08-04 PROCEDURE — 3078F PR MOST RECENT DIASTOLIC BLOOD PRESSURE < 80 MM HG: ICD-10-PCS | Mod: CPTII,S$GLB,, | Performed by: INTERNAL MEDICINE

## 2022-08-04 PROCEDURE — 1160F RVW MEDS BY RX/DR IN RCRD: CPT | Mod: CPTII,S$GLB,, | Performed by: INTERNAL MEDICINE

## 2022-08-04 PROCEDURE — 1126F PR PAIN SEVERITY QUANTIFIED, NO PAIN PRESENT: ICD-10-PCS | Mod: CPTII,S$GLB,, | Performed by: INTERNAL MEDICINE

## 2022-08-04 PROCEDURE — 1101F PT FALLS ASSESS-DOCD LE1/YR: CPT | Mod: CPTII,S$GLB,, | Performed by: INTERNAL MEDICINE

## 2022-08-04 PROCEDURE — 99214 PR OFFICE/OUTPT VISIT, EST, LEVL IV, 30-39 MIN: ICD-10-PCS | Mod: S$GLB,,, | Performed by: INTERNAL MEDICINE

## 2022-08-04 PROCEDURE — 3078F DIAST BP <80 MM HG: CPT | Mod: CPTII,S$GLB,, | Performed by: INTERNAL MEDICINE

## 2022-08-04 PROCEDURE — 1101F PR PT FALLS ASSESS DOC 0-1 FALLS W/OUT INJ PAST YR: ICD-10-PCS | Mod: CPTII,S$GLB,, | Performed by: INTERNAL MEDICINE

## 2022-08-04 PROCEDURE — 99214 OFFICE O/P EST MOD 30 MIN: CPT | Mod: S$GLB,,, | Performed by: INTERNAL MEDICINE

## 2022-08-04 RX ORDER — LEVOTHYROXINE SODIUM 75 UG/1
75 TABLET ORAL
Qty: 30 TABLET | Refills: 11 | Status: SHIPPED | OUTPATIENT
Start: 2022-08-04 | End: 2022-08-04

## 2022-08-04 RX ORDER — CITALOPRAM 10 MG/1
TABLET ORAL
Qty: 21 TABLET | Refills: 0 | Status: SHIPPED | OUTPATIENT
Start: 2022-08-04 | End: 2023-01-23 | Stop reason: ALTCHOICE

## 2022-08-04 RX ORDER — LEVOTHYROXINE SODIUM 125 UG/1
62.5 TABLET ORAL
Qty: 45 TABLET | Refills: 2 | Status: SHIPPED | OUTPATIENT
Start: 2022-08-04 | End: 2023-04-23 | Stop reason: SDUPTHER

## 2022-08-04 NOTE — PROGRESS NOTES
Patient ID: Karina Mathur is a 71 y.o. female.    Chief Complaint: 2 month      Assessment and Plan    Taper Celexa  Increase levothyroxine back to 62.5 mcg  Labs in October 1. Postablative hypothyroidism  TSH    levothyroxine (SYNTHROID) 125 MCG tablet    DISCONTINUED: levothyroxine (SYNTHROID) 75 MCG tablet   2. Urinary frequency  Urinalysis, Reflex to Urine Culture Urine, Clean Catch    Urinalysis, Reflex to Urine Culture Urine, Clean Catch   3. Other hyperlipidemia  Comprehensive Metabolic Panel    Lipid Panel   4. Anxiety and depression  citalopram (CELEXA) 10 MG tablet      HPI   Anxiety, hyperlipidemia, postablative hypothyroidism memory change, chronic insomnia    Two month follow-up  Took lower dose of levothyroxine and shakiness did not improve  Still feeling jittery and wondering if celexa is the cause. She does not think celexa is making any positive impacts.   She is having some frequency and wanting a urine test.         Review of Systems   Constitutional: Negative for fever.   Respiratory: Negative for shortness of breath.    Cardiovascular: Negative for chest pain.   Gastrointestinal: Negative for abdominal pain.        Vitals:    08/04/22 1507   BP: 114/78   Pulse: 65     Physical Exam  Cardiovascular:      Rate and Rhythm: Normal rate and regular rhythm.      Heart sounds: No murmur heard.    No gallop.   Pulmonary:      Breath sounds: Normal breath sounds. No wheezing or rhonchi.   Abdominal:      General: Bowel sounds are normal.      Palpations: Abdomen is soft.      Tenderness: There is no abdominal tenderness.   Musculoskeletal:         General: Normal range of motion.      Cervical back: Neck supple.   Skin:     General: Skin is warm.      Findings: No rash.   Neurological:      Mental Status: She is alert.   Psychiatric:         Behavior: Behavior normal.         I personally reviewed past medical, family and social history.  Medication List with Changes/Refills   New Medications     CITALOPRAM (CELEXA) 10 MG TABLET    Take 1.5 tablets (15 mg total) by mouth once daily for 7 days, THEN 1 tablet (10 mg total) once daily for 7 days, THEN 0.5 tablets (5 mg total) once daily for 7 days.    LEVOTHYROXINE (SYNTHROID) 125 MCG TABLET    Take 0.5 tablets (62.5 mcg total) by mouth before breakfast.   Current Medications    ASCORBIC ACID, VITAMIN C, (VITAMIN C) 500 MG TABLET    Take 500 mg by mouth once daily.    BIOTIN 2,500 MCG CAP    Take 1 capsule by mouth.     CALCIUM CITRATE/VITAMIN D3 (CITRACAL REGULAR ORAL)    Take by mouth.    CYANOCOBALAMIN, VITAMIN B-12, (VITAMIN B-12 ORAL)    Take 1 Film by mouth once daily.    FLU VAC 2021 65UP-XCTRY10E,PF, (FLUAD QUAD 2021-22,65Y UP,,PF,) 60 MCG (15 MCG X 4)/0.5 ML SYRG    Inject 0.5ml as directed    FLUTICASONE PROPIONATE (FLONASE) 50 MCG/ACTUATION NASAL SPRAY    1 spray (50 mcg total) by Each Nostril route 2 (two) times daily.    LORATADINE (CLARITIN) 10 MG TABLET    Take 1 tablet (10 mg total) by mouth once daily.    MULTIVITAMIN (THERAGRAN) PER TABLET    Take 1 tablet by mouth once daily.    SIMVASTATIN (ZOCOR) 20 MG TABLET    Take 1 tablet (20 mg total) by mouth every evening.    VITAMIN D 1000 UNITS TAB    Take 1,000 Units by mouth once daily.    Discontinued Medications    CITALOPRAM (CELEXA) 20 MG TABLET    Take 1 tablet (20 mg total) by mouth once daily.    LEVOTHYROXINE (SYNTHROID) 50 MCG TABLET    Take 1 tablet (50 mcg total) by mouth before breakfast.

## 2022-08-24 ENCOUNTER — PATIENT MESSAGE (OUTPATIENT)
Dept: ADMINISTRATIVE | Facility: HOSPITAL | Age: 72
End: 2022-08-24
Payer: MEDICARE

## 2022-09-27 ENCOUNTER — LAB VISIT (OUTPATIENT)
Dept: LAB | Facility: HOSPITAL | Age: 72
End: 2022-09-27
Attending: INTERNAL MEDICINE
Payer: MEDICARE

## 2022-09-27 DIAGNOSIS — E89.0 POSTABLATIVE HYPOTHYROIDISM: ICD-10-CM

## 2022-09-27 DIAGNOSIS — E78.49 OTHER HYPERLIPIDEMIA: ICD-10-CM

## 2022-09-27 LAB
ALBUMIN SERPL BCP-MCNC: 3.9 G/DL (ref 3.5–5.2)
ALP SERPL-CCNC: 65 U/L (ref 55–135)
ALT SERPL W/O P-5'-P-CCNC: 18 U/L (ref 10–44)
ANION GAP SERPL CALC-SCNC: 8 MMOL/L (ref 8–16)
AST SERPL-CCNC: 18 U/L (ref 10–40)
BILIRUB SERPL-MCNC: 0.6 MG/DL (ref 0.1–1)
BUN SERPL-MCNC: 19 MG/DL (ref 8–23)
CALCIUM SERPL-MCNC: 9.4 MG/DL (ref 8.7–10.5)
CHLORIDE SERPL-SCNC: 103 MMOL/L (ref 95–110)
CHOLEST SERPL-MCNC: 176 MG/DL (ref 120–199)
CHOLEST/HDLC SERPL: 2.8 {RATIO} (ref 2–5)
CO2 SERPL-SCNC: 28 MMOL/L (ref 23–29)
CREAT SERPL-MCNC: 0.7 MG/DL (ref 0.5–1.4)
EST. GFR  (NO RACE VARIABLE): >60 ML/MIN/1.73 M^2
GLUCOSE SERPL-MCNC: 79 MG/DL (ref 70–110)
HDLC SERPL-MCNC: 64 MG/DL (ref 40–75)
HDLC SERPL: 36.4 % (ref 20–50)
LDLC SERPL CALC-MCNC: 97.8 MG/DL (ref 63–159)
NONHDLC SERPL-MCNC: 112 MG/DL
POTASSIUM SERPL-SCNC: 4.2 MMOL/L (ref 3.5–5.1)
PROT SERPL-MCNC: 6.4 G/DL (ref 6–8.4)
SODIUM SERPL-SCNC: 139 MMOL/L (ref 136–145)
TRIGL SERPL-MCNC: 71 MG/DL (ref 30–150)
TSH SERPL DL<=0.005 MIU/L-ACNC: 0.73 UIU/ML (ref 0.4–4)

## 2022-09-27 PROCEDURE — 84443 ASSAY THYROID STIM HORMONE: CPT | Performed by: INTERNAL MEDICINE

## 2022-09-27 PROCEDURE — 80061 LIPID PANEL: CPT | Performed by: INTERNAL MEDICINE

## 2022-09-27 PROCEDURE — 80053 COMPREHEN METABOLIC PANEL: CPT | Performed by: INTERNAL MEDICINE

## 2022-09-27 PROCEDURE — 36415 COLL VENOUS BLD VENIPUNCTURE: CPT | Mod: PN | Performed by: INTERNAL MEDICINE

## 2022-10-17 ENCOUNTER — PATIENT MESSAGE (OUTPATIENT)
Dept: ADMINISTRATIVE | Facility: HOSPITAL | Age: 72
End: 2022-10-17
Payer: MEDICARE

## 2022-10-21 ENCOUNTER — PES CALL (OUTPATIENT)
Dept: ADMINISTRATIVE | Facility: CLINIC | Age: 72
End: 2022-10-21
Payer: MEDICARE

## 2022-11-22 ENCOUNTER — OFFICE VISIT (OUTPATIENT)
Dept: FAMILY MEDICINE | Facility: CLINIC | Age: 72
End: 2022-11-22
Payer: MEDICARE

## 2022-11-22 VITALS
HEIGHT: 61 IN | HEART RATE: 68 BPM | WEIGHT: 112 LBS | DIASTOLIC BLOOD PRESSURE: 64 MMHG | OXYGEN SATURATION: 98 % | TEMPERATURE: 98 F | SYSTOLIC BLOOD PRESSURE: 106 MMHG | BODY MASS INDEX: 21.14 KG/M2

## 2022-11-22 DIAGNOSIS — F32.A MILD DEPRESSION: ICD-10-CM

## 2022-11-22 DIAGNOSIS — Z00.00 ENCOUNTER FOR PREVENTIVE HEALTH EXAMINATION: Primary | ICD-10-CM

## 2022-11-22 DIAGNOSIS — E78.49 OTHER HYPERLIPIDEMIA: ICD-10-CM

## 2022-11-22 PROCEDURE — 1170F PR FUNCTIONAL STATUS ASSESSED: ICD-10-PCS | Mod: CPTII,S$GLB,, | Performed by: NURSE PRACTITIONER

## 2022-11-22 PROCEDURE — 1101F PT FALLS ASSESS-DOCD LE1/YR: CPT | Mod: CPTII,S$GLB,, | Performed by: NURSE PRACTITIONER

## 2022-11-22 PROCEDURE — G0439 PR MEDICARE ANNUAL WELLNESS SUBSEQUENT VISIT: ICD-10-PCS | Mod: S$GLB,,, | Performed by: NURSE PRACTITIONER

## 2022-11-22 PROCEDURE — 1101F PR PT FALLS ASSESS DOC 0-1 FALLS W/OUT INJ PAST YR: ICD-10-PCS | Mod: CPTII,S$GLB,, | Performed by: NURSE PRACTITIONER

## 2022-11-22 PROCEDURE — 3008F PR BODY MASS INDEX (BMI) DOCUMENTED: ICD-10-PCS | Mod: CPTII,S$GLB,, | Performed by: NURSE PRACTITIONER

## 2022-11-22 PROCEDURE — 1160F RVW MEDS BY RX/DR IN RCRD: CPT | Mod: CPTII,S$GLB,, | Performed by: NURSE PRACTITIONER

## 2022-11-22 PROCEDURE — 1170F FXNL STATUS ASSESSED: CPT | Mod: CPTII,S$GLB,, | Performed by: NURSE PRACTITIONER

## 2022-11-22 PROCEDURE — G0439 PPPS, SUBSEQ VISIT: HCPCS | Mod: S$GLB,,, | Performed by: NURSE PRACTITIONER

## 2022-11-22 PROCEDURE — 3078F PR MOST RECENT DIASTOLIC BLOOD PRESSURE < 80 MM HG: ICD-10-PCS | Mod: CPTII,S$GLB,, | Performed by: NURSE PRACTITIONER

## 2022-11-22 PROCEDURE — 1126F PR PAIN SEVERITY QUANTIFIED, NO PAIN PRESENT: ICD-10-PCS | Mod: CPTII,S$GLB,, | Performed by: NURSE PRACTITIONER

## 2022-11-22 PROCEDURE — 3074F PR MOST RECENT SYSTOLIC BLOOD PRESSURE < 130 MM HG: ICD-10-PCS | Mod: CPTII,S$GLB,, | Performed by: NURSE PRACTITIONER

## 2022-11-22 PROCEDURE — 3288F FALL RISK ASSESSMENT DOCD: CPT | Mod: CPTII,S$GLB,, | Performed by: NURSE PRACTITIONER

## 2022-11-22 PROCEDURE — 1159F PR MEDICATION LIST DOCUMENTED IN MEDICAL RECORD: ICD-10-PCS | Mod: CPTII,S$GLB,, | Performed by: NURSE PRACTITIONER

## 2022-11-22 PROCEDURE — 1159F MED LIST DOCD IN RCRD: CPT | Mod: CPTII,S$GLB,, | Performed by: NURSE PRACTITIONER

## 2022-11-22 PROCEDURE — 3074F SYST BP LT 130 MM HG: CPT | Mod: CPTII,S$GLB,, | Performed by: NURSE PRACTITIONER

## 2022-11-22 PROCEDURE — 3078F DIAST BP <80 MM HG: CPT | Mod: CPTII,S$GLB,, | Performed by: NURSE PRACTITIONER

## 2022-11-22 PROCEDURE — 3008F BODY MASS INDEX DOCD: CPT | Mod: CPTII,S$GLB,, | Performed by: NURSE PRACTITIONER

## 2022-11-22 PROCEDURE — 1126F AMNT PAIN NOTED NONE PRSNT: CPT | Mod: CPTII,S$GLB,, | Performed by: NURSE PRACTITIONER

## 2022-11-22 PROCEDURE — 1160F PR REVIEW ALL MEDS BY PRESCRIBER/CLIN PHARMACIST DOCUMENTED: ICD-10-PCS | Mod: CPTII,S$GLB,, | Performed by: NURSE PRACTITIONER

## 2022-11-22 PROCEDURE — 99999 PR PBB SHADOW E&M-EST. PATIENT-LVL IV: ICD-10-PCS | Mod: PBBFAC,,, | Performed by: NURSE PRACTITIONER

## 2022-11-22 PROCEDURE — 3288F PR FALLS RISK ASSESSMENT DOCUMENTED: ICD-10-PCS | Mod: CPTII,S$GLB,, | Performed by: NURSE PRACTITIONER

## 2022-11-22 PROCEDURE — 99999 PR PBB SHADOW E&M-EST. PATIENT-LVL IV: CPT | Mod: PBBFAC,,, | Performed by: NURSE PRACTITIONER

## 2022-11-22 NOTE — PROGRESS NOTES
"  Karina Mathur presented for a  Medicare AWV and comprehensive Health Risk Assessment today. The following components were reviewed and updated:    Medical history  Family History  Social history  Allergies and Current Medications  Health Risk Assessment  Health Maintenance  Care Team         ** See Completed Assessments for Annual Wellness Visit within the encounter summary.**         The following assessments were completed:  Living Situation  CAGE  Depression Screening  Timed Get Up and Go  Whisper Test  Cognitive Function Screening  Nutrition Screening  ADL Screening  PAQ Screening          Vitals:    11/22/22 0926   BP: 106/64   Pulse: 68   Temp: 98.2 °F (36.8 °C)   TempSrc: Oral   SpO2: 98%   Weight: 50.8 kg (111 lb 15.9 oz)   Height: 5' 1" (1.549 m)     Body mass index is 21.16 kg/m².  Physical Exam  Constitutional:       Appearance: She is well-developed.   HENT:      Head: Normocephalic and atraumatic.      Nose: Nose normal.   Eyes:      General: Lids are normal.      Conjunctiva/sclera: Conjunctivae normal.      Pupils: Pupils are equal, round, and reactive to light.   Cardiovascular:      Rate and Rhythm: Normal rate.   Pulmonary:      Effort: Pulmonary effort is normal.   Musculoskeletal:      Cervical back: Full passive range of motion without pain.   Skin:     General: Skin is warm and dry.   Neurological:      Mental Status: She is alert and oriented to person, place, and time.   Psychiatric:         Speech: Speech normal.         Behavior: Behavior normal.             Diagnoses and health risks identified today and associated recommendations/orders:    1. Encounter for preventive health examination  Discussed health maintenance guidelines appropriate for age.    Review for Opioid Screening: Patient does not have rx for Opioids.    Review for Substance Use Disorders: Patient does not use substance.      2. Other hyperlipidemia  Controlled, continue current medication regimen  Patient taking " "statin  Followed by pcp      3. Mild depression  Stable, continue to monitor  Patient was previously taking celexa but has been weaned off by her pcp, states she feels "better without it".   Followed by pcp       Provided Karina with a 5-10 year written screening schedule and personal prevention plan. Recommendations were developed using the USPSTF age appropriate recommendations. Education, counseling, and referrals were provided as needed. After Visit Summary printed and given to patient which includes a list of additional screenings\tests needed.    Follow up for One year for Annual Wellness Visit.    Qiana Ribeiro, NP      I offered to discuss advanced care planning, including how to pick a person who would make decisions for you if you were unable to make them for yourself, called a health care power of , and what kind of decisions you might make such as use of life sustaining treatments such as ventilators and tube feeding when faced with a life limiting illness recorded on a living will that they will need to know. (How you want to be cared for as you near the end of your natural life)     X Patient is interested in learning more about how to make advanced directives.  I provided them paperwork and offered to discuss this with them.  "

## 2022-11-22 NOTE — PATIENT INSTRUCTIONS
Counseling and Referral of Other Preventative  (Italic type indicates deductible and co-insurance are waived)    Patient Name: Karina Mathur  Today's Date: 11/22/2022    Health Maintenance       Date Due Completion Date    COVID-19 Vaccine (4 - Booster for Moderna series) 12/28/2021 11/2/2021    Mammogram 04/19/2022 4/19/2021    Colorectal Cancer Screening 05/06/2022 5/5/2022    DEXA Scan 05/16/2025 5/16/2022    TETANUS VACCINE 05/28/2026 5/28/2016    Lipid Panel 09/27/2027 9/27/2022        No orders of the defined types were placed in this encounter.    The following information is provided to all patients.  This information is to help you find resources for any of the problems found today that may be affecting your health:                Living healthy guide: www.[facility].[place].gov      Understanding Diabetes: www.diabetes.org      Eating healthy: www.cdc.gov/healthyweight      CDC home safety checklist: www.cdc.gov/steadi/patient.html      Agency on Aging: www.goea.[place].[facility]      Alcoholics anonymous (AA): www.aa.org      Physical Activity: www.rasta.nih.gov/qm7dolw      Tobacco use: www.quitwithusla.org

## 2022-11-28 NOTE — LETTER
February 8, 2018      Ion Salinas III, MD  [address]  [facility] [address]           [facility]  [address] [facility] [address] [place] [address]  Phone: [phone]  Fax: [phone]          Patient: Karina Mathur   MR Number: [id number]   YOB: 1950   Date of Visit: 2/8/2018       Dear Dr. Ion Salinas III:    Thank you for referring Karina Mathur to me for evaluation. Attached you will find relevant portions of my assessment and plan of care.    If you have questions, please do not hesitate to call me. I look forward to following Karina Mathur along with you.    Sincerely,    DO Gardenia Aquinoosure  CC:  No Recipients    If you would like to receive this communication electronically, please contact [email or web address] or [phone] to request more information on [email or web address] Link access.    For providers and/or their staff who would like to refer a patient to [facility], please contact us through our one-stop-shop provider referral line, [facility], at [phone].    If you feel you have received this communication in error or would no longer like to receive these types of communications, please e-mail [email or web address]          Burow's Advancement Flap Text: The defect edges were debeveled with a #15 scalpel blade.  Given the location of the defect and the proximity to free margins a Burow's advancement flap was deemed most appropriate.  Using a sterile surgical marker, the appropriate advancement flap was drawn incorporating the defect and placing the expected incisions within the relaxed skin tension lines where possible.    The area thus outlined was incised deep to adipose tissue with a #15 scalpel blade.  The skin margins were undermined to an appropriate distance in all directions utilizing iris scissors.

## 2023-01-05 ENCOUNTER — TELEPHONE (OUTPATIENT)
Dept: DERMATOLOGY | Facility: CLINIC | Age: 73
End: 2023-01-05
Payer: MEDICARE

## 2023-01-05 NOTE — TELEPHONE ENCOUNTER
Scheduled pt for 02/20/23 @215pm.         ----- Message from Martha Meyers sent at 1/5/2023 12:50 PM CST -----  Contact: pt spouse  Type:  Sooner Appointment Request    Caller is requesting a sooner appointment.  Caller declined first available appointment listed below.  Caller will not accept being placed on the waitlist and is requesting a message be sent to doctor.    Name of Caller:  pt spouse   When is the first available appointment?  02/20  Symptoms:  skin check   Best Call Back Number:  [phone]      Additional Information:  new pt would like to be seen with  if possible Carlos Mathur on 02/20 for 2:00pm. Please advise.

## 2023-01-17 ENCOUNTER — PATIENT MESSAGE (OUTPATIENT)
Dept: ADMINISTRATIVE | Facility: HOSPITAL | Age: 73
End: 2023-01-17
Payer: MEDICARE

## 2023-01-23 ENCOUNTER — OFFICE VISIT (OUTPATIENT)
Dept: OBSTETRICS AND GYNECOLOGY | Facility: CLINIC | Age: 73
End: 2023-01-23
Payer: MEDICARE

## 2023-01-23 VITALS
WEIGHT: 114.63 LBS | BODY MASS INDEX: 21.64 KG/M2 | SYSTOLIC BLOOD PRESSURE: 124 MMHG | RESPIRATION RATE: 18 BRPM | HEIGHT: 61 IN | DIASTOLIC BLOOD PRESSURE: 68 MMHG

## 2023-01-23 DIAGNOSIS — Z12.31 SCREENING MAMMOGRAM, ENCOUNTER FOR: ICD-10-CM

## 2023-01-23 DIAGNOSIS — N81.11 CYSTOCELE, MIDLINE: ICD-10-CM

## 2023-01-23 DIAGNOSIS — N39.41 URGE INCONTINENCE: ICD-10-CM

## 2023-01-23 DIAGNOSIS — Z01.419 ENCOUNTER FOR ROUTINE GYNECOLOGICAL EXAMINATION WITH PAPANICOLAOU SMEAR OF CERVIX: Primary | ICD-10-CM

## 2023-01-23 PROCEDURE — 1101F PT FALLS ASSESS-DOCD LE1/YR: CPT | Mod: CPTII,S$GLB,, | Performed by: OBSTETRICS & GYNECOLOGY

## 2023-01-23 PROCEDURE — 1101F PR PT FALLS ASSESS DOC 0-1 FALLS W/OUT INJ PAST YR: ICD-10-PCS | Mod: CPTII,S$GLB,, | Performed by: OBSTETRICS & GYNECOLOGY

## 2023-01-23 PROCEDURE — 88175 CYTOPATH C/V AUTO FLUID REDO: CPT | Performed by: OBSTETRICS & GYNECOLOGY

## 2023-01-23 PROCEDURE — 1126F PR PAIN SEVERITY QUANTIFIED, NO PAIN PRESENT: ICD-10-PCS | Mod: CPTII,S$GLB,, | Performed by: OBSTETRICS & GYNECOLOGY

## 2023-01-23 PROCEDURE — 1159F PR MEDICATION LIST DOCUMENTED IN MEDICAL RECORD: ICD-10-PCS | Mod: CPTII,S$GLB,, | Performed by: OBSTETRICS & GYNECOLOGY

## 2023-01-23 PROCEDURE — 3288F FALL RISK ASSESSMENT DOCD: CPT | Mod: CPTII,S$GLB,, | Performed by: OBSTETRICS & GYNECOLOGY

## 2023-01-23 PROCEDURE — 1159F MED LIST DOCD IN RCRD: CPT | Mod: CPTII,S$GLB,, | Performed by: OBSTETRICS & GYNECOLOGY

## 2023-01-23 PROCEDURE — 3078F DIAST BP <80 MM HG: CPT | Mod: CPTII,S$GLB,, | Performed by: OBSTETRICS & GYNECOLOGY

## 2023-01-23 PROCEDURE — 3074F PR MOST RECENT SYSTOLIC BLOOD PRESSURE < 130 MM HG: ICD-10-PCS | Mod: CPTII,S$GLB,, | Performed by: OBSTETRICS & GYNECOLOGY

## 2023-01-23 PROCEDURE — 99999 PR PBB SHADOW E&M-EST. PATIENT-LVL III: ICD-10-PCS | Mod: PBBFAC,,, | Performed by: OBSTETRICS & GYNECOLOGY

## 2023-01-23 PROCEDURE — 3008F BODY MASS INDEX DOCD: CPT | Mod: CPTII,S$GLB,, | Performed by: OBSTETRICS & GYNECOLOGY

## 2023-01-23 PROCEDURE — 3078F PR MOST RECENT DIASTOLIC BLOOD PRESSURE < 80 MM HG: ICD-10-PCS | Mod: CPTII,S$GLB,, | Performed by: OBSTETRICS & GYNECOLOGY

## 2023-01-23 PROCEDURE — 99999 PR PBB SHADOW E&M-EST. PATIENT-LVL III: CPT | Mod: PBBFAC,,, | Performed by: OBSTETRICS & GYNECOLOGY

## 2023-01-23 PROCEDURE — 3008F PR BODY MASS INDEX (BMI) DOCUMENTED: ICD-10-PCS | Mod: CPTII,S$GLB,, | Performed by: OBSTETRICS & GYNECOLOGY

## 2023-01-23 PROCEDURE — G0101 PR CA SCREEN;PELVIC/BREAST EXAM: ICD-10-PCS | Mod: S$GLB,,, | Performed by: OBSTETRICS & GYNECOLOGY

## 2023-01-23 PROCEDURE — 3074F SYST BP LT 130 MM HG: CPT | Mod: CPTII,S$GLB,, | Performed by: OBSTETRICS & GYNECOLOGY

## 2023-01-23 PROCEDURE — 87624 HPV HI-RISK TYP POOLED RSLT: CPT | Performed by: OBSTETRICS & GYNECOLOGY

## 2023-01-23 PROCEDURE — 3288F PR FALLS RISK ASSESSMENT DOCUMENTED: ICD-10-PCS | Mod: CPTII,S$GLB,, | Performed by: OBSTETRICS & GYNECOLOGY

## 2023-01-23 PROCEDURE — G0101 CA SCREEN;PELVIC/BREAST EXAM: HCPCS | Mod: S$GLB,,, | Performed by: OBSTETRICS & GYNECOLOGY

## 2023-01-23 PROCEDURE — 1126F AMNT PAIN NOTED NONE PRSNT: CPT | Mod: CPTII,S$GLB,, | Performed by: OBSTETRICS & GYNECOLOGY

## 2023-01-23 RX ORDER — ESTRADIOL 0.1 MG/G
2 CREAM VAGINAL
Qty: 42.5 G | Refills: 2 | Status: SHIPPED | OUTPATIENT
Start: 2023-01-23 | End: 2024-02-26 | Stop reason: ALTCHOICE

## 2023-01-23 NOTE — PROGRESS NOTES
Chief Complaint   Patient presents with    Well Woman    overactive bladder       History and Physical:  No LMP recorded. Patient is postmenopausal.       Karina Mathur is a 72 y.o.   female who presents today for her routine annual GYN exam. The patient has no Gynecology complaints today. Urinary urgency / frequency on and off x 1 year, vaginal bulge- sexually active, no dryness or pains - counseled. No Vaginal Bleeding       Allergies: Review of patient's allergies indicates:  No Known Allergies    Past Medical History:   Diagnosis Date    Cataract     Dry eyes, bilateral     Hyperlipidemia     on simvastatin    Hypothyroidism     hx of iodine cocktail around  for goiter.    Osteopenia     19; L fem neck w Tscore -1.7; Lsp -1.2; DEXA in 2 yrs.    PVD (posterior vitreous detachment), bilateral        Past Surgical History:   Procedure Laterality Date    INGUINAL HERNIA REPAIR         MEDS:   Current Outpatient Medications on File Prior to Visit   Medication Sig Dispense Refill    ascorbic acid, vitamin C, (VITAMIN C) 500 MG tablet Take 500 mg by mouth once daily.      calcium citrate/vitamin D3 (CITRACAL REGULAR ORAL) Take by mouth.      levothyroxine (SYNTHROID) 125 MCG tablet Take 0.5 tablets (62.5 mcg total) by mouth before breakfast. 45 tablet 2    multivitamin (THERAGRAN) per tablet Take 1 tablet by mouth once daily.      simvastatin (ZOCOR) 20 MG tablet Take 1 tablet (20 mg total) by mouth every evening. 90 tablet 3    vitamin D 1000 units Tab Take 1,000 Units by mouth once daily.       [DISCONTINUED] biotin 2,500 mcg Cap Take 1 capsule by mouth.       [DISCONTINUED] citalopram (CELEXA) 10 MG tablet Take 1.5 tablets (15 mg total) by mouth once daily for 7 days, THEN 1 tablet (10 mg total) once daily for 7 days, THEN 0.5 tablets (5 mg total) once daily for 7 days. 21 tablet 0    [DISCONTINUED] CYANOCOBALAMIN, VITAMIN B-12, (VITAMIN B-12 ORAL) Take 1 Film by mouth once daily.       [DISCONTINUED] flu vac  [id number],PF, (FLUAD QUAD -,65Y UP,,PF,) 60 mcg (15 mcg x 4)/0.5 mL Syrg Inject 0.5ml as directed 0.5 mL 0    [DISCONTINUED] fluticasone propionate (FLONASE) 50 mcg/actuation nasal spray 1 spray (50 mcg total) by Each Nostril route 2 (two) times daily. (Patient not taking: Reported on 2022) 16 g 2    [DISCONTINUED] loratadine (CLARITIN) 10 mg tablet Take 1 tablet (10 mg total) by mouth once daily. (Patient not taking: Reported on 2022) 30 tablet 2     No current facility-administered medications on file prior to visit.       OB History          3    Para   3    Term   3            AB        Living             SAB        IAB        Ectopic        Multiple        Live Births                     Social History     Socioeconomic History    Marital status:    Tobacco Use    Smoking status: Never    Smokeless tobacco: Never   Substance and Sexual Activity    Alcohol use: Yes     Alcohol/week: 1.0 standard drink     Types: 1 Glasses of wine per week     Comment: 1/2 glass wine every night    Drug use: No     Social Determinants of Health     Financial Resource Strain: Low Risk     Difficulty of Paying Living Expenses: Not hard at all   Food Insecurity: No Food Insecurity    Worried About Running Out of Food in the Last Year: Never true    Ran Out of Food in the Last Year: Never true   Transportation Needs: No Transportation Needs    Lack of Transportation (Medical): No    Lack of Transportation (Non-Medical): No   Physical Activity: Sufficiently Active    Days of Exercise per Week: 4 days    Minutes of Exercise per Session: 60 min   Stress: Stress Concern Present    Feeling of Stress : To some extent   Social Connections: Moderately Integrated    Frequency of Communication with Friends and Family: More than three times a week    Frequency of Social Gatherings with Friends and Family: More than three times a week    Attends Bahai Services: More than  "4 times per year    Active Member of Clubs or Organizations: No    Attends Club or Organization Meetings: Never    Marital Status:    Housing Stability: Low Risk     Unable to Pay for Housing in the Last Year: No    Number of Places Lived in the Last Year: 1    Unstable Housing in the Last Year: No       Family History   Problem Relation Age of Onset    Rheum arthritis Mother     Hypertension Father     Dementia Father     Hypertension Sister     Hypothyroidism Sister          Past medical and surgical history reviewed.   I have reviewed the patient's medical history in detail and updated the computerized patient record.        Review of System:   General: no chills, fever, night sweats, weight gain or weight loss  Psychological: no depression or suicidal ideation  Breasts: no new or changing breast lumps, nipple discharge or masses.  Respiratory: no cough, shortness of breath, or wheezing  Cardiovascular: no chest pain or dyspnea on exertion  Gastrointestinal: no abdominal pain, change in bowel habits, or black or bloody stools  Genito-Urinary: no incontinence, urinary frequency/urgency or vulvar/vaginal symptoms, pelvic pain or abnormal vaginal bleeding.  Musculoskeletal: no gait disturbance or muscular weakness      Physical Exam:   /68   Resp 18   Ht 5' 1" (1.549 m)   Wt 52 kg (114 lb 10.2 oz)   BMI 21.66 kg/m²   Constitutional: She appears alert and responsive. She appears well-developed, well-groomed, and well-nourished. No distress. Normal Weight   HENT:   Head: Normocephalic and atraumatic.   Eyes: Conjunctivae and EOM are normal. No scleral icterus.   Neck: Symmetrical. Normal range of motion. Neck supple. No tracheal deviation present.   Cardiovascular: Normal rate, no rhythm abnormality noted. Extremities without swelling or edema, warm.    Pulmonary/Chest: Normal respiratory Effort. No distress or retractions. She exhibits no tenderness.  Breasts: are symmetrical.   Right breast exhibits " no inverted nipple, no mass, no nipple discharge, no skin change and no tenderness.   Left breast exhibits no inverted nipple, no mass, no nipple discharge, no skin change and no tenderness.  Abdominal: Soft. She exhibits no distension, hernias or masses. There is no tenderness. No enlargement of liver edge or spleen.  There is no rebound and no guarding.   Genitourinary:    External rectal exam shows no thrombosed external hemorrhoids, no lesions.     Pelvic exam was performed with patient supine.   No labial fusion, and symmetrical.    There is no rash, lesion or injury on the right labia.   There is no rash, lesion or injury on the left labia.   No bleeding and no signs of injury around the vaginal introitus, urethral meatus is normal size and without prolapse or lesions, urethra well supported. The cervix is visualized with no discharge, lesions or friability.   No vaginal discharge found. Pale and atrophic, grade II cystocele with valsalva, grade I rectocele. cervix and uterus well supported.   Bimanual exam:   The urethra is normal to palpation and there are no palpable vaginal wall masses.   Uterus is not deviated, not enlarged, not fixed, normal shape and not tender.   Cervix exhibits no motion tenderness.    Right adnexum displays no mass or nodularity and no tenderness.   Left adnexum displays no mass or nodularity and no tenderness.  Musculoskeletal: Normal range of motion.   Lymphadenopathy: No inguinal adenopathy present.   Neurological: She is alert and oriented to person, place, and time. Coordination normal.   Skin: Skin is warm and dry. She is not diaphoretic. No rashes, lesions or ulcers.   Psychiatric: She has a normal mood and affect, oriented to person, place, and time.      Assessment:   Normal annual GYN exam  1. Encounter for routine gynecological examination with Papanicolaou smear of cervix  Liquid-Based Pap Smear, Screening    HPV High Risk Genotypes, PCR      2. Cystocele, midline   estradioL (ESTRACE) 0.01 % (0.1 mg/gram) vaginal cream      3. Urge incontinence  estradioL (ESTRACE) 0.01 % (0.1 mg/gram) vaginal cream      Vag atrophy/ urge incontinence, vaginal prolapse / asymptomatic    Plan:   PAP  Mammogram  Estroven cream twice weekly, follow up 1-2 months if no better.   Follow up in 1 year.  Patient informed will be contacted with results within 2 weeks. Encouraged to please call back or email if she has not heard from us by then.

## 2023-01-30 ENCOUNTER — PATIENT MESSAGE (OUTPATIENT)
Dept: OBSTETRICS AND GYNECOLOGY | Facility: CLINIC | Age: 73
End: 2023-01-30
Payer: MEDICARE

## 2023-01-30 LAB
FINAL PATHOLOGIC DIAGNOSIS: NORMAL
Lab: NORMAL

## 2023-02-20 ENCOUNTER — OFFICE VISIT (OUTPATIENT)
Dept: DERMATOLOGY | Facility: CLINIC | Age: 73
End: 2023-02-20
Payer: MEDICARE

## 2023-02-20 DIAGNOSIS — L71.9 ROSACEA: Primary | ICD-10-CM

## 2023-02-20 DIAGNOSIS — Z41.1 ENCOUNTER FOR COSMETIC PROCEDURE: ICD-10-CM

## 2023-02-20 DIAGNOSIS — Z76.89 ENCOUNTER FOR SKIN CARE: ICD-10-CM

## 2023-02-20 PROCEDURE — 99204 OFFICE O/P NEW MOD 45 MIN: CPT | Mod: S$GLB,,, | Performed by: DERMATOLOGY

## 2023-02-20 PROCEDURE — 99999 PR PBB SHADOW E&M-EST. PATIENT-LVL III: ICD-10-PCS | Mod: PBBFAC,,, | Performed by: DERMATOLOGY

## 2023-02-20 PROCEDURE — 1160F PR REVIEW ALL MEDS BY PRESCRIBER/CLIN PHARMACIST DOCUMENTED: ICD-10-PCS | Mod: CPTII,S$GLB,, | Performed by: DERMATOLOGY

## 2023-02-20 PROCEDURE — 99204 PR OFFICE/OUTPT VISIT, NEW, LEVL IV, 45-59 MIN: ICD-10-PCS | Mod: S$GLB,,, | Performed by: DERMATOLOGY

## 2023-02-20 PROCEDURE — 1126F PR PAIN SEVERITY QUANTIFIED, NO PAIN PRESENT: ICD-10-PCS | Mod: CPTII,S$GLB,, | Performed by: DERMATOLOGY

## 2023-02-20 PROCEDURE — 3288F PR FALLS RISK ASSESSMENT DOCUMENTED: ICD-10-PCS | Mod: CPTII,S$GLB,, | Performed by: DERMATOLOGY

## 2023-02-20 PROCEDURE — 1160F RVW MEDS BY RX/DR IN RCRD: CPT | Mod: CPTII,S$GLB,, | Performed by: DERMATOLOGY

## 2023-02-20 PROCEDURE — 1159F MED LIST DOCD IN RCRD: CPT | Mod: CPTII,S$GLB,, | Performed by: DERMATOLOGY

## 2023-02-20 PROCEDURE — 3288F FALL RISK ASSESSMENT DOCD: CPT | Mod: CPTII,S$GLB,, | Performed by: DERMATOLOGY

## 2023-02-20 PROCEDURE — 1159F PR MEDICATION LIST DOCUMENTED IN MEDICAL RECORD: ICD-10-PCS | Mod: CPTII,S$GLB,, | Performed by: DERMATOLOGY

## 2023-02-20 PROCEDURE — 99999 PR PBB SHADOW E&M-EST. PATIENT-LVL III: CPT | Mod: PBBFAC,,, | Performed by: DERMATOLOGY

## 2023-02-20 PROCEDURE — 1126F AMNT PAIN NOTED NONE PRSNT: CPT | Mod: CPTII,S$GLB,, | Performed by: DERMATOLOGY

## 2023-02-20 PROCEDURE — 1101F PR PT FALLS ASSESS DOC 0-1 FALLS W/OUT INJ PAST YR: ICD-10-PCS | Mod: CPTII,S$GLB,, | Performed by: DERMATOLOGY

## 2023-02-20 PROCEDURE — 1101F PT FALLS ASSESS-DOCD LE1/YR: CPT | Mod: CPTII,S$GLB,, | Performed by: DERMATOLOGY

## 2023-02-20 RX ORDER — METRONIDAZOLE 7.5 MG/G
CREAM TOPICAL 2 TIMES DAILY
Qty: 60 G | Refills: 3 | Status: SHIPPED | OUTPATIENT
Start: 2023-02-20 | End: 2023-03-13

## 2023-02-20 NOTE — PATIENT INSTRUCTIONS
Patient to start using sulfur bar soap for the face or affected area 1-2 x day.     Patient instructed in importance in daily sun protection. Sun avoidance and topical protection discussed.     Patient encouraged to wear hat for all outdoor exposure.     Also discussed sun protective clothing.     Avoid using hot water    Rosacea triggers reviewed with patient.  Avoid alcohol, hot beverage, hot soups, spicy foods, hot water cleansing.  Apply cool water or ice packs or cold soda cans to face when flushed.  Take breaks during exertion to do this.  Avoid sun on the face.    Discussed all the following:  Rosacea is a long-lasting (chronic) skin condition affecting the face. In the early stages it causes easy flushing or blushing. Redness may become long-term (permanent) as the small blood vessels of the face widen (dilate). There may be small, red, pus-filled bumps (pustules). It looks like a bad case of acne, and has been called adult acne. But it is not caused by the same things that cause acne.

## 2023-02-20 NOTE — PROGRESS NOTES
Subjective:       Patient ID:  Karina Mathur is a 72 y.o. female who presents for   Chief Complaint   Patient presents with    Spot     Nose, x 2mo, no sx, no tx      Spot - Initial  Affected locations: nose  Signs / symptoms: asymptomatic  Severity: mild  Timing: constant  Aggravated by: nothing  Relieving factors/Treatments tried: nothing  Improvement on treatment: no relief    Review of Systems   Constitutional:  Negative for fever and chills.   HENT:  Negative for sore throat.    Respiratory:  Negative for cough.       Objective:    Physical Exam   Constitutional: She appears well-developed and well-nourished.   Eyes: No conjunctival no injection.   Neurological: She is alert and oriented to person, place, and time.   Psychiatric: She has a normal mood and affect.   Skin:   Areas Examined (abnormalities noted in diagram):   Head / Face Inspection Performed            Diagram Legend     Erythematous scaling macule/papule c/w actinic keratosis       Vascular papule c/w angioma      Pigmented verrucoid papule/plaque c/w seborrheic keratosis      Yellow umbilicated papule c/w sebaceous hyperplasia      Irregularly shaped tan macule c/w lentigo     1-2 mm smooth white papules consistent with Milia      Movable subcutaneous cyst with punctum c/w epidermal inclusion cyst      Subcutaneous movable cyst c/w pilar cyst      Firm pink to brown papule c/w dermatofibroma      Pedunculated fleshy papule(s) c/w skin tag(s)      Evenly pigmented macule c/w junctional nevus     Mildly variegated pigmented, slightly irregular-bordered macule c/w mildly atypical nevus      Flesh colored to evenly pigmented papule c/w intradermal nevus       Pink pearly papule/plaque c/w basal cell carcinoma      Erythematous hyperkeratotic cursted plaque c/w SCC      Surgical scar with no sign of skin cancer recurrence      Open and closed comedones      Inflammatory papules and pustules      Verrucoid papule consistent consistent with wart      Erythematous eczematous patches and plaques     Dystrophic onycholytic nail with subungual debris c/w onychomycosis     Umbilicated papule    Erythematous-base heme-crusted tan verrucoid plaque consistent with inflamed seborrheic keratosis     Erythematous Silvery Scaling Plaque c/w Psoriasis     See annotation      Assessment / Plan:        Rosacea  -     metronidazole 0.75% (METROCREAM) 0.75 % Crea; Apply topically 2 (two) times daily. Pink areas of the face  Dispense: 60 g; Refill: 3  Discussed with patient the etiology and pathogenesis of the disease or skin lesion(s) and possible treatments and aggravators.    Reviewed with patient different treatment options and associated risks.  Proper application of medications and or care for affected area(s) and condition(s) reviewed.  Patient to start using sulfur bar soap for the face or affected area 1-2 x day.  For scalp usage lather from the soap to be applied to the roots of the scalp and allow to sit for 3-5 minutes regularly.  Same instructions for other affected areas.  Rosacea triggers reviewed with patient.  Avoid alcohol, hot beverage, hot soups, spicy foods, hot water cleansing.  Apply cool water or ice packs or cold soda cans to face when flushed.  Take breaks during exertion to do this.  Avoid sun on the face.    Discussed all the following:  Rosacea is a long-lasting (chronic) skin condition affecting the face. In the early stages it causes easy flushing or blushing. Redness may become long-term (permanent) as the small blood vessels of the face widen (dilate). There may be small, red, pus-filled bumps (pustules). It looks like a bad case of acne, and has been called adult acne. But it is not caused by the same things that cause acne.  Brochure given for patient education.  [facility] and or records and notes reviewed and considered for their impact on our clinical decision making today.    Encounter for skin care  No hot water bathing  reviewed.  Patient instructed in importance in daily sun protection. Sun avoidance and topical protection discussed.     Patient encouraged to wear hat for all outdoor exposure.     Also discussed sun protective clothing.    Encounter for cosmetic procedure  Consider bleph, lokesh for l upper.  L upper with some drooping.  Refer to ophtho.  Oculoplastics?  Warned may not be covered by insur if not visual field impairment.  Independent historian was in exam room or on virtual today to provide information and assist in delivering therapy and treatment at home.             Follow up in about 1 year (around 2/20/2024).

## 2023-03-13 ENCOUNTER — OFFICE VISIT (OUTPATIENT)
Dept: OTOLARYNGOLOGY | Facility: CLINIC | Age: 73
End: 2023-03-13
Payer: MEDICARE

## 2023-03-13 VITALS — HEIGHT: 61 IN | RESPIRATION RATE: 16 BRPM | WEIGHT: 115.31 LBS | BODY MASS INDEX: 21.77 KG/M2

## 2023-03-13 DIAGNOSIS — H69.93 DYSFUNCTION OF BOTH EUSTACHIAN TUBES: ICD-10-CM

## 2023-03-13 DIAGNOSIS — H66.001 NON-RECURRENT ACUTE SUPPURATIVE OTITIS MEDIA OF RIGHT EAR WITHOUT SPONTANEOUS RUPTURE OF TYMPANIC MEMBRANE: Primary | ICD-10-CM

## 2023-03-13 DIAGNOSIS — J06.9 VIRAL URI: ICD-10-CM

## 2023-03-13 DIAGNOSIS — J02.9 SORE THROAT: ICD-10-CM

## 2023-03-13 LAB
GROUP A STREP, MOLECULAR: NEGATIVE
INFLUENZA A, MOLECULAR: NEGATIVE
INFLUENZA B, MOLECULAR: NEGATIVE
SPECIMEN SOURCE: NORMAL

## 2023-03-13 PROCEDURE — 1101F PR PT FALLS ASSESS DOC 0-1 FALLS W/OUT INJ PAST YR: ICD-10-PCS | Mod: CPTII,S$GLB,, | Performed by: PHYSICIAN ASSISTANT

## 2023-03-13 PROCEDURE — 3288F PR FALLS RISK ASSESSMENT DOCUMENTED: ICD-10-PCS | Mod: CPTII,S$GLB,, | Performed by: PHYSICIAN ASSISTANT

## 2023-03-13 PROCEDURE — 1160F PR REVIEW ALL MEDS BY PRESCRIBER/CLIN PHARMACIST DOCUMENTED: ICD-10-PCS | Mod: CPTII,S$GLB,, | Performed by: PHYSICIAN ASSISTANT

## 2023-03-13 PROCEDURE — 87502 INFLUENZA DNA AMP PROBE: CPT | Performed by: PHYSICIAN ASSISTANT

## 2023-03-13 PROCEDURE — 99999 PR PBB SHADOW E&M-EST. PATIENT-LVL III: ICD-10-PCS | Mod: PBBFAC,,, | Performed by: PHYSICIAN ASSISTANT

## 2023-03-13 PROCEDURE — 99214 OFFICE O/P EST MOD 30 MIN: CPT | Mod: S$GLB,,, | Performed by: PHYSICIAN ASSISTANT

## 2023-03-13 PROCEDURE — 1160F RVW MEDS BY RX/DR IN RCRD: CPT | Mod: CPTII,S$GLB,, | Performed by: PHYSICIAN ASSISTANT

## 2023-03-13 PROCEDURE — 1159F PR MEDICATION LIST DOCUMENTED IN MEDICAL RECORD: ICD-10-PCS | Mod: CPTII,S$GLB,, | Performed by: PHYSICIAN ASSISTANT

## 2023-03-13 PROCEDURE — 99999 PR PBB SHADOW E&M-EST. PATIENT-LVL III: CPT | Mod: PBBFAC,,, | Performed by: PHYSICIAN ASSISTANT

## 2023-03-13 PROCEDURE — 3008F BODY MASS INDEX DOCD: CPT | Mod: CPTII,S$GLB,, | Performed by: PHYSICIAN ASSISTANT

## 2023-03-13 PROCEDURE — 3288F FALL RISK ASSESSMENT DOCD: CPT | Mod: CPTII,S$GLB,, | Performed by: PHYSICIAN ASSISTANT

## 2023-03-13 PROCEDURE — 1125F PR PAIN SEVERITY QUANTIFIED, PAIN PRESENT: ICD-10-PCS | Mod: CPTII,S$GLB,, | Performed by: PHYSICIAN ASSISTANT

## 2023-03-13 PROCEDURE — 99214 PR OFFICE/OUTPT VISIT, EST, LEVL IV, 30-39 MIN: ICD-10-PCS | Mod: S$GLB,,, | Performed by: PHYSICIAN ASSISTANT

## 2023-03-13 PROCEDURE — 87651 STREP A DNA AMP PROBE: CPT | Performed by: PHYSICIAN ASSISTANT

## 2023-03-13 PROCEDURE — 1159F MED LIST DOCD IN RCRD: CPT | Mod: CPTII,S$GLB,, | Performed by: PHYSICIAN ASSISTANT

## 2023-03-13 PROCEDURE — 87081 CULTURE SCREEN ONLY: CPT | Performed by: PHYSICIAN ASSISTANT

## 2023-03-13 PROCEDURE — 1101F PT FALLS ASSESS-DOCD LE1/YR: CPT | Mod: CPTII,S$GLB,, | Performed by: PHYSICIAN ASSISTANT

## 2023-03-13 PROCEDURE — 3008F PR BODY MASS INDEX (BMI) DOCUMENTED: ICD-10-PCS | Mod: CPTII,S$GLB,, | Performed by: PHYSICIAN ASSISTANT

## 2023-03-13 PROCEDURE — 1125F AMNT PAIN NOTED PAIN PRSNT: CPT | Mod: CPTII,S$GLB,, | Performed by: PHYSICIAN ASSISTANT

## 2023-03-13 RX ORDER — LEVOCETIRIZINE DIHYDROCHLORIDE 5 MG/1
5 TABLET, FILM COATED ORAL NIGHTLY
Qty: 90 TABLET | Refills: 0 | Status: SHIPPED | OUTPATIENT
Start: 2023-03-13 | End: 2023-05-23

## 2023-03-13 RX ORDER — CEFDINIR 300 MG/1
300 CAPSULE ORAL 2 TIMES DAILY
Qty: 20 CAPSULE | Refills: 0 | Status: SHIPPED | OUTPATIENT
Start: 2023-03-13 | End: 2023-03-23

## 2023-03-13 NOTE — PROGRESS NOTES
[facility]    Subjective:      Patient: Karina Mathur Patient PCP: Mohan Davalos DO         :  1950     Sex:  female      MRN:  [id number]          Date of Visit: 2023      Chief Complaint: Sore throat; Ear fullness/right ear pain      Patient ID 2022: Karina Mathur is a 72 y.o. female who was self-referred for  fluid in ears . Pt states that she had aural fullness last Friday R>L. Pt states that it is been gradually worsening. Pt states that when she mariana over, she hears a swishing noise in the right side. Pt did ear irriations and swimmer's ear drops and did not have any ear wax coming out. Pt states that she has had dysequilibrium the past few days without true vertigo. Pt state this was the first incident happened while pt was washing her hair. This was the first incident. It happened 3 times this week and has lasted for a few seconds. Pt endorses high-pitched bilateral non-pulsatile tinnitus x 2 years, prior to her recent issues. Pt wears  at night for teeth grinding. 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 Pt denies subjective hearing loss, fluctuations in hearing, or h/o SHL. Pt states that she has really good hearing. There is not a family history of hearing loss at a young age. There is not a prior history of ear surgery. There is not a prior history of ear infections. Pt still has tonsils and adenoids. She denies a history of significant noise exposure. She does not wear hearing aids currently. She has not had a hearing test recently. Pt denies h/o migraine or head trauma.     Interval History 2022: Pt seen at last visit and had noted bilateral TM retraction with bilateral serous otitis media. Pt placed on prednisone 40mg PO x 7 days and Flonase 1 puff to each nostril twice daily and take oral claritin once daily fo 6 weeks and advised to pop her ears 4-6 times a day x 2 weeks. Pt states that her ears are feeling better today in office. Pt denies fever/chills, aural  fullness. Pt states she continues to have bilateral high-pitched non-pulsatile tinnitus. Pt denies subjective hearing loss.      Interval History 03/13/2023: Pt states that she has bilateral ear pressure/fullness, which started yesterday with right ear pain. Pt states she has had sore throat that started on Thursday of last week and has also had scratchiness in her throat. Pt denies fever/chills. Pt denies recent sick contact. Pt states she has been doing saltwater gargles, using flonase and taking mucinex. Pt states that she has had issues with voice change and thickening mucous taking mucinex.     Past Medical History  She has a past medical history of Cataract, Dry eyes, bilateral, Hyperlipidemia, Hypothyroidism, Osteopenia, and PVD (posterior vitreous detachment), bilateral.    Family History  Her family history includes Dementia in her father; Hypertension in her father and sister; Hypothyroidism in her sister; Rheum arthritis in her mother.    Past Surgical History:   Procedure Laterality Date    INGUINAL HERNIA REPAIR       Social History     Tobacco Use    Smoking status: Never    Smokeless tobacco: Never   Substance and Sexual Activity    Alcohol use: Yes     Alcohol/week: 1.0 standard drink     Types: 1 Glasses of wine per week     Comment: 1/2 glass wine every night    Drug use: No    Sexual activity: Not on file     Medications  She has a current medication list which includes the following prescription(s): ascorbic acid (vitamin c), calcium citrate/vitamin d3, estradiol, levothyroxine, multivitamin, simvastatin, vitamin d, cefdinir, levocetirizine, and metronidazole 0.75%.    Review of patient's allergies indicates:  No Known Allergies  All medications, allergies, and past history have been reviewed.    Objective:      Vitals:  Vitals - 1 value per visit 2/20/2023 3/13/2023 3/13/2023   SYSTOLIC - - -   DIASTOLIC - - -   Pulse - - -   Temp - - -   Resp - - 16   SPO2 - - -   Weight (lb) - - 115.3   Weight  (kg) - - 52.3   Height - - 61   BMI (Calculated) - - 21.8   VISIT REPORT - - -   Pain Score  0 7 -   Some recent data might be hidden       Body surface area is 1.5 meters squared.    Physical Exam  Constitutional:       General: She is not in acute distress.     Appearance: Normal appearance. She is not ill-appearing.   HENT:      Head: Normocephalic and atraumatic.      Right Ear: Ear canal and external ear normal. A middle ear effusion (purulent) is present.      Left Ear: Tympanic membrane, ear canal and external ear normal.      Nose: Nose normal.      Mouth/Throat:      Lips: Pink. No lesions.      Mouth: Mucous membranes are moist. No oral lesions.      Tongue: No lesions.      Palate: No lesions.      Pharynx: Oropharynx is clear. Uvula midline. No pharyngeal swelling, oropharyngeal exudate, posterior oropharyngeal erythema or uvula swelling.   Eyes:      General:         Right eye: No discharge.         Left eye: No discharge.      Extraocular Movements: Extraocular movements intact.      Conjunctiva/sclera: Conjunctivae normal.   Pulmonary:      Effort: Pulmonary effort is normal.   Neurological:      General: No focal deficit present.      Mental Status: She is alert and oriented to person, place, and time. Mental status is at baseline.   Psychiatric:         Mood and Affect: Mood normal.         Behavior: Behavior normal.         Thought Content: Thought content normal.         Judgment: Judgment normal.     Labs:  WBC   Date Value Ref Range Status   03/09/2022 6.83 3.90 - 12.70 K/uL Final     Platelets   Date Value Ref Range Status   03/09/2022 202 150 - 450 K/uL Final     Creatinine   Date Value Ref Range Status   09/27/2022 0.7 0.5 - 1.4 mg/dL Final     TSH   Date Value Ref Range Status   09/27/2022 0.733 0.400 - 4.000 uIU/mL Final     Glucose   Date Value Ref Range Status   09/27/2022 79 70 - 110 mg/dL Final     Hemoglobin A1C   Date Value Ref Range Status   07/16/2020 5.2 4.0 - 5.6 % Final      Comment:     ADA Screening Guidelines:  5.7-6.4%  Consistent with prediabetes  >or=6.5%  Consistent with diabetes  High levels of fetal hemoglobin interfere with the HbA1C  assay. Heterozygous hemoglobin variants (HbS, HgC, etc)do  not significantly interfere with this assay.   However, presence of multiple variants may affect accuracy.       All lab results and imaging results have been reviewed.    Assessment:        ICD-10-CM ICD-9-CM   1. Non-recurrent acute suppurative otitis media of right ear without spontaneous rupture of tympanic membrane  H66.001 382.00   2. Sore throat  J02.9 462   3. Dysfunction of both eustachian tubes  H69.83 381.81   4. Viral URI  J06.9 465.9              Plan:      Use flonase 1 spray to each nostril twice daily. Discontinue mucinex as this has been drying the pt out. Start xyzal 5mg in the evening. Start cefdinir 300mg twice a day for 10 days for right ear infection. Do saltwater gargles three times a day for the next week. If developing fever or chills, then proceed to urgent care or ED for flu and covid19 testing. Alternate between ibuprofen and tylenol every 3 hours as needed for pain. Do not exceed the maximum dose advised on the back of the label. Influenza and strep testing completed today in office. Follow up in office in 2 weeks to ensure that right middle ear infection is resolving.

## 2023-03-13 NOTE — PATIENT INSTRUCTIONS
Use flonase 1 spray to each nostril twice daily.  Start xyzal 5mg in the evening. You have a right middle ear infection. Start cefdinir 300mg twice a day for 10 days. Follow up in office in 2 weeks.      Do saltwater gargles three times a day for the next week. If developing fever or chills, then proceed to urgent care or ED for flu and covid19 testing.     Alternate between ibuprofen and tylenol every 3 hours as needed for pain. Do not exceed the maximum dose advised on the back of the label.

## 2023-03-16 LAB — BACTERIA THROAT CULT: NORMAL

## 2023-04-11 ENCOUNTER — TELEPHONE (OUTPATIENT)
Dept: OTOLARYNGOLOGY | Facility: CLINIC | Age: 73
End: 2023-04-11
Payer: MEDICARE

## 2023-04-11 ENCOUNTER — PATIENT MESSAGE (OUTPATIENT)
Dept: ADMINISTRATIVE | Facility: HOSPITAL | Age: 73
End: 2023-04-11
Payer: MEDICARE

## 2023-04-11 ENCOUNTER — OFFICE VISIT (OUTPATIENT)
Dept: OTOLARYNGOLOGY | Facility: CLINIC | Age: 73
End: 2023-04-11
Payer: MEDICARE

## 2023-04-11 VITALS
SYSTOLIC BLOOD PRESSURE: 93 MMHG | BODY MASS INDEX: 21.66 KG/M2 | HEART RATE: 69 BPM | DIASTOLIC BLOOD PRESSURE: 62 MMHG | WEIGHT: 114.63 LBS

## 2023-04-11 DIAGNOSIS — H69.91 DYSFUNCTION OF RIGHT EUSTACHIAN TUBE: ICD-10-CM

## 2023-04-11 DIAGNOSIS — J04.0 ACUTE LARYNGITIS: ICD-10-CM

## 2023-04-11 DIAGNOSIS — H61.22 IMPACTED CERUMEN OF LEFT EAR: Primary | ICD-10-CM

## 2023-04-11 PROCEDURE — 1101F PT FALLS ASSESS-DOCD LE1/YR: CPT | Mod: CPTII,S$GLB,, | Performed by: PHYSICIAN ASSISTANT

## 2023-04-11 PROCEDURE — 1160F PR REVIEW ALL MEDS BY PRESCRIBER/CLIN PHARMACIST DOCUMENTED: ICD-10-PCS | Mod: CPTII,S$GLB,, | Performed by: PHYSICIAN ASSISTANT

## 2023-04-11 PROCEDURE — 3008F BODY MASS INDEX DOCD: CPT | Mod: CPTII,S$GLB,, | Performed by: PHYSICIAN ASSISTANT

## 2023-04-11 PROCEDURE — 3074F PR MOST RECENT SYSTOLIC BLOOD PRESSURE < 130 MM HG: ICD-10-PCS | Mod: CPTII,S$GLB,, | Performed by: PHYSICIAN ASSISTANT

## 2023-04-11 PROCEDURE — 1126F AMNT PAIN NOTED NONE PRSNT: CPT | Mod: CPTII,S$GLB,, | Performed by: PHYSICIAN ASSISTANT

## 2023-04-11 PROCEDURE — 3008F PR BODY MASS INDEX (BMI) DOCUMENTED: ICD-10-PCS | Mod: CPTII,S$GLB,, | Performed by: PHYSICIAN ASSISTANT

## 2023-04-11 PROCEDURE — 1159F PR MEDICATION LIST DOCUMENTED IN MEDICAL RECORD: ICD-10-PCS | Mod: CPTII,S$GLB,, | Performed by: PHYSICIAN ASSISTANT

## 2023-04-11 PROCEDURE — 3288F FALL RISK ASSESSMENT DOCD: CPT | Mod: CPTII,S$GLB,, | Performed by: PHYSICIAN ASSISTANT

## 2023-04-11 PROCEDURE — 69210 EAR CERUMEN REMOVAL: ICD-10-PCS | Mod: S$GLB,,, | Performed by: PHYSICIAN ASSISTANT

## 2023-04-11 PROCEDURE — 99214 PR OFFICE/OUTPT VISIT, EST, LEVL IV, 30-39 MIN: ICD-10-PCS | Mod: 25,S$GLB,, | Performed by: PHYSICIAN ASSISTANT

## 2023-04-11 PROCEDURE — 3288F PR FALLS RISK ASSESSMENT DOCUMENTED: ICD-10-PCS | Mod: CPTII,S$GLB,, | Performed by: PHYSICIAN ASSISTANT

## 2023-04-11 PROCEDURE — 99999 PR PBB SHADOW E&M-EST. PATIENT-LVL III: ICD-10-PCS | Mod: PBBFAC,,, | Performed by: PHYSICIAN ASSISTANT

## 2023-04-11 PROCEDURE — 1126F PR PAIN SEVERITY QUANTIFIED, NO PAIN PRESENT: ICD-10-PCS | Mod: CPTII,S$GLB,, | Performed by: PHYSICIAN ASSISTANT

## 2023-04-11 PROCEDURE — 3074F SYST BP LT 130 MM HG: CPT | Mod: CPTII,S$GLB,, | Performed by: PHYSICIAN ASSISTANT

## 2023-04-11 PROCEDURE — 1160F RVW MEDS BY RX/DR IN RCRD: CPT | Mod: CPTII,S$GLB,, | Performed by: PHYSICIAN ASSISTANT

## 2023-04-11 PROCEDURE — 99214 OFFICE O/P EST MOD 30 MIN: CPT | Mod: 25,S$GLB,, | Performed by: PHYSICIAN ASSISTANT

## 2023-04-11 PROCEDURE — 3078F DIAST BP <80 MM HG: CPT | Mod: CPTII,S$GLB,, | Performed by: PHYSICIAN ASSISTANT

## 2023-04-11 PROCEDURE — 3078F PR MOST RECENT DIASTOLIC BLOOD PRESSURE < 80 MM HG: ICD-10-PCS | Mod: CPTII,S$GLB,, | Performed by: PHYSICIAN ASSISTANT

## 2023-04-11 PROCEDURE — 1159F MED LIST DOCD IN RCRD: CPT | Mod: CPTII,S$GLB,, | Performed by: PHYSICIAN ASSISTANT

## 2023-04-11 PROCEDURE — 1101F PR PT FALLS ASSESS DOC 0-1 FALLS W/OUT INJ PAST YR: ICD-10-PCS | Mod: CPTII,S$GLB,, | Performed by: PHYSICIAN ASSISTANT

## 2023-04-11 PROCEDURE — 99999 PR PBB SHADOW E&M-EST. PATIENT-LVL III: CPT | Mod: PBBFAC,,, | Performed by: PHYSICIAN ASSISTANT

## 2023-04-11 PROCEDURE — 69210 REMOVE IMPACTED EAR WAX UNI: CPT | Mod: S$GLB,,, | Performed by: PHYSICIAN ASSISTANT

## 2023-04-11 RX ORDER — METHYLPREDNISOLONE 4 MG/1
TABLET ORAL
Qty: 21 EACH | Refills: 0 | Status: SHIPPED | OUTPATIENT
Start: 2023-04-11 | End: 2023-05-02

## 2023-04-11 RX ORDER — AZELASTINE 1 MG/ML
1 SPRAY, METERED NASAL 2 TIMES DAILY
Qty: 90 ML | Refills: 0 | Status: CANCELLED | OUTPATIENT
Start: 2023-04-11 | End: 2024-04-10

## 2023-04-11 RX ORDER — FLUTICASONE PROPIONATE 50 MCG
1 SPRAY, SUSPENSION (ML) NASAL 2 TIMES DAILY
Qty: 48 G | Refills: 0 | Status: CANCELLED | OUTPATIENT
Start: 2023-04-11

## 2023-04-11 NOTE — PROCEDURES
Ear Cerumen Removal    Date/Time: 4/11/2023 10:15 AM  Performed by: Beto Farfan PA-C  Authorized by: Beto Farfan PA-C     Consent Done?:  Yes (Verbal)    Local anesthetic:  None  Location details:  Left ear  Procedure type: curette    Cerumen  Removal Results:  Cerumen completely removed  Patient tolerance:  Patient tolerated the procedure well with no immediate complications

## 2023-04-11 NOTE — PROGRESS NOTES
[facility]    Subjective:      Patient: Karina Mathur Patient PCP: Mohan Davalos DO         :  1950     Sex:  female      MRN:  [id number]          Date of Visit: 2023      Chief Complaint: Hoarseness/right ear pressure    Patient ID 2022: Karina Mathur is a 72 y.o. female who was self-referred for  fluid in ears . Pt states that she had aural fullness last Friday R>L. Pt states that it is been gradually worsening. Pt states that when she mariana over, she hears a swishing noise in the right side. Pt did ear irriations and swimmer's ear drops and did not have any ear wax coming out. Pt states that she has had dysequilibrium the past few days without true vertigo. Pt state this was the first incident happened while pt was washing her hair. This was the first incident. It happened 3 times this week and has lasted for a few seconds. Pt endorses high-pitched bilateral non-pulsatile tinnitus x 2 years, prior to her recent issues. Pt wears  at night for teeth grinding. 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 Pt denies subjective hearing loss, fluctuations in hearing, or h/o SHL. Pt states that she has really good hearing. There is not a family history of hearing loss at a young age. There is not a prior history of ear surgery. There is not a prior history of ear infections. Pt still has tonsils and adenoids. She denies a history of significant noise exposure. She does not wear hearing aids currently. She has not had a hearing test recently. Pt denies h/o migraine or head trauma.     Interval History 2022: Pt seen at last visit and had noted bilateral TM retraction with bilateral serous otitis media. Pt placed on prednisone 40mg PO x 7 days and Flonase 1 puff to each nostril twice daily and take oral claritin once daily fo 6 weeks and advised to pop her ears 4-6 times a day x 2 weeks. Pt states that her ears are feeling better today in office. Pt denies fever/chills, aural fullness. Pt  states she continues to have bilateral high-pitched non-pulsatile tinnitus. Pt denies subjective hearing loss.      Interval History 03/13/2023: Pt states that she has bilateral ear pressure/fullness, which started yesterday with right ear pain. Pt states she has had sore throat that started on Thursday of last week and has also had scratchiness in her throat. Pt denies fever/chills. Pt denies recent sick contact. Pt states she has been doing saltwater gargles, using flonase and taking mucinex. Pt states that she has had issues with voice change and thickening mucous taking mucinex.     Interval History 04/11/2023: Pt states that she started having right ear fullness on Sunday and developed voice change with coarse/hoarse voice. Pt denies issues with acid reflux. Pt denies issues with seasonal allergies. Pt has had right aural fullness with muffled hearing. Pt denies fever/chills, sore throat, dysphagia or ear pain/discharge. No vertigo. Pt has not had any recent sick contacts.     Past Medical History  She has a past medical history of Cataract, Dry eyes, bilateral, Hyperlipidemia, Hypothyroidism, Osteopenia, and PVD (posterior vitreous detachment), bilateral.    Family History  Her family history includes Dementia in her father; Hypertension in her father and sister; Hypothyroidism in her sister; Rheum arthritis in her mother.    Past Surgical History:   Procedure Laterality Date    INGUINAL HERNIA REPAIR       Social History     Tobacco Use    Smoking status: Never    Smokeless tobacco: Never   Substance and Sexual Activity    Alcohol use: Yes     Alcohol/week: 1.0 standard drink     Types: 1 Glasses of wine per week     Comment: 1/2 glass wine every night    Drug use: No    Sexual activity: Not on file     Medications  She has a current medication list which includes the following prescription(s): ascorbic acid (vitamin c), calcium citrate/vitamin d3, estradiol, levocetirizine, levothyroxine, multivitamin,  simvastatin, vitamin d, and methylprednisolone.    Review of patient's allergies indicates:  No Known Allergies  All medications, allergies, and past history have been reviewed.    Objective:      Vitals:  Vitals - 1 value per visit 3/13/2023 4/11/2023 4/11/2023   SYSTOLIC - - 93   DIASTOLIC - - 62   Pulse - - 69   Temp - - -   Resp 16 - -   SPO2 - - -   Weight (lb) 115.3 - 114.64   Weight (kg) 52.3 - 52   Height 61 - -   BMI (Calculated) 21.8 - -   VISIT REPORT - - -   Pain Score  - 0 -   Some recent data might be hidden       Body surface area is 1.5 meters squared.    Physical Exam  Constitutional:       General: She is not in acute distress.     Appearance: Normal appearance. She is not ill-appearing.   HENT:      Head: Normocephalic and atraumatic.      Right Ear: Ear canal and external ear normal. No middle ear effusion. Tympanic membrane is retracted. Tympanic membrane is not scarred, perforated, erythematous or bulging.      Left Ear: Tympanic membrane, ear canal and external ear normal.  No middle ear effusion. There is impacted cerumen. Tympanic membrane is not scarred, perforated, erythematous, retracted or bulging.      Nose: Nose normal.      Mouth/Throat:      Lips: Pink. No lesions.      Mouth: Mucous membranes are moist. No oral lesions.      Tongue: No lesions.      Palate: No lesions.      Pharynx: Oropharynx is clear. Uvula midline. No pharyngeal swelling, oropharyngeal exudate, posterior oropharyngeal erythema or uvula swelling.   Eyes:      General:         Right eye: No discharge.         Left eye: No discharge.      Extraocular Movements: Extraocular movements intact.      Conjunctiva/sclera: Conjunctivae normal.   Pulmonary:      Effort: Pulmonary effort is normal.   Neurological:      General: No focal deficit present.      Mental Status: She is alert and oriented to person, place, and time. Mental status is at baseline.   Psychiatric:         Mood and Affect: Mood normal.         Behavior:  Behavior normal.         Thought Content: Thought content normal.         Judgment: Judgment normal.     Ear Cerumen Removal     Date/Time: 4/11/2023 10:15 AM  Performed by: Beto Farfan PA-C  Authorized by: Beto Farfan PA-C      Consent Done?:  Yes (Verbal)     Local anesthetic:  None  Location details:  Left ear  Procedure type: curette    Cerumen  Removal Results:  Cerumen completely removed  Patient tolerance:  Patient tolerated the procedure well with no immediate complications    Labs:  WBC   Date Value Ref Range Status   03/09/2022 6.83 3.90 - 12.70 K/uL Final     Platelets   Date Value Ref Range Status   03/09/2022 202 150 - 450 K/uL Final     Creatinine   Date Value Ref Range Status   09/27/2022 0.7 0.5 - 1.4 mg/dL Final     TSH   Date Value Ref Range Status   09/27/2022 0.733 0.400 - 4.000 uIU/mL Final     Glucose   Date Value Ref Range Status   09/27/2022 79 70 - 110 mg/dL Final     Hemoglobin A1C   Date Value Ref Range Status   07/16/2020 5.2 4.0 - 5.6 % Final     Comment:     ADA Screening Guidelines:  5.7-6.4%  Consistent with prediabetes  >or=6.5%  Consistent with diabetes  High levels of fetal hemoglobin interfere with the HbA1C  assay. Heterozygous hemoglobin variants (HbS, HgC, etc)do  not significantly interfere with this assay.   However, presence of multiple variants may affect accuracy.       All lab results and imaging results have been reviewed.    Assessment:        ICD-10-CM ICD-9-CM   1. Impacted cerumen of left ear  H61.22 380.4   2. Dysfunction of right eustachian tube  H69.81 381.81   3. Acute laryngitis  J04.0 464.00              Plan:      methylPREDNISolone (MEDROL DOSEPACK) 4 mg tablet 21 each 0 4/11/2023 5/2/2023 --   Sig: use as directed       Left ear cleaning performed today in office. Use flonase 1 spray to each nostril twice daily for 1 month.  Pop ears (nasal valsalva) 4-6 times a day for the next month for eustachian tube dysfunction of right ear. This  is patient's second episode of acute laryngisitis in the past year. Follow up in 4 weeks and if still having voice change, then will proceed with laryngoscopy to further assess voice. Vocal hygiene guidelines provided to pt via AVS.

## 2023-04-11 NOTE — PATIENT INSTRUCTIONS
Use flonase 1 spray to each nostril twice daily for 1 month.  Pop ears (nasal valsalva) 4-6 times a day for the next month for eustachian tube dysfunction of right ear. Follow up in 4 weeks and if still having voice change, then will proceed with laryngoscopy to further assess voice.        VOCAL HYGIENE AND HYDRATION RECOMMENDATIONS     DO   Drink plenty of waterabout  oz a day! If your urine is pale, you should be well-hydrated.  Try some of these tips to increase hydration as well.  Eat wet snacks such as grapes, melon, cucumbers, apple slices   Reduce intake coffee and other caffeinated and carbonated beverages   Suck on pastille lozenges or sugar free candies (not mentholated lozenges)   Use a room or personal humidifier   Use sinus rinses/irrigations or nasal saline spray   Inhale steam for a few minutes before speaking or singing   Pay attention to how, and how much, you use your voice.   Give yourself vocal breaks throughout the day.   Breathe when talking, take frequent pauses for breath.   Use a microphone when speaking in front of a group of 15 or more to reduce voice strain.   Learn how to use your voice correctly to get loud with voice therapy techniques.  Stay healthy. Eat right and get plenty of rest. Follow a reflux precaution diet if indicated.   ____________________________________________________________________    REDUCE   Loud talking, yelling, cheering, or screaming. Voice injury can take place over a long time, or all at once.  If you need to talk loud or yell, be sure you are doing it properly.   Clearing your throat and forceful coughing. The vocal folds collect mucus when irritated or inflamed and this can cause the urge to clear your throat. The trauma of clearing the throat creates more inflammation and mucus. See tips above for keeping hydrated to assist with cutting back on throat clearing.  Instead of clearing your throat, try these behaviors until the sensation passes:    Take a sip of water   Silently clear with a hard exhale making an hhh sound   Swallow hard   Drying agents such as anti-histamines or decongestant medications. You should always take medications as prescribed, but keep in mind these will dry you out, so increase your hydration!   ____________________________________________________________________    AVOID   Inhalant irritants such as smoke, strong fumes, and allergens. Take advantage of 1-800-QUIT-NOW if you are ready to stop smoking.   Unnecessary non-speech noises, such as grunting during exercise, loud noises, or excessively high- or low-pitched sounds. Save your voice for talking and singing!   Whispering. Whispering places your vocal folds in a tenser position than usual.

## 2023-04-11 NOTE — TELEPHONE ENCOUNTER
Contacted patient, she states she was prescribed rx last year for flutiasone. Informed patient that sit he same rx as flonase that is in her instructions.

## 2023-04-18 ENCOUNTER — PATIENT OUTREACH (OUTPATIENT)
Dept: ADMINISTRATIVE | Facility: HOSPITAL | Age: 73
End: 2023-04-18
Payer: MEDICARE

## 2023-04-18 ENCOUNTER — TELEPHONE (OUTPATIENT)
Dept: ADMINISTRATIVE | Facility: HOSPITAL | Age: 73
End: 2023-04-18
Payer: MEDICARE

## 2023-04-18 DIAGNOSIS — Z12.11 COLON CANCER SCREENING: Primary | ICD-10-CM

## 2023-05-10 ENCOUNTER — OFFICE VISIT (OUTPATIENT)
Dept: FAMILY MEDICINE | Facility: CLINIC | Age: 73
End: 2023-05-10
Payer: MEDICARE

## 2023-05-10 VITALS
DIASTOLIC BLOOD PRESSURE: 64 MMHG | HEART RATE: 58 BPM | OXYGEN SATURATION: 99 % | BODY MASS INDEX: 22.19 KG/M2 | WEIGHT: 117.5 LBS | SYSTOLIC BLOOD PRESSURE: 98 MMHG | HEIGHT: 61 IN

## 2023-05-10 DIAGNOSIS — E89.0 POSTABLATIVE HYPOTHYROIDISM: ICD-10-CM

## 2023-05-10 DIAGNOSIS — F51.04 CHRONIC INSOMNIA: ICD-10-CM

## 2023-05-10 DIAGNOSIS — F32.0 MAJOR DEPRESSIVE DISORDER, SINGLE EPISODE, MILD: ICD-10-CM

## 2023-05-10 DIAGNOSIS — Z12.39 ENCOUNTER FOR SCREENING FOR MALIGNANT NEOPLASM OF BREAST, UNSPECIFIED SCREENING MODALITY: ICD-10-CM

## 2023-05-10 DIAGNOSIS — E78.49 OTHER HYPERLIPIDEMIA: ICD-10-CM

## 2023-05-10 DIAGNOSIS — Z12.31 ENCOUNTER FOR SCREENING MAMMOGRAM FOR MALIGNANT NEOPLASM OF BREAST: ICD-10-CM

## 2023-05-10 DIAGNOSIS — Z00.00 ANNUAL PHYSICAL EXAM: Primary | ICD-10-CM

## 2023-05-10 PROCEDURE — 3074F PR MOST RECENT SYSTOLIC BLOOD PRESSURE < 130 MM HG: ICD-10-PCS | Mod: CPTII,S$GLB,, | Performed by: INTERNAL MEDICINE

## 2023-05-10 PROCEDURE — 3078F DIAST BP <80 MM HG: CPT | Mod: CPTII,S$GLB,, | Performed by: INTERNAL MEDICINE

## 2023-05-10 PROCEDURE — 1160F PR REVIEW ALL MEDS BY PRESCRIBER/CLIN PHARMACIST DOCUMENTED: ICD-10-PCS | Mod: CPTII,S$GLB,, | Performed by: INTERNAL MEDICINE

## 2023-05-10 PROCEDURE — 1101F PT FALLS ASSESS-DOCD LE1/YR: CPT | Mod: CPTII,S$GLB,, | Performed by: INTERNAL MEDICINE

## 2023-05-10 PROCEDURE — 1160F RVW MEDS BY RX/DR IN RCRD: CPT | Mod: CPTII,S$GLB,, | Performed by: INTERNAL MEDICINE

## 2023-05-10 PROCEDURE — 99214 PR OFFICE/OUTPT VISIT, EST, LEVL IV, 30-39 MIN: ICD-10-PCS | Mod: S$GLB,,, | Performed by: INTERNAL MEDICINE

## 2023-05-10 PROCEDURE — 99999 PR PBB SHADOW E&M-EST. PATIENT-LVL III: ICD-10-PCS | Mod: PBBFAC,,, | Performed by: INTERNAL MEDICINE

## 2023-05-10 PROCEDURE — 1101F PR PT FALLS ASSESS DOC 0-1 FALLS W/OUT INJ PAST YR: ICD-10-PCS | Mod: CPTII,S$GLB,, | Performed by: INTERNAL MEDICINE

## 2023-05-10 PROCEDURE — 3008F BODY MASS INDEX DOCD: CPT | Mod: CPTII,S$GLB,, | Performed by: INTERNAL MEDICINE

## 2023-05-10 PROCEDURE — 3288F PR FALLS RISK ASSESSMENT DOCUMENTED: ICD-10-PCS | Mod: CPTII,S$GLB,, | Performed by: INTERNAL MEDICINE

## 2023-05-10 PROCEDURE — 1126F PR PAIN SEVERITY QUANTIFIED, NO PAIN PRESENT: ICD-10-PCS | Mod: CPTII,S$GLB,, | Performed by: INTERNAL MEDICINE

## 2023-05-10 PROCEDURE — 1159F MED LIST DOCD IN RCRD: CPT | Mod: CPTII,S$GLB,, | Performed by: INTERNAL MEDICINE

## 2023-05-10 PROCEDURE — 3078F PR MOST RECENT DIASTOLIC BLOOD PRESSURE < 80 MM HG: ICD-10-PCS | Mod: CPTII,S$GLB,, | Performed by: INTERNAL MEDICINE

## 2023-05-10 PROCEDURE — 1126F AMNT PAIN NOTED NONE PRSNT: CPT | Mod: CPTII,S$GLB,, | Performed by: INTERNAL MEDICINE

## 2023-05-10 PROCEDURE — 3008F PR BODY MASS INDEX (BMI) DOCUMENTED: ICD-10-PCS | Mod: CPTII,S$GLB,, | Performed by: INTERNAL MEDICINE

## 2023-05-10 PROCEDURE — 99999 PR PBB SHADOW E&M-EST. PATIENT-LVL III: CPT | Mod: PBBFAC,,, | Performed by: INTERNAL MEDICINE

## 2023-05-10 PROCEDURE — 3288F FALL RISK ASSESSMENT DOCD: CPT | Mod: CPTII,S$GLB,, | Performed by: INTERNAL MEDICINE

## 2023-05-10 PROCEDURE — 3074F SYST BP LT 130 MM HG: CPT | Mod: CPTII,S$GLB,, | Performed by: INTERNAL MEDICINE

## 2023-05-10 PROCEDURE — 99214 OFFICE O/P EST MOD 30 MIN: CPT | Mod: S$GLB,,, | Performed by: INTERNAL MEDICINE

## 2023-05-10 PROCEDURE — 1159F PR MEDICATION LIST DOCUMENTED IN MEDICAL RECORD: ICD-10-PCS | Mod: CPTII,S$GLB,, | Performed by: INTERNAL MEDICINE

## 2023-05-10 RX ORDER — LEVOTHYROXINE SODIUM 125 UG/1
125 TABLET ORAL EVERY OTHER DAY
COMMUNITY
End: 2023-10-18 | Stop reason: SDUPTHER

## 2023-05-10 NOTE — PROGRESS NOTES
Patient ID: Karina Mathur is a 72 y.o. female.    Chief Complaint: Medication Refill        HPI - Assessment - Plan      Here for med refills    Thyroid- taking 1/2 tab of 125 mcg. Sometimes the pill turns to powder when she cuts it   - try taking levothyroxine 125 mcg every other day instead of cutting it.    Still afraid of getting dementia- forgetting every now and then but stable. MMSE 29/30 in March of 2022.   - discussed diet and exercise.    Hyperlipidemia- taking statin  - controlled, cont med     Depression- off and on. Was on celexa did not help. Staying active helps.   - monitor     Fit kit at home    Mammogram     1. Annual physical exam    2. Major depressive disorder, single episode, mild    3. Other hyperlipidemia    4. Postablative hypothyroidism    5. Chronic insomnia          Review of Systems   Constitutional:  Negative for fever.   Respiratory:  Negative for shortness of breath.    Cardiovascular:  Negative for chest pain.   Gastrointestinal:  Negative for abdominal pain.      Objective     Vitals:    05/10/23 0949   BP: 98/64   Pulse: (!) 58     Wt Readings from Last 3 Encounters:   05/10/23 0949 53.3 kg (117 lb 8.1 oz)   04/11/23 1019 52 kg (114 lb 10.2 oz)   03/13/23 1330 52.3 kg (115 lb 4.8 oz)      Body mass index is 22.2 kg/m².   Physical Exam  Cardiovascular:      Rate and Rhythm: Normal rate and regular rhythm.      Heart sounds: No murmur heard.    No gallop.   Pulmonary:      Breath sounds: Normal breath sounds. No wheezing or rhonchi.   Abdominal:      Palpations: Abdomen is soft.      Tenderness: There is no abdominal tenderness.        Orders     Karina was seen today for medication refill.    Diagnoses and all orders for this visit:    Annual physical exam    Major depressive disorder, single episode, mild  -     CBC Auto Differential; Future    Other hyperlipidemia  -     Comprehensive Metabolic Panel; Future  -     Lipid Panel; Future    Postablative hypothyroidism  -      TSH; Future    Chronic insomnia               Hyperlipidemia Medications               simvastatin (ZOCOR) 20 MG tablet Take 1 tablet (20 mg total) by mouth every evening.           Medication List with Changes/Refills   Current Medications    ASCORBIC ACID, VITAMIN C, (VITAMIN C) 500 MG TABLET    Take 500 mg by mouth once daily.    CALCIUM CITRATE/VITAMIN D3 (CITRACAL REGULAR ORAL)    Take by mouth.    ESTRADIOL (ESTRACE) 0.01 % (0.1 MG/GRAM) VAGINAL CREAM    Place 2 g vaginally twice a week.    LEVOCETIRIZINE (XYZAL) 5 MG TABLET    Take 1 tablet (5 mg total) by mouth every evening.    LEVOTHYROXINE (SYNTHROID) 125 MCG TABLET    Take 125 mcg by mouth every other day.    MULTIVITAMIN (THERAGRAN) PER TABLET    Take 1 tablet by mouth once daily.    SIMVASTATIN (ZOCOR) 20 MG TABLET    Take 1 tablet (20 mg total) by mouth every evening.    VITAMIN D 1000 UNITS TAB    Take 1,000 Units by mouth once daily.    Discontinued Medications    LEVOTHYROXINE (SYNTHROID) 125 MCG TABLET    Take 0.5 tablets (62.5 mcg total) by mouth before breakfast.       I personally reviewed past medical, family and social history.    Patient Active Problem List   Diagnosis    Other hyperlipidemia    Osteopenia    Vitamin D insufficiency    Postablative hypothyroidism    Cervicalgia    Chronic midline low back pain    Hav (hallux abducto valgus), unspecified laterality    Hammer toes of both feet    Pain in both feet    Fredy armstrong    Memory change    Anxiety    Chronic insomnia    Cystocele, midline- with urgency    Major depressive disorder, single episode, mild

## 2023-05-11 ENCOUNTER — PES CALL (OUTPATIENT)
Dept: ADMINISTRATIVE | Facility: CLINIC | Age: 73
End: 2023-05-11
Payer: MEDICARE

## 2023-05-19 ENCOUNTER — PATIENT MESSAGE (OUTPATIENT)
Dept: ADMINISTRATIVE | Facility: HOSPITAL | Age: 73
End: 2023-05-19
Payer: MEDICARE

## 2023-05-22 ENCOUNTER — HOSPITAL ENCOUNTER (OUTPATIENT)
Dept: RADIOLOGY | Facility: HOSPITAL | Age: 73
Discharge: HOME | End: 2023-05-22
Attending: INTERNAL MEDICINE
Payer: MEDICARE

## 2023-05-22 DIAGNOSIS — Z12.39 ENCOUNTER FOR SCREENING FOR MALIGNANT NEOPLASM OF BREAST, UNSPECIFIED SCREENING MODALITY: ICD-10-CM

## 2023-05-22 DIAGNOSIS — Z12.31 ENCOUNTER FOR SCREENING MAMMOGRAM FOR MALIGNANT NEOPLASM OF BREAST: ICD-10-CM

## 2023-05-22 PROCEDURE — 77067 SCR MAMMO BI INCL CAD: CPT | Mod: TC,PO

## 2023-05-22 PROCEDURE — 77067 SCR MAMMO BI INCL CAD: CPT | Mod: 26,,, | Performed by: RADIOLOGY

## 2023-05-22 PROCEDURE — 77067 MAMMO DIGITAL SCREENING BILAT WITH TOMO: ICD-10-PCS | Mod: 26,,, | Performed by: RADIOLOGY

## 2023-05-22 PROCEDURE — 77063 MAMMO DIGITAL SCREENING BILAT WITH TOMO: ICD-10-PCS | Mod: 26,,, | Performed by: RADIOLOGY

## 2023-05-22 PROCEDURE — 77063 BREAST TOMOSYNTHESIS BI: CPT | Mod: 26,,, | Performed by: RADIOLOGY

## 2023-05-23 DIAGNOSIS — J02.9 SORE THROAT: ICD-10-CM

## 2023-05-23 DIAGNOSIS — J06.9 VIRAL URI: ICD-10-CM

## 2023-05-23 DIAGNOSIS — H69.93 DYSFUNCTION OF BOTH EUSTACHIAN TUBES: ICD-10-CM

## 2023-05-23 RX ORDER — LEVOCETIRIZINE DIHYDROCHLORIDE 5 MG/1
TABLET, FILM COATED ORAL
Qty: 90 TABLET | Refills: 0 | Status: SHIPPED | OUTPATIENT
Start: 2023-05-23 | End: 2023-08-16 | Stop reason: SDUPTHER

## 2023-05-30 ENCOUNTER — PATIENT MESSAGE (OUTPATIENT)
Dept: ADMINISTRATIVE | Facility: HOSPITAL | Age: 73
End: 2023-05-30
Payer: MEDICARE

## 2023-05-30 DIAGNOSIS — E78.49 OTHER HYPERLIPIDEMIA: ICD-10-CM

## 2023-05-30 NOTE — TELEPHONE ENCOUNTER
No care due was identified.  [facility] Embedded Care Due Messages. Reference number: [id number].   5/30/2023 3:55:29 PM CDT

## 2023-05-31 RX ORDER — SIMVASTATIN 20 MG/1
20 TABLET, FILM COATED ORAL NIGHTLY
Qty: 90 TABLET | Refills: 1 | Status: SHIPPED | OUTPATIENT
Start: 2023-05-31 | End: 2023-11-13 | Stop reason: SDUPTHER

## 2023-05-31 NOTE — TELEPHONE ENCOUNTER
Refill Decision Note   aKrina Preciadojagruti  is requesting a refill authorization.  Brief Assessment and Rationale for Refill:  Approve     Medication Therapy Plan:       Medication Reconciliation Completed: No   Comments:     No Care Gaps recommended.     Note composed:3:55 AM 05/31/2023

## 2023-06-21 ENCOUNTER — LAB VISIT (OUTPATIENT)
Dept: LAB | Facility: HOSPITAL | Age: 73
End: 2023-06-21
Attending: INTERNAL MEDICINE
Payer: MEDICARE

## 2023-06-21 DIAGNOSIS — E89.0 POSTABLATIVE HYPOTHYROIDISM: ICD-10-CM

## 2023-06-21 DIAGNOSIS — E78.49 OTHER HYPERLIPIDEMIA: ICD-10-CM

## 2023-06-21 DIAGNOSIS — F32.0 MAJOR DEPRESSIVE DISORDER, SINGLE EPISODE, MILD: ICD-10-CM

## 2023-06-21 LAB
ALBUMIN SERPL BCP-MCNC: 4 G/DL (ref 3.5–5.2)
ALP SERPL-CCNC: 70 U/L (ref 55–135)
ALT SERPL W/O P-5'-P-CCNC: 18 U/L (ref 10–44)
ANION GAP SERPL CALC-SCNC: 8 MMOL/L (ref 8–16)
AST SERPL-CCNC: 21 U/L (ref 10–40)
BASOPHILS # BLD AUTO: 0.04 K/UL (ref 0–0.2)
BASOPHILS NFR BLD: 0.9 % (ref 0–1.9)
BILIRUB SERPL-MCNC: 0.6 MG/DL (ref 0.1–1)
BUN SERPL-MCNC: 15 MG/DL (ref 8–23)
CALCIUM SERPL-MCNC: 9.4 MG/DL (ref 8.7–10.5)
CHLORIDE SERPL-SCNC: 103 MMOL/L (ref 95–110)
CHOLEST SERPL-MCNC: 191 MG/DL (ref 120–199)
CHOLEST/HDLC SERPL: 2.6 {RATIO} (ref 2–5)
CO2 SERPL-SCNC: 29 MMOL/L (ref 23–29)
CREAT SERPL-MCNC: 0.7 MG/DL (ref 0.5–1.4)
DIFFERENTIAL METHOD: ABNORMAL
EOSINOPHIL # BLD AUTO: 0.2 K/UL (ref 0–0.5)
EOSINOPHIL NFR BLD: 4.5 % (ref 0–8)
ERYTHROCYTE [DISTWIDTH] IN BLOOD BY AUTOMATED COUNT: 13.2 % (ref 11.5–14.5)
EST. GFR  (NO RACE VARIABLE): >60 ML/MIN/1.73 M^2
GLUCOSE SERPL-MCNC: 88 MG/DL (ref 70–110)
HCT VFR BLD AUTO: 42.4 % (ref 37–48.5)
HDLC SERPL-MCNC: 74 MG/DL (ref 40–75)
HDLC SERPL: 38.7 % (ref 20–50)
HGB BLD-MCNC: 13.3 G/DL (ref 12–16)
IMM GRANULOCYTES # BLD AUTO: 0.01 K/UL (ref 0–0.04)
IMM GRANULOCYTES NFR BLD AUTO: 0.2 % (ref 0–0.5)
LDLC SERPL CALC-MCNC: 107.8 MG/DL (ref 63–159)
LYMPHOCYTES # BLD AUTO: 1.4 K/UL (ref 1–4.8)
LYMPHOCYTES NFR BLD: 29.7 % (ref 18–48)
MCH RBC QN AUTO: 29.8 PG (ref 27–31)
MCHC RBC AUTO-ENTMCNC: 31.4 G/DL (ref 32–36)
MCV RBC AUTO: 95 FL (ref 82–98)
MONOCYTES # BLD AUTO: 0.4 K/UL (ref 0.3–1)
MONOCYTES NFR BLD: 8 % (ref 4–15)
NEUTROPHILS # BLD AUTO: 2.6 K/UL (ref 1.8–7.7)
NEUTROPHILS NFR BLD: 56.7 % (ref 38–73)
NONHDLC SERPL-MCNC: 117 MG/DL
NRBC BLD-RTO: 0 /100 WBC
PLATELET # BLD AUTO: 221 K/UL (ref 150–450)
PMV BLD AUTO: 11 FL (ref 9.2–12.9)
POTASSIUM SERPL-SCNC: 4.4 MMOL/L (ref 3.5–5.1)
PROT SERPL-MCNC: 6.6 G/DL (ref 6–8.4)
RBC # BLD AUTO: 4.47 M/UL (ref 4–5.4)
SODIUM SERPL-SCNC: 140 MMOL/L (ref 136–145)
TRIGL SERPL-MCNC: 46 MG/DL (ref 30–150)
TSH SERPL DL<=0.005 MIU/L-ACNC: 1.91 UIU/ML (ref 0.4–4)
WBC # BLD AUTO: 4.65 K/UL (ref 3.9–12.7)

## 2023-06-21 PROCEDURE — 80061 LIPID PANEL: CPT | Performed by: INTERNAL MEDICINE

## 2023-06-21 PROCEDURE — 36415 COLL VENOUS BLD VENIPUNCTURE: CPT | Mod: PN | Performed by: INTERNAL MEDICINE

## 2023-06-21 PROCEDURE — 85025 COMPLETE CBC W/AUTO DIFF WBC: CPT | Performed by: INTERNAL MEDICINE

## 2023-06-21 PROCEDURE — 80053 COMPREHEN METABOLIC PANEL: CPT | Performed by: INTERNAL MEDICINE

## 2023-06-21 PROCEDURE — 84443 ASSAY THYROID STIM HORMONE: CPT | Performed by: INTERNAL MEDICINE

## 2023-06-25 ENCOUNTER — PATIENT MESSAGE (OUTPATIENT)
Dept: FAMILY MEDICINE | Facility: CLINIC | Age: 73
End: 2023-06-25
Payer: MEDICARE

## 2023-08-16 DIAGNOSIS — J02.9 SORE THROAT: ICD-10-CM

## 2023-08-16 DIAGNOSIS — H69.93 DYSFUNCTION OF BOTH EUSTACHIAN TUBES: ICD-10-CM

## 2023-08-16 DIAGNOSIS — J06.9 VIRAL URI: ICD-10-CM

## 2023-08-17 RX ORDER — LEVOCETIRIZINE DIHYDROCHLORIDE 5 MG/1
5 TABLET, FILM COATED ORAL NIGHTLY
Qty: 90 TABLET | Refills: 0 | Status: SHIPPED | OUTPATIENT
Start: 2023-08-17 | End: 2023-08-30 | Stop reason: SDUPTHER

## 2023-08-21 ENCOUNTER — OFFICE VISIT (OUTPATIENT)
Dept: PODIATRY | Facility: CLINIC | Age: 73
End: 2023-08-21
Payer: MEDICARE

## 2023-08-21 VITALS — WEIGHT: 117 LBS | BODY MASS INDEX: 22.09 KG/M2 | HEIGHT: 61 IN

## 2023-08-21 DIAGNOSIS — M20.12 VALGUS DEFORMITY OF BOTH GREAT TOES: Primary | ICD-10-CM

## 2023-08-21 DIAGNOSIS — L84 CORN OR CALLUS: ICD-10-CM

## 2023-08-21 DIAGNOSIS — M20.42 HAMMER TOES OF BOTH FEET: ICD-10-CM

## 2023-08-21 DIAGNOSIS — M20.41 HAMMER TOES OF BOTH FEET: ICD-10-CM

## 2023-08-21 DIAGNOSIS — M20.11 VALGUS DEFORMITY OF BOTH GREAT TOES: Primary | ICD-10-CM

## 2023-08-21 PROCEDURE — 99213 OFFICE O/P EST LOW 20 MIN: CPT | Mod: S$GLB,,, | Performed by: PODIATRIST

## 2023-08-21 PROCEDURE — 99213 PR OFFICE/OUTPT VISIT, EST, LEVL III, 20-29 MIN: ICD-10-PCS | Mod: S$GLB,,, | Performed by: PODIATRIST

## 2023-08-21 PROCEDURE — 99999 PR PBB SHADOW E&M-EST. PATIENT-LVL III: ICD-10-PCS | Mod: PBBFAC,,, | Performed by: PODIATRIST

## 2023-08-21 PROCEDURE — 99999 PR PBB SHADOW E&M-EST. PATIENT-LVL III: CPT | Mod: PBBFAC,,, | Performed by: PODIATRIST

## 2023-08-21 PROCEDURE — 1160F PR REVIEW ALL MEDS BY PRESCRIBER/CLIN PHARMACIST DOCUMENTED: ICD-10-PCS | Mod: CPTII,S$GLB,, | Performed by: PODIATRIST

## 2023-08-21 PROCEDURE — 1160F RVW MEDS BY RX/DR IN RCRD: CPT | Mod: CPTII,S$GLB,, | Performed by: PODIATRIST

## 2023-08-21 PROCEDURE — 1159F MED LIST DOCD IN RCRD: CPT | Mod: CPTII,S$GLB,, | Performed by: PODIATRIST

## 2023-08-21 PROCEDURE — 1125F AMNT PAIN NOTED PAIN PRSNT: CPT | Mod: CPTII,S$GLB,, | Performed by: PODIATRIST

## 2023-08-21 PROCEDURE — 1125F PR PAIN SEVERITY QUANTIFIED, PAIN PRESENT: ICD-10-PCS | Mod: CPTII,S$GLB,, | Performed by: PODIATRIST

## 2023-08-21 PROCEDURE — 3008F BODY MASS INDEX DOCD: CPT | Mod: CPTII,S$GLB,, | Performed by: PODIATRIST

## 2023-08-21 PROCEDURE — 3288F FALL RISK ASSESSMENT DOCD: CPT | Mod: CPTII,S$GLB,, | Performed by: PODIATRIST

## 2023-08-21 PROCEDURE — 1101F PT FALLS ASSESS-DOCD LE1/YR: CPT | Mod: CPTII,S$GLB,, | Performed by: PODIATRIST

## 2023-08-21 PROCEDURE — 1101F PR PT FALLS ASSESS DOC 0-1 FALLS W/OUT INJ PAST YR: ICD-10-PCS | Mod: CPTII,S$GLB,, | Performed by: PODIATRIST

## 2023-08-21 PROCEDURE — 1159F PR MEDICATION LIST DOCUMENTED IN MEDICAL RECORD: ICD-10-PCS | Mod: CPTII,S$GLB,, | Performed by: PODIATRIST

## 2023-08-21 PROCEDURE — 3288F PR FALLS RISK ASSESSMENT DOCUMENTED: ICD-10-PCS | Mod: CPTII,S$GLB,, | Performed by: PODIATRIST

## 2023-08-21 PROCEDURE — 3008F PR BODY MASS INDEX (BMI) DOCUMENTED: ICD-10-PCS | Mod: CPTII,S$GLB,, | Performed by: PODIATRIST

## 2023-08-21 NOTE — PROGRESS NOTES
Subjective:      Patient ID: Karina Mathur is a 72 y.o. female.    Chief Complaint: Foot Pain    Karina is a 72 y.o. female who presents to the podiatry clinic  with complaint of  right foot pain at the second toe with callus formation at the interspace. Onset of the symptoms was several years ago. Precipitating event: none known. Current symptoms include: ability to bear weight, but with some pain and worsening symptoms after a period of activity. Aggravating factors: any weight bearing. Symptoms have gradually worsened. Patient has had prior foot problems. Evaluation to date:  seen about 3 years ago with Dr. Teague who gave her some toe spacers . Patients rates pain 6/10 on pain scale.    Review of Systems   Constitutional: Negative for chills and fever.   Cardiovascular:  Negative for claudication and leg swelling.   Respiratory:  Negative for shortness of breath.    Skin:  Negative for itching, nail changes and rash.        calluses   Musculoskeletal:  Negative for muscle cramps, muscle weakness and myalgias.        Bunions and hammertoes   Gastrointestinal:  Negative for nausea and vomiting.   Neurological:  Negative for focal weakness, loss of balance, numbness and paresthesias.           Objective:      Physical Exam  Constitutional:       General: She is not in acute distress.     Appearance: She is well-developed. She is not diaphoretic.   Cardiovascular:      Pulses:           Dorsalis pedis pulses are 2+ on the right side and 2+ on the left side.        Posterior tibial pulses are 2+ on the right side and 2+ on the left side.      Comments: < 3 sec capillary refill time to toes 1-5 bilateral. Toes and feet are warm to touch proximally with normal distal cooling b/l. There is some hair growth on the feet and toes b/l. There is no edema b/l. No spider veins or varicosities present b/l.     Musculoskeletal:      Comments: Equinus noted b/l ankles with < 10 deg DF noted. MMT 5/5 in DF/PF/Inv/Ev resistance  with no reproduction of pain in any direction. Passive range of motion of ankle and pedal joints is painless b/l.      Painful medial 1st MTPJ exostosis bilateral. Lateral deviation of hallux, somewhat trackbound. Bilateral hammertoe deformities semi reducible      Skin:     General: Skin is warm and dry.      Coloration: Skin is not pale.      Findings: Lesion present. No abrasion, bruising, burn, ecchymosis, erythema, laceration, petechiae or rash.      Nails: There is no clubbing.      Comments: Skin temperature, texture and turgor within normal limits.    Right 1-2 toes interspace kissing hyperkeratotic lesions with pain to palpation   Neurological:      Mental Status: She is alert and oriented to person, place, and time.      Sensory: No sensory deficit.      Motor: No tremor, atrophy or abnormal muscle tone.      Comments: Negative tinel sign bilateral.   Psychiatric:         Behavior: Behavior normal.               Assessment:       Encounter Diagnoses   Name Primary?    Valgus deformity of both great toes Yes    Hammer toes of both feet     Corn or callus          Plan:       Karina was seen today for foot pain.    Diagnoses and all orders for this visit:    Valgus deformity of both great toes    Hammer toes of both feet    Corn or callus      I counseled the patient on her conditions, their implications and medical management.    Recommend toe spacers with shoes to prevent rubbing of the 1-2 toes    Wide supportive shoes at all times    COnsider custom orthotic inserts    Dispensed new toe spacers to use    Consider second toe amputation if pain is severe    Return SHARAN Dubois DPM

## 2023-08-30 ENCOUNTER — OFFICE VISIT (OUTPATIENT)
Dept: OTOLARYNGOLOGY | Facility: CLINIC | Age: 73
End: 2023-08-30
Payer: MEDICARE

## 2023-08-30 VITALS
SYSTOLIC BLOOD PRESSURE: 93 MMHG | HEIGHT: 61 IN | BODY MASS INDEX: 21.81 KG/M2 | RESPIRATION RATE: 17 BRPM | WEIGHT: 115.5 LBS | HEART RATE: 62 BPM | DIASTOLIC BLOOD PRESSURE: 52 MMHG

## 2023-08-30 DIAGNOSIS — H93.13 TINNITUS OF BOTH EARS: ICD-10-CM

## 2023-08-30 DIAGNOSIS — H69.91 DYSFUNCTION OF RIGHT EUSTACHIAN TUBE: Primary | ICD-10-CM

## 2023-08-30 PROCEDURE — 3008F BODY MASS INDEX DOCD: CPT | Mod: CPTII,S$GLB,, | Performed by: PHYSICIAN ASSISTANT

## 2023-08-30 PROCEDURE — 99999 PR PBB SHADOW E&M-EST. PATIENT-LVL IV: CPT | Mod: PBBFAC,,, | Performed by: PHYSICIAN ASSISTANT

## 2023-08-30 PROCEDURE — 1160F PR REVIEW ALL MEDS BY PRESCRIBER/CLIN PHARMACIST DOCUMENTED: ICD-10-PCS | Mod: CPTII,S$GLB,, | Performed by: PHYSICIAN ASSISTANT

## 2023-08-30 PROCEDURE — 99999 PR PBB SHADOW E&M-EST. PATIENT-LVL IV: ICD-10-PCS | Mod: PBBFAC,,, | Performed by: PHYSICIAN ASSISTANT

## 2023-08-30 PROCEDURE — 1159F PR MEDICATION LIST DOCUMENTED IN MEDICAL RECORD: ICD-10-PCS | Mod: CPTII,S$GLB,, | Performed by: PHYSICIAN ASSISTANT

## 2023-08-30 PROCEDURE — 3078F DIAST BP <80 MM HG: CPT | Mod: CPTII,S$GLB,, | Performed by: PHYSICIAN ASSISTANT

## 2023-08-30 PROCEDURE — 1101F PT FALLS ASSESS-DOCD LE1/YR: CPT | Mod: CPTII,S$GLB,, | Performed by: PHYSICIAN ASSISTANT

## 2023-08-30 PROCEDURE — 99213 PR OFFICE/OUTPT VISIT, EST, LEVL III, 20-29 MIN: ICD-10-PCS | Mod: S$GLB,,, | Performed by: PHYSICIAN ASSISTANT

## 2023-08-30 PROCEDURE — 99213 OFFICE O/P EST LOW 20 MIN: CPT | Mod: S$GLB,,, | Performed by: PHYSICIAN ASSISTANT

## 2023-08-30 PROCEDURE — 1101F PR PT FALLS ASSESS DOC 0-1 FALLS W/OUT INJ PAST YR: ICD-10-PCS | Mod: CPTII,S$GLB,, | Performed by: PHYSICIAN ASSISTANT

## 2023-08-30 PROCEDURE — 1159F MED LIST DOCD IN RCRD: CPT | Mod: CPTII,S$GLB,, | Performed by: PHYSICIAN ASSISTANT

## 2023-08-30 PROCEDURE — 3074F PR MOST RECENT SYSTOLIC BLOOD PRESSURE < 130 MM HG: ICD-10-PCS | Mod: CPTII,S$GLB,, | Performed by: PHYSICIAN ASSISTANT

## 2023-08-30 PROCEDURE — 3288F FALL RISK ASSESSMENT DOCD: CPT | Mod: CPTII,S$GLB,, | Performed by: PHYSICIAN ASSISTANT

## 2023-08-30 PROCEDURE — 3008F PR BODY MASS INDEX (BMI) DOCUMENTED: ICD-10-PCS | Mod: CPTII,S$GLB,, | Performed by: PHYSICIAN ASSISTANT

## 2023-08-30 PROCEDURE — 1160F RVW MEDS BY RX/DR IN RCRD: CPT | Mod: CPTII,S$GLB,, | Performed by: PHYSICIAN ASSISTANT

## 2023-08-30 PROCEDURE — 3078F PR MOST RECENT DIASTOLIC BLOOD PRESSURE < 80 MM HG: ICD-10-PCS | Mod: CPTII,S$GLB,, | Performed by: PHYSICIAN ASSISTANT

## 2023-08-30 PROCEDURE — 3288F PR FALLS RISK ASSESSMENT DOCUMENTED: ICD-10-PCS | Mod: CPTII,S$GLB,, | Performed by: PHYSICIAN ASSISTANT

## 2023-08-30 PROCEDURE — 3074F SYST BP LT 130 MM HG: CPT | Mod: CPTII,S$GLB,, | Performed by: PHYSICIAN ASSISTANT

## 2023-08-30 RX ORDER — LEVOCETIRIZINE DIHYDROCHLORIDE 5 MG/1
5 TABLET, FILM COATED ORAL NIGHTLY
Qty: 30 TABLET | Refills: 3 | Status: SHIPPED | OUTPATIENT
Start: 2023-08-30 | End: 2023-12-04 | Stop reason: SDUPTHER

## 2023-08-30 RX ORDER — FLUTICASONE PROPIONATE 50 MCG
1 SPRAY, SUSPENSION (ML) NASAL 2 TIMES DAILY
Qty: 16 G | Refills: 3 | Status: SHIPPED | OUTPATIENT
Start: 2023-08-30

## 2023-08-30 NOTE — PATIENT INSTRUCTIONS
Pop ears (valsalva) as shown in office 4-6 times a day for 2 weeks.  Use flonase 1 puff to each nostril twice daily for 6 weeks.  Take xyzal 5mg in the evening.

## 2023-08-30 NOTE — PROGRESS NOTES
[facility]    Subjective:      Patient: Karina Mathur Patient PCP: Mohan Davalos DO         :  1950     Sex:  female      MRN:  [id number]          Date of Visit: 2023      Chief Complaint: Right aural fullness    Patient ID [id number]: Karina Mathur is a 72 y.o. female who was self-referred for  fluid in ears . Pt states that she had aural fullness last Friday R>L. Pt states that it is been gradually worsening. Pt states that when she mariana over, she hears a swishing noise in the right side. Pt did ear irriations and swimmer's ear drops and did not have any ear wax coming out. Pt states that she has had dysequilibrium the past few days without true vertigo. Pt state this was the first incident happened while pt was washing her hair. This was the first incident. It happened 3 times this week and has lasted for a few seconds. Pt endorses high-pitched bilateral non-pulsatile tinnitus x 2 years, prior to her recent issues. Pt wears  at night for teeth grinding. 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 Pt denies subjective hearing loss, fluctuations in hearing, or h/o SHL. Pt states that she has really good hearing. There is not a family history of hearing loss at a young age. There is not a prior history of ear surgery. There is not a prior history of ear infections. Pt still has tonsils and adenoids. She denies a history of significant noise exposure. She does not wear hearing aids currently. She has not had a hearing test recently. Pt denies h/o migraine or head trauma.     Interval History 2022: Pt seen at last visit and had noted bilateral TM retraction with bilateral serous otitis media. Pt placed on prednisone 40mg PO x 7 days and Flonase 1 puff to each nostril twice daily and take oral claritin once daily fo 6 weeks and advised to pop her ears 4-6 times a day x 2 weeks. Pt states that her ears are feeling better today in office. Pt denies fever/chills, aural fullness. Pt states she  continues to have bilateral high-pitched non-pulsatile tinnitus. Pt denies subjective hearing loss.      Interval History 03/13/2023: Pt states that she has bilateral ear pressure/fullness, which started yesterday with right ear pain. Pt states she has had sore throat that started on Thursday of last week and has also had scratchiness in her throat. Pt denies fever/chills. Pt denies recent sick contact. Pt states she has been doing saltwater gargles, using flonase and taking mucinex. Pt states that she has had issues with voice change and thickening mucous taking mucinex.     Interval History 04/11/2023: Pt states that she started having right ear fullness on Sunday and developed voice change with coarse/hoarse voice. Pt denies issues with acid reflux. Pt denies issues with seasonal allergies. Pt has had right aural fullness with muffled hearing. Pt denies fever/chills, sore throat, dysphagia or ear pain/discharge. No vertigo. Pt has not had any recent sick contacts.     Interval History 08/30/2023: Pt presents to office with ongoing right aural fullness. Pt has had long-standing high-pitched bilateral non-pulsatile tinnitus prior to issues with aural fullness. Pt has attempted to valsalva, but is unable to. Pt has been using flonase 1 spray to each nostril twice daily and taking xyzal 5mg in the evening. She denies issues with seasonal allergies. Pt denies ear pain/discharge, hearing loss, fluctuations in hearing, or vertigo.     Past Medical History  She has a past medical history of Cataract, Dry eyes, bilateral, Hyperlipidemia, Hypothyroidism, Osteopenia, and PVD (posterior vitreous detachment), bilateral.    Family History  Her family history includes Dementia in her father; Hypertension in her father and sister; Hypothyroidism in her sister; Rheum arthritis in her mother.    Past Surgical History:   Procedure Laterality Date    INGUINAL HERNIA REPAIR       Social History     Tobacco Use    Smoking status:  "Never    Smokeless tobacco: Never   Substance and Sexual Activity    Alcohol use: Yes     Alcohol/week: 1.0 standard drink of alcohol     Types: 1 Glasses of wine per week     Comment: 1/2 glass wine every night    Drug use: No    Sexual activity: Not on file     Medications  She has a current medication list which includes the following prescription(s): ascorbic acid (vitamin c), calcium citrate/vitamin d3, levothyroxine, multivitamin, simvastatin, vitamin d, estradiol, fluticasone propionate, and levocetirizine.    Review of patient's allergies indicates:  No Known Allergies  All medications, allergies, and past history have been reviewed.    Objective:      Vitals:      5/10/2023     9:49 AM 8/21/2023     1:00 PM 8/30/2023     2:07 PM   Vitals - 1 value per visit   SYSTOLIC 98  93   DIASTOLIC 64  52   Pulse 58  62   Resp   17   SPO2 99 %     Weight (lb) 117.51 117 115.52   Weight (kg) 53.3 53.071 52.4   Height 5' 1" (1.549 m) 5' 1" (1.549 m) 5' 1" (1.549 m)   BMI (Calculated) 22.2 22.1 21.8   Pain Score Zero Six        Body surface area is 1.5 meters squared.    Physical Exam  Constitutional:       General: She is not in acute distress.     Appearance: Normal appearance. She is not ill-appearing.   HENT:      Head: Normocephalic and atraumatic.      Right Ear: Tympanic membrane, ear canal and external ear normal. No middle ear effusion. Tympanic membrane is not scarred, perforated, erythematous, retracted or bulging.      Left Ear: Tympanic membrane, ear canal and external ear normal.  No middle ear effusion. Tympanic membrane is not scarred, perforated, erythematous, retracted or bulging.      Nose: Nose normal.      Mouth/Throat:      Lips: Pink. No lesions.      Mouth: Mucous membranes are moist. No oral lesions.      Tongue: No lesions.      Palate: No lesions.      Pharynx: Oropharynx is clear. Uvula midline. No pharyngeal swelling, oropharyngeal exudate, posterior oropharyngeal erythema or uvula swelling. "   Eyes:      General:         Right eye: No discharge.         Left eye: No discharge.      Extraocular Movements: Extraocular movements intact.      Conjunctiva/sclera: Conjunctivae normal.   Pulmonary:      Effort: Pulmonary effort is normal.   Neurological:      General: No focal deficit present.      Mental Status: She is alert and oriented to person, place, and time. Mental status is at baseline.   Psychiatric:         Mood and Affect: Mood normal.         Behavior: Behavior normal.         Thought Content: Thought content normal.         Judgment: Judgment normal.         Labs:  WBC   Date Value Ref Range Status   06/21/2023 4.65 3.90 - 12.70 K/uL Final     Platelets   Date Value Ref Range Status   06/21/2023 221 150 - 450 K/uL Final     Creatinine   Date Value Ref Range Status   06/21/2023 0.7 0.5 - 1.4 mg/dL Final     TSH   Date Value Ref Range Status   06/21/2023 1.907 0.400 - 4.000 uIU/mL Final     Glucose   Date Value Ref Range Status   06/21/2023 88 70 - 110 mg/dL Final     Hemoglobin A1C   Date Value Ref Range Status   07/16/2020 5.2 4.0 - 5.6 % Final     Comment:     ADA Screening Guidelines:  5.7-6.4%  Consistent with prediabetes  >or=6.5%  Consistent with diabetes  High levels of fetal hemoglobin interfere with the HbA1C  assay. Heterozygous hemoglobin variants (HbS, HgC, etc)do  not significantly interfere with this assay.   However, presence of multiple variants may affect accuracy.       All lab results and imaging results have been reviewed.    Assessment:        ICD-10-CM ICD-9-CM   1. Dysfunction of right eustachian tube  H69.81 381.81   2. Tinnitus of both ears  H93.13 388.30           Plan:      Longstanding bilateral tinnitus. Pt advised about masking techniques such as light ambient background music during the daytime or white noise machine at night if tinnitus becomes bothersome. Pt advised that tinnitus can be secondary to hearing loss. Pt has had aural fullness in right ear greater than  3 months. Symptoms consistent with chronic ETD of right ear. Pt will proceed with comprehensive audiogram and dependant upon findings, nasal endoscopy may be completed at follow up to ensure patency of eustachian tubes. Continue xyzal 5mg in the evening, flonase 1 spray to each nostril twice daily and attempt valsalva 6 times a day.

## 2023-09-15 ENCOUNTER — CLINICAL SUPPORT (OUTPATIENT)
Dept: AUDIOLOGY | Facility: CLINIC | Age: 73
End: 2023-09-15
Payer: MEDICARE

## 2023-09-15 ENCOUNTER — OFFICE VISIT (OUTPATIENT)
Dept: OTOLARYNGOLOGY | Facility: CLINIC | Age: 73
End: 2023-09-15
Payer: MEDICARE

## 2023-09-15 VITALS — RESPIRATION RATE: 18 BRPM | TEMPERATURE: 98 F | WEIGHT: 115.06 LBS | HEIGHT: 61 IN | BODY MASS INDEX: 21.72 KG/M2

## 2023-09-15 DIAGNOSIS — H90.3 SENSORINEURAL HEARING LOSS (SNHL) OF BOTH EARS: ICD-10-CM

## 2023-09-15 DIAGNOSIS — H90.3 SENSORINEURAL HEARING LOSS, BILATERAL: Primary | ICD-10-CM

## 2023-09-15 DIAGNOSIS — H69.91 CHRONIC DYSFUNCTION OF RIGHT EUSTACHIAN TUBE: Primary | ICD-10-CM

## 2023-09-15 PROCEDURE — 1159F PR MEDICATION LIST DOCUMENTED IN MEDICAL RECORD: ICD-10-PCS | Mod: CPTII,S$GLB,, | Performed by: PHYSICIAN ASSISTANT

## 2023-09-15 PROCEDURE — 99999 PR PBB SHADOW E&M-EST. PATIENT-LVL III: CPT | Mod: PBBFAC,,, | Performed by: PHYSICIAN ASSISTANT

## 2023-09-15 PROCEDURE — 1160F RVW MEDS BY RX/DR IN RCRD: CPT | Mod: CPTII,S$GLB,, | Performed by: PHYSICIAN ASSISTANT

## 2023-09-15 PROCEDURE — 92567 TYMPANOMETRY: CPT | Mod: S$GLB,,, | Performed by: AUDIOLOGIST

## 2023-09-15 PROCEDURE — 1160F PR REVIEW ALL MEDS BY PRESCRIBER/CLIN PHARMACIST DOCUMENTED: ICD-10-PCS | Mod: CPTII,S$GLB,, | Performed by: PHYSICIAN ASSISTANT

## 2023-09-15 PROCEDURE — 99214 OFFICE O/P EST MOD 30 MIN: CPT | Mod: 25,S$GLB,, | Performed by: PHYSICIAN ASSISTANT

## 2023-09-15 PROCEDURE — 99999 PR PBB SHADOW E&M-EST. PATIENT-LVL III: ICD-10-PCS | Mod: PBBFAC,,, | Performed by: PHYSICIAN ASSISTANT

## 2023-09-15 PROCEDURE — 3008F PR BODY MASS INDEX (BMI) DOCUMENTED: ICD-10-PCS | Mod: CPTII,S$GLB,, | Performed by: PHYSICIAN ASSISTANT

## 2023-09-15 PROCEDURE — 1126F AMNT PAIN NOTED NONE PRSNT: CPT | Mod: CPTII,S$GLB,, | Performed by: PHYSICIAN ASSISTANT

## 2023-09-15 PROCEDURE — 92557 COMPREHENSIVE HEARING TEST: CPT | Mod: S$GLB,,, | Performed by: AUDIOLOGIST

## 2023-09-15 PROCEDURE — 99214 PR OFFICE/OUTPT VISIT, EST, LEVL IV, 30-39 MIN: ICD-10-PCS | Mod: 25,S$GLB,, | Performed by: PHYSICIAN ASSISTANT

## 2023-09-15 PROCEDURE — 31231 NASAL/SINUS ENDOSCOPY: ICD-10-PCS | Mod: S$GLB,,, | Performed by: PHYSICIAN ASSISTANT

## 2023-09-15 PROCEDURE — 1126F PR PAIN SEVERITY QUANTIFIED, NO PAIN PRESENT: ICD-10-PCS | Mod: CPTII,S$GLB,, | Performed by: PHYSICIAN ASSISTANT

## 2023-09-15 PROCEDURE — 31231 NASAL ENDOSCOPY DX: CPT | Mod: S$GLB,,, | Performed by: PHYSICIAN ASSISTANT

## 2023-09-15 PROCEDURE — 92557 PR COMPREHENSIVE HEARING TEST: ICD-10-PCS | Mod: S$GLB,,, | Performed by: AUDIOLOGIST

## 2023-09-15 PROCEDURE — 92567 PR TYMPA2METRY: ICD-10-PCS | Mod: S$GLB,,, | Performed by: AUDIOLOGIST

## 2023-09-15 PROCEDURE — 1159F MED LIST DOCD IN RCRD: CPT | Mod: CPTII,S$GLB,, | Performed by: PHYSICIAN ASSISTANT

## 2023-09-15 PROCEDURE — 3008F BODY MASS INDEX DOCD: CPT | Mod: CPTII,S$GLB,, | Performed by: PHYSICIAN ASSISTANT

## 2023-09-15 NOTE — PROGRESS NOTES
Karina Mathur was seen 09/15/2023 for an audiological evaluation. Pt was alone during today's visit. Pertinent complaints today include hearing loss. Pt denies history of loud noise exposure and denies early onset of genetic family history of hearing loss. Otoscopy revealed no cerumen in both ears. The tympanic membrane was visualized AU prior to proceeding with the hearing testing.     Results reveal a mild sensorineural hearing loss for the right ear and  mild sensorineural hearing loss for the left ear.    Speech Reception Thresholds were  20 dBHL for the right ear and 15 dBHL for the left ear.    Word recognition scores were excellent for the right ear and excellent for the left ear.   Tympanograms were Type C for the right ear and Type A for the left ear.    Recommendations: 1) Follow up with ENT     2) Annual hearing evaluation        Audiogram results were reviewed in detail with patient and all questions were answered. Results will be reviewed by the referring provider at the completion of this note. All complaints were addressed during this visit to the patient's satisfaction.

## 2023-09-15 NOTE — PROGRESS NOTES
[facility]    Subjective:      Patient: Karina Mathur Patient PCP: Mohan Davalos DO         :  1950     Sex:  female      MRN:  [id number]          Date of Visit: 09/15/2023      Chief Complaint: Right aural fullness    Patient ID [id number]: Karina Mathur is a 72 y.o. female who was self-referred for  fluid in ears . Pt states that she had aural fullness last Friday R>L. Pt states that it is been gradually worsening. Pt states that when she mariana over, she hears a swishing noise in the right side. Pt did ear irriations and swimmer's ear drops and did not have any ear wax coming out. Pt states that she has had dysequilibrium the past few days without true vertigo. Pt state this was the first incident happened while pt was washing her hair. This was the first incident. It happened 3 times this week and has lasted for a few seconds. Pt endorses high-pitched bilateral non-pulsatile tinnitus x 2 years, prior to her recent issues. Pt wears  at night for teeth grinding. 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 Pt denies subjective hearing loss, fluctuations in hearing, or h/o SHL. Pt states that she has really good hearing. There is not a family history of hearing loss at a young age. There is not a prior history of ear surgery. There is not a prior history of ear infections. Pt still has tonsils and adenoids. She denies a history of significant noise exposure. She does not wear hearing aids currently. She has not had a hearing test recently. Pt denies h/o migraine or head trauma.     Interval History 2022: Pt seen at last visit and had noted bilateral TM retraction with bilateral serous otitis media. Pt placed on prednisone 40mg PO x 7 days and Flonase 1 puff to each nostril twice daily and take oral claritin once daily fo 6 weeks and advised to pop her ears 4-6 times a day x 2 weeks. Pt states that her ears are feeling better today in office. Pt denies fever/chills, aural fullness. Pt states she  continues to have bilateral high-pitched non-pulsatile tinnitus. Pt denies subjective hearing loss.      Interval History 03/13/2023: Pt states that she has bilateral ear pressure/fullness, which started yesterday with right ear pain. Pt states she has had sore throat that started on Thursday of last week and has also had scratchiness in her throat. Pt denies fever/chills. Pt denies recent sick contact. Pt states she has been doing saltwater gargles, using flonase and taking mucinex. Pt states that she has had issues with voice change and thickening mucous taking mucinex.     Interval History 04/11/2023: Pt states that she started having right ear fullness on Sunday and developed voice change with coarse/hoarse voice. Pt denies issues with acid reflux. Pt denies issues with seasonal allergies. Pt has had right aural fullness with muffled hearing. Pt denies fever/chills, sore throat, dysphagia or ear pain/discharge. No vertigo. Pt has not had any recent sick contacts.     Interval History 08/30/2023: Pt presents to office with ongoing right aural fullness. Pt has had long-standing high-pitched bilateral non-pulsatile tinnitus prior to issues with aural fullness. Pt has attempted to valsalva, but is unable to. Pt has been using flonase 1 spray to each nostril twice daily and taking xyzal 5mg in the evening. She denies issues with seasonal allergies. Pt denies ear pain/discharge, hearing loss, fluctuations in hearing, or vertigo.     Interval History 09/15/2023: Pt recently had her grandchild who was sick and she recently go sick from her grandchild. Pt states that she had fever yesterday 100F and right aural fullness worsened. Pt was popping ears 6 times a day and she could not open up her right ear. Pt states that she is just having clear runny nose. Pt is not have sinus pressure/pain today in office. Pt has had associated clear runny nose with nasal congestion with her recent illness. Pt has 97.7F temp today. She  "last took tylenol at midnight last night. Pt denies seasonal allergies. Pt is using flonase and xyzal as prescribed.     Past Medical History  She has a past medical history of Cataract, Dry eyes, bilateral, Hyperlipidemia, Hypothyroidism, Osteopenia, and PVD (posterior vitreous detachment), bilateral.    Family History  Her family history includes Dementia in her father; Hypertension in her father and sister; Hypothyroidism in her sister; Rheum arthritis in her mother.    Past Surgical History:   Procedure Laterality Date    INGUINAL HERNIA REPAIR       Social History     Tobacco Use    Smoking status: Never    Smokeless tobacco: Never   Substance and Sexual Activity    Alcohol use: Yes     Alcohol/week: 1.0 standard drink of alcohol     Types: 1 Glasses of wine per week     Comment: 1/2 glass wine every night    Drug use: No    Sexual activity: Not on file     Medications  She has a current medication list which includes the following prescription(s): ascorbic acid (vitamin c), calcium citrate/vitamin d3, fluticasone propionate, levocetirizine, levothyroxine, multivitamin, simvastatin, vitamin d, and estradiol.    Review of patient's allergies indicates:  No Known Allergies  All medications, allergies, and past history have been reviewed.    Objective:      Vitals:      8/21/2023     1:00 PM 8/30/2023     2:07 PM 9/15/2023     8:51 AM   Vitals - 1 value per visit   SYSTOLIC  93    DIASTOLIC  52    Pulse  62    Temp   97.7 °F (36.5 °C)   Resp  17 18   Weight (lb) 117 115.52 115.08   Weight (kg) 53.071 52.4 52.2   Height 5' 1" (1.549 m) 5' 1" (1.549 m) 5' 1" (1.549 m)   BMI (Calculated) 22.1 21.8 21.8   Pain Score Six  Zero       Body surface area is 1.5 meters squared.    Physical Exam  Constitutional:       General: She is not in acute distress.     Appearance: Normal appearance. She is not ill-appearing.   HENT:      Head: Normocephalic and atraumatic.      Right Ear: Ear canal and external ear normal. No middle " ear effusion. Tympanic membrane is retracted (mild). Tympanic membrane is not scarred, perforated, erythematous or bulging.      Left Ear: Tympanic membrane, ear canal and external ear normal.  No middle ear effusion. Tympanic membrane is not scarred, perforated, erythematous, retracted or bulging.      Nose: Septal deviation (right with anterior nasal septal bone spur) present.      Mouth/Throat:      Lips: Pink. No lesions.      Mouth: Mucous membranes are moist. No oral lesions.      Tongue: No lesions.      Palate: No lesions.      Pharynx: Oropharynx is clear. Uvula midline. No pharyngeal swelling, oropharyngeal exudate, posterior oropharyngeal erythema or uvula swelling.   Eyes:      General:         Right eye: No discharge.         Left eye: No discharge.      Extraocular Movements: Extraocular movements intact.      Conjunctiva/sclera: Conjunctivae normal.   Pulmonary:      Effort: Pulmonary effort is normal.   Neurological:      General: No focal deficit present.      Mental Status: She is alert and oriented to person, place, and time. Mental status is at baseline.   Psychiatric:         Mood and Affect: Mood normal.         Behavior: Behavior normal.         Thought Content: Thought content normal.         Judgment: Judgment normal.     Nasal/sinus endoscopy     Date/Time: 9/15/2023 9:15 AM     Performed by: Beto Farfan PA-C  Authorized by: Beto Farfan PA-C    Consent Done?:  Yes (Verbal)  Anesthesia:     Local anesthetic:  Lidocaine 4% and Angel-Synephrine 1/2%    Location: bilateral nares.    Type of Endoscope:  Flexible (disposable ambu scope)    Patient tolerance:  Patient tolerated the procedure well with no immediate complications  Nose:     Procedure Performed:  Nasal Endoscopy  External:      No external nasal deformity  Intranasal:      Mucosa no polyps     Mucosa ulcers not present     No mucosa lesions present     Turbinates not enlarged     Septum gross  deformity  Nasopharynx:      No mucosa lesions     Posterior choanae patent     Eustachian tube patent    Labs:  WBC   Date Value Ref Range Status   06/21/2023 4.65 3.90 - 12.70 K/uL Final     Platelets   Date Value Ref Range Status   06/21/2023 221 150 - 450 K/uL Final     Creatinine   Date Value Ref Range Status   06/21/2023 0.7 0.5 - 1.4 mg/dL Final     TSH   Date Value Ref Range Status   06/21/2023 1.907 0.400 - 4.000 uIU/mL Final     Glucose   Date Value Ref Range Status   06/21/2023 88 70 - 110 mg/dL Final     Hemoglobin A1C   Date Value Ref Range Status   07/16/2020 5.2 4.0 - 5.6 % Final     Comment:     ADA Screening Guidelines:  5.7-6.4%  Consistent with prediabetes  >or=6.5%  Consistent with diabetes  High levels of fetal hemoglobin interfere with the HbA1C  assay. Heterozygous hemoglobin variants (HbS, HgC, etc)do  not significantly interfere with this assay.   However, presence of multiple variants may affect accuracy.         All lab results and imaging results have been reviewed.    Assessment:        ICD-10-CM ICD-9-CM   1. Chronic dysfunction of right eustachian tube  H69.81 381.81   2. Sensorineural hearing loss (SNHL) of both ears  H90.3 389.18             Plan:      Pt defers on PE/T tube at this time for chronic right ETD. Nasal endoscopy today reveal patent eustachian tubes bilaterally. Pt advised that she may continue flonase 1 spray to each nostril twice daily and xyzal 5mg in the evening and pop her ears 6 times a day for 1 more month. Audiogram today shows mild bilateral high-frequency SNHL bilaterally. 100% speech discrimination scores bilaterally. Type A tymp for left ear and type C tymp for right ear. Pt advised about chronic ETD and elective ear tube placement. She will defer at this time and reach out to our office if she would like to proceed with PE/T tube for right ear in the future. Pt is to proceed with annual audiograms to monitor hearing loss and may follow up as needed prior to  annual audiograms if having ENT issues/concerns.

## 2023-09-17 ENCOUNTER — PATIENT MESSAGE (OUTPATIENT)
Dept: OTOLARYNGOLOGY | Facility: CLINIC | Age: 73
End: 2023-09-17
Payer: MEDICARE

## 2023-09-18 DIAGNOSIS — J32.9 SINUSITIS, UNSPECIFIED CHRONICITY, UNSPECIFIED LOCATION: Primary | ICD-10-CM

## 2023-09-18 RX ORDER — AMOXICILLIN AND CLAVULANATE POTASSIUM 875; 125 MG/1; MG/1
1 TABLET, FILM COATED ORAL EVERY 12 HOURS
Qty: 20 TABLET | Refills: 0 | Status: SHIPPED | OUTPATIENT
Start: 2023-09-18 | End: 2023-09-28

## 2023-09-18 NOTE — TELEPHONE ENCOUNTER
Beto Farfan PA-C  You 27 minutes ago (12:31 PM)     Please tell pt that since it is green or yellow she may be developing sinus infection or bacterial bronchitis due to recent sick contact. I have placed order for Augmentin 875mg BID x 10 days to pt's pharmacy to treat. Continue other medication regimen as discussed in office and if still having issues with green/yellow sputum or if it is green/yellow nasal discharge after completing 10 days of antibiotics, then she is to follow up in office. Thank you!

## 2023-10-18 ENCOUNTER — PATIENT MESSAGE (OUTPATIENT)
Dept: FAMILY MEDICINE | Facility: CLINIC | Age: 73
End: 2023-10-18
Payer: MEDICARE

## 2023-10-18 RX ORDER — LEVOTHYROXINE SODIUM 125 UG/1
125 TABLET ORAL EVERY OTHER DAY
Qty: 90 TABLET | Refills: 1 | Status: SHIPPED | OUTPATIENT
Start: 2023-10-18 | End: 2023-11-13 | Stop reason: SDUPTHER

## 2023-10-18 NOTE — TELEPHONE ENCOUNTER
No care due was identified.  [facility] Embedded Care Due Messages. Reference number: [id number].   10/18/2023 5:43:51 PM CDT

## 2023-10-19 ENCOUNTER — PATIENT MESSAGE (OUTPATIENT)
Dept: OPTOMETRY | Facility: CLINIC | Age: 73
End: 2023-10-19
Payer: MEDICARE

## 2023-10-25 ENCOUNTER — TELEPHONE (OUTPATIENT)
Dept: PLASTIC SURGERY | Facility: CLINIC | Age: 73
End: 2023-10-25
Payer: MEDICARE

## 2023-10-25 ENCOUNTER — OFFICE VISIT (OUTPATIENT)
Dept: OPTOMETRY | Facility: CLINIC | Age: 73
End: 2023-10-25
Payer: MEDICARE

## 2023-10-25 DIAGNOSIS — H04.123 DRY EYES, BILATERAL: ICD-10-CM

## 2023-10-25 DIAGNOSIS — H25.13 NUCLEAR SCLEROSIS, BILATERAL: ICD-10-CM

## 2023-10-25 DIAGNOSIS — Z13.5 GLAUCOMA SCREENING: ICD-10-CM

## 2023-10-25 DIAGNOSIS — H02.403: Primary | ICD-10-CM

## 2023-10-25 DIAGNOSIS — H43.813 POSTERIOR VITREOUS DETACHMENT, BILATERAL: ICD-10-CM

## 2023-10-25 PROCEDURE — 1159F MED LIST DOCD IN RCRD: CPT | Mod: CPTII,S$GLB,, | Performed by: OPTOMETRIST

## 2023-10-25 PROCEDURE — 1126F PR PAIN SEVERITY QUANTIFIED, NO PAIN PRESENT: ICD-10-PCS | Mod: CPTII,S$GLB,, | Performed by: OPTOMETRIST

## 2023-10-25 PROCEDURE — 1159F PR MEDICATION LIST DOCUMENTED IN MEDICAL RECORD: ICD-10-PCS | Mod: CPTII,S$GLB,, | Performed by: OPTOMETRIST

## 2023-10-25 PROCEDURE — 92014 COMPRE OPH EXAM EST PT 1/>: CPT | Mod: S$GLB,,, | Performed by: OPTOMETRIST

## 2023-10-25 PROCEDURE — 3288F FALL RISK ASSESSMENT DOCD: CPT | Mod: CPTII,S$GLB,, | Performed by: OPTOMETRIST

## 2023-10-25 PROCEDURE — 1101F PR PT FALLS ASSESS DOC 0-1 FALLS W/OUT INJ PAST YR: ICD-10-PCS | Mod: CPTII,S$GLB,, | Performed by: OPTOMETRIST

## 2023-10-25 PROCEDURE — 92015 PR REFRACTION: ICD-10-PCS | Mod: S$GLB,,, | Performed by: OPTOMETRIST

## 2023-10-25 PROCEDURE — 92014 PR EYE EXAM, EST PATIENT,COMPREHESV: ICD-10-PCS | Mod: S$GLB,,, | Performed by: OPTOMETRIST

## 2023-10-25 PROCEDURE — 3288F PR FALLS RISK ASSESSMENT DOCUMENTED: ICD-10-PCS | Mod: CPTII,S$GLB,, | Performed by: OPTOMETRIST

## 2023-10-25 PROCEDURE — 92015 DETERMINE REFRACTIVE STATE: CPT | Mod: S$GLB,,, | Performed by: OPTOMETRIST

## 2023-10-25 PROCEDURE — 1101F PT FALLS ASSESS-DOCD LE1/YR: CPT | Mod: CPTII,S$GLB,, | Performed by: OPTOMETRIST

## 2023-10-25 PROCEDURE — 1126F AMNT PAIN NOTED NONE PRSNT: CPT | Mod: CPTII,S$GLB,, | Performed by: OPTOMETRIST

## 2023-10-25 PROCEDURE — 99999 PR PBB SHADOW E&M-EST. PATIENT-LVL III: ICD-10-PCS | Mod: PBBFAC,,, | Performed by: OPTOMETRIST

## 2023-10-25 PROCEDURE — 99999 PR PBB SHADOW E&M-EST. PATIENT-LVL III: CPT | Mod: PBBFAC,,, | Performed by: OPTOMETRIST

## 2023-10-25 NOTE — PROGRESS NOTES
"HPI    Routine-dle-5/22    Pt complains of trouble driving at night with light glare. Denies any   flashes or floaters. Using AT -"constantly"   Last edited by Latrice Gonzales on 10/25/2023  9:22 AM.            Assessment /Plan     For exam results, see Encounter Report.    Acquired blepharoptosis, bilateral  -     Heller Visual Field - OU - Extended - Both Eyes; Future  -     Ambulatory referral/consult to Plastic Surgery; Future; Expected date: 11/01/2023    Nuclear sclerosis, bilateral    Posterior vitreous detachment, bilateral    Glaucoma screening    Dry eyes, bilateral      Gradually worsening ptosis OS>OD, would like consult for possible surgery   Schedule 30-2 w/ taped and non taped lids   Will schedule pending that outcome     2.   Vis sig, not ready for consult   3.   RD precautions given and reviewed. Patient knows to call/ message if any further changes in symptoms occur.  4.   Not suspect   5.   Longstanding // will increase use of ATs or gel gtts daily ---discussed possible restasis / xiidra / plugs in future     Discussed and educated patient on current findings /plan.  RTC 1 year, prn if any changes / issues                     "

## 2023-10-25 NOTE — Clinical Note
Chucky  Ms Mathur is interested in a consult for ptosis repair. We've scheduled visual fields here at [facility] and she'd like to see Dr HANSEN at [facility] as well. Thanks

## 2023-11-03 ENCOUNTER — OFFICE VISIT (OUTPATIENT)
Dept: PLASTIC SURGERY | Facility: CLINIC | Age: 73
End: 2023-11-03
Payer: MEDICARE

## 2023-11-03 VITALS
SYSTOLIC BLOOD PRESSURE: 93 MMHG | BODY MASS INDEX: 21.34 KG/M2 | HEIGHT: 61 IN | HEART RATE: 62 BPM | DIASTOLIC BLOOD PRESSURE: 52 MMHG | WEIGHT: 113 LBS

## 2023-11-03 DIAGNOSIS — H02.403: ICD-10-CM

## 2023-11-03 PROCEDURE — 3008F PR BODY MASS INDEX (BMI) DOCUMENTED: ICD-10-PCS | Mod: CPTII,S$GLB,, | Performed by: SURGERY

## 2023-11-03 PROCEDURE — 1101F PT FALLS ASSESS-DOCD LE1/YR: CPT | Mod: CPTII,S$GLB,, | Performed by: SURGERY

## 2023-11-03 PROCEDURE — 3078F PR MOST RECENT DIASTOLIC BLOOD PRESSURE < 80 MM HG: ICD-10-PCS | Mod: CPTII,S$GLB,, | Performed by: SURGERY

## 2023-11-03 PROCEDURE — 99203 PR OFFICE/OUTPT VISIT, NEW, LEVL III, 30-44 MIN: ICD-10-PCS | Mod: S$GLB,,, | Performed by: SURGERY

## 2023-11-03 PROCEDURE — 99999 PR PBB SHADOW E&M-EST. PATIENT-LVL III: ICD-10-PCS | Mod: PBBFAC,,, | Performed by: SURGERY

## 2023-11-03 PROCEDURE — 99999 PR PBB SHADOW E&M-EST. PATIENT-LVL III: CPT | Mod: PBBFAC,,, | Performed by: SURGERY

## 2023-11-03 PROCEDURE — 1126F AMNT PAIN NOTED NONE PRSNT: CPT | Mod: CPTII,S$GLB,, | Performed by: SURGERY

## 2023-11-03 PROCEDURE — 99203 OFFICE O/P NEW LOW 30 MIN: CPT | Mod: S$GLB,,, | Performed by: SURGERY

## 2023-11-03 PROCEDURE — 3078F DIAST BP <80 MM HG: CPT | Mod: CPTII,S$GLB,, | Performed by: SURGERY

## 2023-11-03 PROCEDURE — 1101F PR PT FALLS ASSESS DOC 0-1 FALLS W/OUT INJ PAST YR: ICD-10-PCS | Mod: CPTII,S$GLB,, | Performed by: SURGERY

## 2023-11-03 PROCEDURE — 1159F MED LIST DOCD IN RCRD: CPT | Mod: CPTII,S$GLB,, | Performed by: SURGERY

## 2023-11-03 PROCEDURE — 3288F PR FALLS RISK ASSESSMENT DOCUMENTED: ICD-10-PCS | Mod: CPTII,S$GLB,, | Performed by: SURGERY

## 2023-11-03 PROCEDURE — 1126F PR PAIN SEVERITY QUANTIFIED, NO PAIN PRESENT: ICD-10-PCS | Mod: CPTII,S$GLB,, | Performed by: SURGERY

## 2023-11-03 PROCEDURE — 3074F SYST BP LT 130 MM HG: CPT | Mod: CPTII,S$GLB,, | Performed by: SURGERY

## 2023-11-03 PROCEDURE — 1159F PR MEDICATION LIST DOCUMENTED IN MEDICAL RECORD: ICD-10-PCS | Mod: CPTII,S$GLB,, | Performed by: SURGERY

## 2023-11-03 PROCEDURE — 3008F BODY MASS INDEX DOCD: CPT | Mod: CPTII,S$GLB,, | Performed by: SURGERY

## 2023-11-03 PROCEDURE — 3074F PR MOST RECENT SYSTOLIC BLOOD PRESSURE < 130 MM HG: ICD-10-PCS | Mod: CPTII,S$GLB,, | Performed by: SURGERY

## 2023-11-03 PROCEDURE — 3288F FALL RISK ASSESSMENT DOCD: CPT | Mod: CPTII,S$GLB,, | Performed by: SURGERY

## 2023-11-03 NOTE — PROGRESS NOTES
Pt presents to the [facility] with a chief complaint of excess skin of the left eye lid. Pt states that she has had this for several years, but it is now beginning to affect her vision. Pt reports she has difficulty driving as a result of the excess eye lid skin. Pt is scheduled to have a visual field testing next week.   Physical exam revealed excess skin laterally of the left eye   We will plan for left upper blepharoplasty and left sided brow lift. Staff to coordinate surgery and we will seek insurance approval.

## 2023-11-03 NOTE — LETTER
[facility] - Plastic Surgery  [address] [place] [address]  Phone: [phone]  Fax: [phone] December 15, 2023        LUKE Cole, ERNIE  [address] [facility] [address] [place] [address]    Patient: Karina Mathru   MR Number: [id number]   YOB: 1950   Date of Visit: 11/3/2023     Dear Dr. Cole:    Thank you for referring Karina Mathur to me for evaluation. Attached are the relevant portions of my assessment and plan of care.    If you have questions, please do not hesitate to call me. I look forward to following Karina along with you.    Sincerely,       Maico Morton MD   Section of Plastic Surgery  Department of Surgery  [facility]    CRB/hcr    CC  Mohan Davalos, DO

## 2023-11-05 ENCOUNTER — PATIENT MESSAGE (OUTPATIENT)
Dept: DERMATOLOGY | Facility: CLINIC | Age: 73
End: 2023-11-05
Payer: MEDICARE

## 2023-11-05 ENCOUNTER — PATIENT MESSAGE (OUTPATIENT)
Dept: OBSTETRICS AND GYNECOLOGY | Facility: CLINIC | Age: 73
End: 2023-11-05
Payer: MEDICARE

## 2023-11-05 DIAGNOSIS — N81.11 CYSTOCELE, MIDLINE: Primary | ICD-10-CM

## 2023-11-07 ENCOUNTER — CLINICAL SUPPORT (OUTPATIENT)
Dept: OPHTHALMOLOGY | Facility: CLINIC | Age: 73
End: 2023-11-07
Payer: MEDICARE

## 2023-11-07 DIAGNOSIS — H02.403: ICD-10-CM

## 2023-11-07 NOTE — PROGRESS NOTES
Pt had HVF first with no taped lens, then taped lens. Pt. Fell asleep and had to be woken up multiple times throughout the process. HG

## 2023-11-13 ENCOUNTER — TELEPHONE (OUTPATIENT)
Dept: PLASTIC SURGERY | Facility: CLINIC | Age: 73
End: 2023-11-13
Payer: MEDICARE

## 2023-11-13 ENCOUNTER — OFFICE VISIT (OUTPATIENT)
Dept: FAMILY MEDICINE | Facility: CLINIC | Age: 73
End: 2023-11-13
Payer: MEDICARE

## 2023-11-13 VITALS
HEART RATE: 63 BPM | WEIGHT: 115.31 LBS | BODY MASS INDEX: 21.77 KG/M2 | DIASTOLIC BLOOD PRESSURE: 62 MMHG | HEIGHT: 61 IN | SYSTOLIC BLOOD PRESSURE: 104 MMHG | OXYGEN SATURATION: 97 %

## 2023-11-13 DIAGNOSIS — N81.10 BLADDER PROLAPSE, FEMALE, ACQUIRED: ICD-10-CM

## 2023-11-13 DIAGNOSIS — F32.0 MAJOR DEPRESSIVE DISORDER, SINGLE EPISODE, MILD: ICD-10-CM

## 2023-11-13 DIAGNOSIS — E89.0 POSTABLATIVE HYPOTHYROIDISM: Primary | ICD-10-CM

## 2023-11-13 DIAGNOSIS — Z12.11 SCREENING FOR COLON CANCER: ICD-10-CM

## 2023-11-13 DIAGNOSIS — E78.49 OTHER HYPERLIPIDEMIA: ICD-10-CM

## 2023-11-13 DIAGNOSIS — F41.9 ANXIETY: ICD-10-CM

## 2023-11-13 DIAGNOSIS — F51.04 CHRONIC INSOMNIA: ICD-10-CM

## 2023-11-13 PROCEDURE — 1101F PR PT FALLS ASSESS DOC 0-1 FALLS W/OUT INJ PAST YR: ICD-10-PCS | Mod: CPTII,S$GLB,, | Performed by: INTERNAL MEDICINE

## 2023-11-13 PROCEDURE — 1126F PR PAIN SEVERITY QUANTIFIED, NO PAIN PRESENT: ICD-10-PCS | Mod: CPTII,S$GLB,, | Performed by: INTERNAL MEDICINE

## 2023-11-13 PROCEDURE — 1160F RVW MEDS BY RX/DR IN RCRD: CPT | Mod: CPTII,S$GLB,, | Performed by: INTERNAL MEDICINE

## 2023-11-13 PROCEDURE — 99999 PR PBB SHADOW E&M-EST. PATIENT-LVL IV: CPT | Mod: PBBFAC,,, | Performed by: INTERNAL MEDICINE

## 2023-11-13 PROCEDURE — 1101F PT FALLS ASSESS-DOCD LE1/YR: CPT | Mod: CPTII,S$GLB,, | Performed by: INTERNAL MEDICINE

## 2023-11-13 PROCEDURE — 3078F PR MOST RECENT DIASTOLIC BLOOD PRESSURE < 80 MM HG: ICD-10-PCS | Mod: CPTII,S$GLB,, | Performed by: INTERNAL MEDICINE

## 2023-11-13 PROCEDURE — 1159F MED LIST DOCD IN RCRD: CPT | Mod: CPTII,S$GLB,, | Performed by: INTERNAL MEDICINE

## 2023-11-13 PROCEDURE — 3074F PR MOST RECENT SYSTOLIC BLOOD PRESSURE < 130 MM HG: ICD-10-PCS | Mod: CPTII,S$GLB,, | Performed by: INTERNAL MEDICINE

## 2023-11-13 PROCEDURE — 1160F PR REVIEW ALL MEDS BY PRESCRIBER/CLIN PHARMACIST DOCUMENTED: ICD-10-PCS | Mod: CPTII,S$GLB,, | Performed by: INTERNAL MEDICINE

## 2023-11-13 PROCEDURE — 3008F BODY MASS INDEX DOCD: CPT | Mod: CPTII,S$GLB,, | Performed by: INTERNAL MEDICINE

## 2023-11-13 PROCEDURE — 3008F PR BODY MASS INDEX (BMI) DOCUMENTED: ICD-10-PCS | Mod: CPTII,S$GLB,, | Performed by: INTERNAL MEDICINE

## 2023-11-13 PROCEDURE — 99999 PR PBB SHADOW E&M-EST. PATIENT-LVL IV: ICD-10-PCS | Mod: PBBFAC,,, | Performed by: INTERNAL MEDICINE

## 2023-11-13 PROCEDURE — 3078F DIAST BP <80 MM HG: CPT | Mod: CPTII,S$GLB,, | Performed by: INTERNAL MEDICINE

## 2023-11-13 PROCEDURE — 3288F PR FALLS RISK ASSESSMENT DOCUMENTED: ICD-10-PCS | Mod: CPTII,S$GLB,, | Performed by: INTERNAL MEDICINE

## 2023-11-13 PROCEDURE — 3074F SYST BP LT 130 MM HG: CPT | Mod: CPTII,S$GLB,, | Performed by: INTERNAL MEDICINE

## 2023-11-13 PROCEDURE — 99214 OFFICE O/P EST MOD 30 MIN: CPT | Mod: S$GLB,,, | Performed by: INTERNAL MEDICINE

## 2023-11-13 PROCEDURE — 3288F FALL RISK ASSESSMENT DOCD: CPT | Mod: CPTII,S$GLB,, | Performed by: INTERNAL MEDICINE

## 2023-11-13 PROCEDURE — 1126F AMNT PAIN NOTED NONE PRSNT: CPT | Mod: CPTII,S$GLB,, | Performed by: INTERNAL MEDICINE

## 2023-11-13 PROCEDURE — 99214 PR OFFICE/OUTPT VISIT, EST, LEVL IV, 30-39 MIN: ICD-10-PCS | Mod: S$GLB,,, | Performed by: INTERNAL MEDICINE

## 2023-11-13 PROCEDURE — 1159F PR MEDICATION LIST DOCUMENTED IN MEDICAL RECORD: ICD-10-PCS | Mod: CPTII,S$GLB,, | Performed by: INTERNAL MEDICINE

## 2023-11-13 RX ORDER — SIMVASTATIN 20 MG/1
20 TABLET, FILM COATED ORAL NIGHTLY
Qty: 90 TABLET | Refills: 3 | Status: SHIPPED | OUTPATIENT
Start: 2023-11-13

## 2023-11-13 RX ORDER — LEVOTHYROXINE SODIUM 125 UG/1
125 TABLET ORAL EVERY OTHER DAY
Qty: 90 TABLET | Refills: 3 | Status: SHIPPED | OUTPATIENT
Start: 2023-11-13

## 2023-11-13 NOTE — TELEPHONE ENCOUNTER
In basket message sent to specialist office Dr SURYA Cole.  Pt with visual fields appointment on 11.7.23 and attempting to request copy of results for review and submission to insurance.  Awaiting office response.  Provided office with fax and phone numbers.

## 2023-11-13 NOTE — PROGRESS NOTES
Patient ID: Karina Mathur is a 73 y.o. female.    Chief Complaint: Follow-up        Assessment and Plan      1. Postablative hypothyroidism  - TSH; Future    2. Other hyperlipidemia  - Comprehensive Metabolic Panel; Future  - Lipid Panel; Future  - simvastatin (ZOCOR) 20 MG tablet; Take 1 tablet (20 mg total) by mouth every evening.  Dispense: 90 tablet; Refill: 3    3. Major depressive disorder, single episode, mild    4. Chronic insomnia  - CBC Auto Differential; Future    5. Anxiety    6. Bladder prolapse, female, acquired  - Ambulatory referral/consult to Urogynecology; Future    7. Screening for colon cancer  - Case Request Endoscopy: COLONOSCOPY     Continue current medication   Urogyn consult.   Labs and visit in 6 mo.      HPI     6 mo follow up     Bladder prolapse with overactive bladder. Ref to urogyn by Dr. Mancilla but appointment in march. She would like to see if she can get a sooner appointment. Last colonoscopy outside [facility].     1. Postablative hypothyroidism   - controlled    2. Other hyperlipidemia   - controlled    3. Major depressive disorder, single episode, mild   - Doing better. No med    4. Chronic insomnia   - uncontrolled. Tylenol PM 2-3x week. Helps her sleep for 4 hrs. Nocturia exacerbates.    5. Anxiety   - stable    6. Bladder prolapse, female, acquired          Review of Systems   Constitutional:  Negative for fever.   Respiratory:  Negative for shortness of breath.    Cardiovascular:  Negative for chest pain.   Gastrointestinal:  Negative for abdominal pain.          Hyperlipidemia Medications               simvastatin (ZOCOR) 20 MG tablet Take 1 tablet (20 mg total) by mouth every evening.           COPD Medications       No Active Medications                 Objective     Vitals:    11/13/23 0908   BP: 104/62   Pulse: 63     Wt Readings from Last 3 Encounters:   11/13/23 0908 52.3 kg (115 lb 4.8 oz)   11/03/23 1022 51.3 kg (113 lb)   09/15/23 0851 52.2 kg (115 lb 1.3 oz)       Body mass index is 21.79 kg/m².   Physical Exam  Cardiovascular:      Rate and Rhythm: Normal rate and regular rhythm.      Heart sounds: No murmur heard.     No gallop.   Pulmonary:      Breath sounds: Normal breath sounds. No wheezing or rhonchi.   Abdominal:      Palpations: Abdomen is soft.      Tenderness: There is no abdominal tenderness.          Medication List with Changes/Refills   Current Medications    ASCORBIC ACID, VITAMIN C, (VITAMIN C) 500 MG TABLET    Take 500 mg by mouth once daily.    CALCIUM CITRATE/VITAMIN D3 (CITRACAL REGULAR ORAL)    Take by mouth.    ESTRADIOL (ESTRACE) 0.01 % (0.1 MG/GRAM) VAGINAL CREAM    Place 2 g vaginally twice a week.    FLUTICASONE PROPIONATE (FLONASE) 50 MCG/ACTUATION NASAL SPRAY    1 spray (50 mcg total) by Each Nostril route 2 (two) times daily.    LEVOCETIRIZINE (XYZAL) 5 MG TABLET    Take 1 tablet (5 mg total) by mouth every evening.    MULTIVITAMIN (THERAGRAN) PER TABLET    Take 1 tablet by mouth once daily.    VITAMIN D 1000 UNITS TAB    Take 1,000 Units by mouth once daily.    Changed and/or Refilled Medications    Modified Medication Previous Medication    LEVOTHYROXINE (SYNTHROID) 125 MCG TABLET levothyroxine (SYNTHROID) 125 MCG tablet       Take 1 tablet (125 mcg total) by mouth every other day.    Take 1 tablet (125 mcg total) by mouth every other day.    SIMVASTATIN (ZOCOR) 20 MG TABLET simvastatin (ZOCOR) 20 MG tablet       Take 1 tablet (20 mg total) by mouth every evening.    Take 1 tablet (20 mg total) by mouth every evening.       I personally reviewed past medical, family and social history.    Patient Active Problem List   Diagnosis    Other hyperlipidemia    Osteopenia    Vitamin D insufficiency    Postablative hypothyroidism    Cervicalgia    Chronic midline low back pain    Hav (hallux abducto valgus), unspecified laterality    Hammer toes of both feet    Pain in both feet    Fredy armstrong    Memory change    Anxiety    Chronic insomnia     Cystocele, midline- with urgency    Major depressive disorder, single episode, mild

## 2023-11-14 ENCOUNTER — TELEPHONE (OUTPATIENT)
Dept: UROGYNECOLOGY | Facility: CLINIC | Age: 73
End: 2023-11-14
Payer: MEDICARE

## 2023-11-14 NOTE — TELEPHONE ENCOUNTER
----- Message from Zaynab Murdock sent at 11/14/2023  2:26 PM CST -----  Type: Needs Medical Advice  Who Called:  pt  Best Call Back Number: [phone] or [phone]  Additional Information: pt has an appt in [place] on 3/11 but wants to be seen sooner in the [facility] area, she is being seen for Cystocele, midline, pl call bk and advise thanks

## 2023-11-15 ENCOUNTER — PATIENT MESSAGE (OUTPATIENT)
Dept: OPTOMETRY | Facility: CLINIC | Age: 73
End: 2023-11-15
Payer: MEDICARE

## 2023-11-15 ENCOUNTER — PATIENT MESSAGE (OUTPATIENT)
Dept: PLASTIC SURGERY | Facility: CLINIC | Age: 73
End: 2023-11-15
Payer: MEDICARE

## 2023-11-29 ENCOUNTER — TELEPHONE (OUTPATIENT)
Dept: GASTROENTEROLOGY | Facility: CLINIC | Age: 73
End: 2023-11-29
Payer: MEDICARE

## 2023-11-29 ENCOUNTER — OFFICE VISIT (OUTPATIENT)
Dept: UROGYNECOLOGY | Facility: CLINIC | Age: 73
End: 2023-11-29
Payer: MEDICARE

## 2023-11-29 VITALS — BODY MASS INDEX: 21.77 KG/M2 | WEIGHT: 115.31 LBS | HEIGHT: 61 IN

## 2023-11-29 DIAGNOSIS — N81.2 CYSTOCELE AND RECTOCELE WITH INCOMPLETE UTEROVAGINAL PROLAPSE: Primary | ICD-10-CM

## 2023-11-29 PROCEDURE — 1159F PR MEDICATION LIST DOCUMENTED IN MEDICAL RECORD: ICD-10-PCS | Mod: CPTII,S$GLB,, | Performed by: OBSTETRICS & GYNECOLOGY

## 2023-11-29 PROCEDURE — 1126F AMNT PAIN NOTED NONE PRSNT: CPT | Mod: CPTII,S$GLB,, | Performed by: OBSTETRICS & GYNECOLOGY

## 2023-11-29 PROCEDURE — 51701 INSERT BLADDER CATHETER: CPT | Mod: S$GLB,,, | Performed by: OBSTETRICS & GYNECOLOGY

## 2023-11-29 PROCEDURE — 3008F BODY MASS INDEX DOCD: CPT | Mod: CPTII,S$GLB,, | Performed by: OBSTETRICS & GYNECOLOGY

## 2023-11-29 PROCEDURE — 99214 PR OFFICE/OUTPT VISIT, EST, LEVL IV, 30-39 MIN: ICD-10-PCS | Mod: 25,S$GLB,, | Performed by: OBSTETRICS & GYNECOLOGY

## 2023-11-29 PROCEDURE — 3288F FALL RISK ASSESSMENT DOCD: CPT | Mod: CPTII,S$GLB,, | Performed by: OBSTETRICS & GYNECOLOGY

## 2023-11-29 PROCEDURE — 99214 OFFICE O/P EST MOD 30 MIN: CPT | Mod: 25,S$GLB,, | Performed by: OBSTETRICS & GYNECOLOGY

## 2023-11-29 PROCEDURE — 1126F PR PAIN SEVERITY QUANTIFIED, NO PAIN PRESENT: ICD-10-PCS | Mod: CPTII,S$GLB,, | Performed by: OBSTETRICS & GYNECOLOGY

## 2023-11-29 PROCEDURE — 1101F PR PT FALLS ASSESS DOC 0-1 FALLS W/OUT INJ PAST YR: ICD-10-PCS | Mod: CPTII,S$GLB,, | Performed by: OBSTETRICS & GYNECOLOGY

## 2023-11-29 PROCEDURE — 51701 PR INSERTION OF NON-INDWELLING BLADDER CATHETERIZATION FOR RESIDUAL UR: ICD-10-PCS | Mod: S$GLB,,, | Performed by: OBSTETRICS & GYNECOLOGY

## 2023-11-29 PROCEDURE — 3288F PR FALLS RISK ASSESSMENT DOCUMENTED: ICD-10-PCS | Mod: CPTII,S$GLB,, | Performed by: OBSTETRICS & GYNECOLOGY

## 2023-11-29 PROCEDURE — 99999 PR PBB SHADOW E&M-EST. PATIENT-LVL III: CPT | Mod: PBBFAC,,, | Performed by: OBSTETRICS & GYNECOLOGY

## 2023-11-29 PROCEDURE — 1101F PT FALLS ASSESS-DOCD LE1/YR: CPT | Mod: CPTII,S$GLB,, | Performed by: OBSTETRICS & GYNECOLOGY

## 2023-11-29 PROCEDURE — 99999 PR PBB SHADOW E&M-EST. PATIENT-LVL III: ICD-10-PCS | Mod: PBBFAC,,, | Performed by: OBSTETRICS & GYNECOLOGY

## 2023-11-29 PROCEDURE — 3008F PR BODY MASS INDEX (BMI) DOCUMENTED: ICD-10-PCS | Mod: CPTII,S$GLB,, | Performed by: OBSTETRICS & GYNECOLOGY

## 2023-11-29 PROCEDURE — 1159F MED LIST DOCD IN RCRD: CPT | Mod: CPTII,S$GLB,, | Performed by: OBSTETRICS & GYNECOLOGY

## 2023-11-29 NOTE — PROGRESS NOTES
Subjective:      Patient ID: Karina Mathur is a 73 y.o. female.    Chief Complaint:  cystocele      History of Present Illness  73-year-old para 3 who is referred secondary to pelvic organ prolapse patient states she has never dealt with a to this level she can see something emanating from outside the vagina and this is very bothersome would like assistance            Past Medical History:   Diagnosis Date    Cataract     Dry eyes, bilateral     Hyperlipidemia     on simvastatin    Hypothyroidism     hx of iodine cocktail around  for goiter.    Osteopenia     19; L fem neck w Tscore -1.7; Lsp -1.2; DEXA in 2 yrs.    PVD (posterior vitreous detachment), bilateral        Past Surgical History:   Procedure Laterality Date    INGUINAL HERNIA REPAIR         GYN & OB History  No LMP recorded. Patient is postmenopausal.   Date of Last Pap: 2023    OB History    Para Term  AB Living   3 3 3         SAB IAB Ectopic Multiple Live Births                  # Outcome Date GA Lbr Parish/2nd Weight Sex Delivery Anes PTL Lv   3 Term            2 Term            1 Term                Health Maintenance         Date Due Completion Date    RSV Vaccine (Age 60+ and Pregnant patients) (1 - 1-dose 60+ series) Never done ---    Colorectal Cancer Screening 2023    COVID-19 Vaccine ( season) 2023    Mammogram 2024    DEXA Scan 2025    TETANUS VACCINE 2026    Lipid Panel 2028            Family History   Problem Relation Age of Onset    Rheum arthritis Mother     Hypertension Father     Dementia Father     Hypertension Sister     Hypothyroidism Sister        Social History     Socioeconomic History    Marital status:    Tobacco Use    Smoking status: Never    Smokeless tobacco: Never   Substance and Sexual Activity    Alcohol use: Yes     Alcohol/week: 1.0 standard drink of alcohol     Types: 1 Glasses  "of wine per week     Comment: 1/2 glass wine every night    Drug use: No     Social Determinants of Health     Financial Resource Strain: Low Risk  (11/22/2022)    Overall Financial Resource Strain (CARDIA)     Difficulty of Paying Living Expenses: Not hard at all   Food Insecurity: No Food Insecurity (11/22/2022)    Hunger Vital Sign     Worried About Running Out of Food in the Last Year: Never true     Ran Out of Food in the Last Year: Never true   Transportation Needs: No Transportation Needs (11/22/2022)    PRAPARE - Transportation     Lack of Transportation (Medical): No     Lack of Transportation (Non-Medical): No   Physical Activity: Sufficiently Active (11/22/2022)    Exercise Vital Sign     Days of Exercise per Week: 4 days     Minutes of Exercise per Session: 60 min   Stress: Stress Concern Present (11/22/2022)    [employer] [place] [employer] - Occupational Stress Questionnaire     Feeling of Stress : To some extent   Social Connections: Moderately Integrated (11/22/2022)    Social Connection and Isolation Panel [NHANES]     Frequency of Communication with Friends and Family: More than three times a week     Frequency of Social Gatherings with Friends and Family: More than three times a week     Attends Shinto Services: More than 4 times per year     Active Member of Clubs or Organizations: No     Attends Club or Organization Meetings: Never     Marital Status:    Housing Stability: Low Risk  (11/22/2022)    Housing Stability Vital Sign     Unable to Pay for Housing in the Last Year: No     Number of Places Lived in the Last Year: 1     Unstable Housing in the Last Year: No       Review of Systems  Review of Systems  Anatomic distortion     Objective:   Ht 5' 1" (1.549 m)   Wt 52.3 kg (115 lb 4.8 oz)   BMI 21.79 kg/m²     Physical Exam   Stage III anterior compartment defect with apical involvement posterior compartment within normal limits given the level of prolapse I did carry " out a CIC through a prepped external urethral meatus patient had voided earlier when she entered the office building postvoid residual was only 20 cc of clear urine.      Assessment:     1. Cystocele and rectocele with incomplete uterovaginal prolapse       After examination had patient presented my office for we reviewed her anatomy in relation to normal anatomy using [employer] [facility] interactive web site from there I discussed all options as referenced below.  Multiple examples given a each therapeutic intervention from expectant to pessary be a conservative manner     Plan:     1. Cystocele and rectocele with incomplete uterovaginal prolapse        After examination I had patient presented to the office for we reviewed her anatomy in relation to normal anatomy using [employer] [facility] interactive web site.  All options as referenced below reviewed multiple images from the world wide web included to make different points patient appreciated the consultation information is provided in the after visit summary all questions that were asked were addressed  There are no Patient Instructions on file for this visit.    Expectant Management:  Patient understands we will continue to monitor her level of anatomic distortion without any type of active intervention until a time where symptomatology can no longer be tolerated by patient and she wishes to have active management.    Conservative Therapy:  Patient understands she will be utilizing a a supportive device within the vagina which will need maintenance.  The clinic will support her in her maintenance of the pessary.  Patient understands this is an active process which will need her input to maintain the pessary properly.     Surgical Management:

## 2023-11-29 NOTE — TELEPHONE ENCOUNTER
C-scope Dx is verified prior to scheduling from PCP's order and pt's Hx. Prep instructions has been sent via [facility] and/or mail. Address is confirmed. Patient is informed the preop Nurse will call him/her with the arrival time a day or two prior to procedure. Patient verbalizes understanding.

## 2023-11-29 NOTE — TELEPHONE ENCOUNTER
I reach out back to patient to schedule colonoscopy.  Patient does not answer. I leave a brief voicemail for pt to return my call. Callback number is provided.

## 2023-12-04 ENCOUNTER — TELEPHONE (OUTPATIENT)
Dept: GASTROENTEROLOGY | Facility: CLINIC | Age: 73
End: 2023-12-04
Payer: MEDICARE

## 2023-12-04 ENCOUNTER — PATIENT MESSAGE (OUTPATIENT)
Dept: UROGYNECOLOGY | Facility: CLINIC | Age: 73
End: 2023-12-04
Payer: MEDICARE

## 2023-12-04 DIAGNOSIS — H69.91 DYSFUNCTION OF RIGHT EUSTACHIAN TUBE: ICD-10-CM

## 2023-12-04 RX ORDER — LEVOCETIRIZINE DIHYDROCHLORIDE 5 MG/1
5 TABLET, FILM COATED ORAL NIGHTLY
Qty: 30 TABLET | Refills: 3 | Status: SHIPPED | OUTPATIENT
Start: 2023-12-04

## 2023-12-04 NOTE — TELEPHONE ENCOUNTER
----- Message from Mary Javier RN sent at 12/4/2023  4:34 PM CST -----    ----- Message -----  From: Lexie Douglas  Sent: 12/4/2023  10:18 AM CST  To: [facility] Staff    Type: Need Medical Advice  Who Called:Patient  Best callback number: [phone]  Additional Information: Patient called to reschedule her procedure on 12/27  Please call to further assist, Thanks.

## 2023-12-04 NOTE — TELEPHONE ENCOUNTER
----- Message from Tena Crum RN sent at 12/4/2023 11:22 AM CST -----    ----- Message -----  From: Lexie Douglas  Sent: 12/4/2023  10:18 AM CST  To: [facility] Staff    Type: Need Medical Advice  Who Called:Patient  Best callback number: [phone]  Additional Information: Patient called to reschedule her procedure on 12/27  Please call to further assist, Thanks.

## 2024-01-03 ENCOUNTER — OFFICE VISIT (OUTPATIENT)
Dept: DERMATOLOGY | Facility: CLINIC | Age: 74
End: 2024-01-03
Payer: MEDICARE

## 2024-01-03 DIAGNOSIS — L71.9 ROSACEA: Primary | ICD-10-CM

## 2024-01-03 PROCEDURE — 99213 OFFICE O/P EST LOW 20 MIN: CPT | Mod: S$GLB,,, | Performed by: DERMATOLOGY

## 2024-01-03 PROCEDURE — 99999 PR PBB SHADOW E&M-EST. PATIENT-LVL II: CPT | Mod: PBBFAC,,, | Performed by: DERMATOLOGY

## 2024-01-03 RX ORDER — METRONIDAZOLE 7.5 MG/G
CREAM TOPICAL 2 TIMES DAILY
Qty: 45 G | Refills: 2 | Status: SHIPPED | OUTPATIENT
Start: 2024-01-03 | End: 2024-02-26

## 2024-01-03 NOTE — PROGRESS NOTES
Subjective:      Patient ID:  Karina Mathur is a 73 y.o. female who presents for   Chief Complaint   Patient presents with    Rosacea     Face      Rosacea - Initial  Affected locations: face  Signs / symptoms: redness  Severity: mild to moderate  Timing: constant      Review of Systems   Constitutional:  Negative for fever and chills.   Respiratory:  Negative for cough and shortness of breath.    Gastrointestinal:  Negative for nausea and vomiting.   Musculoskeletal:  Negative for joint swelling and arthralgias.   Skin:  Negative for daily sunscreen use, activity-related sunscreen use, recent sunburn and wears hat.   All other systems reviewed and are negative.  Hematologic/Lymphatic: Bruises/bleeds easily.       Objective:   Physical Exam   Constitutional: She appears well-developed and well-nourished.   Neurological: She is alert and oriented to person, place, and time.        Diagram Legend     Erythematous scaling macule/papule c/w actinic keratosis       Vascular papule c/w angioma      Pigmented verrucoid papule/plaque c/w seborrheic keratosis      Yellow umbilicated papule c/w sebaceous hyperplasia      Irregularly shaped tan macule c/w lentigo     1-2 mm smooth white papules consistent with Milia      Movable subcutaneous cyst with punctum c/w epidermal inclusion cyst      Subcutaneous movable cyst c/w pilar cyst      Firm pink to brown papule c/w dermatofibroma      Pedunculated fleshy papule(s) c/w skin tag(s)      Evenly pigmented macule c/w junctional nevus     Mildly variegated pigmented, slightly irregular-bordered macule c/w mildly atypical nevus      Flesh colored to evenly pigmented papule c/w intradermal nevus       Pink pearly papule/plaque c/w basal cell carcinoma      Erythematous hyperkeratotic cursted plaque c/w SCC      Surgical scar with no sign of skin cancer recurrence      Open and closed comedones      Inflammatory papules and pustules      Verrucoid papule consistent consistent  with wart     Erythematous eczematous patches and plaques     Dystrophic onycholytic nail with subungual debris c/w onychomycosis     Umbilicated papule    Erythematous-base heme-crusted tan verrucoid plaque consistent with inflamed seborrheic keratosis     Erythematous Silvery Scaling Plaque c/w Psoriasis     See annotation  Nose pink and medial cheeks mildly so.      Assessment / Plan:        Rosacea  -     metronidazole 0.75% (METROCREAM) 0.75 % Crea; Apply topically 2 (two) times daily.  Dispense: 45 g; Refill: 2  Pt never got metrocr.  Start this.  Proper application of medications and or care for affected area(s) and condition(s) reviewed.  Reviewed with patient different treatment options and associated risks.  Cont sulf soap.  Pt has been using this qd.  Patient to start using sulfur bar soap for the face or affected area 1-2 x day.  For scalp usage lather from the soap to be applied to the roots of the scalp and allow to sit for 3-5 minutes regularly.  Same instructions for other affected areas.  Independent historian was in exam room or on virtual today to provide information and assist in delivering therapy and treatment at home.             Follow up in about 6 months (around 7/3/2024).

## 2024-01-03 NOTE — PATIENT INSTRUCTIONS
Rosacea triggers reviewed with patient.  Avoid alcohol, hot beverage, hot soups, spicy foods, hot water cleansing.  Apply cool water or ice packs or cold soda cans to face when flushed.  Take breaks during exertion to do this.  Avoid sun on the face.    Discussed all the following:  Rosacea is a long-lasting (chronic) skin condition affecting the face. In the early stages it causes easy flushing or blushing. Redness may become long-term (permanent) as the small blood vessels of the face widen (dilate). There may be small, red, pus-filled bumps (pustules). It looks like a bad case of acne, and has been called adult acne. But it is not caused by the same things that cause acne.

## 2024-01-11 DIAGNOSIS — Z00.00 ENCOUNTER FOR MEDICARE ANNUAL WELLNESS EXAM: ICD-10-CM

## 2024-01-12 ENCOUNTER — TELEPHONE (OUTPATIENT)
Dept: PLASTIC SURGERY | Facility: CLINIC | Age: 74
End: 2024-01-12
Payer: MEDICARE

## 2024-01-12 DIAGNOSIS — H02.403: Primary | ICD-10-CM

## 2024-01-26 ENCOUNTER — PATIENT MESSAGE (OUTPATIENT)
Dept: OPTOMETRY | Facility: CLINIC | Age: 74
End: 2024-01-26
Payer: MEDICARE

## 2024-01-26 DIAGNOSIS — H00.015 HORDEOLUM EXTERNUM OF LEFT LOWER EYELID: Primary | ICD-10-CM

## 2024-01-26 RX ORDER — NEOMYCIN SULFATE, POLYMYXIN B SULFATE, AND DEXAMETHASONE 3.5; 10000; 1 MG/G; [USP'U]/G; MG/G
OINTMENT OPHTHALMIC
Qty: 3.5 G | Refills: 0 | Status: SHIPPED | OUTPATIENT
Start: 2024-01-26 | End: 2024-02-26 | Stop reason: ALTCHOICE

## 2024-01-26 NOTE — PROGRESS NOTES
Assessment /Plan     For exam results, see Encounter Report.    Hordeolum externum of left lower eyelid  -     neomycin-polymyxin-dexamethasone (MAXITROL) 3.5 mg/g-10,000 unit/g-0.1 % Oint; Apply sparingly to the left eyelid three times a day for ten days.  Dispense: 3.5 g; Refill: 0      Covering Dr. Cole's messages. Pt messaged regarding early stye formation 01/26/24. Advised warm compresses with digital massage and called in Maxitrol tommie to use TID OS x 10 days. Ed pt to call or message if no improvement or worsening of symptoms to schedule an office visit.

## 2024-02-14 ENCOUNTER — TELEPHONE (OUTPATIENT)
Dept: GASTROENTEROLOGY | Facility: CLINIC | Age: 74
End: 2024-02-14
Payer: MEDICARE

## 2024-02-14 NOTE — TELEPHONE ENCOUNTER
Ismael is called in  Pharmacist: Cony    I went over the Golytely (pt's preference) prep instructions over the phone with Ms. Collins. Pt verbalizes understanding.

## 2024-02-14 NOTE — TELEPHONE ENCOUNTER
----- Message from Brenna Iverson LPN sent at 2/14/2024 11:06 AM CST -----  Regarding: FW: colonoscopy prep instruction and medication  Contact: [phone]    ----- Message -----  From: Latisha Hernandez  Sent: 2/14/2024  11:02 AM CST  To: [facility] Clinical Staff  Subject: colonoscopy prep instruction and medication      Karina Mathur calling regarding Patient Advice (message) for colonoscopy prep instruction and medication      [id number] - [place] - [address] AT [facility] [address]  [address] [address]  Phone: [phone] Fax: [phone]

## 2024-02-20 ENCOUNTER — ANESTHESIA EVENT (OUTPATIENT)
Dept: ENDOSCOPY | Facility: HOSPITAL | Age: 74
End: 2024-02-20
Payer: MEDICARE

## 2024-02-21 ENCOUNTER — HOSPITAL ENCOUNTER (OUTPATIENT)
Facility: HOSPITAL | Age: 74
Discharge: HOME | End: 2024-02-21
Attending: COLON & RECTAL SURGERY | Admitting: COLON & RECTAL SURGERY
Payer: MEDICARE

## 2024-02-21 ENCOUNTER — ANESTHESIA (OUTPATIENT)
Dept: ENDOSCOPY | Facility: HOSPITAL | Age: 74
End: 2024-02-21
Payer: MEDICARE

## 2024-02-21 VITALS
SYSTOLIC BLOOD PRESSURE: 108 MMHG | HEART RATE: 70 BPM | DIASTOLIC BLOOD PRESSURE: 58 MMHG | BODY MASS INDEX: 21.71 KG/M2 | WEIGHT: 115 LBS | OXYGEN SATURATION: 97 % | RESPIRATION RATE: 16 BRPM | HEIGHT: 61 IN | TEMPERATURE: 98 F

## 2024-02-21 DIAGNOSIS — Z12.11 SCREEN FOR COLON CANCER: Primary | ICD-10-CM

## 2024-02-21 PROCEDURE — D9220A PRA ANESTHESIA: Mod: CRNA,,, | Performed by: NURSE ANESTHETIST, CERTIFIED REGISTERED

## 2024-02-21 PROCEDURE — G0121 COLON CA SCRN NOT HI RSK IND: HCPCS | Mod: ,,, | Performed by: COLON & RECTAL SURGERY

## 2024-02-21 PROCEDURE — 25000003 PHARM REV CODE 250: Mod: PO | Performed by: NURSE ANESTHETIST, CERTIFIED REGISTERED

## 2024-02-21 PROCEDURE — 37000009 HC ANESTHESIA EA ADD 15 MINS: Mod: PO | Performed by: COLON & RECTAL SURGERY

## 2024-02-21 PROCEDURE — 63600175 PHARM REV CODE 636 W HCPCS: Mod: PO | Performed by: ANESTHESIOLOGY

## 2024-02-21 PROCEDURE — 37000008 HC ANESTHESIA 1ST 15 MINUTES: Mod: PO | Performed by: COLON & RECTAL SURGERY

## 2024-02-21 PROCEDURE — 63600175 PHARM REV CODE 636 W HCPCS: Mod: PO | Performed by: NURSE ANESTHETIST, CERTIFIED REGISTERED

## 2024-02-21 PROCEDURE — D9220A PRA ANESTHESIA: Mod: ANES,,, | Performed by: ANESTHESIOLOGY

## 2024-02-21 PROCEDURE — G0121 COLON CA SCRN NOT HI RSK IND: HCPCS | Mod: PO | Performed by: COLON & RECTAL SURGERY

## 2024-02-21 RX ORDER — SODIUM CHLORIDE 0.9 % (FLUSH) 0.9 %
10 SYRINGE (ML) INJECTION
Status: DISCONTINUED | OUTPATIENT
Start: 2024-02-21 | End: 2024-02-21 | Stop reason: HOSPADM

## 2024-02-21 RX ORDER — LIDOCAINE HYDROCHLORIDE 20 MG/ML
INJECTION INTRAVENOUS
Status: DISCONTINUED | OUTPATIENT
Start: 2024-02-21 | End: 2024-02-21

## 2024-02-21 RX ORDER — SODIUM CHLORIDE, SODIUM LACTATE, POTASSIUM CHLORIDE, CALCIUM CHLORIDE 600; 310; 30; 20 MG/100ML; MG/100ML; MG/100ML; MG/100ML
INJECTION, SOLUTION INTRAVENOUS CONTINUOUS
Status: DISCONTINUED | OUTPATIENT
Start: 2024-02-21 | End: 2024-02-21 | Stop reason: HOSPADM

## 2024-02-21 RX ORDER — PROPOFOL 10 MG/ML
VIAL (ML) INTRAVENOUS
Status: DISCONTINUED | OUTPATIENT
Start: 2024-02-21 | End: 2024-02-21

## 2024-02-21 RX ADMIN — PROPOFOL 100 MG: 10 INJECTION, EMULSION INTRAVENOUS at 08:02

## 2024-02-21 RX ADMIN — SODIUM CHLORIDE, POTASSIUM CHLORIDE, SODIUM LACTATE AND CALCIUM CHLORIDE: 600; 310; 30; 20 INJECTION, SOLUTION INTRAVENOUS at 07:02

## 2024-02-21 RX ADMIN — PROPOFOL 50 MG: 10 INJECTION, EMULSION INTRAVENOUS at 08:02

## 2024-02-21 RX ADMIN — LIDOCAINE HYDROCHLORIDE 100 MG: 20 INJECTION INTRAVENOUS at 08:02

## 2024-02-21 NOTE — PROVATION PATIENT INSTRUCTIONS
Discharge Summary/Instructions after an Endoscopic Procedure  Patient Name: Karina Mathur  Patient MRN: [id number]  Patient YOB: 1950  Wednesday, February 21, 2024  Roni Luong MD  Dear patient,  As a result of recent federal legislation (The Federal Cures Act), you may   receive lab or pathology results from your procedure in your [facility]   account before your physician is able to contact you. Your physician or   their representative will relay the results to you with their   recommendations at their soonest availability.  Thank you,  RESTRICTIONS:  During your procedure today, you received medications for sedation.  These   medications may affect your judgment, balance and coordination.  Therefore,   for 24 hours, you have the following restrictions:   - DO NOT drive a car, operate machinery, make legal/financial decisions,   sign important papers or drink alcohol.    ACTIVITY:  Today: no heavy lifting, straining or running due to procedural   sedation/anesthesia.  The following day: return to full activity including work.  DIET:  Eat and drink normally unless instructed otherwise.     TREATMENT FOR COMMON SIDE EFFECTS:  - Mild abdominal pain, nausea, belching, bloating or excessive gas:  rest,   eat lightly and use a heating pad.  - Sore Throat: treat with throat lozenges and/or gargle with warm salt   water.  - Because air was used during the procedure, expelling large amounts of air   from your rectum or belching is normal.  - If a bowel prep was taken, you may not have a bowel movement for 1-3 days.    This is normal.  SYMPTOMS TO WATCH FOR AND REPORT TO YOUR PHYSICIAN:  1. Abdominal pain or bloating, other than gas cramps.  2. Chest pain.  3. Back pain.  4. Signs of infection such as: chills or fever occurring within 24 hours   after the procedure.  5. Rectal bleeding, which would show as bright red, maroon, or black stools.   (A tablespoon of blood from the rectum is not serious, especially  if   hemorrhoids are present.)  6. Vomiting.  7. Weakness or dizziness.  GO DIRECTLY TO THE NEAREST EMERGENCY ROOM IF YOU HAVE ANY OF THE FOLLOWING:      Difficulty breathing              Chills and/or fever over 101 F   Persistent vomiting and/or vomiting blood   Severe abdominal pain   Severe chest pain   Black, tarry stools   Bleeding- more than one tablespoon   Any other symptom or condition that you feel may need urgent attention  Your doctor recommends these additional instructions:  If any biopsies were taken, your doctors clinic will contact you in 1 to 2   weeks with any results.  You are being discharged to home.   You have a contact number available for emergencies.  The signs and symptoms   of potential delayed complications were discussed with you.  You may return   to normal activities tomorrow.  Written discharge instructions were   provided to you.   Resume your previous diet.   Continue your present medications.   Your physician has recommended a repeat colonoscopy in 10 years for   screening purposes.  For questions, problems or results please call your physician - Roni Luong MD at Work:  [phone].  EMERGENCY PHONE NUMBER: [phone], LAB RESULTS: [phone]  IF A COMPLICATION OR EMERGENCY SITUATION ARISES AND YOU ARE UNABLE TO REACH   YOUR PHYSICIAN - GO DIRECTLY TO THE EMERGENCY ROOM.  ___________________________________________  Nurse Signature  ___________________________________________  Patient/Designated Responsible Party Signature  Roin Luong MD  2/21/2024 8:39:32 AM  This report has been verified and signed electronically.  Dear patient,  As a result of recent federal legislation (The Federal Cures Act), you may   receive lab or pathology results from your procedure in your [facility]   account before your physician is able to contact you. Your physician or   their representative will relay the results to you with their   recommendations at their soonest  availability.  Thank you.  PROVATION

## 2024-02-21 NOTE — ANESTHESIA PREPROCEDURE EVALUATION
02/21/2024  Karina Mathur is a 73 y.o., female.    Pre-operative evaluation for Procedure(s) (LRB):  COLONOSCOPY (N/A)    Karina Mathur is a 73 y.o. female     Patient Active Problem List   Diagnosis    Other hyperlipidemia    Osteopenia    Vitamin D insufficiency    Postablative hypothyroidism    Cervicalgia    Chronic midline low back pain    Hav (hallux abducto valgus), unspecified laterality    Hammer toes of both feet    Pain in both feet    Heloma molle    Memory change    Anxiety    Chronic insomnia    Cystocele, midline- with urgency    Major depressive disorder, single episode, mild       Review of patient's allergies indicates:  No Known Allergies    No current facility-administered medications on file prior to encounter.     Current Outpatient Medications on File Prior to Encounter   Medication Sig Dispense Refill    ascorbic acid, vitamin C, (VITAMIN C) 500 MG tablet Take 500 mg by mouth once daily.      calcium citrate/vitamin D3 (CITRACAL REGULAR ORAL) Take by mouth.      fluticasone propionate (FLONASE) 50 mcg/actuation nasal spray 1 spray (50 mcg total) by Each Nostril route 2 (two) times daily. 16 g 3    levothyroxine (SYNTHROID) 125 MCG tablet Take 1 tablet (125 mcg total) by mouth every other day. 90 tablet 3    multivitamin (THERAGRAN) per tablet Take 1 tablet by mouth once daily.      simvastatin (ZOCOR) 20 MG tablet Take 1 tablet (20 mg total) by mouth every evening. 90 tablet 3    vitamin D 1000 units Tab Take 1,000 Units by mouth once daily.       estradioL (ESTRACE) 0.01 % (0.1 mg/gram) vaginal cream Place 2 g vaginally twice a week. (Patient not taking: Reported on 2/16/2024) 42.5 g 2       Past Surgical History:   Procedure Laterality Date    INGUINAL HERNIA REPAIR           Pre-op Assessment    I have reviewed the Patient Summary Reports.     I have  reviewed the Nursing Notes. I have reviewed the NPO Status.      Review of Systems  Anesthesia Hx:  No problems with previous Anesthesia                Cardiovascular:  Cardiovascular Normal                                            Pulmonary:  Pulmonary Normal                       Hepatic/GI:  Hepatic/GI Normal                 Neurological:  Neurology Normal                                      Endocrine:   Hypothyroidism              Physical Exam  General: Cooperative    Airway:  Mallampati: II / I  Mouth Opening: Small, but > 3cm  TM Distance: 4 - 6 cm  Tongue: Normal  Neck ROM: Normal ROM    Dental:  Intact    Anesthesia Plan  Type of Anesthesia, risks & benefits discussed:    Anesthesia Type: Gen Natural Airway  Intra-op Monitoring Plan: Standard ASA Monitors  Induction:  IV  Informed Consent: Informed consent signed with the Patient and all parties understand the risks and agree with anesthesia plan.  All questions answered.   ASA Score: 2  Day of Surgery Review of History & Physical: H&P Update referred to the surgeon/provider.    Ready For Surgery From Anesthesia Perspective.   .

## 2024-02-21 NOTE — H&P
COLONOSCOPY HISTORY AND PE    Procedure : Colonoscopy      INDICATIONS: asymptomatic screening exam      Past Medical History:   Diagnosis Date    Cataract     Dry eyes, bilateral     Hyperlipidemia     on simvastatin    Hypothyroidism     hx of iodine cocktail around 1988 for goiter.    Osteopenia     1/18/17; L fem neck w Tscore -1.7; Lsp -1.2; DEXA in 2 yrs.    Prolapse of female bladder, acquired     PVD (posterior vitreous detachment), bilateral        Past Surgical History:   Procedure Laterality Date    INGUINAL HERNIA REPAIR         Review of patient's allergies indicates:  No Known Allergies    No current facility-administered medications on file prior to encounter.     Current Outpatient Medications on File Prior to Encounter   Medication Sig Dispense Refill    ascorbic acid, vitamin C, (VITAMIN C) 500 MG tablet Take 500 mg by mouth once daily.      calcium citrate/vitamin D3 (CITRACAL REGULAR ORAL) Take by mouth.      fluticasone propionate (FLONASE) 50 mcg/actuation nasal spray 1 spray (50 mcg total) by Each Nostril route 2 (two) times daily. 16 g 3    levothyroxine (SYNTHROID) 125 MCG tablet Take 1 tablet (125 mcg total) by mouth every other day. 90 tablet 3    multivitamin (THERAGRAN) per tablet Take 1 tablet by mouth once daily.      simvastatin (ZOCOR) 20 MG tablet Take 1 tablet (20 mg total) by mouth every evening. 90 tablet 3    vitamin D 1000 units Tab Take 1,000 Units by mouth once daily.       estradioL (ESTRACE) 0.01 % (0.1 mg/gram) vaginal cream Place 2 g vaginally twice a week. (Patient not taking: Reported on 2/16/2024) 42.5 g 2       Family History   Problem Relation Age of Onset    Rheum arthritis Mother     Hypertension Father     Dementia Father     Hypertension Sister     Hypothyroidism Sister        Social History     Socioeconomic History    Marital status:    Tobacco Use    Smoking status: Never    Smokeless tobacco: Never   Substance and Sexual Activity    Alcohol use: Yes      Alcohol/week: 1.0 standard drink of alcohol     Types: 1 Glasses of wine per week     Comment: 1/2 glass wine every night    Drug use: No     Social Determinants of Health     Financial Resource Strain: Low Risk  (11/22/2022)    Overall Financial Resource Strain (CARDIA)     Difficulty of Paying Living Expenses: Not hard at all   Food Insecurity: No Food Insecurity (11/22/2022)    Hunger Vital Sign     Worried About Running Out of Food in the Last Year: Never true     Ran Out of Food in the Last Year: Never true   Transportation Needs: No Transportation Needs (11/22/2022)    PRAPARE - Transportation     Lack of Transportation (Medical): No     Lack of Transportation (Non-Medical): No   Physical Activity: Sufficiently Active (11/22/2022)    Exercise Vital Sign     Days of Exercise per Week: 4 days     Minutes of Exercise per Session: 60 min   Stress: Stress Concern Present (11/22/2022)    [employer] [place] [employer] - Occupational Stress Questionnaire     Feeling of Stress : To some extent   Social Connections: Moderately Integrated (11/22/2022)    Social Connection and Isolation Panel [NHANES]     Frequency of Communication with Friends and Family: More than three times a week     Frequency of Social Gatherings with Friends and Family: More than three times a week     Attends Sabianism Services: More than 4 times per year     Active Member of Clubs or Organizations: No     Attends Club or Organization Meetings: Never     Marital Status:    Housing Stability: Low Risk  (11/22/2022)    Housing Stability Vital Sign     Unable to Pay for Housing in the Last Year: No     Number of Places Lived in the Last Year: 1     Unstable Housing in the Last Year: No       Review of Systems -    Respiratory : no cough, shortness of breath, or wheezing  Cardiovascular  no chest pain or dyspnea on exertion  Gastrointestinal no abdominal pain, change in bowel habits, or black or bloody stools  Musculoskeletal no  deformities, swelling  Neurological no TIA or stroke symptoms        Physical Exam:  General: NAD  AT NC EOMI  Mallampati Score   Neck supple, trachea midline  Lungs: nl excursions, no retractions.  Breathing comfortably  Abdomen ND soft NT.  No masses  Extremities: No CCE.      ASA:  II    PLAN  COLONOSCOPY.  The details of the procedure, the possible need for biopsy or polypectomy and the potential risks including bleeding, perforation, missed polyps were discussed in detail.

## 2024-02-21 NOTE — ANESTHESIA POSTPROCEDURE EVALUATION
Anesthesia Post Evaluation    Patient: Karina Mathur    Procedure(s) Performed: Procedure(s) (LRB):  COLONOSCOPY (N/A)    Final Anesthesia Type: general      Patient location during evaluation: PACU  Patient participation: Yes- Able to Participate  Level of consciousness: awake and alert and oriented  Post-procedure vital signs: reviewed and stable  Pain management: adequate  Airway patency: patent    PONV status at discharge: No PONV  Anesthetic complications: no      Cardiovascular status: hemodynamically stable  Respiratory status: unassisted and spontaneous ventilation  Hydration status: euvolemic  Follow-up not needed.          Vitals Value Taken Time   BP 95/51 02/21/24 0853   Temp 36.4 °C (97.5 °F) 02/21/24 0840   Pulse 69 02/21/24 0853   Resp 16 02/21/24 0853   SpO2 97 % 02/21/24 0853         No case tracking events are documented in the log.      Pain/Suzi Score: Suzi Score: 5 (2/21/2024  8:40 AM)

## 2024-02-21 NOTE — TRANSFER OF CARE
"Anesthesia Transfer of Care Note    Patient: Karina Mathur    Procedure(s) Performed: Procedure(s) (LRB):  COLONOSCOPY (N/A)    Patient location: PACU    Anesthesia Type: general    Transport from OR: Transported from OR on room air with adequate spontaneous ventilation    Post pain: adequate analgesia    Post assessment: no apparent anesthetic complications and tolerated procedure well    Post vital signs: stable    Level of consciousness: sedated and awake    Nausea/Vomiting: no nausea/vomiting    Complications: none    Transfer of care protocol was followed      Last vitals: Visit Vitals  BP (!) 109/58   Pulse 70   Temp 36.7 °C (98.1 °F) (Skin)   Resp 15   Ht 5' 1" (1.549 m)   Wt 52.2 kg (115 lb)   SpO2 98%   Breastfeeding No   BMI 21.73 kg/m²     "

## 2024-02-26 ENCOUNTER — TELEPHONE (OUTPATIENT)
Dept: OBSTETRICS AND GYNECOLOGY | Facility: CLINIC | Age: 74
End: 2024-02-26

## 2024-02-26 ENCOUNTER — OFFICE VISIT (OUTPATIENT)
Dept: OBSTETRICS AND GYNECOLOGY | Facility: CLINIC | Age: 74
End: 2024-02-26
Payer: MEDICARE

## 2024-02-26 VITALS
DIASTOLIC BLOOD PRESSURE: 82 MMHG | BODY MASS INDEX: 20.6 KG/M2 | WEIGHT: 109.13 LBS | SYSTOLIC BLOOD PRESSURE: 110 MMHG | HEIGHT: 61 IN

## 2024-02-26 DIAGNOSIS — R19.00 PELVIC MASS: Primary | ICD-10-CM

## 2024-02-26 DIAGNOSIS — Z01.419 GYNECOLOGIC EXAM NORMAL: ICD-10-CM

## 2024-02-26 PROCEDURE — 88175 CYTOPATH C/V AUTO FLUID REDO: CPT | Performed by: OBSTETRICS & GYNECOLOGY

## 2024-02-26 PROCEDURE — G0101 CA SCREEN;PELVIC/BREAST EXAM: HCPCS | Mod: S$GLB,,, | Performed by: OBSTETRICS & GYNECOLOGY

## 2024-02-26 PROCEDURE — 99999 PR PBB SHADOW E&M-EST. PATIENT-LVL III: CPT | Mod: PBBFAC,,, | Performed by: OBSTETRICS & GYNECOLOGY

## 2024-02-26 NOTE — TELEPHONE ENCOUNTER
----- Message from Arthur Mancilla MD sent at 2/26/2024 10:55 AM CST -----  Regarding: schedule ultrasound  Please call to schedule pelvic ultrasound - recommend to rule out other causes of full bladder prior to follow up with urogyn.

## 2024-02-26 NOTE — PROGRESS NOTES
Chief Complaint   Patient presents with    Discuss bladder issues        History and Physical:  No LMP recorded. Patient is postmenopausal.       Karina Mathur is a 73 y.o.   female who presents today for her routine annual GYN exam. The patient has persistent vaginal prolapse and urgency, saw uroGYN in [place], did not like. Schedule With Dr. Griffin in [place].    Request PAP today, counseled.       Allergies: Review of patient's allergies indicates:  No Known Allergies    Past Medical History:   Diagnosis Date    Cataract     Dry eyes, bilateral     Hyperlipidemia     on simvastatin    Hypothyroidism     hx of iodine cocktail around  for goiter.    Osteopenia     17; L fem neck w Tscore -1.7; Lsp -1.2; DEXA in 2 yrs.    Prolapse of female bladder, acquired     PVD (posterior vitreous detachment), bilateral        Past Surgical History:   Procedure Laterality Date    COLONOSCOPY N/A 2024    Procedure: COLONOSCOPY;  Surgeon: LAMAR Luong MD;  Location: [facility];  Service: Endoscopy;  Laterality: N/A;    INGUINAL HERNIA REPAIR         MEDS:   Current Outpatient Medications on File Prior to Visit   Medication Sig Dispense Refill    ascorbic acid, vitamin C, (VITAMIN C) 500 MG tablet Take 500 mg by mouth once daily.      BIOTIN ORAL Take by mouth.      calcium citrate/vitamin D3 (CITRACAL REGULAR ORAL) Take by mouth.      fluticasone propionate (FLONASE) 50 mcg/actuation nasal spray 1 spray (50 mcg total) by Each Nostril route 2 (two) times daily. 16 g 3    levocetirizine (XYZAL) 5 MG tablet Take 1 tablet (5 mg total) by mouth every evening. 30 tablet 3    levothyroxine (SYNTHROID) 125 MCG tablet Take 1 tablet (125 mcg total) by mouth every other day. 90 tablet 3    multivitamin (THERAGRAN) per tablet Take 1 tablet by mouth once daily.      simvastatin (ZOCOR) 20 MG tablet Take 1 tablet (20 mg total) by mouth every evening. 90 tablet 3    vitamin D 1000 units Tab Take 1,000 Units by  mouth once daily.       [DISCONTINUED] estradioL (ESTRACE) 0.01 % (0.1 mg/gram) vaginal cream Place 2 g vaginally twice a week. (Patient not taking: Reported on 2024) 42.5 g 2    [DISCONTINUED] metronidazole 0.75% (METROCREAM) 0.75 % Crea Apply topically 2 (two) times daily. (Patient not taking: Reported on 2024) 45 g 2    [DISCONTINUED] neomycin-polymyxin-dexamethasone (MAXITROL) 3.5 mg/g-10,000 unit/g-0.1 % Oint Apply sparingly to the left eyelid three times a day for ten days. (Patient not taking: Reported on 2024) 3.5 g 0     No current facility-administered medications on file prior to visit.       OB History          3    Para   3    Term   3            AB        Living             SAB        IAB        Ectopic        Multiple        Live Births                     Social History     Socioeconomic History    Marital status:    Tobacco Use    Smoking status: Never    Smokeless tobacco: Never   Substance and Sexual Activity    Alcohol use: Yes     Alcohol/week: 1.0 standard drink of alcohol     Types: 1 Glasses of wine per week     Comment: 1/2 glass wine every night    Drug use: No     Social Determinants of Health     Financial Resource Strain: Low Risk  (2022)    Overall Financial Resource Strain (CARDIA)     Difficulty of Paying Living Expenses: Not hard at all   Food Insecurity: No Food Insecurity (2022)    Hunger Vital Sign     Worried About Running Out of Food in the Last Year: Never true     Ran Out of Food in the Last Year: Never true   Transportation Needs: No Transportation Needs (2022)    PRAPARE - Transportation     Lack of Transportation (Medical): No     Lack of Transportation (Non-Medical): No   Physical Activity: Sufficiently Active (2022)    Exercise Vital Sign     Days of Exercise per Week: 4 days     Minutes of Exercise per Session: 60 min   Stress: Stress Concern Present (2022)    [employer] [place] [employer] -  "Occupational Stress Questionnaire     Feeling of Stress : To some extent   Social Connections: Moderately Integrated (11/22/2022)    Social Connection and Isolation Panel [NHANES]     Frequency of Communication with Friends and Family: More than three times a week     Frequency of Social Gatherings with Friends and Family: More than three times a week     Attends Sabianist Services: More than 4 times per year     Active Member of Clubs or Organizations: No     Attends Club or Organization Meetings: Never     Marital Status:    Housing Stability: Low Risk  (11/22/2022)    Housing Stability Vital Sign     Unable to Pay for Housing in the Last Year: No     Number of Places Lived in the Last Year: 1     Unstable Housing in the Last Year: No       Family History   Problem Relation Age of Onset    Rheum arthritis Mother     Hypertension Father     Dementia Father     Hypertension Sister     Hypothyroidism Sister          Past medical and surgical history reviewed.   I have reviewed the patient's medical history in detail and updated the computerized patient record.    Review of System:   General: no chills, fever, night sweats, weight gain or weight loss  Psychological: no depression or suicidal ideation  Breasts: no new or changing breast lumps, nipple discharge or masses.  Respiratory: no cough, shortness of breath, or wheezing  Cardiovascular: no chest pain or dyspnea on exertion  Gastrointestinal: no abdominal pain, change in bowel habits, or black or bloody stools  Genito-Urinary: no incontinence, urinary frequency/urgency or vulvar/vaginal symptoms, pelvic pain or abnormal vaginal bleeding.  Musculoskeletal: no gait disturbance or muscular weakness      Physical Exam:   /82   Ht 5' 1" (1.549 m)   Wt 49.5 kg (109 lb 2 oz)   BMI 20.62 kg/m²   Constitutional: She appears alert and responsive. She appears well-developed, well-groomed, and well-nourished. No distress. Thin  HENT:   Head: Normocephalic and " atraumatic.   Eyes: Conjunctivae and EOM are normal. No scleral icterus.   Neck: Symmetrical. Normal range of motion. Neck supple. No tracheal deviation present.   Cardiovascular: Normal rate, no rhythm abnormality noted. Extremities without swelling or edema, warm.    Pulmonary/Chest: Normal respiratory Effort. No distress or retractions. She exhibits no tenderness.  Breasts: deferred- per pat request, recent normal mammogram  Abdominal: Soft. She exhibits no distension, hernias or masses. There is no tenderness. No enlargement of liver edge or spleen.  There is no rebound and no guarding.   Genitourinary:    External rectal exam shows no thrombosed external hemorrhoids, no lesions.     Pelvic exam was performed with patient supine.   No labial fusion, and symmetrical.    There is no rash, lesion or injury on the right labia.   There is no rash, lesion or injury on the left labia.   No bleeding and no signs of injury around the vaginal introitus, urethral meatus is normal size and without prolapse or lesions, urethra hypermobile with grade III cystocele with valsalva. The cervix is visualized with no discharge, lesions or friability.   No vaginal discharge found.   No significant Cystocele, Enterocele or rectocele, and cervix and uterus well supported.   Bimanual exam:   The urethra is normal to palpation and there are no palpable vaginal wall masses. Full bladder.    Uterus is not deviated, not enlarged, not fixed, normal shape and not tender difficult exam with full bladder.    Cervix exhibits no motion tenderness.    Right adnexum displays no mass or nodularity and no tenderness.   Left adnexum displays no mass or nodularity and no tenderness.  Musculoskeletal: Normal range of motion.   Neurological: She is alert and oriented to person, place, and time. Coordination normal.   Skin: Skin is warm and dry. She is not diaphoretic. No rashes, lesions or ulcers.   Psychiatric: She has a normal mood and affect,  oriented to person, place, and time.      Assessment:   Normal annual GYN exam  Pelvic fullness  Grade III cystocele    Plan:   PAP  Mammogram  Pelvic ultrasound  Follow up in 1 year.  Patient informed will be contacted with results within 2 weeks. Encouraged to please call back or email if she has not heard from us by then.

## 2024-02-28 ENCOUNTER — HOSPITAL ENCOUNTER (OUTPATIENT)
Dept: RADIOLOGY | Facility: HOSPITAL | Age: 74
Discharge: HOME | End: 2024-02-28
Attending: OBSTETRICS & GYNECOLOGY
Payer: MEDICARE

## 2024-02-28 DIAGNOSIS — R19.00 PELVIC MASS: ICD-10-CM

## 2024-02-28 PROCEDURE — 76830 TRANSVAGINAL US NON-OB: CPT | Mod: TC,PN

## 2024-02-28 PROCEDURE — 76856 US EXAM PELVIC COMPLETE: CPT | Mod: 26,,, | Performed by: RADIOLOGY

## 2024-02-28 PROCEDURE — 76830 TRANSVAGINAL US NON-OB: CPT | Mod: 26,,, | Performed by: RADIOLOGY

## 2024-02-29 ENCOUNTER — PATIENT MESSAGE (OUTPATIENT)
Dept: OBSTETRICS AND GYNECOLOGY | Facility: CLINIC | Age: 74
End: 2024-02-29
Payer: MEDICARE

## 2024-02-29 LAB
FINAL PATHOLOGIC DIAGNOSIS: NORMAL
Lab: NORMAL

## 2024-03-05 ENCOUNTER — PATIENT MESSAGE (OUTPATIENT)
Dept: OBSTETRICS AND GYNECOLOGY | Facility: CLINIC | Age: 74
End: 2024-03-05
Payer: MEDICARE

## 2024-03-10 NOTE — PROGRESS NOTES
"[facility] - UROGYNECOLOGY  [address] [address]    Karina Mathur  [id number]  1950  March 11, 2024    Consulting Physician: Self, Aaareferral   GYN: MD Laurent  Primary M.D.: Mohan Davalos DO    Chief Complaint   Patient presents with    Vaginal Prolapse     HPI:     1)  UI:  (+) JADE (rare) > (+) UUI.  (+) pads "in case", usually min wetness.  1 pad/night "in case."  Daytime frequency: Q 2 hours.  Nocturia: Yes: 2+/night--disrupts sleep.   (--) dysuria,  (--) hematuria,  (--) frequent UTIs.  (+) complete bladder emptying. +PV urgency with small 2nd volume. +splayed stream.     2)  POP:  Present. below introitus, significant.  Symptoms:(+) pressure, general bother.  (--) vaginal bleeding. (--) vaginal discharge. (+) sexually active.  (+) dyspareunia due to dryness.  (+)  Vaginal dryness--uses vaseline.  (--) vaginal estrogen use.     3)  BM:  (--) constipation/straining.  (+) chronic diarrhea--seems related to nerves. (--) hematochezia.  (--) fecal incontinence.  (+) fecal smearing/urgency x 1.  (+) complete evacuation.     Past Medical History  Past Medical History:   Diagnosis Date    Cataract     Dry eyes, bilateral     Hyperlipidemia     on simvastatin    Hypothyroidism     hx of iodine cocktail around 1988 for goiter.    Osteopenia     1/18/17; L fem neck w Tscore -1.7; Lsp -1.2; DEXA in 2 yrs.    Prolapse of female bladder, acquired     PVD (posterior vitreous detachment), bilateral    --PVD:  was unaware of this. Followed by LUKE Cole, OD.     Past Surgical History  Past Surgical History:   Procedure Laterality Date    COLONOSCOPY N/A 2/21/2024    Procedure: COLONOSCOPY;  Surgeon: LAMAR Luong MD;  Location: [facility];  Service: Endoscopy;  Laterality: N/A;    INGUINAL HERNIA REPAIR     --inguinal hernia as child    Hysterectomy: No  2/26/2024 pelvic US:  FINDINGS:  Uterus: The uterus measures 5.1 x 2.7 x 3.2 cm in dimension.  No uterine masses appreciated.  " There is a diffuse coarse heterogeneous uterine parenchymal echotexture.  The uterus is retroverted.  The endometrial stripe measures 4 mm, normal for a postmenopausal patient.  There is a 7 x 4 x 6 mm endometrial cyst present within the endometrial canal of the uterine fundus.  No fluid/blood products within the endometrial canal.  Ovaries: The ovaries are not visible on either the transabdominal or transvaginal portion of the examination.  There are mildly prominent left adnexal veins present, nonspecific.  Adnexal soft tissues: No free fluid in the cul de sac.  No solid or cystic adnexal masses.  Impression:  1. Endometrial stripe measures 4 mm, normal for a postmenopausal patient.  There is a 7 mm endometrial cyst present within the endometrial canal of the uterine fundus.  2. Nonvisualization of the ovaries    Past Ob History     x 3.  C/s x 0.    Largest infant weight: 8#15oz  yes FAVD. yes episiotomy.      Gynecologic History  LMP: No LMP recorded. Patient is postmenopausal.  Age of menarche: 19 yo  Age of menopause: approx 53 yo  Menstrual history: h/o normal  Pap test:  normal/ HPV NEG.  History of abnormal paps: No.  History of STIs:  No  Mammogram: Date of last: 2023.  Result: Normal  Colonoscopy: Date of last: .  Result:  diverticulosis.  Repeat due:  .    DEXA:  Date of last: .  Result:  normal.  Repeat due:  per PCP.     Family History  Family History   Problem Relation Age of Onset    Rheum arthritis Mother     Hypertension Father     Dementia Father     Hypertension Sister     Hypothyroidism Sister       Colon CA: No  Breast CA: No  GYN CA: No   CA: No    Social History  Social History     Tobacco Use   Smoking Status Never   Smokeless Tobacco Never     Social History     Substance and Sexual Activity   Alcohol Use Yes    Alcohol/week: 1.0 standard drink of alcohol    Types: 1 Glasses of wine per week    Comment: 1/2 glass wine every night   .    Social History      Substance and Sexual Activity   Drug Use No     The patient is  x 50 years.   [place] in [facility] [address].  Employment status: retired teacher elementary.     Allergies  Review of patient's allergies indicates:  No Known Allergies    Medications  Current Outpatient Medications on File Prior to Visit   Medication Sig Dispense Refill    ascorbic acid, vitamin C, (VITAMIN C) 500 MG tablet Take 500 mg by mouth once daily.      BIOTIN ORAL Take by mouth.      calcium citrate/vitamin D3 (CITRACAL REGULAR ORAL) Take by mouth.      levocetirizine (XYZAL) 5 MG tablet Take 1 tablet (5 mg total) by mouth every evening. 30 tablet 3    levothyroxine (SYNTHROID) 125 MCG tablet Take 1 tablet (125 mcg total) by mouth every other day. 90 tablet 3    multivitamin (THERAGRAN) per tablet Take 1 tablet by mouth once daily.      simvastatin (ZOCOR) 20 MG tablet Take 1 tablet (20 mg total) by mouth every evening. 90 tablet 3    vitamin D 1000 units Tab Take 1,000 Units by mouth once daily.       fluticasone propionate (FLONASE) 50 mcg/actuation nasal spray 1 spray (50 mcg total) by Each Nostril route 2 (two) times daily. (Patient not taking: Reported on 3/11/2024) 16 g 3     No current facility-administered medications on file prior to visit.       Review of Systems A 14 point ROS was reviewed with pertinent positives as noted above in the history of present illness.      Constitutional: negative  Eyes: negative  Endocrine: negative  Gastrointestinal: negative  Cardiovascular: negative  Respiratory: negative  Allergic/Immunologic: negative  Integumentary: negative  Psychiatric: negative  Musculoskeletal: negative   Ear/Nose/Throat: negative  Neurologic: negative  Genitourinary: SEE HPI  Hematologic/Lymphatic: negative   Breast: negative    Urogynecologic Exam  BP (!) 99/51 (BP Location: Right arm, Patient Position: Sitting, BP Method: Medium (Automatic))   Wt 49.4 kg (108 lb 14.5 oz)   BMI 20.58 kg/m²     GENERAL  APPEARANCE:  The patient is well-developed, well-nourished.   Neck:  Supple with no thyromegaly, no carotid bruits.  Heart:  Regular rate and rhythm, no murmurs, rubs or gallops.  Lungs:  Clear.  No CVA tenderness.  Abdomen:  Soft, nontender, nondistended, no hepatosplenomegaly.  Incisions:  none    PELVIC:    External genitalia:  Normal Bartholins, Skenes and labia bilaterally.    Urethra:  No caruncle, diverticulum or masses.  (+) hypermobility.    Vagina:  Atrophy (+) , no bladder masses or tender, no discharge.    Cervix:  normal appearance  Uterus: normal size, contour, position, consistency, mobility, non-tender  Adnexa: Not palpable.    POP-Q:  Aa +3; Ba +3; C -2; Ap -1; Bp -1; D -5.  Genital hiatus 3, perineal body 2, total vaginal length 10-11 (standing).    NEUROLOGIC:  Cranial nerves 2 through 12 intact.  Strength 5/5.  DTRs 2+ lower extremities.  S2 through 4 normal.  Sacral reflexes intact.    EXT: BAILON, 2+ pulses bilaterally, no C/C/E    COUGH STRESS TEST:  negative  KEGEL: does Valsalva    RECTAL:    External:  Normal, (+) hemorrhoids--old, external, non-thrombosed, (--) dovetailing.   Internal:   (--) tenderness, (--) masses, Normal resting tone, Normal active tone. Grossly heme NEG.     PVR: 1 mL (on US)    Impression    1. Urinary incontinence, mixed    2. Cystocele, midline    3. Vaginal atrophy    4. Rectocele, female    5. Uterovaginal prolapse    6. Nocturia more than twice per night    7. Loose stools        Initial Plan  The patient was counseled regarding these issues. The patient was given a summary sheet containing each of these issues with possible options for evaluation and management. When appropriate, we also reviewed computer-generated diagrams specific to their diagnoses..  All questions were addressed to the patient's satisfaction.    1)  Mixed urinary incontinence, urge < stress:    --urine C&S  --Empty bladder every 3 hours.  Empty well: wait a minute, lean forward on toilet.     --Avoid dietary irritants (see sheet).  Keep diary x 3-5 days to determine your irritants.  --KEGELS: do 10 in AM and 10 in PM, holding each x 10 seconds.  When you feel urge to go, STOP, KEGEL, and when urge has passed, then go to bathroom.  Consider PT in future.    --URGE: consider medication in future. Takes 2-4 weeks to see if will have effect.  For dry mouth: get sour, sugar free lozenge or gum.    --STRESS:  Pessary vs. Sling vs periurethral bulking.     2)  Nocturia (nighttime urination):   --no water by bed  --stop fluids 2 hours before bed.    --If have leg swelling:  Elevate feet above chest x 1 hour before bed to get excess fluid off.  Can also use support hose (knee highs).      3) Stage 3 cystocele, stage 2 rectocele, stage 1 uterovaginal prolapse:  --discussed  --options: observation vs PT vs pessary vs surgery   --if surgery: laparoscopic/robotic hysterectomy/removal of tubes + ovaries + sacral colpopexy   --if surgery: pelvic US normal 2/2024   --if surgery: bladder testing to see if need injections or sling for stress leakage   --if surgery: clearance per PCP (Susannah) + labs (TSH, CBC, CMP, T&S)/EKG   --if surgery: message eye MD to make sure positioning ok with detachment    4)  Vaginal atrophy (dryness):    --start Vaginal estrogen:  --Use 0.5 grams of estrogen cream in vagina with applicator or dime-sized amount with finger (as far as can reach internally) nightly x 2 weeks, then twice a week thereafter.  You can also apply a dime-sized amount with your finger around the vaginal opening and inner lips at same frequency.     --Vaginal estrogen may help to decrease pain related to dryness with intercourse and urinary symptoms (urgency/frequency/UTIs) around menopause.     --with intercourse, can use Non-estrogen options for vaginal dryness:  --Use REPLENS or REFRESH OTC: 1/2 applicator full in vagina twice a week.    --coconut oil: dime-sized amount with finger (as far as can reach internally)  and around the vaginal opening and inner lips up to nightly as needed  --Uberlube, Astroglide, [place] liquibeads, Satin by Sliquid Natural Intimate Moisturizer    5)  Chronic loose stool:  --seems related to nerves  --avoid dietary triggers  --hydrate well  Controlling constipation may help bladder urgency/leakage and fiber may better control cholesterol and blood glucose.  Start daily fiber.  Take 1 tsp of fiber powder (psyllium or other sugar-free powder).  Mix in 8 oz of water.  Take x 3-5 days.  Then, increase fiber by 1 tsp every 3-5 days until stool is easy to pass.  Stop and continue at that dose.   Do not exceed 6 tsps/day.  May also use over the counter stool softener 1-2 x/day.  AVOID laxatives.    6)  Think about what you'd like to do. If surgery, let us know at least 2 months before desired timeframe.     Approximately 60 min were spent in consult, 90 % in discussion.     Thank you for requesting consultation of your patient.  I look forward to participating in their care.    Wendy Griffin  Female Pelvic Medicine and Reconstructive Surgery  [facility] [place], [place]

## 2024-03-11 ENCOUNTER — OFFICE VISIT (OUTPATIENT)
Dept: UROGYNECOLOGY | Facility: CLINIC | Age: 74
End: 2024-03-11
Payer: MEDICARE

## 2024-03-11 VITALS — WEIGHT: 108.94 LBS | DIASTOLIC BLOOD PRESSURE: 51 MMHG | SYSTOLIC BLOOD PRESSURE: 99 MMHG | BODY MASS INDEX: 20.58 KG/M2

## 2024-03-11 DIAGNOSIS — N39.46 URINARY INCONTINENCE, MIXED: Primary | ICD-10-CM

## 2024-03-11 DIAGNOSIS — N81.4 UTEROVAGINAL PROLAPSE: ICD-10-CM

## 2024-03-11 DIAGNOSIS — R35.1 NOCTURIA MORE THAN TWICE PER NIGHT: ICD-10-CM

## 2024-03-11 DIAGNOSIS — N81.6 RECTOCELE, FEMALE: ICD-10-CM

## 2024-03-11 DIAGNOSIS — N95.2 VAGINAL ATROPHY: ICD-10-CM

## 2024-03-11 DIAGNOSIS — R19.5 LOOSE STOOLS: ICD-10-CM

## 2024-03-11 DIAGNOSIS — N81.11 CYSTOCELE, MIDLINE: ICD-10-CM

## 2024-03-11 PROCEDURE — 99215 OFFICE O/P EST HI 40 MIN: CPT | Mod: 25,S$GLB,, | Performed by: OBSTETRICS & GYNECOLOGY

## 2024-03-11 PROCEDURE — 99999 PR PBB SHADOW E&M-EST. PATIENT-LVL IV: CPT | Mod: PBBFAC,,, | Performed by: OBSTETRICS & GYNECOLOGY

## 2024-03-11 PROCEDURE — 51798 US URINE CAPACITY MEASURE: CPT | Mod: S$GLB,,, | Performed by: OBSTETRICS & GYNECOLOGY

## 2024-03-11 RX ORDER — ESTRADIOL 0.1 MG/G
CREAM VAGINAL
Qty: 42.5 G | Refills: 11 | Status: SHIPPED | OUTPATIENT
Start: 2024-03-11

## 2024-03-11 NOTE — PATIENT INSTRUCTIONS
Bladder Irritants  Certain foods and drinks have been associated with worsening symptoms of urinary frequency, urgency, urge incontinence, or bladder pain. If you suffer from any of these conditions, you may wish to try eliminating one or more of these foods from your diet and see if your symptoms improve. If bladder symptoms are related to dietary factors, strict adherence to a diet thateliminates the food should bring marked relief in 10 days. Once you are feeling better, you can begin to add foods back into your diet, one at a time. If symptoms return, you will be able to identify the irritant. As you add foods back to your diet it is very important that you drink significant amounts of water.    -----------------------------------------------------------------------------------------------  List of Common Bladder Irritants*  Alcoholic beverages  Apples and apple juice  Cantaloupe  Carbonated beverages  Chili and spicy foods  Chocolate  Citrus fruit  Coffee (including decaffeinated)  Cranberries and cranberry juice  Grapes  Guava  Milk Products: milk, cheese, cottage cheese, yogurt, ice cream  Peaches  Pineapple  Plums  Strawberries  Sugar especially artificial sweeteners, saccharin, aspartame, corn sweeteners, honey, fructose, sucrose, lactose  Tea  Tomatoes and tomato juice  Vitamin B complex  Vinegar  *Most people are not sensitive to ALL of these products; your goal is to find the foods that make YOUR symptoms worse.  ---------------------------------------------------------------------------------------------------    Low-acid fruit substitutions include apricots, papaya, pears and watermelon. Coffee drinkers can drink Kava or other lowacid instant drinks. Tea drinkers can substitute non-citrus herbal and sun brewed teas. Calcium carbonate co-buffered with calcium ascorbate can be substituted for Vitamin C. Prelief is a dietary supplement that works as an acid blocker for the bladder.    Where to get more  information:        Overcoming Bladder Disorders by Mamta Santiago and Chu Flanagan, 1990        You Dont Have to Live with Cystitis! By Marylu Nj, 1988  http://www.urologymanagement.org/oab  ----------------------------------------------------------------------------    1)  Mixed urinary incontinence, urge < stress:    --urine C&S  --Empty bladder every 3 hours.  Empty well: wait a minute, lean forward on toilet.    --Avoid dietary irritants (see sheet).  Keep diary x 3-5 days to determine your irritants.  --KEGELS: do 10 in AM and 10 in PM, holding each x 10 seconds.  When you feel urge to go, STOP, KEGEL, and when urge has passed, then go to bathroom.  Consider PT in future.    --URGE: consider medication in future. Takes 2-4 weeks to see if will have effect.  For dry mouth: get sour, sugar free lozenge or gum.    --STRESS:  Pessary vs. Sling vs periurethral bulking.     2)  Nocturia (nighttime urination):   --no water by bed  --stop fluids 2 hours before bed.    --If have leg swelling:  Elevate feet above chest x 1 hour before bed to get excess fluid off.  Can also use support hose (knee highs).      3) Stage 3 cystocele, stage 2 rectocele, stage 1 uterovaginal prolapse:  --discussed  --options: observation vs PT vs pessary vs surgery   --if surgery: laparoscopic/robotic hysterectomy/removal of tubes + ovaries + sacral colpopexy   --if surgery: pelvic US normal 2/2024   --if surgery: bladder testing to see if need injections or sling for stress leakage   --if surgery: clearance per PCP (Susannah) + labs (TSH, CBC, CMP, T&S)/EKG   --if surgery: message eye MD to make sure positioning ok with detachment    4)  Vaginal atrophy (dryness):    --start Vaginal estrogen:  --Use 0.5 grams of estrogen cream in vagina with applicator or dime-sized amount with finger (as far as can reach internally) nightly x 2 weeks, then twice a week thereafter.  You can also apply a dime-sized  amount with your finger around the vaginal opening and inner lips at same frequency.     --Vaginal estrogen may help to decrease pain related to dryness with intercourse and urinary symptoms (urgency/frequency/UTIs) around menopause.     --with intercourse, can use Non-estrogen options for vaginal dryness:  --Use REPLENS or REFRESH OTC: 1/2 applicator full in vagina twice a week.    --coconut oil: dime-sized amount with finger (as far as can reach internally) and around the vaginal opening and inner lips up to nightly as needed  --Uberlube, Astroglide, [place] liquibeads, Satin by Sliquid Natural Intimate Moisturizer    5)  Chronic loose stool:  --seems related to nerves  --avoid dietary triggers  --hydrate well  Controlling constipation may help bladder urgency/leakage and fiber may better control cholesterol and blood glucose.  Start daily fiber.  Take 1 tsp of fiber powder (psyllium or other sugar-free powder).  Mix in 8 oz of water.  Take x 3-5 days.  Then, increase fiber by 1 tsp every 3-5 days until stool is easy to pass.  Stop and continue at that dose.   Do not exceed 6 tsps/day.  May also use over the counter stool softener 1-2 x/day.  AVOID laxatives.    6)  Think about what you'd like to do. If surgery, let us know at least 2 months before desired timeframe.

## 2024-03-14 ENCOUNTER — PATIENT MESSAGE (OUTPATIENT)
Dept: UROGYNECOLOGY | Facility: CLINIC | Age: 74
End: 2024-03-14
Payer: MEDICARE

## 2024-03-17 ENCOUNTER — TELEPHONE (OUTPATIENT)
Dept: UROGYNECOLOGY | Facility: CLINIC | Age: 74
End: 2024-03-17
Payer: MEDICARE

## 2024-03-17 NOTE — TELEPHONE ENCOUNTER
----- Message from LUKE Cole, OD sent at 3/16/2024 11:18 AM CDT -----  Regarding: RE: Patient having surgery--ok for trendelenberg?  Chucky Griffin,  She's had the diagnosis of bilateral vitreous detachment at least 4 years, most likely due to age,  without any other retina issues. She's not a high myope and doesn't have other risk factors for spontaneous RD, so I would say this will not be a contraindication for her surgery.    Dr SIMS  ----- Message -----  From: Wendy Griffin MD  Sent: 3/15/2024   1:35 PM CDT  To: LUKE Cole, OD; Shwetha Guaman NP; #  Subject: Patient having surgery--ok for trendelenberg?    Hi Kamille Rivera and Douglas: I just wanted to reach out to y'all since you have been following Mrs. Mathur for her eye issues. Per previous notes, she has the diagnosis of: Posterior vitreous detachment, bilateral.  We are planning a pelvic floor surgery for her (hysterectomy + sacral colpopexy to lift the prolapsed vagina).  It will take about 4 hours, during which we usually have her in steep trendelenberg (head down, feet up).  There have been some reports (rare) of retinal detachment occurring in this position. I know that's technically different from the viterous detachment. I wasn't sure if her underlying condition makes her more vulnerable to issues, but I just wanted to make sure that there was no medical contradiction to doing our procedure.  At your earliest convenience, please let me know your thoughts. Thank you for your help!  Best,   Wendy Griffin MD  Urogynecology

## 2024-03-19 ENCOUNTER — OFFICE VISIT (OUTPATIENT)
Dept: FAMILY MEDICINE | Facility: CLINIC | Age: 74
End: 2024-03-19
Payer: MEDICARE

## 2024-03-19 VITALS
WEIGHT: 109.56 LBS | DIASTOLIC BLOOD PRESSURE: 56 MMHG | TEMPERATURE: 98 F | HEART RATE: 61 BPM | OXYGEN SATURATION: 96 % | SYSTOLIC BLOOD PRESSURE: 89 MMHG | BODY MASS INDEX: 20.69 KG/M2 | HEIGHT: 61 IN

## 2024-03-19 DIAGNOSIS — Z00.00 ENCOUNTER FOR PREVENTIVE HEALTH EXAMINATION: Primary | ICD-10-CM

## 2024-03-19 DIAGNOSIS — F32.0 MAJOR DEPRESSIVE DISORDER, SINGLE EPISODE, MILD: ICD-10-CM

## 2024-03-19 PROCEDURE — G0439 PPPS, SUBSEQ VISIT: HCPCS | Mod: S$GLB,,, | Performed by: NURSE PRACTITIONER

## 2024-03-19 PROCEDURE — 99999 PR PBB SHADOW E&M-EST. PATIENT-LVL III: CPT | Mod: PBBFAC,,, | Performed by: NURSE PRACTITIONER

## 2024-03-19 NOTE — PATIENT INSTRUCTIONS
Counseling and Referral of Other Preventative  (Italic type indicates deductible and co-insurance are waived)    Patient Name: Karina Mathur  Today's Date: 3/19/2024    Health Maintenance       Date Due Completion Date    RSV Vaccine (Age 60+ and Pregnant patients) (1 - 1-dose 60+ series) Never done ---    COVID-19 Vaccine (4 - 2023-24 season) 09/01/2023 11/2/2021    Mammogram 05/22/2024 5/22/2023    DEXA Scan 05/16/2025 5/16/2022    TETANUS VACCINE 05/28/2026 5/28/2016    Lipid Panel 06/21/2028 6/21/2023    Colorectal Cancer Screening 02/21/2034 2/21/2024        No orders of the defined types were placed in this encounter.    The following information is provided to all patients.  This information is to help you find resources for any of the problems found today that may be affecting your health:                  Living healthy guide: www.[facility].[email or web address].[facility]      Understanding Diabetes: www.diabetes.org      Eating healthy: www.cdc.gov/healthyweight      Formerly named [facility] home safety checklist: www.cdc.gov/steadi/patient.html      Agency on Aging: www.goea.[place].[facility]      Alcoholics anonymous (AA): www.aa.org      Physical Activity: www.rasta.nih.gov/cg9gjcl      Tobacco use: www.quitwithusla.org

## 2024-03-19 NOTE — PROGRESS NOTES
"  Karina Mathur presented for a  Medicare AWV and comprehensive Health Risk Assessment today. The following components were reviewed and updated:    Medical history  Family History  Social history  Allergies and Current Medications  Health Risk Assessment  Health Maintenance  Care Team         ** See Completed Assessments for Annual Wellness Visit within the encounter summary.**         The following assessments were completed:  Living Situation  CAGE  Depression Screening  Timed Get Up and Go  Whisper Test  Cognitive Function Screening  Nutrition Screening  ADL Screening  PAQ Screening    Clock in media   Opioid documentation:      Patient does not have a current opioid prescription.        Vitals:    03/19/24 0947   BP: (!) 89/56   Pulse: 61   Temp: 97.7 °F (36.5 °C)   TempSrc: Oral   SpO2: 96%   Weight: 49.7 kg (109 lb 9.1 oz)   Height: 5' 1" (1.549 m)     Body mass index is 20.7 kg/m².  Physical Exam  Constitutional:       Appearance: She is well-developed.   HENT:      Head: Normocephalic and atraumatic.      Nose: Nose normal.   Eyes:      General: Lids are normal.      Conjunctiva/sclera: Conjunctivae normal.   Cardiovascular:      Rate and Rhythm: Normal rate.   Pulmonary:      Effort: Pulmonary effort is normal.   Neurological:      Mental Status: She is alert and oriented to person, place, and time.   Psychiatric:         Speech: Speech normal.         Behavior: Behavior normal.               Diagnoses and health risks identified today and associated recommendations/orders:    1. Encounter for preventive health examination  Discussed health maintenance guidelines appropriate for age.        2. Major depressive disorder, single episode, mild  Stable, continue to monitor  Not on medication  Feels it's more seasonal   Monitored per pcp       Provided Karina with a 5-10 year written screening schedule and personal prevention plan. Recommendations were developed using the USPSTF age appropriate " recommendations. Education, counseling, and referrals were provided as needed. After Visit Summary printed and given to patient which includes a list of additional screenings\tests needed.    Follow up for One year for Annual Wellness Visit.    Qiana Ribeiro NP      I offered to discuss advanced care planning, including how to pick a person who would make decisions for you if you were unable to make them for yourself, called a health care power of , and what kind of decisions you might make such as use of life sustaining treatments such as ventilators and tube feeding when faced with a life limiting illness recorded on a living will that they will need to know. (How you want to be cared for as you near the end of your natural life)     X  Patient has advanced directives written and agrees to provide copies to the institution.

## 2024-03-27 ENCOUNTER — OFFICE VISIT (OUTPATIENT)
Dept: PODIATRY | Facility: CLINIC | Age: 74
End: 2024-03-27
Payer: MEDICARE

## 2024-03-27 VITALS — BODY MASS INDEX: 20.69 KG/M2 | WEIGHT: 109.56 LBS | HEIGHT: 61 IN

## 2024-03-27 DIAGNOSIS — L84 CORN OR CALLUS: ICD-10-CM

## 2024-03-27 DIAGNOSIS — M21.41 ACQUIRED PES PLANOVALGUS OF RIGHT FOOT: ICD-10-CM

## 2024-03-27 DIAGNOSIS — M20.41 HAMMER TOES OF BOTH FEET: ICD-10-CM

## 2024-03-27 DIAGNOSIS — M20.42 HAMMER TOES OF BOTH FEET: ICD-10-CM

## 2024-03-27 DIAGNOSIS — M20.12 VALGUS DEFORMITY OF BOTH GREAT TOES: Primary | ICD-10-CM

## 2024-03-27 DIAGNOSIS — M20.11 VALGUS DEFORMITY OF BOTH GREAT TOES: Primary | ICD-10-CM

## 2024-03-27 DIAGNOSIS — M21.42 ACQUIRED PES PLANOVALGUS OF LEFT FOOT: ICD-10-CM

## 2024-03-27 PROCEDURE — 99999 PR PBB SHADOW E&M-EST. PATIENT-LVL III: CPT | Mod: PBBFAC,,, | Performed by: PODIATRIST

## 2024-03-27 PROCEDURE — 99213 OFFICE O/P EST LOW 20 MIN: CPT | Mod: S$GLB,,, | Performed by: PODIATRIST

## 2024-03-27 NOTE — PROGRESS NOTES
Subjective:      Patient ID: Karina Mathur is a 73 y.o. female.    Chief Complaint: Callouses and Foot Pain (Bilateral callouses, foot pain)    Karina is a 73 y.o. female who presents to the podiatry clinic  with complaint of  right foot pain at the second toe with callus formation at the interspace. Onset of the symptoms was several years ago. Precipitating event: none known. Current symptoms include: ability to bear weight, but with some pain and worsening symptoms after a period of activity. Aggravating factors: any weight bearing. Symptoms have gradually worsened. Patient has had prior foot problems. Evaluation to date:  seen about 3 years ago with Dr. Teague who gave her some toe spacers . Patients rates pain 6/10 on pain scale.    3/27/24: Patient returns with pain in the ball of the foot bilateral with weight bearing    Review of Systems   Constitutional: Negative for chills and fever.   Cardiovascular:  Negative for claudication and leg swelling.   Respiratory:  Negative for shortness of breath.    Skin:  Negative for itching, nail changes and rash.        calluses   Musculoskeletal:  Negative for muscle cramps, muscle weakness and myalgias.        Bunions and hammertoes   Gastrointestinal:  Negative for nausea and vomiting.   Neurological:  Negative for focal weakness, loss of balance, numbness and paresthesias.           Objective:      Physical Exam  Constitutional:       General: She is not in acute distress.     Appearance: She is well-developed. She is not diaphoretic.   Cardiovascular:      Pulses:           Dorsalis pedis pulses are 2+ on the right side and 2+ on the left side.        Posterior tibial pulses are 2+ on the right side and 2+ on the left side.      Comments: < 3 sec capillary refill time to toes 1-5 bilateral. Toes and feet are warm to touch proximally with normal distal cooling b/l. There is some hair growth on the feet and toes b/l. There is no edema b/l. No spider veins or  varicosities present b/l.     Musculoskeletal:      Comments: Equinus noted b/l ankles with < 10 deg DF noted. MMT 5/5 in DF/PF/Inv/Ev resistance with no reproduction of pain in any direction. Passive range of motion of ankle and pedal joints is painless b/l.      Painful medial 1st MTPJ exostosis bilateral. Lateral deviation of hallux, somewhat trackbound. Bilateral hammertoe deformities semi reducible     Flat non reducible arches bilateral with noted fat pad atrophy to the plantar feet and callus formation sub second met head bilateral     Skin:     General: Skin is warm and dry.      Coloration: Skin is not pale.      Findings: Lesion present. No abrasion, bruising, burn, ecchymosis, erythema, laceration, petechiae or rash.      Nails: There is no clubbing.      Comments: Skin temperature, texture and turgor within normal limits.    Right 1-2 toes interspace kissing hyperkeratotic lesions with pain to palpation   Neurological:      Mental Status: She is alert and oriented to person, place, and time.      Sensory: No sensory deficit.      Motor: No tremor, atrophy or abnormal muscle tone.      Comments: Negative tinel sign bilateral.   Psychiatric:         Behavior: Behavior normal.               Assessment:       Encounter Diagnoses   Name Primary?    Valgus deformity of both great toes Yes    Hammer toes of both feet     Corn or callus     Acquired pes planovalgus of left foot     Acquired pes planovalgus of right foot            Plan:       Karina was seen today for callouses and foot pain.    Diagnoses and all orders for this visit:    Valgus deformity of both great toes  -     ORTHOTIC DEVICE (DME)    Hammer toes of both feet  -     ORTHOTIC DEVICE (DME)    Corn or callus  -     ORTHOTIC DEVICE (DME)    Acquired pes planovalgus of left foot  -     ORTHOTIC DEVICE (DME)    Acquired pes planovalgus of right foot  -     ORTHOTIC DEVICE (DME)        I counseled the patient on her conditions, their implications  and medical management.    Recommend toe spacers with shoes to prevent rubbing of the 1-2 toes    Wide supportive shoes at all times    Recommend custom orthotic inserts, prescription was dispensed    Use ebanel cream to the calluses daily    Return PRN    Terence Dubois DPM

## 2024-03-28 ENCOUNTER — PATIENT MESSAGE (OUTPATIENT)
Dept: UROGYNECOLOGY | Facility: CLINIC | Age: 74
End: 2024-03-28
Payer: MEDICARE

## 2024-03-28 ENCOUNTER — TELEPHONE (OUTPATIENT)
Dept: UROGYNECOLOGY | Facility: CLINIC | Age: 74
End: 2024-03-28
Payer: MEDICARE

## 2024-03-28 DIAGNOSIS — Z01.818 PREOPERATIVE TESTING: ICD-10-CM

## 2024-03-28 DIAGNOSIS — N39.46 URINARY INCONTINENCE, MIXED: Primary | ICD-10-CM

## 2024-03-28 DIAGNOSIS — N81.4 UTEROVAGINAL PROLAPSE: ICD-10-CM

## 2024-03-28 DIAGNOSIS — N81.11 CYSTOCELE, MIDLINE: ICD-10-CM

## 2024-03-31 ENCOUNTER — ANESTHESIA EVENT (OUTPATIENT)
Dept: SURGERY | Facility: HOSPITAL | Age: 74
End: 2024-03-31
Payer: MEDICARE

## 2024-04-01 ENCOUNTER — TELEPHONE (OUTPATIENT)
Dept: FAMILY MEDICINE | Facility: CLINIC | Age: 74
End: 2024-04-01
Payer: MEDICARE

## 2024-04-01 DIAGNOSIS — Z01.818 PREOP EXAMINATION: Primary | ICD-10-CM

## 2024-04-01 NOTE — TELEPHONE ENCOUNTER
----- Message from Shwetha Guaman NP sent at 3/31/2024 12:27 PM CDT -----  Sabine Javierjese Mathur is scheduled to have a robotic assisted laparoscopic hysterectomy, sacrocolpopexy, removal of tubes and ovaries, anterior/posterior repair, midurethral sling, and cystosocpy with Dr. Griffin on 05/27/2024.  Please assist with surgical clearance.   She is scheduled to see you on 05/13/2024. When completed,  please place a note in Epic stating whether the patient is cleared for surgery. If you have any questions our office number is [phone], feel free to contact us.     Sincerely,     NATACHA Duenas-BC  Female Pelvic Medicine& Reconstructive surgery  [facility], Department of Urogynecology

## 2024-04-07 ENCOUNTER — PATIENT MESSAGE (OUTPATIENT)
Dept: OTOLARYNGOLOGY | Facility: CLINIC | Age: 74
End: 2024-04-07
Payer: MEDICARE

## 2024-04-07 DIAGNOSIS — H69.91 DYSFUNCTION OF RIGHT EUSTACHIAN TUBE: ICD-10-CM

## 2024-04-11 ENCOUNTER — PATIENT MESSAGE (OUTPATIENT)
Dept: PLASTIC SURGERY | Facility: CLINIC | Age: 74
End: 2024-04-11
Payer: MEDICARE

## 2024-04-11 NOTE — PRE-PROCEDURE INSTRUCTIONS
PreOp Instructions given:    -- Medication information (what to hold and what to take)   -- NPO guidelines as follows: (or as per your Surgeon)  Stop ALL solid food, gum, candy 8 hours before surgery/procedure time.  Stop all CLOUDY liquids: coffee with creamer, cloudy juices, 8 hours prior to surgery/procedure  time.  The patient should be ENCOURAGED to drink carbohydrate-rich clear liquids (sports drinks, clear juices) until 2 hours prior to surgery/procedure  time.  CLEAR liquids include water, black coffee NO creamer, clear oral rehydration drinks, clear sports drinks and clear fruit juices (no orange juice, no pulpy juices, no apple cider).   IF IN DOUBT, drink water instead.   NOTHING TO DRINK 2 hours before to surgery/procedure  time. If you are told to take medication on the morning of surgery, it may be taken with a sip of water.   -- *Arrival place and directions given*.  Time to be given the day before procedure by the Surgeon's Office   -- Bathe with antibacterial soap (dial or Hibiclens as instructed)  -- Don't wear any jewelry or valuables and not metals on skin or hair AM of surgery   -- No makeup or moisturizer to face   -- No perfume/cologne, powder, lotions, aftershave or deodorant    Pt verbalized understanding.         *If going to , see below:     Directions and Instructions for [facility]   At [facility], we have an outstanding team of physicians, anesthesiologists, CRNAs, Registered Nurses, Surgical Technologists, and other ancillary team members all focused on your surgical and procedural care.   Before Your Procedure:   The physician's office will call you with a specific arrival time and directions a day or two before your scheduled procedure. You may also receive these instructions through your [facility] portal.   Day of Procedure:   Please be sure to arrive at the arrival time given or you may risk your surgery being delayed or canceled. The  arrival time is earlier than your scheduled surgery or procedure time. In the winter months please dress warm and bring blankets for you or your child as the waiting room may be cold. If you have difficulty locating the facility, please give us a call at [phone].   Directions:   The [facility] is located on the 1st floor of the hospital building near the [place] entrance.   Parking:   You will park in the South Parking Garage (note location on map). [facility] opens at 5:00 a.m. and has a drop off area by the entrance.  parking is available starting at 7:00 a.m. Please see below for further  parking instructions.   Directions from the parking garage elevators   Blue [facility] Elevators: From the parking garage, take the blue [place] elevators (located in the center of the parking garage) to the 1st floor of the garage. You will then take a right once off the elevators then another right to the outside of the parking garage. You will be across from the [facility]. You will walk down the sidewalk, pass the  curve at the [place] entrance and continue to follow the sidewalk. You will pass the radiation oncology entrance on your right. Continue to follow the sidewalk to the [facility] glass door entrance.   Hospital Entrance (Inside Route): If a mostly inside route is preferred: Take the inside elevator bank (located at the far north end of the garage) from the parking garage to the 1st floor. On the 1st floor walk past [employer]. Keep walking down the center of the hallway towards the hospital elevators. Once you reach the red brick santana, take a left and go past the hospital elevators. Take another left and follow the blue and white [place] signs around the hallway to the end. Go outside of the door. You will see the [facility] entrance to your right.   Drop Off:   There is a drop off area at the doors of  the [facility] for your convenience. If utilized for pediatric patients, an adult must accompany the patient into the surgery center while another adult rangel the vehicle.   [place] (at 7:00 a.m.):   Upon check-in, please let the  know that you are utilizing [email or web address] parking which is free. The . will then call [email or web address] for your car to be picked up. Your keys and phone number will be collected and given to [email or web address] services. You will then be given a ticket. Upon discharge, [email or web address] will be notified to bring your vehicle back when you are ready.   2/6/2024      If going to 2nd floor surgery center, see below:    Directions to the 2nd floor ([facility]) Surgery Center  The hallway to get to the surgery center is on the 2nd fl between the gold elevators in the atrium.  Follow the hallway into the waiting room (has a fish tank) and check in at desk.

## 2024-04-12 NOTE — ANESTHESIA PREPROCEDURE EVALUATION
04/12/2024  Karina Mathur is a 73 y.o., female.  Patient Active Problem List   Diagnosis    Other hyperlipidemia    Osteopenia    Vitamin D insufficiency    Postablative hypothyroidism    Cervicalgia    Chronic midline low back pain    Hav (hallux abducto valgus), unspecified laterality    Hammer toes of both feet    Pain in both feet    Heloma molle    Memory change    Anxiety    Chronic insomnia    Cystocele, midline- with urgency    Major depressive disorder, single episode, mild    Urinary incontinence, mixed    Vaginal atrophy    Rectocele, female    Uterovaginal prolapse    Nocturia more than twice per night    Loose stools     Pre-operative evaluation for Procedure(s) (LRB):  BLEPHAROPLASTY, UPPER EYELID (Left)  REPAIR,BROW PTOSIS (Left)    Karina Mathur is a 73 y.o. female     Patient Active Problem List   Diagnosis    Other hyperlipidemia    Osteopenia    Vitamin D insufficiency    Postablative hypothyroidism    Cervicalgia    Chronic midline low back pain    Hav (hallux abducto valgus), unspecified laterality    Hammer toes of both feet    Pain in both feet    Heloma molle    Memory change    Anxiety    Chronic insomnia    Cystocele, midline- with urgency    Major depressive disorder, single episode, mild    Urinary incontinence, mixed    Vaginal atrophy    Rectocele, female    Uterovaginal prolapse    Nocturia more than twice per night    Loose stools       Review of patient's allergies indicates:  No Known Allergies    No current facility-administered medications on file prior to encounter.     Current Outpatient Medications on File Prior to Encounter   Medication Sig Dispense Refill    ascorbic acid, vitamin C, (VITAMIN C) 500 MG tablet Take 500 mg by mouth once daily.      calcium citrate/vitamin D3 (CITRACAL REGULAR ORAL) Take by mouth.      levocetirizine (XYZAL) 5 MG tablet Take 1  tablet (5 mg total) by mouth every evening. 30 tablet 3    levothyroxine (SYNTHROID) 125 MCG tablet Take 1 tablet (125 mcg total) by mouth every other day. 90 tablet 3    multivitamin (THERAGRAN) per tablet Take 1 tablet by mouth once daily.      simvastatin (ZOCOR) 20 MG tablet Take 1 tablet (20 mg total) by mouth every evening. 90 tablet 3    vitamin D 1000 units Tab Take 1,000 Units by mouth once daily.       fluticasone propionate (FLONASE) 50 mcg/actuation nasal spray 1 spray (50 mcg total) by Each Nostril route 2 (two) times daily. (Patient not taking: Reported on 3/11/2024) 16 g 3       Past Surgical History:   Procedure Laterality Date    COLONOSCOPY N/A 2/21/2024    Procedure: COLONOSCOPY;  Surgeon: LAMAR Luong MD;  Location: [facility];  Service: Endoscopy;  Laterality: N/A;    INGUINAL HERNIA REPAIR         Social History     Socioeconomic History    Marital status:    Tobacco Use    Smoking status: Never    Smokeless tobacco: Never   Substance and Sexual Activity    Alcohol use: Yes     Alcohol/week: 3.0 standard drinks of alcohol     Types: 3 Glasses of wine per week     Comment: 3 a week    Drug use: No    Sexual activity: Not Currently     Social Determinants of Health     Financial Resource Strain: Low Risk  (11/22/2022)    Overall Financial Resource Strain (CARDIA)     Difficulty of Paying Living Expenses: Not hard at all   Food Insecurity: No Food Insecurity (11/22/2022)    Hunger Vital Sign     Worried About Running Out of Food in the Last Year: Never true     Ran Out of Food in the Last Year: Never true   Transportation Needs: No Transportation Needs (11/22/2022)    PRAPARE - Transportation     Lack of Transportation (Medical): No     Lack of Transportation (Non-Medical): No   Physical Activity: Sufficiently Active (11/22/2022)    Exercise Vital Sign     Days of Exercise per Week: 4 days     Minutes of Exercise per Session: 60 min   Stress: Stress Concern Present (11/22/2022)     [facility] of Occupational Health - Occupational Stress Questionnaire     Feeling of Stress : To some extent   Social Connections: Moderately Integrated (11/22/2022)    Social Connection and Isolation Panel [NHANES]     Frequency of Communication with Friends and Family: More than three times a week     Frequency of Social Gatherings with Friends and Family: More than three times a week     Attends Gnosticism Services: More than 4 times per year     Active Member of Clubs or Organizations: No     Attends Club or Organization Meetings: Never     Marital Status:    Housing Stability: Low Risk  (11/22/2022)    Housing Stability Vital Sign     Unable to Pay for Housing in the Last Year: No     Number of Places Lived in the Last Year: 1     Unstable Housing in the Last Year: No         Pre-op Assessment    I have reviewed the Patient Summary Reports.       I have reviewed the Medications.     Review of Systems  Anesthesia Hx:  No problems with previous Anesthesia               Denies Personal Hx of Anesthesia complications.                    Social:  Non-Smoker       Cardiovascular:                hyperlipidemia                             Pulmonary:  Pulmonary Normal                       Hepatic/GI:  Hepatic/GI Normal                 Neurological:  Neurology Normal                                      Endocrine:   Hypothyroidism       Hypothyroidism          Psych:  Psychiatric History                  Physical Exam    Airway:  Mallampati: / II  Mouth Opening: Small, but > 3cm  TM Distance: Normal  Tongue: Normal  No airway management difficulties anticipated  Dental:  Intact  No active dental issues noted  Chest/Lungs:  Clear to auscultation    Heart:  Rate: Normal  Rhythm: Regular Rhythm  Sounds: Normal        Anesthesia Plan  Type of Anesthesia, risks & benefits discussed:    Anesthesia Type: Gen ETT  Intra-op Monitoring Plan: Standard ASA Monitors  Post Op Pain Control Plan: multimodal  analgesia  Induction:  IV  Airway Plan: Direct, Post-Induction  Informed Consent: Informed consent signed with the Patient and all parties understand the risks and agree with anesthesia plan.  All questions answered.   ASA Score: 2  Anesthesia Plan Notes: Chart reviewed. Patient seen and examined. Anesthesia plan discussed and questions answered. E-consent signed. Larry Ballesteros MD    Ready For Surgery From Anesthesia Perspective.     .

## 2024-04-15 ENCOUNTER — HOSPITAL ENCOUNTER (OUTPATIENT)
Facility: HOSPITAL | Age: 74
Discharge: HOME | End: 2024-04-15
Attending: SURGERY | Admitting: SURGERY
Payer: MEDICARE

## 2024-04-15 ENCOUNTER — ANESTHESIA (OUTPATIENT)
Dept: SURGERY | Facility: HOSPITAL | Age: 74
End: 2024-04-15
Payer: MEDICARE

## 2024-04-15 VITALS
OXYGEN SATURATION: 96 % | TEMPERATURE: 97 F | WEIGHT: 109.56 LBS | HEART RATE: 60 BPM | RESPIRATION RATE: 12 BRPM | BODY MASS INDEX: 20.69 KG/M2 | DIASTOLIC BLOOD PRESSURE: 52 MMHG | SYSTOLIC BLOOD PRESSURE: 94 MMHG | HEIGHT: 61 IN

## 2024-04-15 DIAGNOSIS — H02.839 DERMATOCHALASIS: ICD-10-CM

## 2024-04-15 DIAGNOSIS — H02.839 DERMATOCHALASIS, UNSPECIFIED LATERALITY: Primary | ICD-10-CM

## 2024-04-15 PROCEDURE — D9220A PRA ANESTHESIA: Mod: ANES,,, | Performed by: ANESTHESIOLOGY

## 2024-04-15 PROCEDURE — 63600175 PHARM REV CODE 636 W HCPCS: Performed by: STUDENT IN AN ORGANIZED HEALTH CARE EDUCATION/TRAINING PROGRAM

## 2024-04-15 PROCEDURE — D9220A PRA ANESTHESIA: Mod: CRNA,,, | Performed by: NURSE ANESTHETIST, CERTIFIED REGISTERED

## 2024-04-15 PROCEDURE — 71000044 HC DOSC ROUTINE RECOVERY FIRST HOUR: Performed by: SURGERY

## 2024-04-15 PROCEDURE — 15822 BLEPHAROPLASTY UPPER EYELID: CPT | Mod: 51,LT,, | Performed by: SURGERY

## 2024-04-15 PROCEDURE — 36000707: Performed by: SURGERY

## 2024-04-15 PROCEDURE — 37000009 HC ANESTHESIA EA ADD 15 MINS: Performed by: SURGERY

## 2024-04-15 PROCEDURE — 36000706: Performed by: SURGERY

## 2024-04-15 PROCEDURE — 25000003 PHARM REV CODE 250: Performed by: STUDENT IN AN ORGANIZED HEALTH CARE EDUCATION/TRAINING PROGRAM

## 2024-04-15 PROCEDURE — 71000015 HC POSTOP RECOV 1ST HR: Performed by: SURGERY

## 2024-04-15 PROCEDURE — 25000003 PHARM REV CODE 250: Performed by: NURSE ANESTHETIST, CERTIFIED REGISTERED

## 2024-04-15 PROCEDURE — 63600175 PHARM REV CODE 636 W HCPCS: Performed by: NURSE ANESTHETIST, CERTIFIED REGISTERED

## 2024-04-15 PROCEDURE — 67900 REPAIR BROW DEFECT: CPT | Mod: LT,,, | Performed by: SURGERY

## 2024-04-15 PROCEDURE — 37000008 HC ANESTHESIA 1ST 15 MINUTES: Performed by: SURGERY

## 2024-04-15 PROCEDURE — 25000003 PHARM REV CODE 250: Performed by: SURGERY

## 2024-04-15 RX ORDER — LIDOCAINE HYDROCHLORIDE 20 MG/ML
INJECTION INTRAVENOUS
Status: DISCONTINUED | OUTPATIENT
Start: 2024-04-15 | End: 2024-04-15

## 2024-04-15 RX ORDER — LIDOCAINE HYDROCHLORIDE 10 MG/ML
1 INJECTION, SOLUTION EPIDURAL; INFILTRATION; INTRACAUDAL; PERINEURAL ONCE
Status: DISCONTINUED | OUTPATIENT
Start: 2024-04-15 | End: 2024-04-15 | Stop reason: HOSPADM

## 2024-04-15 RX ORDER — ROCURONIUM BROMIDE 10 MG/ML
INJECTION, SOLUTION INTRAVENOUS
Status: DISCONTINUED | OUTPATIENT
Start: 2024-04-15 | End: 2024-04-15

## 2024-04-15 RX ORDER — SODIUM CHLORIDE 0.9 % (FLUSH) 0.9 %
10 SYRINGE (ML) INJECTION
Status: DISCONTINUED | OUTPATIENT
Start: 2024-04-15 | End: 2024-04-15 | Stop reason: HOSPADM

## 2024-04-15 RX ORDER — HYDROCODONE BITARTRATE AND ACETAMINOPHEN 5; 325 MG/1; MG/1
1 TABLET ORAL EVERY 6 HOURS PRN
Qty: 12 TABLET | Refills: 0 | Status: SHIPPED | OUTPATIENT
Start: 2024-04-15 | End: 2024-04-22

## 2024-04-15 RX ORDER — FENTANYL CITRATE 50 UG/ML
25 INJECTION, SOLUTION INTRAMUSCULAR; INTRAVENOUS EVERY 5 MIN PRN
Status: DISCONTINUED | OUTPATIENT
Start: 2024-04-15 | End: 2024-04-15 | Stop reason: HOSPADM

## 2024-04-15 RX ORDER — ONDANSETRON HYDROCHLORIDE 2 MG/ML
INJECTION, SOLUTION INTRAVENOUS
Status: DISCONTINUED | OUTPATIENT
Start: 2024-04-15 | End: 2024-04-15

## 2024-04-15 RX ORDER — LEVOCETIRIZINE DIHYDROCHLORIDE 5 MG/1
5 TABLET, FILM COATED ORAL NIGHTLY
Qty: 30 TABLET | Refills: 3 | Status: SHIPPED | OUTPATIENT
Start: 2024-04-15

## 2024-04-15 RX ORDER — FENTANYL CITRATE 50 UG/ML
INJECTION, SOLUTION INTRAMUSCULAR; INTRAVENOUS
Status: DISCONTINUED | OUTPATIENT
Start: 2024-04-15 | End: 2024-04-15

## 2024-04-15 RX ORDER — PROPOFOL 10 MG/ML
VIAL (ML) INTRAVENOUS
Status: DISCONTINUED | OUTPATIENT
Start: 2024-04-15 | End: 2024-04-15

## 2024-04-15 RX ORDER — DEXAMETHASONE SODIUM PHOSPHATE 4 MG/ML
INJECTION, SOLUTION INTRA-ARTICULAR; INTRALESIONAL; INTRAMUSCULAR; INTRAVENOUS; SOFT TISSUE
Status: DISCONTINUED | OUTPATIENT
Start: 2024-04-15 | End: 2024-04-15

## 2024-04-15 RX ORDER — MUPIROCIN 20 MG/G
OINTMENT TOPICAL
Status: DISCONTINUED | OUTPATIENT
Start: 2024-04-15 | End: 2024-04-15 | Stop reason: HOSPADM

## 2024-04-15 RX ORDER — DEXMEDETOMIDINE HYDROCHLORIDE 100 UG/ML
INJECTION, SOLUTION INTRAVENOUS
Status: DISCONTINUED | OUTPATIENT
Start: 2024-04-15 | End: 2024-04-15

## 2024-04-15 RX ORDER — PHENYLEPHRINE HYDROCHLORIDE 10 MG/ML
INJECTION INTRAVENOUS
Status: DISCONTINUED | OUTPATIENT
Start: 2024-04-15 | End: 2024-04-15

## 2024-04-15 RX ORDER — LIDOCAINE HYDROCHLORIDE AND EPINEPHRINE 5; 5 MG/ML; UG/ML
INJECTION, SOLUTION INFILTRATION; PERINEURAL
Status: DISCONTINUED | OUTPATIENT
Start: 2024-04-15 | End: 2024-04-15 | Stop reason: HOSPADM

## 2024-04-15 RX ORDER — NEOMYCIN AND POLYMYXIN B SULFATES AND BACITRACIN ZINC 400; 3.5; 1 [USP'U]/G; MG/G; [USP'U]/G
OINTMENT OPHTHALMIC 2 TIMES DAILY
Qty: 3.5 G | Refills: 0 | Status: SHIPPED | OUTPATIENT
Start: 2024-04-15 | End: 2024-04-22

## 2024-04-15 RX ORDER — HALOPERIDOL 5 MG/ML
0.5 INJECTION INTRAMUSCULAR EVERY 10 MIN PRN
Status: DISCONTINUED | OUTPATIENT
Start: 2024-04-15 | End: 2024-04-15 | Stop reason: HOSPADM

## 2024-04-15 RX ADMIN — SUGAMMADEX 100 MG: 100 INJECTION, SOLUTION INTRAVENOUS at 08:04

## 2024-04-15 RX ADMIN — FENTANYL CITRATE 50 MCG: 50 INJECTION, SOLUTION INTRAMUSCULAR; INTRAVENOUS at 07:04

## 2024-04-15 RX ADMIN — LIDOCAINE HYDROCHLORIDE 60 MG: 20 INJECTION INTRAVENOUS at 07:04

## 2024-04-15 RX ADMIN — ONDANSETRON 4 MG: 2 INJECTION INTRAMUSCULAR; INTRAVENOUS at 08:04

## 2024-04-15 RX ADMIN — SODIUM CHLORIDE: 9 INJECTION, SOLUTION INTRAVENOUS at 07:04

## 2024-04-15 RX ADMIN — PROPOFOL 100 MG: 10 INJECTION, EMULSION INTRAVENOUS at 07:04

## 2024-04-15 RX ADMIN — CEFAZOLIN 2 G: 2 INJECTION, POWDER, FOR SOLUTION INTRAMUSCULAR; INTRAVENOUS at 07:04

## 2024-04-15 RX ADMIN — PHENYLEPHRINE HYDROCHLORIDE 100 MCG: 10 INJECTION INTRAVENOUS at 08:04

## 2024-04-15 RX ADMIN — ROCURONIUM BROMIDE 30 MG: 10 INJECTION, SOLUTION INTRAVENOUS at 07:04

## 2024-04-15 RX ADMIN — DEXMEDETOMIDINE 8 MCG: 100 INJECTION, SOLUTION, CONCENTRATE INTRAVENOUS at 07:04

## 2024-04-15 RX ADMIN — PHENYLEPHRINE HYDROCHLORIDE 50 MCG: 10 INJECTION INTRAVENOUS at 07:04

## 2024-04-15 RX ADMIN — FENTANYL CITRATE 25 MCG: 50 INJECTION, SOLUTION INTRAMUSCULAR; INTRAVENOUS at 07:04

## 2024-04-15 RX ADMIN — DEXAMETHASONE SODIUM PHOSPHATE 4 MG: 4 INJECTION, SOLUTION INTRAMUSCULAR; INTRAVENOUS at 07:04

## 2024-04-15 RX ADMIN — GLYCOPYRROLATE 0.2 MG: 0.2 INJECTION, SOLUTION INTRAMUSCULAR; INTRAVENOUS at 08:04

## 2024-04-15 NOTE — DISCHARGE INSTRUCTIONS
Okay for eye drops, bacitracin ophthalmic over incision  Ice the incision to help reduce swelling  Okay to shower but avoid submerging the incisions in bath tub/pool  Use of narcotics can lead to constipation, recommend taking a stool softener while on medications  Call the office with any questions or concerns

## 2024-04-15 NOTE — BRIEF OP NOTE
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    DISPOSITION: Home or Self Care    FINAL DIAGNOSIS:  <principal problem not specified>    FOLLOWUP: In clinic    DISCHARGE INSTRUCTIONS:  No discharge procedures on file.

## 2024-04-15 NOTE — TELEPHONE ENCOUNTER
----- Message from Tena Crum RN sent at 12/4/2023 11:22 AM CST -----    ----- Message -----  From: Lexie Douglas  Sent: 12/4/2023  10:18 AM CST  To: Andre Briscoe Staff    Type: Need Medical Advice  Who Called:Patient  Best callback number: [phone]  Additional Information: Patient called to reschedule her procedure on 12/27  Please call to further assist, Thanks.         What Type Of Note Output Would You Prefer (Optional)?: Bullet Format Has Your Skin Lesion Been Treated?: not been treated Is This A New Presentation, Or A Follow-Up?: Skin Lesions

## 2024-04-15 NOTE — H&P
Plastic and Reconstructive Surgery   H&P    Date of Consultation:   04/15/2024    Reason for Consultation:  Dermatochalasis     History of Present Illness:       Pt presented to the Plastic Surgery Clinic with a chief complaint of excess skin of the left eye lid. Pt states that she has had this for several years, but it is now beginning to affect her vision. Pt reports she has difficulty driving as a result of the excess eye lid skin. Physical exam revealed excess skin laterally of the left eye   Visual acuity testing performed.              Past Medical History:    has a past medical history of Cataract, Dry eyes, bilateral, Hyperlipidemia, Hypothyroidism, Osteopenia, Prolapse of female bladder, acquired, and PVD (posterior vitreous detachment), bilateral.    Past Surgical History:    has a past surgical history that includes Inguinal hernia repair and Colonoscopy (N/A, 2/21/2024).    Social History:  Social History     Tobacco Use    Smoking status: Never    Smokeless tobacco: Never   Substance Use Topics    Alcohol use: Yes     Alcohol/week: 3.0 standard drinks of alcohol     Types: 3 Glasses of wine per week     Comment: 3 a week     Social History     Substance and Sexual Activity   Drug Use No       Family History:  Family History   Problem Relation Name Age of Onset    Rheum arthritis Mother      Hypertension Father      Dementia Father      Hypertension Sister      Hypothyroidism Sister         Allergies:  Review of patient's allergies indicates:  No Known Allergies    Home Medications:  Prior to Admission medications    Medication Sig Start Date End Date Taking? Authorizing Provider   ascorbic acid, vitamin C, (VITAMIN C) 500 MG tablet Take 500 mg by mouth once daily.   Yes Provider, Historical   BIOTIN ORAL Take by mouth.   Yes Provider, Historical   calcium citrate/vitamin D3 (CITRACAL REGULAR ORAL) Take by mouth.   Yes Provider, Historical   levocetirizine (XYZAL) 5 MG tablet Take 1 tablet (5 mg total)  "by mouth every evening. 12/4/23  Yes Beto Farfan PA-C   levothyroxine (SYNTHROID) 125 MCG tablet Take 1 tablet (125 mcg total) by mouth every other day. 11/13/23  Yes Mohan Davlaos, DO   multivitamin (THERAGRAN) per tablet Take 1 tablet by mouth once daily.   Yes Provider, Historical   simvastatin (ZOCOR) 20 MG tablet Take 1 tablet (20 mg total) by mouth every evening. 11/13/23  Yes Mohan Davalos, DO   vitamin D 1000 units Tab Take 1,000 Units by mouth once daily.    Yes Provider, Historical   estradioL (ESTRACE) 0.01 % (0.1 mg/gram) vaginal cream 0.5 grams with applicator or dime-sized amount with finger in vagina nightly x 2 weeks, then twice a week thereafter  Patient taking differently: 0.5 grams with applicator or dime-sized amount with finger in vagina nightly x 2 weeks, then twice a week thereafter  Use nightly 3/11/24   Wendy Griffin MD   fluticasone propionate (FLONASE) 50 mcg/actuation nasal spray 1 spray (50 mcg total) by Each Nostril route 2 (two) times daily.  Patient not taking: Reported on 3/11/2024 8/30/23   Beto Farfan PA-C       Review of Systems:  Negative except for what is noted in HPI    Physical Exam:  VITAL SIGNS:   Vitals:    04/15/24 0546   BP: (!) 100/53   BP Location: Right arm   Patient Position: Lying   Pulse: (!) 57   Resp: 18   SpO2: 98%   Weight: 49.7 kg (109 lb 9.1 oz)   Height: 5' 1" (1.549 m)     TMAX: No data recorded.    General: Alert; No acute distress  Cardiovascular: Regular rate   Respiratory: Normal respiratory effort. Chest rise symmetric.   Abdomen: Soft, nontender, nondistended  Extremity: Moves all extremities equally.  Neurologic: No focal deficit. Speech normal   Left eyelid dermatochalasis worse than right, decreased visual acuity noted along periphery       Diagnostic Data:  No results found for this or any previous visit (from the past 336 hour(s)).  No results found for this or any previous visit (from the past 336 " "hour(s)).  Lab Results   Component Value Date    ALBUMIN 4.0 06/21/2023     Lab Results   Component Value Date    CRP 0.39 10/12/2011     No results found for: "INR", "PROTIME"  No results found for: "PTT"    Microbiology Results (last 7 days)       ** No results found for the last 168 hours. **            Assessment:  73 y.o.female with dermatochalasis of the left eye for surgical correction.     Plan:  We will plan for left upper blepharoplasty and left sided brow lift in OR  Discussed with patient and/or family the risks and benefits of surgical intervention.  Conservative measures have been exhausted and patient would like to proceed with surgery.      We have discussed risks, which include but are not limited to blood clots in the legs that can travel to the lungs (pulmonary embolism). Pulmonary embolism can cause shortness of breath, chest pain, and even shock. Other risks include urinary tract infection, nausea and vomiting (usually related to pain medication), chronic pain, bleeding, nerve damage, blood vessel injury, scarring and infection, which can require re-operation. Furthermore, the risks of anesthesia include potential heart, lung, kidney, and liver damage.  Informed consent was obtained.  The patient understands and would like to proceed with surgery in the near future.        Ezio Hannah MD  Plastic Surgery Fellow   "

## 2024-04-15 NOTE — OP NOTE
Date of surgery 04/15/2024  Preoperative diagnosis  1. 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 Blepharochalasia  Postoperative diagnosis is the same  Procedure performed left upper lid blepharoplasty   2. Left unilateral brow lift   Surgeon Selene  Drains none  Anesthesia general  Complications none  Blood loss minimal      The patient was evaluated in the preoperative holding area in the upright position markings were made for both the brow lift as well as the upper lid blepharoplasty.  Patient had completely asymmetric brows.  With the left brow being ptotic and large amount of excess skin laterally.  The right eye had some excess skin (minimal hooding).  The patient was approved for a left side blepharoplasty as well as an upper lid blepharoplasty.  The patient was taken to the operative room placed in supine position after adequate general anesthesia was prepped and draped in a normal sterile fashion patient preoperatively was marked in a deep righted above the lateral aspect of the left eye.  1% lidocaine with epinephrine was instilled.  Proximally a a 5 mm strip of skin was then removed.  Blunt dissection proceeded through the subcutaneous tissue to avoid any type of damage to the frontal branch of the facial nerve.  A muscle suture was then taken from the inferior incision in placed in a parallel fashion with the frontal branch of the nerve carefully making sure that this was not damaged.  This secured the brow in the proper position skin was closed using 5 0 Monocryl followed by running 5 0 Monocryl subcuticular suture.  After completion of the brow lift attention was then placed to the upper lid.  Markings were again made and checked.  A 15. C blade was used to remove the excess skin.  A 3 mm strip of orbicularis oculi muscle was excised.  Hemostasis was achieved using the Bovie.  The septum was deepened using the Bovie.  Skin was closed using interrupted 5 0 nylon lateral to the lateral canthus in  interrupted 5 0 fast-absorbing gut medial to the lateral canthus.  There were no complications.

## 2024-04-15 NOTE — TRANSFER OF CARE
"Anesthesia Transfer of Care Note    Patient: Karina Mathur    Procedure(s) Performed: Procedure(s) (LRB):  BLEPHAROPLASTY, UPPER EYELID (Left)  REPAIR,BROW PTOSIS (Left)    Patient location: [facility]    Anesthesia Type: general    Transport from OR: Transported from OR on room air with adequate spontaneous ventilation    Post pain: adequate analgesia    Post assessment: no apparent anesthetic complications and tolerated procedure well    Post vital signs: stable    Level of consciousness: awake    Nausea/Vomiting: no nausea/vomiting    Complications: none    Transfer of care protocol was followed      Last vitals: Visit Vitals  BP (!) 103/51   Pulse 84   Temp 36.3 °C (97.3 °F)   Resp 16   Ht 5' 1" (1.549 m)   Wt 49.7 kg (109 lb 9.1 oz)   SpO2 (!) 93%   Breastfeeding No   BMI 20.70 kg/m²     "

## 2024-04-16 ENCOUNTER — PATIENT MESSAGE (OUTPATIENT)
Dept: PLASTIC SURGERY | Facility: CLINIC | Age: 74
End: 2024-04-16
Payer: MEDICARE

## 2024-04-17 ENCOUNTER — PATIENT MESSAGE (OUTPATIENT)
Dept: PLASTIC SURGERY | Facility: CLINIC | Age: 74
End: 2024-04-17
Payer: MEDICARE

## 2024-04-18 ENCOUNTER — OFFICE VISIT (OUTPATIENT)
Dept: PLASTIC SURGERY | Facility: CLINIC | Age: 74
End: 2024-04-18
Payer: MEDICARE

## 2024-04-18 VITALS
HEART RATE: 60 BPM | DIASTOLIC BLOOD PRESSURE: 72 MMHG | HEIGHT: 61 IN | BODY MASS INDEX: 20.69 KG/M2 | WEIGHT: 109.56 LBS | SYSTOLIC BLOOD PRESSURE: 102 MMHG

## 2024-04-18 DIAGNOSIS — Z09 SURGERY FOLLOW-UP EXAMINATION: Primary | ICD-10-CM

## 2024-04-18 PROCEDURE — 1101F PT FALLS ASSESS-DOCD LE1/YR: CPT | Mod: CPTII,S$GLB,, | Performed by: SURGERY

## 2024-04-18 PROCEDURE — 99024 POSTOP FOLLOW-UP VISIT: CPT | Mod: S$GLB,,, | Performed by: SURGERY

## 2024-04-18 PROCEDURE — 3078F DIAST BP <80 MM HG: CPT | Mod: CPTII,S$GLB,, | Performed by: SURGERY

## 2024-04-18 PROCEDURE — 1125F AMNT PAIN NOTED PAIN PRSNT: CPT | Mod: CPTII,S$GLB,, | Performed by: SURGERY

## 2024-04-18 PROCEDURE — 3288F FALL RISK ASSESSMENT DOCD: CPT | Mod: CPTII,S$GLB,, | Performed by: SURGERY

## 2024-04-18 PROCEDURE — 99999 PR PBB SHADOW E&M-EST. PATIENT-LVL III: CPT | Mod: PBBFAC,,, | Performed by: SURGERY

## 2024-04-18 PROCEDURE — 3074F SYST BP LT 130 MM HG: CPT | Mod: CPTII,S$GLB,, | Performed by: SURGERY

## 2024-04-18 NOTE — ANESTHESIA POSTPROCEDURE EVALUATION
Anesthesia Post Evaluation    Patient: Karina Mathur    Procedure(s) Performed: Procedure(s) (LRB):  BLEPHAROPLASTY, UPPER EYELID (Left)  REPAIR,BROW PTOSIS (Left)    Final Anesthesia Type: general      Patient location during evaluation: PACU  Patient participation: Yes- Able to Participate  Level of consciousness: awake and alert  Post-procedure vital signs: reviewed and stable  Pain management: adequate  Airway patency: patent    PONV status at discharge: No PONV  Anesthetic complications: no      Cardiovascular status: stable  Respiratory status: unassisted and spontaneous ventilation  Hydration status: euvolemic  Follow-up not needed.              Vitals Value Taken Time   BP 94/52 04/15/24 0945   Temp 36.3 °C (97.4 °F) 04/15/24 0945   Pulse 60 04/15/24 0945   Resp 12 04/15/24 0945   SpO2 96 % 04/15/24 0945         No case tracking events are documented in the log.      Pain/Suzi Score: No data recorded

## 2024-04-18 NOTE — PROGRESS NOTES
Patient presents to Plastic surgery Clinic after having a left unilateral upper lid blepharoplasty as well as a brow lift.  Patient came in just for an evaluation.  She was worried about the appearance of her surgery.  Physical examination reveals he is healing well no real problems.  She does have a little swelling or possibly excess skin at the point between the brow incision as well as the lower lid blepharoplasty.  We have to wait a little bit of time to see if this goes down.  If it does not go down this is really a 5 mm by 3 mm piece of tissue would need to be removed.  We would obviously do this under local anesthesia in the minor surgery room.  But we will give it some time 6-8 weeks to see if things settled down.

## 2024-04-22 ENCOUNTER — OFFICE VISIT (OUTPATIENT)
Dept: PLASTIC SURGERY | Facility: CLINIC | Age: 74
End: 2024-04-22
Payer: MEDICARE

## 2024-04-22 VITALS
WEIGHT: 109.56 LBS | DIASTOLIC BLOOD PRESSURE: 72 MMHG | BODY MASS INDEX: 20.69 KG/M2 | HEIGHT: 61 IN | SYSTOLIC BLOOD PRESSURE: 102 MMHG | HEART RATE: 60 BPM

## 2024-04-22 DIAGNOSIS — Z09 SURGERY FOLLOW-UP EXAMINATION: Primary | ICD-10-CM

## 2024-04-22 PROCEDURE — 99024 POSTOP FOLLOW-UP VISIT: CPT | Mod: S$GLB,,, | Performed by: SURGERY

## 2024-04-22 PROCEDURE — 3078F DIAST BP <80 MM HG: CPT | Mod: CPTII,S$GLB,, | Performed by: SURGERY

## 2024-04-22 PROCEDURE — 99999 PR PBB SHADOW E&M-EST. PATIENT-LVL III: CPT | Mod: PBBFAC,,, | Performed by: SURGERY

## 2024-04-22 PROCEDURE — 1101F PT FALLS ASSESS-DOCD LE1/YR: CPT | Mod: CPTII,S$GLB,, | Performed by: SURGERY

## 2024-04-22 PROCEDURE — 1159F MED LIST DOCD IN RCRD: CPT | Mod: CPTII,S$GLB,, | Performed by: SURGERY

## 2024-04-22 PROCEDURE — 3288F FALL RISK ASSESSMENT DOCD: CPT | Mod: CPTII,S$GLB,, | Performed by: SURGERY

## 2024-04-22 PROCEDURE — 3074F SYST BP LT 130 MM HG: CPT | Mod: CPTII,S$GLB,, | Performed by: SURGERY

## 2024-04-22 PROCEDURE — 1125F AMNT PAIN NOTED PAIN PRSNT: CPT | Mod: CPTII,S$GLB,, | Performed by: SURGERY

## 2024-04-22 NOTE — PROGRESS NOTES
Plastic surgery clinic after having an upper lid blepharoplasty as well as a brow lift on the left side.  She is done well the sutures removed today.  Site her back in clinic in 2 months.  She does have a very small amount of excess tissue which could be clipped in the minor surgery room.  If it does not settled down

## 2024-05-06 ENCOUNTER — LAB VISIT (OUTPATIENT)
Dept: LAB | Facility: HOSPITAL | Age: 74
End: 2024-05-06
Attending: INTERNAL MEDICINE
Payer: MEDICARE

## 2024-05-06 DIAGNOSIS — E78.49 OTHER HYPERLIPIDEMIA: ICD-10-CM

## 2024-05-06 DIAGNOSIS — E89.0 POSTABLATIVE HYPOTHYROIDISM: ICD-10-CM

## 2024-05-06 DIAGNOSIS — F51.04 CHRONIC INSOMNIA: ICD-10-CM

## 2024-05-06 LAB
ALBUMIN SERPL BCP-MCNC: 3.8 G/DL (ref 3.5–5.2)
ALP SERPL-CCNC: 73 U/L (ref 55–135)
ALT SERPL W/O P-5'-P-CCNC: 23 U/L (ref 10–44)
ANION GAP SERPL CALC-SCNC: 5 MMOL/L (ref 8–16)
AST SERPL-CCNC: 18 U/L (ref 10–40)
BASOPHILS # BLD AUTO: 0.05 K/UL (ref 0–0.2)
BASOPHILS NFR BLD: 1 % (ref 0–1.9)
BILIRUB SERPL-MCNC: 0.6 MG/DL (ref 0.1–1)
BUN SERPL-MCNC: 18 MG/DL (ref 8–23)
CALCIUM SERPL-MCNC: 9.3 MG/DL (ref 8.7–10.5)
CHLORIDE SERPL-SCNC: 106 MMOL/L (ref 95–110)
CHOLEST SERPL-MCNC: 198 MG/DL (ref 120–199)
CHOLEST/HDLC SERPL: 2.6 {RATIO} (ref 2–5)
CO2 SERPL-SCNC: 30 MMOL/L (ref 23–29)
CREAT SERPL-MCNC: 0.7 MG/DL (ref 0.5–1.4)
DIFFERENTIAL METHOD BLD: ABNORMAL
EOSINOPHIL # BLD AUTO: 0.2 K/UL (ref 0–0.5)
EOSINOPHIL NFR BLD: 4.9 % (ref 0–8)
ERYTHROCYTE [DISTWIDTH] IN BLOOD BY AUTOMATED COUNT: 13.1 % (ref 11.5–14.5)
EST. GFR  (NO RACE VARIABLE): >60 ML/MIN/1.73 M^2
GLUCOSE SERPL-MCNC: 99 MG/DL (ref 70–110)
HCT VFR BLD AUTO: 41.3 % (ref 37–48.5)
HDLC SERPL-MCNC: 76 MG/DL (ref 40–75)
HDLC SERPL: 38.4 % (ref 20–50)
HGB BLD-MCNC: 13.8 G/DL (ref 12–16)
IMM GRANULOCYTES # BLD AUTO: 0.01 K/UL (ref 0–0.04)
IMM GRANULOCYTES NFR BLD AUTO: 0.2 % (ref 0–0.5)
LDLC SERPL CALC-MCNC: 112.8 MG/DL (ref 63–159)
LYMPHOCYTES # BLD AUTO: 1.6 K/UL (ref 1–4.8)
LYMPHOCYTES NFR BLD: 32.5 % (ref 18–48)
MCH RBC QN AUTO: 31.1 PG (ref 27–31)
MCHC RBC AUTO-ENTMCNC: 33.4 G/DL (ref 32–36)
MCV RBC AUTO: 93 FL (ref 82–98)
MONOCYTES # BLD AUTO: 0.4 K/UL (ref 0.3–1)
MONOCYTES NFR BLD: 8 % (ref 4–15)
NEUTROPHILS # BLD AUTO: 2.6 K/UL (ref 1.8–7.7)
NEUTROPHILS NFR BLD: 53.4 % (ref 38–73)
NONHDLC SERPL-MCNC: 122 MG/DL
NRBC BLD-RTO: 0 /100 WBC
PLATELET # BLD AUTO: 223 K/UL (ref 150–450)
PMV BLD AUTO: 10.8 FL (ref 9.2–12.9)
POTASSIUM SERPL-SCNC: 4.5 MMOL/L (ref 3.5–5.1)
PROT SERPL-MCNC: 6.3 G/DL (ref 6–8.4)
RBC # BLD AUTO: 4.44 M/UL (ref 4–5.4)
SODIUM SERPL-SCNC: 141 MMOL/L (ref 136–145)
TRIGL SERPL-MCNC: 46 MG/DL (ref 30–150)
TSH SERPL DL<=0.005 MIU/L-ACNC: 0.8 UIU/ML (ref 0.4–4)
WBC # BLD AUTO: 4.86 K/UL (ref 3.9–12.7)

## 2024-05-06 PROCEDURE — 36415 COLL VENOUS BLD VENIPUNCTURE: CPT | Mod: PN | Performed by: INTERNAL MEDICINE

## 2024-05-06 PROCEDURE — 80061 LIPID PANEL: CPT | Performed by: INTERNAL MEDICINE

## 2024-05-06 PROCEDURE — 80053 COMPREHEN METABOLIC PANEL: CPT | Performed by: INTERNAL MEDICINE

## 2024-05-06 PROCEDURE — 84443 ASSAY THYROID STIM HORMONE: CPT | Performed by: INTERNAL MEDICINE

## 2024-05-06 PROCEDURE — 85025 COMPLETE CBC W/AUTO DIFF WBC: CPT | Performed by: INTERNAL MEDICINE

## 2024-05-10 ENCOUNTER — CLINICAL SUPPORT (OUTPATIENT)
Dept: FAMILY MEDICINE | Facility: CLINIC | Age: 74
End: 2024-05-10
Payer: MEDICARE

## 2024-05-10 DIAGNOSIS — Z01.818 PREOP EXAMINATION: ICD-10-CM

## 2024-05-10 LAB
OHS QRS DURATION: 74 MS
OHS QTC CALCULATION: 479 MS

## 2024-05-10 PROCEDURE — 93005 ELECTROCARDIOGRAM TRACING: CPT | Mod: S$GLB,,, | Performed by: INTERNAL MEDICINE

## 2024-05-10 PROCEDURE — 93010 ELECTROCARDIOGRAM REPORT: CPT | Mod: S$GLB,,, | Performed by: INTERNAL MEDICINE

## 2024-05-10 NOTE — PROGRESS NOTES
Patient here for EKG - completed for pre op. Patient is completing pre op on 5/13/2024 with Dr Davalos.

## 2024-05-13 ENCOUNTER — OFFICE VISIT (OUTPATIENT)
Dept: FAMILY MEDICINE | Facility: CLINIC | Age: 74
End: 2024-05-13
Payer: MEDICARE

## 2024-05-13 VITALS
HEART RATE: 74 BPM | DIASTOLIC BLOOD PRESSURE: 74 MMHG | BODY MASS INDEX: 21.18 KG/M2 | OXYGEN SATURATION: 98 % | SYSTOLIC BLOOD PRESSURE: 108 MMHG | WEIGHT: 112.19 LBS | HEIGHT: 61 IN

## 2024-05-13 DIAGNOSIS — E89.0 POSTABLATIVE HYPOTHYROIDISM: ICD-10-CM

## 2024-05-13 DIAGNOSIS — Z00.00 ANNUAL PHYSICAL EXAM: ICD-10-CM

## 2024-05-13 DIAGNOSIS — F32.0 MAJOR DEPRESSIVE DISORDER, SINGLE EPISODE, MILD: ICD-10-CM

## 2024-05-13 DIAGNOSIS — E78.49 OTHER HYPERLIPIDEMIA: Primary | ICD-10-CM

## 2024-05-13 DIAGNOSIS — Z01.818 PREOP EXAMINATION: ICD-10-CM

## 2024-05-13 PROCEDURE — 3074F SYST BP LT 130 MM HG: CPT | Mod: CPTII,S$GLB,, | Performed by: INTERNAL MEDICINE

## 2024-05-13 PROCEDURE — 1160F RVW MEDS BY RX/DR IN RCRD: CPT | Mod: CPTII,S$GLB,, | Performed by: INTERNAL MEDICINE

## 2024-05-13 PROCEDURE — 99214 OFFICE O/P EST MOD 30 MIN: CPT | Mod: S$GLB,,, | Performed by: INTERNAL MEDICINE

## 2024-05-13 PROCEDURE — 3008F BODY MASS INDEX DOCD: CPT | Mod: CPTII,S$GLB,, | Performed by: INTERNAL MEDICINE

## 2024-05-13 PROCEDURE — 99999 PR PBB SHADOW E&M-EST. PATIENT-LVL IV: CPT | Mod: PBBFAC,,, | Performed by: INTERNAL MEDICINE

## 2024-05-13 PROCEDURE — 3288F FALL RISK ASSESSMENT DOCD: CPT | Mod: CPTII,S$GLB,, | Performed by: INTERNAL MEDICINE

## 2024-05-13 PROCEDURE — 1159F MED LIST DOCD IN RCRD: CPT | Mod: CPTII,S$GLB,, | Performed by: INTERNAL MEDICINE

## 2024-05-13 PROCEDURE — 1101F PT FALLS ASSESS-DOCD LE1/YR: CPT | Mod: CPTII,S$GLB,, | Performed by: INTERNAL MEDICINE

## 2024-05-13 PROCEDURE — 1126F AMNT PAIN NOTED NONE PRSNT: CPT | Mod: CPTII,S$GLB,, | Performed by: INTERNAL MEDICINE

## 2024-05-13 PROCEDURE — 3078F DIAST BP <80 MM HG: CPT | Mod: CPTII,S$GLB,, | Performed by: INTERNAL MEDICINE

## 2024-05-13 NOTE — PROGRESS NOTES
Patient ID: Karina Mathur is a 73 y.o. female.    Chief Complaint: Pre-op Exam    Annual and preop    Problem  HPI  Plan   Preop Robotic hysterectomy (intermediated risk)   05/27/2024  Dr. Griffin  EKG- NSR, possible LAE, nonspecific ST abn,   No chest pain or dyspnea   CBC and CMP acceptable for surgery   RCRI = 0 Low risk for intermediate risk procedure. Proceed with standard precautions   Post ablative hypothyroid TSH in range Continue Synthroid 125 mcg every other day   Hyperlipidemia  Controlled Continue simvastatin   Depression / anxiety  Improved a little Monitor        Medications, recent labs, health maintenance, and diet has been reviewed.    Exercise- walking   Alcohol- 3 glasses of wine/ week   Tobacco- none            Diagnoses addressed and related orders     1. Other hyperlipidemia    2. Postablative hypothyroidism    3. Major depressive disorder, single episode, mild    4. Annual physical exam    5. Preop examination          Review of Systems   Constitutional:  Negative for fever.   Respiratory:  Negative for shortness of breath.    Cardiovascular:  Negative for chest pain.   Gastrointestinal:  Negative for abdominal pain.     Vitals:    05/13/24 0915   BP: 108/74   Pulse: 74      Wt Readings from Last 3 Encounters:   05/13/24 0915 50.9 kg (112 lb 2.7 oz)   04/22/24 1205 49.7 kg (109 lb 9.1 oz)   04/18/24 1333 49.7 kg (109 lb 9.1 oz)      Body mass index is 21.19 kg/m².     Physical Exam  Cardiovascular:      Rate and Rhythm: Normal rate and regular rhythm.      Heart sounds: No murmur heard.     No gallop.   Pulmonary:      Breath sounds: Normal breath sounds. No wheezing or rhonchi.   Abdominal:      Palpations: Abdomen is soft.      Tenderness: There is no abdominal tenderness.               Hyperlipidemia Medications               simvastatin (ZOCOR) 20 MG tablet Take 1 tablet (20 mg total) by mouth every evening.           Medication List with Changes/Refills   Current Medications     ASCORBIC ACID, VITAMIN C, (VITAMIN C) 500 MG TABLET    Take 500 mg by mouth once daily.    BIOTIN ORAL    Take by mouth.    CALCIUM CITRATE/VITAMIN D3 (CITRACAL REGULAR ORAL)    Take by mouth.    ESTRADIOL (ESTRACE) 0.01 % (0.1 MG/GRAM) VAGINAL CREAM    0.5 grams with applicator or dime-sized amount with finger in vagina nightly x 2 weeks, then twice a week thereafter    FLUTICASONE PROPIONATE (FLONASE) 50 MCG/ACTUATION NASAL SPRAY    1 spray (50 mcg total) by Each Nostril route 2 (two) times daily.    LEVOCETIRIZINE (XYZAL) 5 MG TABLET    Take 1 tablet (5 mg total) by mouth every evening.    LEVOTHYROXINE (SYNTHROID) 125 MCG TABLET    Take 1 tablet (125 mcg total) by mouth every other day.    MULTIVITAMIN (THERAGRAN) PER TABLET    Take 1 tablet by mouth once daily.    SIMVASTATIN (ZOCOR) 20 MG TABLET    Take 1 tablet (20 mg total) by mouth every evening.    VITAMIN D 1000 UNITS TAB    Take 1,000 Units by mouth once daily.        I personally reviewed past medical, family and social history.

## 2024-05-14 ENCOUNTER — ANESTHESIA EVENT (OUTPATIENT)
Dept: SURGERY | Facility: OTHER | Age: 74
End: 2024-05-14
Payer: MEDICARE

## 2024-05-16 ENCOUNTER — HOSPITAL ENCOUNTER (OUTPATIENT)
Dept: PREADMISSION TESTING | Facility: OTHER | Age: 74
Discharge: HOME | End: 2024-05-16
Attending: OBSTETRICS & GYNECOLOGY
Payer: MEDICARE

## 2024-05-16 ENCOUNTER — OFFICE VISIT (OUTPATIENT)
Dept: UROGYNECOLOGY | Facility: CLINIC | Age: 74
End: 2024-05-16
Payer: MEDICARE

## 2024-05-16 ENCOUNTER — PROCEDURE VISIT (OUTPATIENT)
Dept: UROGYNECOLOGY | Facility: CLINIC | Age: 74
End: 2024-05-16
Payer: MEDICARE

## 2024-05-16 VITALS
WEIGHT: 112 LBS | BODY MASS INDEX: 21.14 KG/M2 | SYSTOLIC BLOOD PRESSURE: 114 MMHG | OXYGEN SATURATION: 99 % | HEIGHT: 61 IN | DIASTOLIC BLOOD PRESSURE: 56 MMHG | HEART RATE: 60 BPM | TEMPERATURE: 97 F | RESPIRATION RATE: 16 BRPM

## 2024-05-16 VITALS
HEIGHT: 61 IN | DIASTOLIC BLOOD PRESSURE: 68 MMHG | WEIGHT: 112.63 LBS | SYSTOLIC BLOOD PRESSURE: 114 MMHG | BODY MASS INDEX: 21.27 KG/M2

## 2024-05-16 DIAGNOSIS — N81.4 UTEROVAGINAL PROLAPSE: ICD-10-CM

## 2024-05-16 DIAGNOSIS — Z01.818 PREOPERATIVE TESTING: ICD-10-CM

## 2024-05-16 DIAGNOSIS — R35.1 NOCTURIA MORE THAN TWICE PER NIGHT: ICD-10-CM

## 2024-05-16 DIAGNOSIS — R19.5 LOOSE STOOLS: ICD-10-CM

## 2024-05-16 DIAGNOSIS — N95.2 VAGINAL ATROPHY: ICD-10-CM

## 2024-05-16 DIAGNOSIS — Z01.818 PREOPERATIVE EXAM FOR GYNECOLOGIC SURGERY: Primary | ICD-10-CM

## 2024-05-16 DIAGNOSIS — N39.46 URINARY INCONTINENCE, MIXED: ICD-10-CM

## 2024-05-16 DIAGNOSIS — N81.11 CYSTOCELE, MIDLINE: ICD-10-CM

## 2024-05-16 DIAGNOSIS — N81.6 RECTOCELE, FEMALE: ICD-10-CM

## 2024-05-16 DIAGNOSIS — Z01.818 PREOP TESTING: ICD-10-CM

## 2024-05-16 LAB
ABO + RH BLD: NORMAL
BILIRUB SERPL-MCNC: NORMAL MG/DL
BLD GP AB SCN CELLS X3 SERPL QL: NORMAL
BLOOD URINE, POC: NORMAL
CLARITY, POC UA: CLEAR
COLOR, POC UA: NORMAL
GLUCOSE UR QL STRIP: NORMAL
KETONES UR QL STRIP: NORMAL
LEUKOCYTE ESTERASE URINE, POC: NORMAL
NITRITE, POC UA: NORMAL
PH, POC UA: 6
PROTEIN, POC: NORMAL
SPECIFIC GRAVITY, POC UA: 1.01
SPECIMEN OUTDATE: NORMAL
UROBILINOGEN, POC UA: NORMAL

## 2024-05-16 PROCEDURE — 99999 PR PBB SHADOW E&M-EST. PATIENT-LVL I: CPT | Mod: PBBFAC,,, | Performed by: NURSE PRACTITIONER

## 2024-05-16 PROCEDURE — 51784 ANAL/URINARY MUSCLE STUDY: CPT | Mod: 51,S$GLB,, | Performed by: OBSTETRICS & GYNECOLOGY

## 2024-05-16 PROCEDURE — 36415 COLL VENOUS BLD VENIPUNCTURE: CPT | Performed by: NURSE PRACTITIONER

## 2024-05-16 PROCEDURE — 52000 CYSTOURETHROSCOPY: CPT | Mod: 59,S$GLB,, | Performed by: OBSTETRICS & GYNECOLOGY

## 2024-05-16 PROCEDURE — 51728 CYSTOMETROGRAM W/VP: CPT | Mod: S$GLB,,, | Performed by: OBSTETRICS & GYNECOLOGY

## 2024-05-16 PROCEDURE — 51797 INTRAABDOMINAL PRESSURE TEST: CPT | Mod: S$GLB,,, | Performed by: OBSTETRICS & GYNECOLOGY

## 2024-05-16 PROCEDURE — 86850 RBC ANTIBODY SCREEN: CPT | Performed by: NURSE PRACTITIONER

## 2024-05-16 PROCEDURE — 81002 URINALYSIS NONAUTO W/O SCOPE: CPT | Mod: S$GLB,,, | Performed by: OBSTETRICS & GYNECOLOGY

## 2024-05-16 PROCEDURE — 99499 UNLISTED E&M SERVICE: CPT | Mod: S$GLB,,, | Performed by: NURSE PRACTITIONER

## 2024-05-16 RX ORDER — CIPROFLOXACIN 500 MG/1
500 TABLET ORAL
Status: DISCONTINUED | OUTPATIENT
Start: 2024-05-16 | End: 2024-05-16

## 2024-05-16 RX ORDER — LIDOCAINE HYDROCHLORIDE 20 MG/ML
JELLY TOPICAL ONCE
Status: DISCONTINUED | OUTPATIENT
Start: 2024-05-16 | End: 2024-05-16

## 2024-05-16 RX ADMIN — CIPROFLOXACIN 500 MG: 500 TABLET ORAL at 09:05

## 2024-05-16 RX ADMIN — LIDOCAINE HYDROCHLORIDE 5 ML: 20 JELLY TOPICAL at 09:05

## 2024-05-16 NOTE — ANESTHESIA PREPROCEDURE EVALUATION
05/16/2024  Karina Mathur is a 73 y.o., female.      Pre-op Assessment    I have reviewed the Patient Summary Reports.     I have reviewed the Nursing Notes. I have reviewed the NPO Status.   I have reviewed the Medications.     Review of Systems  Anesthesia Hx:  No problems with previous Anesthesia             Denies Family Hx of Anesthesia complications.    Denies Personal Hx of Anesthesia complications.                    Social:  Non-Smoker       Hematology/Oncology:  Hematology Normal   Oncology Normal                                   EENT/Dental:  EENT/Dental Normal           Cardiovascular:                hyperlipidemia                             Pulmonary:  Pulmonary Normal                       Renal/:  Renal/ Normal                 Hepatic/GI:  Hepatic/GI Normal                 Musculoskeletal:         Spine Disorders: lumbar            Neurological:        Chronic Pain Syndrome                         Endocrine:   Hypothyroidism          Dermatological:  Skin Normal    Psych:  Psychiatric History anxiety depression                Physical Exam  General: Well nourished, Alert, Cooperative and Oriented    Airway:  Mallampati: II   Mouth Opening: Normal  Tongue: Normal    Dental:  Intact        Anesthesia Plan  Type of Anesthesia, risks & benefits discussed:    Anesthesia Type: Gen ETT  Intra-op Monitoring Plan: Standard ASA Monitors  Post Op Pain Control Plan: multimodal analgesia  Induction:  IV  Airway Plan: Video, Post-Induction  Informed Consent: Informed consent signed with the Patient and all parties understand the risks and agree with anesthesia plan.  All questions answered.   ASA Score: 2  Anesthesia Plan Notes: Labs/EKG in [facility]  Needs T&S  Had Bleph [facility] last month- no problems  Preop clearance in [facility] from [facility] on 5/13    Hb 13.8    Ready For Surgery From Anesthesia  Perspective.     .

## 2024-05-16 NOTE — PROGRESS NOTES
"  Urogyn follow up  05/16/2024  .  [facility] - UROGYNECOLOGY  [address] [id number]    Karina Mathur  [id number]  1950      Karina Mathur is a 73 y.o. here for preop.     Last HPI from 03/11/2024     1)  UI:  (+) JADE (rare) > (+) UUI.  (+) pads "in case", usually min wetness.  1 pad/night "in case."  Daytime frequency: Q 2 hours.  Nocturia: Yes: 2+/night--disrupts sleep.   (--) dysuria,  (--) hematuria,  (--) frequent UTIs.  (+) complete bladder emptying. +PV urgency with small 2nd volume. +splayed stream.      2)  POP:  Present. below introitus, significant.  Symptoms:(+) pressure, general bother.  (--) vaginal bleeding. (--) vaginal discharge. (+) sexually active.  (+) dyspareunia due to dryness.  (+)  Vaginal dryness--uses vaseline.  (--) vaginal estrogen use.   --POP-Q:  Aa +3; Ba +3; C -2; Ap -1; Bp -1; D -5.  Genital hiatus 3, perineal body 2, total vaginal length 10-11 (standing).     3)  BM:  (--) constipation/straining.  (+) chronic diarrhea--seems related to nerves. (--) hematochezia.  (--) fecal incontinence.  (+) fecal smearing/urgency x 1.  (+) complete evacuation.     Changes from last visit:  Rare UUI.  Voiding every 2-3 hours during the day and 2-3/night  Denies constipation    Pelvic US 2/2024:  Impression:  1. Endometrial stripe measures 4 mm, normal for a postmenopausal patient.  There is a 7 mm endometrial cyst present within the endometrial canal of the uterine fundus.  2. Nonvisualization of the ovaries    5/16/2024 UDS:  3.  URETHRAL FUNCTION/STORAGE PHASE:  a.  WITH prolapse reduction:  CLPP (150 mL): Positive  at  23 cm H20  VLPP (150 mL): Negative  at  91 cm H20   CLPP (300 mL): Positive  at  104 cm H20  VLPP (300 mL): Negative  at  80 cm H20   These findings are consistent with Positive urodynamic stress incontinence.  Assessment:  UF unable to be assessed.   PF prolonged with concern for voiding dysfunction (Valsalva assist).  Compliance normal.  Max " capacity 309 mL.  DO (--).  JADE (+).      5/16/2024 cysto: normal with decreased coaptation.    Past Medical History:   Diagnosis Date    Dry eyes, bilateral     Hyperlipidemia     on simvastatin    Hypothyroidism     hx of iodine cocktail around 1988 for goiter.    Osteopenia     1/18/17; L fem neck w Tscore -1.7; Lsp -1.2; DEXA in 2 yrs.    Prolapse of female bladder, acquired     PVD (posterior vitreous detachment), bilateral    --PVD:  was unaware of this. Followed by LUKE Cole, OD.     Past Surgical History:   Procedure Laterality Date    BLEPHAROPLASTY, UPPER EYELID Left 4/15/2024    Procedure: BLEPHAROPLASTY, UPPER EYELID;  Surgeon: Maico Morton MD;  Location: [address];  Service: Plastics;  Laterality: Left;    COLONOSCOPY N/A 2/21/2024    Procedure: COLONOSCOPY;  Surgeon: LAMAR Luong MD;  Location: [facility];  Service: Endoscopy;  Laterality: N/A;    CYSTOSCOPY N/A 5/27/2024    Procedure: CYSTOSCOPY;  Surgeon: Wendy Griffin MD;  Location: [facility];  Service: OB/GYN;  Laterality: N/A;    INGUINAL HERNIA REPAIR      INSERTION OF MIDURETHRAL SLING N/A 5/27/2024    Procedure: SLING, MIDURETHRAL;  Surgeon: Wendy Griffin MD;  Location: [facility];  Service: OB/GYN;  Laterality: N/A;    REPAIR,BROW PTOSIS Left 4/15/2024    Procedure: REPAIR,BROW PTOSIS;  Surgeon: Maico Morton MD;  Location: [address];  Service: Plastics;  Laterality: Left;    ROBOT-ASSISTED LAPAROSCOPIC ABDOMINAL SACROCOLPOPEXY USING DA ELROY XI N/A 5/27/2024    Procedure: XI ROBOTIC SACROCOLPOPEXY, ABDOMINAL;  Surgeon: Wendy Griffin MD;  Location: [facility];  Service: OB/GYN;  Laterality: N/A;    XI ROBOTIC HYSTERECTOMY, WITH SALPINGO-OOPHORECTOMY N/A 5/27/2024    Procedure: XI ROBOTIC HYSTERECTOMY,WITH SALPINGO-OOPHORECTOMY;  Surgeon: Wendy Griffin MD;  Location: [facility];  Service: OB/GYN;  Laterality: N/A;   --inguinal hernia as child  Hysterectomy: No    Family History   Problem  Relation Name Age of Onset    Rheum arthritis Mother      Hypertension Father      Dementia Father      Hypertension Sister      Hypothyroidism Sister         Social History     Socioeconomic History    Marital status:    Tobacco Use    Smoking status: Never    Smokeless tobacco: Never   Substance and Sexual Activity    Alcohol use: Yes     Alcohol/week: 3.0 standard drinks of alcohol     Types: 3 Glasses of wine per week     Comment: 3 a week    Drug use: No    Sexual activity: Not Currently     Social Determinants of Health     Financial Resource Strain: Medium Risk (5/13/2024)    Overall Financial Resource Strain (CARDIA)     Difficulty of Paying Living Expenses: Somewhat hard   Food Insecurity: No Food Insecurity (5/13/2024)    Hunger Vital Sign     Worried About Running Out of Food in the Last Year: Never true     Ran Out of Food in the Last Year: Never true   Transportation Needs: No Transportation Needs (5/13/2024)    PRAPARE - Transportation     Lack of Transportation (Medical): No     Lack of Transportation (Non-Medical): No   Physical Activity: Insufficiently Active (5/13/2024)    Exercise Vital Sign     Days of Exercise per Week: 3 days     Minutes of Exercise per Session: 30 min   Stress: Stress Concern Present (5/13/2024)    [employer] [place] [employer] - Occupational Stress Questionnaire     Feeling of Stress : To some extent   Housing Stability: Low Risk  (11/22/2022)    Housing Stability Vital Sign     Unable to Pay for Housing in the Last Year: No     Number of Places Lived in the Last Year: 1     Unstable Housing in the Last Year: No       Current Outpatient Medications   Medication Sig Dispense Refill    ascorbic acid, vitamin C, (VITAMIN C) 500 MG tablet Take 500 mg by mouth once daily.      BIOTIN ORAL Take by mouth.      calcium citrate/vitamin D3 (CITRACAL REGULAR ORAL) Take by mouth.      cephALEXin (KEFLEX) 500 MG capsule Take 1 capsule (500 mg total) by mouth every 12  (twelve) hours. for 7 days 14 capsule 0    docusate sodium (COLACE) 100 MG capsule Take 1 capsule (100 mg total) by mouth 2 (two) times daily as needed for Constipation. 30 capsule 3    estradioL (ESTRACE) 0.01 % (0.1 mg/gram) vaginal cream 0.5 grams with applicator or dime-sized amount with finger in vagina nightly x 2 weeks, then twice a week thereafter (Patient taking differently: 0.5 grams with applicator or dime-sized amount with finger in vagina nightly x 2 weeks, then twice a week thereafter  Use nightly) 42.5 g 11    ibuprofen (ADVIL,MOTRIN) 600 MG tablet Take 1 tablet (600 mg total) by mouth every 6 (six) hours as needed for Pain. 30 tablet 3    levocetirizine (XYZAL) 5 MG tablet Take 1 tablet (5 mg total) by mouth every evening. 30 tablet 3    levothyroxine (SYNTHROID) 125 MCG tablet Take 1 tablet (125 mcg total) by mouth every other day. 90 tablet 3    multivitamin (THERAGRAN) per tablet Take 1 tablet by mouth once daily.      oxyCODONE-acetaminophen (PERCOCET) 5-325 mg per tablet Take 1 tablet by mouth every 4 (four) hours as needed for Pain. 30 tablet 0    phenazopyridine (PYRIDIUM) 200 MG tablet Take 1 tablet (200 mg total) by mouth 3 (three) times daily as needed for Pain. 30 tablet 0    simvastatin (ZOCOR) 20 MG tablet Take 1 tablet (20 mg total) by mouth every evening. 90 tablet 3    vitamin D 1000 units Tab Take 1,000 Units by mouth once daily.        No current facility-administered medications for this visit.       Review of patient's allergies indicates:  No Known Allergies    Well woman:  Pap test: 2024 normal/2023 HPV NEG.  History of abnormal paps: No.  History of STIs:  No  Mammogram: Date of last: 5/2023.  Result: Normal  Colonoscopy: Date of last: 2024.  Result:  diverticulosis.  Repeat due:  2034.    DEXA:  Date of last: 2022.  Result:  normal.  Repeat due:  per PCP.     ROS:  As per HPI.      Exam  There were no vitals taken for this visit.  General: alert and oriented, no acute  distress  Respiratory: normal respiratory effort  Abd: soft, non-tender, non-distended    Pelvic--deferred    Impression  1. Preoperative exam for gynecologic surgery        2. Urinary incontinence, mixed        3. Cystocele, midline- with urgency        4. Vaginal atrophy        5. Rectocele, female        6. Uterovaginal prolapse        7. Loose stools        8. Nocturia more than twice per night          We reviewed the above issues and discussed options for short-term versus long-term management of her problems.   Plan:   Patient consented for robotic-assisted laparoscopic hysterectomy, removal of tubes/ovaries, sacrocolpopexy with synthetic mesh, possible uterosacral vs sacrospinous ligament suspension, possible anterior/posterior repair with perineorrhaphy, possible laparotomy, placement of synthetic midurethral sling, and cystourethroscopy.   R/B/A reviewed. Specific risks reviewed include:  infection, bleeding, need for blood transfusion, damage to surrounding structures, anesthesia risks, death, heart attack, stroke, mesh erosion/extrusion, pain, dyspareunia, urinary retention, voiding dysfunction, urinary incontinence, exacerbation of urinary urge incontinence, and need for further surgeries.  We reviewed potential for failure of POP defect repair and need for future surgery, with no way of predicting risk.  She understands success rate of ASC approaches 85%.  Success rate of midurethral sling for JADE was reviewed as 80-85%, and she understands that this will not necessarily impact other types of urinary incontinence.  Alternatives reviewed include: pessary/PT for POP and pessary/periurethral injections/PT/medication for JADE.    T&S, urine HCG on DOS  pelvic US normal 2/2024  UDS +JADE--add MUS  messaged eye MD to make sure positioning ok with detachment--ok'd  Preoperative appointment with PCP or cardiology: Yes - cleared  VTE Prophylaxis:  heparin 5000 u SQ TID (1st dose 2hrs preop) + SCDs  Patient  instructed on Magnesium citrate and chlorahexadine/dial soap prep to perform day before & AM of surgery.   Proceed to OR for above-mentioned procedure.      20 minutes were spent in face to face time with this patient  90 % of this time was spent in counseling and/or coordination of care      NATACHA Duenas-BC [facility]  Division of Female Pelvic Medicine and Reconstructive Surgery  Department of Obstetrics & Gynecology

## 2024-05-16 NOTE — DISCHARGE INSTRUCTIONS
Information to Prepare you for your Surgery    PRE-ADMIT TESTING   [address] [facility]       Your surgery has been scheduled at [facility]. We are pleased to have the opportunity to serve you. For Further Information please call [phone].    On the day of surgery please report to the Information Desk on the 1st floor.    CONTACT YOUR PHYSICIAN'S OFFICE THE DAY PRIOR TO YOUR SURGERY TO OBTAIN YOUR ARRIVAL TIME.     The evening before surgery do not eat anything after 9 p.m. ( this includes hard candy, chewing gum and mints).  You may only have GATORADE, POWERADE AND WATER  from 9 p.m. until you leave your home.   DO NOT DRINK ANY LIQUIDS ON THE WAY TO THE HOSPITAL.      Why does your anesthesiologist allow you to drink Gatorade/Powerade before surgery?  Gatorade/Powerade helps to increase your comfort before surgery and to decrease your nausea after surgery.   The carbohydrates in Gatorade/Powerade help reduce your body's stress response to surgery.  If you are a diabetic-drink only water prior to surgery.    Outpatient Surgery- May allow 2 adults (18 and older)/ Support Persons (1 being the designated ) for all surgical/procedural patients. A breastfeeding mother will be allowed her infant and 2 adult Support Persons. No one under the age of 18 will be allowed in the building.      SPECIAL MEDICATION INSTRUCTIONS: TAKE medications checked off by the Anesthesiologist on your Medication List.    Surgery Patients:  If you take ASPIRIN - Your PHYSICIAN/SURGEON will need to inform you IF/OR when you need to stop taking aspirin prior to your surgery.     Starting the week prior to surgery, do not take any medications containing IBUPROFEN or NSAIDS (Advil, Aleve, BC, Goody's, Ketorolac, Meloxicam, Mobic, Motrin, Naproxen, Toradol, etc).  If you are not sure if you should take a medicine please call your surgeon's office.  You may take Tylenol.    Do Not Wear any make-up  (especially eye make-up) to surgery. Please remove any false eyelashes or eyelash extensions. If you arrive the day of surgery with makeup/eyelashes on you will be required to remove prior to surgery. (There is a risk of corneal abrasions if eye makeup/eyelash extensions are not removed)    Leave all valuables at home.   Do Not wear any jewelry or watches, including any metal in body piercings. Jewelry must be removed prior to coming to the hospital.  There is a possibility that rings that are unable to be removed may be cut off if they are on the surgical extremity.    Please remove all hair extensions, wigs, clips and any other metal accessories/ ornaments from your hair.  These items may pose a flammable/fire risk in Surgery and must be removed.    Do not shave your surgical area at least 5 days prior to your surgery. The surgical prep will be performed at the hospital according to Infection Control regulations.    Contact Lens must be removed before surgery. Either do not wear the contact lens or bring a case and solution for storage.  Please bring a container for eyeglasses or dentures as required.  Bring any paperwork your physician has provided, such as consent forms,  history and physicals, doctor's orders, etc.   Bring comfortable clothes that are loose fitting to wear upon discharge. Take into consideration the type of surgery being performed.  Maintain your diet as advised per your physician the day prior to surgery.    Adequate rest the night before surgery is advised.   Park in the Parking lot behind the hospital or in the [facility] across the street from the parking lot. Parking is complimentary.  If you will be discharged the same day as your procedure, please arrange for a responsible adult to drive you home or to accompany you if traveling by taxi.   YOU WILL NOT BE PERMITTED TO DRIVE OR TO LEAVE THE HOSPITAL ALONE AFTER SURGERY.   If you are being discharged the same day, it is  strongly recommended that you arrange for someone to remain with you for the first 24 hrs following your surgery.    The Surgeon will speak to your family/visitor after your surgery regarding the outcome of your surgery and post op care.  The Surgeon may speak to you after your surgery, but there is a possibility you may not remember the details.  Please check with your family members regarding the conversation with the Surgeon.    We strongly recommend whoever is bringing you home be present for discharge instructions.  This will ensure a thorough understanding for your post op home care.              Bathing Instructions with Hibiclens  Shower the evening before and morning of your procedure with Chlorhexidine (Hibiclens)    Do not use Chlorhexidine on your face or genitals. Do not get in your eyes.  Wash your face with water and your regular face wash/soap  Use your regular shampoo  Apply Chlorhexidine (Hibiclens) directly on your skin or on a wet washcloth and wash gently. When showering: Move away from the shower stream when applying Chlorhexidine (Hibiclens) to avoid rinsing off too soon.  Rinse thoroughly with warm water  Do not dilute Chlorhexidine (Hibiclens)   Dry off as usual, do not use any deodorant, powder, body lotions, perfume, after shave or cologne.

## 2024-05-16 NOTE — H&P (VIEW-ONLY)
"  Urogyn follow up  05/16/2024  .  [facility] - UROGYNECOLOGY  [address] [address]    Karina Mathur  [id number]  1950      Karina Mathur is a 73 y.o. here for preop.     Last HPI from 03/11/2024     1)  UI:  (+) JADE (rare) > (+) UUI.  (+) pads "in case", usually min wetness.  1 pad/night "in case."  Daytime frequency: Q 2 hours.  Nocturia: Yes: 2+/night--disrupts sleep.   (--) dysuria,  (--) hematuria,  (--) frequent UTIs.  (+) complete bladder emptying. +PV urgency with small 2nd volume. +splayed stream.      2)  POP:  Present. below introitus, significant.  Symptoms:(+) pressure, general bother.  (--) vaginal bleeding. (--) vaginal discharge. (+) sexually active.  (+) dyspareunia due to dryness.  (+)  Vaginal dryness--uses vaseline.  (--) vaginal estrogen use.   --POP-Q:  Aa +3; Ba +3; C -2; Ap -1; Bp -1; D -5.  Genital hiatus 3, perineal body 2, total vaginal length 10-11 (standing).     3)  BM:  (--) constipation/straining.  (+) chronic diarrhea--seems related to nerves. (--) hematochezia.  (--) fecal incontinence.  (+) fecal smearing/urgency x 1.  (+) complete evacuation.     Changes from last visit:  Rare UUI.  Voiding every 2-3 hours during the day and 2-3/night  Denies constipation    Pelvic US 2/2024:  Impression:  1. Endometrial stripe measures 4 mm, normal for a postmenopausal patient.  There is a 7 mm endometrial cyst present within the endometrial canal of the uterine fundus.  2. Nonvisualization of the ovaries    5/16/2024 UDS:  3.  URETHRAL FUNCTION/STORAGE PHASE:  a.  WITH prolapse reduction:  CLPP (150 mL): Positive  at  23 cm H20  VLPP (150 mL): Negative  at  91 cm H20   CLPP (300 mL): Positive  at  104 cm H20  VLPP (300 mL): Negative  at  80 cm H20   These findings are consistent with Positive urodynamic stress incontinence.  Assessment:  UF unable to be assessed.   PF prolonged with concern for voiding dysfunction (Valsalva assist).  Compliance normal.  Max " capacity 309 mL.  DO (--).  JADE (+).      5/16/2024 cysto: normal with decreased coaptation.    Past Medical History:   Diagnosis Date    Dry eyes, bilateral     Hyperlipidemia     on simvastatin    Hypothyroidism     hx of iodine cocktail around 1988 for goiter.    Osteopenia     1/18/17; L fem neck w Tscore -1.7; Lsp -1.2; DEXA in 2 yrs.    Prolapse of female bladder, acquired     PVD (posterior vitreous detachment), bilateral    --PVD:  was unaware of this. Followed by LUKE Cole, OD.     Past Surgical History:   Procedure Laterality Date    BLEPHAROPLASTY, UPPER EYELID Left 4/15/2024    Procedure: BLEPHAROPLASTY, UPPER EYELID;  Surgeon: Maico Morton MD;  Location: [facility] OR [address] [place] [address];  Service: Plastics;  Laterality: Left;    COLONOSCOPY N/A 2/21/2024    Procedure: COLONOSCOPY;  Surgeon: LAMAR Luong MD;  Location: [facility];  Service: Endoscopy;  Laterality: N/A;    INGUINAL HERNIA REPAIR      REPAIR,BROW PTOSIS Left 4/15/2024    Procedure: REPAIR,BROW PTOSIS;  Surgeon: Maico Morton MD;  Location: [address];  Service: Plastics;  Laterality: Left;   --inguinal hernia as child  Hysterectomy: No    Family History   Problem Relation Name Age of Onset    Rheum arthritis Mother      Hypertension Father      Dementia Father      Hypertension Sister      Hypothyroidism Sister         Social History     Socioeconomic History    Marital status:    Tobacco Use    Smoking status: Never    Smokeless tobacco: Never   Substance and Sexual Activity    Alcohol use: Yes     Alcohol/week: 3.0 standard drinks of alcohol     Types: 3 Glasses of wine per week     Comment: 3 a week    Drug use: No    Sexual activity: Not Currently     Social Determinants of Health     Financial Resource Strain: Medium Risk (5/13/2024)    Overall Financial Resource Strain (CARDIA)     Difficulty of Paying Living Expenses: Somewhat hard   Food Insecurity: No Food Insecurity (5/13/2024)    Hunger Vital Sign      Worried About Running Out of Food in the Last Year: Never true     Ran Out of Food in the Last Year: Never true   Transportation Needs: No Transportation Needs (5/13/2024)    PRAPARE - Transportation     Lack of Transportation (Medical): No     Lack of Transportation (Non-Medical): No   Physical Activity: Insufficiently Active (5/13/2024)    Exercise Vital Sign     Days of Exercise per Week: 3 days     Minutes of Exercise per Session: 30 min   Stress: Stress Concern Present (5/13/2024)    [employer] - Occupational Stress Questionnaire     Feeling of Stress : To some extent   Housing Stability: Low Risk  (11/22/2022)    Housing Stability Vital Sign     Unable to Pay for Housing in the Last Year: No     Number of Places Lived in the Last Year: 1     Unstable Housing in the Last Year: No       Current Outpatient Medications   Medication Sig Dispense Refill    ascorbic acid, vitamin C, (VITAMIN C) 500 MG tablet Take 500 mg by mouth once daily.      BIOTIN ORAL Take by mouth.      calcium citrate/vitamin D3 (CITRACAL REGULAR ORAL) Take by mouth.      estradioL (ESTRACE) 0.01 % (0.1 mg/gram) vaginal cream 0.5 grams with applicator or dime-sized amount with finger in vagina nightly x 2 weeks, then twice a week thereafter (Patient taking differently: 0.5 grams with applicator or dime-sized amount with finger in vagina nightly x 2 weeks, then twice a week thereafter  Use nightly) 42.5 g 11    levocetirizine (XYZAL) 5 MG tablet Take 1 tablet (5 mg total) by mouth every evening. 30 tablet 3    levothyroxine (SYNTHROID) 125 MCG tablet Take 1 tablet (125 mcg total) by mouth every other day. 90 tablet 3    multivitamin (THERAGRAN) per tablet Take 1 tablet by mouth once daily.      simvastatin (ZOCOR) 20 MG tablet Take 1 tablet (20 mg total) by mouth every evening. 90 tablet 3    vitamin D 1000 units Tab Take 1,000 Units by mouth once daily.        No current facility-administered medications for  this visit.       Review of patient's allergies indicates:  No Known Allergies    Well woman:  Pap test: 2024 normal/2023 HPV NEG.  History of abnormal paps: No.  History of STIs:  No  Mammogram: Date of last: 5/2023.  Result: Normal  Colonoscopy: Date of last: 2024.  Result:  diverticulosis.  Repeat due:  2034.    DEXA:  Date of last: 2022.  Result:  normal.  Repeat due:  per PCP.     ROS:  As per HPI.      Exam  There were no vitals taken for this visit.  General: alert and oriented, no acute distress  Respiratory: normal respiratory effort  Abd: soft, non-tender, non-distended    Pelvic--deferred    Impression  1. Urinary incontinence, mixed        2. Cystocele, midline- with urgency        3. Vaginal atrophy        4. Rectocele, female        5. Uterovaginal prolapse        6. Loose stools        7. Nocturia more than twice per night          We reviewed the above issues and discussed options for short-term versus long-term management of her problems.   Plan:   Patient consented for robotic-assisted laparoscopic hysterectomy, removal of tubes/ovaries, sacrocolpopexy with synthetic mesh, possible uterosacral vs sacrospinous ligament suspension, possible anterior/posterior repair with perineorrhaphy, possible laparotomy, placement of synthetic midurethral sling, and cystourethroscopy.   R/B/A reviewed. Specific risks reviewed include:  infection, bleeding, need for blood transfusion, damage to surrounding structures, anesthesia risks, death, heart attack, stroke, mesh erosion/extrusion, pain, dyspareunia, urinary retention, voiding dysfunction, urinary incontinence, exacerbation of urinary urge incontinence, and need for further surgeries.  We reviewed potential for failure of POP defect repair and need for future surgery, with no way of predicting risk.  She understands success rate of ASC approaches 85%.  Success rate of midurethral sling for JADE was reviewed as 80-85%, and she understands that this will not  necessarily impact other types of urinary incontinence.  Alternatives reviewed include: pessary/PT for POP and pessary/periurethral injections/PT/medication for JADE.    T&S, urine HCG on DOS  pelvic US normal 2/2024  UDS +JADE--add MUS  messaged eye MD to make sure positioning ok with detachment--ok'd  Preoperative appointment with PCP or cardiology: Yes - cleared  VTE Prophylaxis:  heparin 5000 u SQ TID (1st dose 2hrs preop) + SCDs  Patient instructed on Magnesium citrate and chlorahexadine/dial soap prep to perform day before & AM of surgery.   Proceed to OR for above-mentioned procedure.      20 minutes were spent in face to face time with this patient  90 % of this time was spent in counseling and/or coordination of care  ME@  [facility]  Division of Female Pelvic Medicine and Reconstructive Surgery  Department of Obstetrics & Gynecology

## 2024-05-16 NOTE — PROCEDURES
"TITLE OF OPERATION:  Complex cystometry.  Electromyography with surface electrodes.  Pressure voiding flow study.  Abdominal pressure measurement.  Leak point pressure measurement.    INDICATIONS:  1)  UI:  (+) JADE (rare) > (+) UUI.  (+) pads "in case", usually min wetness.  1 pad/night "in case."  Daytime frequency: Q 2 hours.  Nocturia: Yes: 2+/night--disrupts sleep.   (--) dysuria,  (--) hematuria,  (--) frequent UTIs.  (+) complete bladder emptying. +PV urgency with small 2nd volume. +splayed stream.      2)  POP:  Present. below introitus, significant.  Symptoms:(+) pressure, general bother.  (--) vaginal bleeding. (--) vaginal discharge. (+) sexually active.  (+) dyspareunia due to dryness.  (+)  Vaginal dryness--uses vaseline.  (--) vaginal estrogen use.      3)  BM:  (--) constipation/straining.  (+) chronic diarrhea--seems related to nerves. (--) hematochezia.  (--) fecal incontinence.  (+) fecal smearing/urgency x 1.  (+) complete evacuation.        PREOPERATIVE DIAGNOSIS:  1. Urinary incontinence, mixed    2. Cystocele, midline    3. Vaginal atrophy    4. Rectocele, female    5. Uterovaginal prolapse    6. Nocturia more than twice per night    7. Loose stools      POSTOPERATIVE DIAGNOSIS:  1. Urinary incontinence, mixed    2. Cystocele, midline    3. Vaginal atrophy    4. Rectocele, female    5. Uterovaginal prolapse    6. Nocturia more than twice per night    7. Loose stools    8.    Concern for voiding dysfunction (Valsalva assist)  9.    Urodynamic stress incontinence  10.  Decreased urethral coaptation    ANESTHESIA:  None.    SPECIMEN (BACTERIOLOGICAL, PATHOLOGICAL OR OTHER):  None.    PROSTHETIC DEVICE/IMPLANT:  None.    SURGEONS NARRATIVE:  A time out was performed in which the patient identity and procedure were confirmed.  Urodynamic evaluation was performed using a computerized system ([employer]  Uroflowmetry was performed on the patient in the sitting position without " catheters in place.  Subsequent urodynamic testing was performed with the patient in the lithotomy position at 45 degrees. Air charged catheters were used with sterile water as the infusion medium. Vesical and abdominal (rectal) pressures were measured, and detrusor pressure was calculated. EMG activity was recorded with surface electrodes. During filling, room temperature sterile water was infused at a rate of 30 cubic centimeters per minute. The patient was asked cough after instillation of each 100cc volume. Two Valsalva leak point pressures and two cough leak point pressures were performed with the catheters in place at 300 cubic centimeters and again at maximum capacity. Valsalva leak point pressure was defined as the difference between vesical pressure at which leakage was noted (visualized at the external urethral meatus) and the baseline vesical pressure. Following urodynamic testing, a pressure flow study was performed with the patient in the sitting position. Vesical and abdominal pressures were monitored and detrusor pressures were calculated. After the pressure flow study, the catheters were then removed. The patient tolerated the procedure well.     Urine dipstick: neg.    1.  VOIDING PHASE:      a.  Uroflowmetry:  Prolapse reduction: No  Patient unable to void.  PVR:   100 mL    The overall configuration of this uroflow study was unable to be assessed.      b.  Pressure flow:  Prolapse reduction: Yes (pessary)  Voided volume:   269 mL  Voiding time:   61 seconds  Peak flow:  11 mL/s   Avg flow:  4 mL/s  Max det pressure:  31  cm H20  Det pressure at max flow: 19 cm H20  Void initiated by detrusor contraction followed by Valsalva.    Urethral relaxation (EMG): present.    PVR (calculated):  31 mL    The overall configuration of this pressure flow study was prolonged with concern for voiding dysfunction (Valsalva assist).      2.  FILLING PHASE:  1st desire: 178 mL  Normal desire:  221 mL  Strong desire:   238 mL  Urgency:  309 mL  Compliance (calculated)  approx 150 mL/cm H20  EMG activity during filling:   stable  Detrusor contractions observed: No.     3.  URETHRAL FUNCTION/STORAGE PHASE:    a.  WITH prolapse reduction:  CLPP (150 mL): Positive  at  23 cm H20  VLPP (150 mL): Negative  at  91 cm H20   CLPP (300 mL): Positive  at  104 cm H20  VLPP (300 mL): Negative  at  80 cm H20     These findings are consistent with Positive urodynamic stress incontinence.    Assessment:  UF unable to be assessed.   PF prolonged with concern for voiding dysfunction (Valsalva assist).  Compliance normal.  Max capacity 309 mL.  DO (--).  JADE (+).      Plan:  1)  Mixed urinary incontinence, urge < stress:    --Empty bladder every 3 hours.  Empty well: wait a minute, lean forward on toilet.    --Avoid dietary irritants (see sheet).  Keep diary x 3-5 days to determine your irritants.  --KEGELS: do 10 in AM and 10 in PM, holding each x 10 seconds.  When you feel urge to go, STOP, KEGEL, and when urge has passed, then go to bathroom.  Consider PT in future.    --URGE: consider medication in future. Takes 2-4 weeks to see if will have effect.  For dry mouth: get sour, sugar free lozenge or gum.    --STRESS:  Pessary vs. Sling vs periurethral bulking.    --UDS +JADE: add MUS to procedure     2)  Nocturia (nighttime urination):   --no water by bed  --stop fluids 2 hours before bed.    --If have leg swelling:  Elevate feet above chest x 1 hour before bed to get excess fluid off.  Can also use support hose (knee highs).       3) Stage 3 cystocele, stage 2 rectocele, stage 1 uterovaginal prolapse:  --discussed  --options: observation vs PT vs pessary vs surgery              --if surgery: laparoscopic/robotic hysterectomy/removal of tubes + ovaries + sacral colpopexy              --if surgery: pelvic US normal 2/2024              --if surgery: UDS +JADE--add MUS              --if surgery: clearance per PCP (Susannah) + labs (TSH, CBC, CMP,  T&S)/EKG              --if surgery: message eye MD to make sure positioning ok with detachment--ok'd     4)  Vaginal atrophy (dryness):    --start Vaginal estrogen:  --Use 0.5 grams of estrogen cream in vagina with applicator or dime-sized amount with finger (as far as can reach internally) nightly x 2 weeks, then twice a week thereafter.  You can also apply a dime-sized amount with your finger around the vaginal opening and inner lips at same frequency.                           --Vaginal estrogen may help to decrease pain related to dryness with intercourse and urinary symptoms (urgency/frequency/UTIs) around menopause.      --with intercourse, can use Non-estrogen options for vaginal dryness:  --Use REPLENS or REFRESH OTC: 1/2 applicator full in vagina twice a week.    --coconut oil: dime-sized amount with finger (as far as can reach internally) and around the vaginal opening and inner lips up to nightly as needed  --Uberlube, [place], [place] liquibeads, Satin by [employer]liquid Natural Intimate Moisturizer     5)  Chronic loose stool:  --seems related to nerves  --avoid dietary triggers  --hydrate well  Controlling constipation may help bladder urgency/leakage and fiber may better control cholesterol and blood glucose.  Start daily fiber.  Take 1 tsp of fiber powder (psyllium or other sugar-free powder).  Mix in 8 oz of water.  Take x 3-5 days.  Then, increase fiber by 1 tsp every 3-5 days until stool is easy to pass.  Stop and continue at that dose.   Do not exceed 6 tsps/day.  May also use over the counter stool softener 1-2 x/day.  AVOID laxatives.     6)  See preop note for full detail. Surgery scheduled 5-.   ----------------------------------------------    Title of Operation:   Cystourethroscopy.     INDICATIONS:  As above    PREOPERATIVE DIAGNOSIS  As above    POSTOPERATIVE DIAGNOSIS:   As above    Anesthesia:   2% Xylocaine gel.    Specimen (Bacteriological, Pathological or other):   None.     Prosthetic  Device/Implant:   None.     Surgeons Narrative:     After informed consent was obtained, the patient was placed in the lithotomy position. The urethral meatus was prepped with Betadine and 10 cubic centimeters of 2% Xylocaine gel were introduced into the urethra. A flexible cystourethroscope was introduced into the bladder. The bladder was distended with approximately 300 cubic centimeters of sterile water. A systematic survey was performed in which the bladder was surveyed using multiple sequential passes in a clockwise fashion from the bladder dome to the bladder base to the urethrovesical junction. The trigone and ureteral orifices were observed. The scope was then flipped back on itself, and the urethrovesical junction was viewed. A vaginal examining finger was then placed with pressure suburethrally at the urethrovesical junction as the telescope was withdrawn in order to perform positive pressure urethroscopy.  Standard maneuvers of cough, squeeze and Valsalva were performed. The telescope was then completely withdrawn.     Findings: Urethroscopy:  Normal with decreased coaptation.  Cystoscopy:  Normal bladder mucosa, bilateral ureteral flow was noted.     Assessment: Essentially normal cystourethroscopy with decreased coaptation.     Plan: The patient will follow up with Dr. Griffin as scheduled.  See urodynamics note from 5- for further plan details.

## 2024-05-24 ENCOUNTER — TELEPHONE (OUTPATIENT)
Dept: UROGYNECOLOGY | Facility: CLINIC | Age: 74
End: 2024-05-24
Payer: MEDICARE

## 2024-05-24 NOTE — TELEPHONE ENCOUNTER
Returned patients phone call regarding questions about upcoming surgery. Explained to patient steps for bowel prep- patient expressed understanding. Denies other questions/concerns. Call ended.     ----- Message from Daniel Nuñez sent at 5/24/2024 11:34 AM CDT -----  Regarding: Self  [phone]  Type: Patient Call Back    Who called: Self      What is the request in detail: called in regards to getting more information about instructions for her upcoming surgery. Would like a call back.     Can the clinic reply by [facility]? No     Would the patient rather a call back or a response via [facility]? Call back     Best call back number: [phone]     Additional Information:    Thank you.

## 2024-05-24 NOTE — TELEPHONE ENCOUNTER
Contacted patient via telephone to remind patient surgery is scheduled for Monday, 5/27 at 7AM. Notified patient needs to arrive at 5AM. Patient verbalized understanding, call ended.

## 2024-05-27 ENCOUNTER — ANESTHESIA (OUTPATIENT)
Dept: SURGERY | Facility: OTHER | Age: 74
End: 2024-05-27
Payer: MEDICARE

## 2024-05-27 ENCOUNTER — HOSPITAL ENCOUNTER (OUTPATIENT)
Facility: OTHER | Age: 74
Discharge: HOME | End: 2024-05-28
Attending: OBSTETRICS & GYNECOLOGY | Admitting: OBSTETRICS & GYNECOLOGY
Payer: MEDICARE

## 2024-05-27 DIAGNOSIS — N81.4 UTEROVAGINAL PROLAPSE: ICD-10-CM

## 2024-05-27 DIAGNOSIS — Z98.890 S/P ROBOT-ASSISTED SURGICAL PROCEDURE: Primary | ICD-10-CM

## 2024-05-27 DIAGNOSIS — N39.46 URINARY INCONTINENCE, MIXED: ICD-10-CM

## 2024-05-27 DIAGNOSIS — R35.1 NOCTURIA MORE THAN TWICE PER NIGHT: ICD-10-CM

## 2024-05-27 PROCEDURE — 37000008 HC ANESTHESIA 1ST 15 MINUTES: Performed by: OBSTETRICS & GYNECOLOGY

## 2024-05-27 PROCEDURE — D9220A PRA ANESTHESIA: Mod: ANES,,, | Performed by: ANESTHESIOLOGY

## 2024-05-27 PROCEDURE — 36000712 HC OR TIME LEV V 1ST 15 MIN: Performed by: OBSTETRICS & GYNECOLOGY

## 2024-05-27 PROCEDURE — 71000033 HC RECOVERY, INTIAL HOUR: Performed by: OBSTETRICS & GYNECOLOGY

## 2024-05-27 PROCEDURE — 36000713 HC OR TIME LEV V EA ADD 15 MIN: Performed by: OBSTETRICS & GYNECOLOGY

## 2024-05-27 PROCEDURE — 25000003 PHARM REV CODE 250: Performed by: STUDENT IN AN ORGANIZED HEALTH CARE EDUCATION/TRAINING PROGRAM

## 2024-05-27 PROCEDURE — 57288 REPAIR BLADDER DEFECT: CPT | Mod: AS,51,, | Performed by: PHYSICIAN ASSISTANT

## 2024-05-27 PROCEDURE — 94799 UNLISTED PULMONARY SVC/PX: CPT

## 2024-05-27 PROCEDURE — 25000003 PHARM REV CODE 250: Performed by: NURSE ANESTHETIST, CERTIFIED REGISTERED

## 2024-05-27 PROCEDURE — C1771 REP DEV, URINARY, W/SLING: HCPCS | Performed by: OBSTETRICS & GYNECOLOGY

## 2024-05-27 PROCEDURE — 25000003 PHARM REV CODE 250: Performed by: ANESTHESIOLOGY

## 2024-05-27 PROCEDURE — 58571 TLH W/T/O 250 G OR LESS: CPT | Mod: AS,51,, | Performed by: PHYSICIAN ASSISTANT

## 2024-05-27 PROCEDURE — 71000039 HC RECOVERY, EACH ADD'L HOUR: Performed by: OBSTETRICS & GYNECOLOGY

## 2024-05-27 PROCEDURE — C1781 MESH (IMPLANTABLE): HCPCS | Performed by: OBSTETRICS & GYNECOLOGY

## 2024-05-27 PROCEDURE — C1765 ADHESION BARRIER: HCPCS | Performed by: OBSTETRICS & GYNECOLOGY

## 2024-05-27 PROCEDURE — 57288 REPAIR BLADDER DEFECT: CPT | Mod: 51,,, | Performed by: OBSTETRICS & GYNECOLOGY

## 2024-05-27 PROCEDURE — 57425 LAPAROSCOPY SURG COLPOPEXY: CPT | Mod: AS,,, | Performed by: PHYSICIAN ASSISTANT

## 2024-05-27 PROCEDURE — 63600175 PHARM REV CODE 636 W HCPCS: Performed by: OBSTETRICS & GYNECOLOGY

## 2024-05-27 PROCEDURE — 27201423 OPTIME MED/SURG SUP & DEVICES STERILE SUPPLY: Performed by: OBSTETRICS & GYNECOLOGY

## 2024-05-27 PROCEDURE — D9220A PRA ANESTHESIA: Mod: CRNA,,, | Performed by: NURSE ANESTHETIST, CERTIFIED REGISTERED

## 2024-05-27 PROCEDURE — 63600175 PHARM REV CODE 636 W HCPCS: Performed by: ANESTHESIOLOGY

## 2024-05-27 PROCEDURE — 63600175 PHARM REV CODE 636 W HCPCS: Performed by: NURSE ANESTHETIST, CERTIFIED REGISTERED

## 2024-05-27 PROCEDURE — 25000003 PHARM REV CODE 250: Performed by: OBSTETRICS & GYNECOLOGY

## 2024-05-27 PROCEDURE — 88305 TISSUE EXAM BY PATHOLOGIST: CPT | Performed by: PATHOLOGY

## 2024-05-27 PROCEDURE — 37000009 HC ANESTHESIA EA ADD 15 MINS: Performed by: OBSTETRICS & GYNECOLOGY

## 2024-05-27 PROCEDURE — 63600175 PHARM REV CODE 636 W HCPCS: Performed by: STUDENT IN AN ORGANIZED HEALTH CARE EDUCATION/TRAINING PROGRAM

## 2024-05-27 PROCEDURE — 88305 TISSUE EXAM BY PATHOLOGIST: CPT | Mod: 26,,, | Performed by: PATHOLOGY

## 2024-05-27 PROCEDURE — 94761 N-INVAS EAR/PLS OXIMETRY MLT: CPT

## 2024-05-27 PROCEDURE — 63600175 PHARM REV CODE 636 W HCPCS: Mod: JZ,JG | Performed by: OBSTETRICS & GYNECOLOGY

## 2024-05-27 PROCEDURE — 99900035 HC TECH TIME PER 15 MIN (STAT)

## 2024-05-27 PROCEDURE — C2628 CATHETER, OCCLUSION: HCPCS | Performed by: OBSTETRICS & GYNECOLOGY

## 2024-05-27 PROCEDURE — 58571 TLH W/T/O 250 G OR LESS: CPT | Mod: 51,,, | Performed by: OBSTETRICS & GYNECOLOGY

## 2024-05-27 PROCEDURE — 57425 LAPAROSCOPY SURG COLPOPEXY: CPT | Mod: ,,, | Performed by: OBSTETRICS & GYNECOLOGY

## 2024-05-27 DEVICE — SLING FIT ADVANTAGE: Type: IMPLANTABLE DEVICE | Site: ABDOMEN | Status: FUNCTIONAL

## 2024-05-27 DEVICE — BARRIER INTERCEED 3X4 ST DIS: Type: IMPLANTABLE DEVICE | Site: ABDOMEN | Status: FUNCTIONAL

## 2024-05-27 DEVICE — MESH VAGINAL PROLAPSE 10X15CM: Type: IMPLANTABLE DEVICE | Site: ABDOMEN | Status: FUNCTIONAL

## 2024-05-27 RX ORDER — HYDROMORPHONE HYDROCHLORIDE 2 MG/ML
0.4 INJECTION, SOLUTION INTRAMUSCULAR; INTRAVENOUS; SUBCUTANEOUS
Status: DISCONTINUED | OUTPATIENT
Start: 2024-05-27 | End: 2024-05-28 | Stop reason: HOSPADM

## 2024-05-27 RX ORDER — HYDROMORPHONE HYDROCHLORIDE 2 MG/ML
0.4 INJECTION, SOLUTION INTRAMUSCULAR; INTRAVENOUS; SUBCUTANEOUS EVERY 5 MIN PRN
Status: DISCONTINUED | OUTPATIENT
Start: 2024-05-27 | End: 2024-05-27 | Stop reason: HOSPADM

## 2024-05-27 RX ORDER — SODIUM CHLORIDE, SODIUM LACTATE, POTASSIUM CHLORIDE, CALCIUM CHLORIDE 600; 310; 30; 20 MG/100ML; MG/100ML; MG/100ML; MG/100ML
INJECTION, SOLUTION INTRAVENOUS CONTINUOUS
Status: DISCONTINUED | OUTPATIENT
Start: 2024-05-27 | End: 2024-05-27

## 2024-05-27 RX ORDER — SODIUM CHLORIDE, SODIUM LACTATE, POTASSIUM CHLORIDE, CALCIUM CHLORIDE 600; 310; 30; 20 MG/100ML; MG/100ML; MG/100ML; MG/100ML
INJECTION, SOLUTION INTRAVENOUS CONTINUOUS
Status: DISCONTINUED | OUTPATIENT
Start: 2024-05-27 | End: 2024-05-28

## 2024-05-27 RX ORDER — KETAMINE HCL IN 0.9 % NACL 50 MG/5 ML
SYRINGE (ML) INTRAVENOUS
Status: DISCONTINUED | OUTPATIENT
Start: 2024-05-27 | End: 2024-05-27

## 2024-05-27 RX ORDER — TALC
6 POWDER (GRAM) TOPICAL NIGHTLY PRN
Status: DISCONTINUED | OUTPATIENT
Start: 2024-05-27 | End: 2024-05-28 | Stop reason: HOSPADM

## 2024-05-27 RX ORDER — PROCHLORPERAZINE EDISYLATE 5 MG/ML
5 INJECTION INTRAMUSCULAR; INTRAVENOUS EVERY 6 HOURS PRN
Status: DISCONTINUED | OUTPATIENT
Start: 2024-05-27 | End: 2024-05-28 | Stop reason: HOSPADM

## 2024-05-27 RX ORDER — BUPIVACAINE HYDROCHLORIDE 2.5 MG/ML
INJECTION, SOLUTION EPIDURAL; INFILTRATION; INTRACAUDAL
Status: DISCONTINUED | OUTPATIENT
Start: 2024-05-27 | End: 2024-05-27 | Stop reason: HOSPADM

## 2024-05-27 RX ORDER — FAMOTIDINE 20 MG/1
20 TABLET, FILM COATED ORAL
Status: COMPLETED | OUTPATIENT
Start: 2024-05-27 | End: 2024-05-27

## 2024-05-27 RX ORDER — LEVOTHYROXINE SODIUM 125 UG/1
125 TABLET ORAL EVERY OTHER DAY
Status: DISCONTINUED | OUTPATIENT
Start: 2024-05-28 | End: 2024-05-28 | Stop reason: HOSPADM

## 2024-05-27 RX ORDER — OXYCODONE HYDROCHLORIDE 5 MG/1
5 TABLET ORAL
Status: DISCONTINUED | OUTPATIENT
Start: 2024-05-27 | End: 2024-05-27 | Stop reason: HOSPADM

## 2024-05-27 RX ORDER — ACETAMINOPHEN 500 MG
1000 TABLET ORAL
Status: COMPLETED | OUTPATIENT
Start: 2024-05-27 | End: 2024-05-27

## 2024-05-27 RX ORDER — ONDANSETRON HYDROCHLORIDE 2 MG/ML
INJECTION, SOLUTION INTRAVENOUS
Status: DISCONTINUED | OUTPATIENT
Start: 2024-05-27 | End: 2024-05-27

## 2024-05-27 RX ORDER — ONDANSETRON HYDROCHLORIDE 2 MG/ML
4 INJECTION, SOLUTION INTRAVENOUS DAILY PRN
Status: DISCONTINUED | OUTPATIENT
Start: 2024-05-27 | End: 2024-05-27 | Stop reason: HOSPADM

## 2024-05-27 RX ORDER — EPHEDRINE SULFATE 50 MG/ML
INJECTION, SOLUTION INTRAVENOUS
Status: DISCONTINUED | OUTPATIENT
Start: 2024-05-27 | End: 2024-05-27

## 2024-05-27 RX ORDER — LIDOCAINE HYDROCHLORIDE 10 MG/ML
0.5 INJECTION, SOLUTION EPIDURAL; INFILTRATION; INTRACAUDAL; PERINEURAL ONCE
Status: DISCONTINUED | OUTPATIENT
Start: 2024-05-27 | End: 2024-05-27

## 2024-05-27 RX ORDER — PHENYLEPHRINE HCL IN 0.9% NACL 1 MG/10 ML
SYRINGE (ML) INTRAVENOUS
Status: DISCONTINUED | OUTPATIENT
Start: 2024-05-27 | End: 2024-05-27

## 2024-05-27 RX ORDER — CEFAZOLIN SODIUM 1 G/3ML
2 INJECTION, POWDER, FOR SOLUTION INTRAMUSCULAR; INTRAVENOUS
Status: DISCONTINUED | OUTPATIENT
Start: 2024-05-27 | End: 2024-05-27

## 2024-05-27 RX ORDER — PROPOFOL 10 MG/ML
VIAL (ML) INTRAVENOUS
Status: DISCONTINUED | OUTPATIENT
Start: 2024-05-27 | End: 2024-05-27

## 2024-05-27 RX ORDER — MEPERIDINE HYDROCHLORIDE 25 MG/ML
12.5 INJECTION INTRAMUSCULAR; INTRAVENOUS; SUBCUTANEOUS ONCE AS NEEDED
Status: DISCONTINUED | OUTPATIENT
Start: 2024-05-27 | End: 2024-05-27 | Stop reason: HOSPADM

## 2024-05-27 RX ORDER — CETIRIZINE HYDROCHLORIDE 5 MG/1
5 TABLET ORAL NIGHTLY
Status: DISCONTINUED | OUTPATIENT
Start: 2024-05-27 | End: 2024-05-28 | Stop reason: HOSPADM

## 2024-05-27 RX ORDER — PHENAZOPYRIDINE HYDROCHLORIDE 100 MG/1
100 TABLET, FILM COATED ORAL
Status: DISCONTINUED | OUTPATIENT
Start: 2024-05-27 | End: 2024-05-28 | Stop reason: HOSPADM

## 2024-05-27 RX ORDER — CEPHALEXIN 500 MG/1
500 CAPSULE ORAL EVERY 12 HOURS
Status: DISCONTINUED | OUTPATIENT
Start: 2024-05-27 | End: 2024-05-28 | Stop reason: HOSPADM

## 2024-05-27 RX ORDER — NALOXONE HCL 0.4 MG/ML
0.02 VIAL (ML) INJECTION
Status: DISCONTINUED | OUTPATIENT
Start: 2024-05-27 | End: 2024-05-28 | Stop reason: HOSPADM

## 2024-05-27 RX ORDER — MUPIROCIN 20 MG/G
OINTMENT TOPICAL 2 TIMES DAILY
Status: DISCONTINUED | OUTPATIENT
Start: 2024-05-27 | End: 2024-05-28 | Stop reason: HOSPADM

## 2024-05-27 RX ORDER — OXYCODONE HYDROCHLORIDE 10 MG/1
10 TABLET ORAL EVERY 4 HOURS PRN
Status: DISCONTINUED | OUTPATIENT
Start: 2024-05-27 | End: 2024-05-28 | Stop reason: HOSPADM

## 2024-05-27 RX ORDER — SODIUM CHLORIDE 0.9 % (FLUSH) 0.9 %
3 SYRINGE (ML) INJECTION
Status: DISCONTINUED | OUTPATIENT
Start: 2024-05-27 | End: 2024-05-27 | Stop reason: HOSPADM

## 2024-05-27 RX ORDER — LIDOCAINE HYDROCHLORIDE 20 MG/ML
INJECTION INTRAVENOUS
Status: DISCONTINUED | OUTPATIENT
Start: 2024-05-27 | End: 2024-05-27

## 2024-05-27 RX ORDER — PREGABALIN 50 MG/1
50 CAPSULE ORAL ONCE
Status: COMPLETED | OUTPATIENT
Start: 2024-05-27 | End: 2024-05-27

## 2024-05-27 RX ORDER — DOCUSATE SODIUM 100 MG/1
100 CAPSULE, LIQUID FILLED ORAL 2 TIMES DAILY
Status: DISCONTINUED | OUTPATIENT
Start: 2024-05-27 | End: 2024-05-28 | Stop reason: HOSPADM

## 2024-05-27 RX ORDER — ACETAMINOPHEN 500 MG
1000 TABLET ORAL EVERY 6 HOURS
Status: DISCONTINUED | OUTPATIENT
Start: 2024-05-27 | End: 2024-05-28 | Stop reason: HOSPADM

## 2024-05-27 RX ORDER — LIDOCAINE HYDROCHLORIDE AND EPINEPHRINE 10; 10 MG/ML; UG/ML
INJECTION, SOLUTION INFILTRATION; PERINEURAL
Status: DISCONTINUED | OUTPATIENT
Start: 2024-05-27 | End: 2024-05-27 | Stop reason: HOSPADM

## 2024-05-27 RX ORDER — ROCURONIUM BROMIDE 10 MG/ML
INJECTION, SOLUTION INTRAVENOUS
Status: DISCONTINUED | OUTPATIENT
Start: 2024-05-27 | End: 2024-05-27

## 2024-05-27 RX ORDER — FENTANYL CITRATE 50 UG/ML
INJECTION, SOLUTION INTRAMUSCULAR; INTRAVENOUS
Status: DISCONTINUED | OUTPATIENT
Start: 2024-05-27 | End: 2024-05-27

## 2024-05-27 RX ORDER — HEPARIN SODIUM 5000 [USP'U]/ML
5000 INJECTION, SOLUTION INTRAVENOUS; SUBCUTANEOUS
Status: DISCONTINUED | OUTPATIENT
Start: 2024-05-27 | End: 2024-05-27

## 2024-05-27 RX ORDER — ONDANSETRON HYDROCHLORIDE 2 MG/ML
8 INJECTION, SOLUTION INTRAVENOUS EVERY 6 HOURS PRN
Status: DISCONTINUED | OUTPATIENT
Start: 2024-05-27 | End: 2024-05-28 | Stop reason: HOSPADM

## 2024-05-27 RX ORDER — IBUPROFEN 400 MG/1
400 TABLET ORAL EVERY 6 HOURS
Status: DISCONTINUED | OUTPATIENT
Start: 2024-05-27 | End: 2024-05-28 | Stop reason: HOSPADM

## 2024-05-27 RX ORDER — OXYCODONE HYDROCHLORIDE 5 MG/1
5 TABLET ORAL EVERY 4 HOURS PRN
Status: DISCONTINUED | OUTPATIENT
Start: 2024-05-27 | End: 2024-05-28 | Stop reason: HOSPADM

## 2024-05-27 RX ORDER — MUPIROCIN 20 MG/G
OINTMENT TOPICAL
Status: DISCONTINUED | OUTPATIENT
Start: 2024-05-27 | End: 2024-05-27

## 2024-05-27 RX ORDER — DEXAMETHASONE SODIUM PHOSPHATE 4 MG/ML
INJECTION, SOLUTION INTRA-ARTICULAR; INTRALESIONAL; INTRAMUSCULAR; INTRAVENOUS; SOFT TISSUE
Status: DISCONTINUED | OUTPATIENT
Start: 2024-05-27 | End: 2024-05-27

## 2024-05-27 RX ORDER — ATORVASTATIN CALCIUM 10 MG/1
10 TABLET, FILM COATED ORAL DAILY
Status: DISCONTINUED | OUTPATIENT
Start: 2024-05-27 | End: 2024-05-28 | Stop reason: HOSPADM

## 2024-05-27 RX ORDER — CEPHALEXIN 250 MG/1
250 CAPSULE ORAL DAILY
Status: DISCONTINUED | OUTPATIENT
Start: 2024-05-27 | End: 2024-05-27

## 2024-05-27 RX ADMIN — DEXAMETHASONE SODIUM PHOSPHATE 4 MG: 4 INJECTION INTRA-ARTICULAR; INTRALESIONAL; INTRAMUSCULAR; INTRAVENOUS; SOFT TISSUE at 07:05

## 2024-05-27 RX ADMIN — ONDANSETRON 4 MG: 2 INJECTION INTRAMUSCULAR; INTRAVENOUS at 11:05

## 2024-05-27 RX ADMIN — IBUPROFEN 400 MG: 400 TABLET ORAL at 05:05

## 2024-05-27 RX ADMIN — ACETAMINOPHEN 1000 MG: 500 TABLET ORAL at 11:05

## 2024-05-27 RX ADMIN — ACETAMINOPHEN 1000 MG: 500 TABLET ORAL at 05:05

## 2024-05-27 RX ADMIN — Medication 30 MG: at 08:05

## 2024-05-27 RX ADMIN — ROCURONIUM BROMIDE 10 MG: 10 SOLUTION INTRAVENOUS at 08:05

## 2024-05-27 RX ADMIN — CEFAZOLIN 2 G: 330 INJECTION, POWDER, FOR SOLUTION INTRAMUSCULAR; INTRAVENOUS at 11:05

## 2024-05-27 RX ADMIN — ROCURONIUM BROMIDE 40 MG: 10 SOLUTION INTRAVENOUS at 07:05

## 2024-05-27 RX ADMIN — CETIRIZINE HYDROCHLORIDE 5 MG: 5 TABLET, FILM COATED ORAL at 09:05

## 2024-05-27 RX ADMIN — PHENAZOPYRIDINE 100 MG: 100 TABLET ORAL at 05:05

## 2024-05-27 RX ADMIN — SUGAMMADEX 200 MG: 100 INJECTION, SOLUTION INTRAVENOUS at 11:05

## 2024-05-27 RX ADMIN — DOCUSATE SODIUM 100 MG: 100 CAPSULE, LIQUID FILLED ORAL at 09:05

## 2024-05-27 RX ADMIN — FAMOTIDINE 20 MG: 20 TABLET, FILM COATED ORAL at 05:05

## 2024-05-27 RX ADMIN — MUPIROCIN: 20 OINTMENT TOPICAL at 09:05

## 2024-05-27 RX ADMIN — GLYCOPYRROLATE 0.1 MG: 0.2 INJECTION, SOLUTION INTRAMUSCULAR; INTRAVITREAL at 07:05

## 2024-05-27 RX ADMIN — SODIUM CHLORIDE, POTASSIUM CHLORIDE, SODIUM LACTATE AND CALCIUM CHLORIDE: 600; 310; 30; 20 INJECTION, SOLUTION INTRAVENOUS at 02:05

## 2024-05-27 RX ADMIN — EPHEDRINE SULFATE 5 MG: 50 INJECTION INTRAVENOUS at 07:05

## 2024-05-27 RX ADMIN — PREGABALIN 50 MG: 50 CAPSULE ORAL at 05:05

## 2024-05-27 RX ADMIN — SODIUM CHLORIDE, SODIUM LACTATE, POTASSIUM CHLORIDE, AND CALCIUM CHLORIDE: .6; .31; .03; .02 INJECTION, SOLUTION INTRAVENOUS at 11:05

## 2024-05-27 RX ADMIN — ROCURONIUM BROMIDE 20 MG: 10 SOLUTION INTRAVENOUS at 10:05

## 2024-05-27 RX ADMIN — LIDOCAINE HYDROCHLORIDE 60 MG: 20 INJECTION, SOLUTION INTRAVENOUS at 07:05

## 2024-05-27 RX ADMIN — Medication 10 MG: at 09:05

## 2024-05-27 RX ADMIN — HEPARIN SODIUM 5000 UNITS: 5000 INJECTION INTRAVENOUS; SUBCUTANEOUS at 05:05

## 2024-05-27 RX ADMIN — ROCURONIUM BROMIDE 20 MG: 10 SOLUTION INTRAVENOUS at 09:05

## 2024-05-27 RX ADMIN — FENTANYL CITRATE 100 MCG: 0.05 INJECTION, SOLUTION INTRAMUSCULAR; INTRAVENOUS at 07:05

## 2024-05-27 RX ADMIN — CEFAZOLIN 2 G: 330 INJECTION, POWDER, FOR SOLUTION INTRAMUSCULAR; INTRAVENOUS at 07:05

## 2024-05-27 RX ADMIN — PROPOFOL 100 MG: 10 INJECTION, EMULSION INTRAVENOUS at 07:05

## 2024-05-27 RX ADMIN — CEPHALEXIN 500 MG: 500 CAPSULE ORAL at 09:05

## 2024-05-27 RX ADMIN — ROCURONIUM BROMIDE 20 MG: 10 SOLUTION INTRAVENOUS at 08:05

## 2024-05-27 RX ADMIN — CEPHALEXIN 250 MG: 250 CAPSULE ORAL at 12:05

## 2024-05-27 RX ADMIN — IBUPROFEN 400 MG: 400 TABLET ORAL at 11:05

## 2024-05-27 RX ADMIN — MUPIROCIN: 20 OINTMENT TOPICAL at 05:05

## 2024-05-27 RX ADMIN — EPHEDRINE SULFATE 5 MG: 50 INJECTION INTRAVENOUS at 11:05

## 2024-05-27 RX ADMIN — Medication 100 MCG: at 07:05

## 2024-05-27 RX ADMIN — SODIUM CHLORIDE, SODIUM LACTATE, POTASSIUM CHLORIDE, AND CALCIUM CHLORIDE: .6; .31; .03; .02 INJECTION, SOLUTION INTRAVENOUS at 08:05

## 2024-05-27 RX ADMIN — SODIUM CHLORIDE, SODIUM LACTATE, POTASSIUM CHLORIDE, AND CALCIUM CHLORIDE: .6; .31; .03; .02 INJECTION, SOLUTION INTRAVENOUS at 06:05

## 2024-05-27 NOTE — OPERATIVE NOTE ADDENDUM
Certification of Assistant at Surgery       Surgery Date: 5/27/2024     Participating Surgeons:  Surgeons and Role:     * Wendy Griffin MD - Primary     * Patricia Badillo MD - Assisting    Procedures:  Procedure(s) (LRB):  XI ROBOTIC HYSTERECTOMY,WITH SALPINGO-OOPHORECTOMY (N/A)  XI ROBOTIC SACROCOLPOPEXY, ABDOMINAL (N/A)  SLING, MIDURETHRAL (N/A)  CYSTOSCOPY (N/A)    Assistant Surgeon's Certification of Necessity:  I understand that section 1842 (b) (6) (d) of the Social Security Act generally prohibits Medicare Part B reasonable charge payment for the services of assistants at surgery in teaching hospitals when qualified residents are available to furnish such services. I certify that the services for which payment is claimed were medically necessary, and that no qualified resident was available to perform the services. I further understand that these services are subject to post-payment review by the Medicare carrier.      Jr Bennett PA-C    05/27/2024  11:57 AM

## 2024-05-27 NOTE — ANESTHESIA POSTPROCEDURE EVALUATION
Anesthesia Post Evaluation    Patient: Karina Mathur    Procedure(s) Performed: Procedure(s) (LRB):  XI ROBOTIC HYSTERECTOMY,WITH SALPINGO-OOPHORECTOMY (N/A)  XI ROBOTIC SACROCOLPOPEXY, ABDOMINAL (N/A)  SLING, MIDURETHRAL (N/A)  CYSTOSCOPY (N/A)    Final Anesthesia Type: general      Patient location during evaluation: PACU  Patient participation: Yes- Able to Participate  Level of consciousness: awake and alert  Post-procedure vital signs: reviewed and stable  Pain management: adequate  Airway patency: patent    PONV status at discharge: No PONV  Anesthetic complications: no      Cardiovascular status: blood pressure returned to baseline  Respiratory status: unassisted, spontaneous ventilation and room air  Hydration status: euvolemic  Follow-up not needed.          Vitals Value Taken Time   /56 05/27/24 1301   Temp 36.4 °C (97.5 °F) 05/27/24 1206   Pulse 57 05/27/24 1311   Resp 16 05/27/24 1300   SpO2 94 % 05/27/24 1311   Vitals shown include unfiled device data.      Event Time   Out of Recovery 13:15:00         Pain/Suzi Score: Pain Rating Prior to Med Admin: 0 (5/27/2024  5:45 AM)  Suzi Score: 10 (5/27/2024  1:00 PM)

## 2024-05-27 NOTE — TRANSFER OF CARE
Anesthesia Transfer of Care Note    Patient: Karina Mathur    Procedure(s) Performed: Procedure(s) (LRB):  XI ROBOTIC HYSTERECTOMY,WITH SALPINGO-OOPHORECTOMY (N/A)  XI ROBOTIC SACROCOLPOPEXY, ABDOMINAL (N/A)  SLING, MIDURETHRAL (N/A)  CYSTOSCOPY (N/A)    Patient location: PACU    Anesthesia Type: general    Transport from OR: Transported from OR on 6-10 L/min O2 by face mask with adequate spontaneous ventilation    Post pain: adequate analgesia    Post assessment: no apparent anesthetic complications and tolerated procedure well    Post vital signs: stable    Level of consciousness: awake and alert    Nausea/Vomiting: no nausea/vomiting    Complications: none    Transfer of care protocol was followed      Last vitals: Visit Vitals  BP (!) 99/41   Pulse 61   Temp 36.4 °C (97.5 °F) (Oral)   Resp 18   SpO2 98%   Breastfeeding No

## 2024-05-27 NOTE — ANESTHESIA PROCEDURE NOTES
Intubation    Date/Time: 5/27/2024 7:16 AM    Performed by: Florence Chinchilla CRNA  Authorized by: Florence Chinchilla CRNA    Intubation:     Induction:  Intravenous    Intubated:  Postinduction    Mask Ventilation:  Easy mask    Attempts:  1    Attempted By:  CRNA    Method of Intubation:  Video laryngoscopy    Blade:  Rodriguez 3    Laryngeal View Grade: Grade I - full view of cords      Difficult Airway Encountered?: No      Complications:  None    Airway Device:  Oral endotracheal tube    Airway Device Size:  7.0    Style/Cuff Inflation:  Cuffed (inflated to minimal occlusive pressure)    Inflation Amount (mL):  5    Tube secured:  21    Secured at:  The lips    Placement Verified By:  Capnometry    Complicating Factors:  None    Findings Post-Intubation:  BS equal bilateral and atraumatic/condition of teeth unchanged

## 2024-05-27 NOTE — OP NOTE
"Date of Operation: 05/27/2024    Title of Operation:  1)  Robotic-assisted total laparoscopic hysterectomy with bilateral salpingo-oophorectomy  2)  Robotic-assisted laparoscopic sacral colpopexy with polypropylene mesh  3)  Placement of retropubic tension-free midurethral sling, Advantage Fit ([employer])  4)  Cystourethroscopy    Indications for Surgery:  Last HPI from 03/11/2024     1)  UI:  (+) JADE (rare) > (+) UUI.  (+) pads "in case", usually min wetness.  1 pad/night "in case."  Daytime frequency: Q 2 hours.  Nocturia: Yes: 2+/night--disrupts sleep.   (--) dysuria,  (--) hematuria,  (--) frequent UTIs.  (+) complete bladder emptying. +PV urgency with small 2nd volume. +splayed stream.      2)  POP:  Present. below introitus, significant.  Symptoms:(+) pressure, general bother.  (--) vaginal bleeding. (--) vaginal discharge. (+) sexually active.  (+) dyspareunia due to dryness.  (+)  Vaginal dryness--uses vaseline.  (--) vaginal estrogen use.   --POP-Q:  Aa +3; Ba +3; C -2; Ap -1; Bp -1; D -5.  Genital hiatus 3, perineal body 2, total vaginal length 10-11 (standing).      3)  BM:  (--) constipation/straining.  (+) chronic diarrhea--seems related to nerves. (--) hematochezia.  (--) fecal incontinence.  (+) fecal smearing/urgency x 1.  (+) complete evacuation.      Changes from last visit:  Rare UUI.  Voiding every 2-3 hours during the day and 2-3/night  Denies constipation     Pelvic US 2/2024:  Impression:  1. Endometrial stripe measures 4 mm, normal for a postmenopausal patient.  There is a 7 mm endometrial cyst present within the endometrial canal of the uterine fundus.  2. Nonvisualization of the ovaries     5/16/2024 UDS:  3.  URETHRAL FUNCTION/STORAGE PHASE:  a.  WITH prolapse reduction:  CLPP (150 mL): Positive  at  23 cm H20  VLPP (150 mL): Negative  at  91 cm H20   CLPP (300 mL): Positive  at  104 cm H20  VLPP (300 mL): Negative  at  80 cm H20   These findings are consistent with Positive " urodynamic stress incontinence.  Assessment:  UF unable to be assessed.   PF prolonged with concern for voiding dysfunction (Valsalva assist).  Compliance normal.  Max capacity 309 mL.  DO (--).  JADE (+).       5/16/2024 cysto: normal with decreased coaptation.    Preoperative Diagnosis:  1. Urinary incontinence, mixed    2. Cystocele, midline    3. Vaginal atrophy    4. Rectocele, female    5. Uterovaginal prolapse    6. Nocturia more than twice per night    7. Loose stools    8.    Concern for voiding dysfunction (Valsalva assist)  9.    Urodynamic stress incontinence  10.  Decreased urethral coaptation    Postoperative Diagnosis:  1. Urinary incontinence, mixed    2. Cystocele, midline    3. Vaginal atrophy    4. Rectocele, female    5. Uterovaginal prolapse    6. Nocturia more than twice per night    7. Loose stools    8.    Concern for voiding dysfunction (Valsalva assist)  9.    Urodynamic stress incontinence  10.  Decreased urethral coaptation    Anesthesia:  General endotracheal anesthesia.  Additionally, 0.5% marcaine was injected prior to placement of laparoscopic trocars, and a dilute solution of 1% lidocaine with epinephrine was injected vaginally for local anesthesia.    Specimen (Bacteriological, Pathological or other):  Uterus, cervix, bilateral fallopian tube fragments, and ovaries    Vaginal Packing (type and #):   Yes - kerlix, #1     Prosthetic Device/Implant:  1)  Gynecare gynemesh (10 x 15 cm).  LOT# [id number] .    2)  Interceed barrier.  LOT# [id number] .    3)  Advantage Fit sling.  LOT# [id number] .      Surgeons Narrative:    Surgeon: Wendy Griffin MD    Assistants:  Patricia Badillo MD (PGY4), JOSHUA Rausch (insufficient resident assistance).       Intravenous Fluids:  2000 mL     Estimated Blood Loss:  150 mL     Urine Output:  255 mL     Counts:  Sponge, lap, needle counts correct x 2.     Drains: Parsons catheter.     Disposition:  The patient was sent to the PACU in stable condition.      Findings:     1.  On exam under anesthesia,  normal external female genitalia. There was prolapse as previously noted in clinic.  Uterus and cervix: Normal size, sounded 6-8 cm.  Adnexa: non palpable.     2.  On laparoscopic survey:    --The bowel was grossly Normal.    --The appendix was present and Normal.   --The uterus and cervix, bilateral ovaries were present and Normal. The proximal and distal-most portion of each fallopian tube was present, with absence of the midsection, consistent with previous tubal ligation.   --The liver margin Normal.   --Bilateral ureters were visualized and noted to vermiculate at the start and close of the case.    --Adhesions were absent.    --Other findings included:  none     3.  On cystoscopy, the bladder mucosa was Normal.  After R-hyst/BSO, there was no suture or mesh within the bladder mucosa.  After initial passage of the sling trocars, a small area of trocar perforation was noted through the right bladder dome.  The trocar was removed and repassed, and there was no injury or perforation noted from either trocar at this point.  The ureteral orifices were visualized bilaterally with (+) noted good efflux x 2. On a systematic survey of the bladder dome to the base of the urethrovesical junction, there were not other abnormalities noted. The urethra was normal on retraction of the scope.     4.  Rectal exam:  At the close of the case, rectal exam was performed.  There was no suture, mesh, or other injury noted on exam.  No obvious masses were palpated.     Description of procedure:    The patient was identified in the preoperative area where informed consent was confirmed, and she was taken to the operating room where an adequate level of general anesthesia was obtained.  The patient was positioned in lithotomy position with legs in Joseph stirrups. Care was taken to avoid joint hyperflexion or hyperextension, and all extremity surfaces were carefully padded so as to minimize  risk of neurologic injury. Intravenous antibiotics were administered preoperatively. Sequential compression devices were applied to the patient's lower extremities preoperatively and heparin was administered subcutaneously for VTE prophylaxis.  Surgical time-out was performed, where the patient was identified and procedures confirmed.  An examination under anesthesia was performed with findings described as above.  The patient's abdomen, perineum, and vagina were sterilely prepped and draped. A garcias catheter was placed in the bladder for drainage.      A weighted speculum was placed in the vagina.  The cervix was identified, the posterior lip was grasped with a single toothed tenaculum, and stay sutures of 0-vicryl were placed in the anterior and posterior cervix.  The uterus was sounded, and the appropriate uterine manipulator and KOH colpotomizer were selected.  The KAMRAN/KOH apparatus was assembled, and the uterine manipulator was advanced into the uterine cavity until the KOH colpotomizer was secured optimally around the cervix.  The uterine manipulator was inflated to secure the device, and a vaginal occluder balloon was placed distally to the KAMRAN/KOH and inflated until the vaginal cavity was occluded.      First, we placed laparoscopic ports. Before placement of each laparoscopic port, several mL of 0.5% Marcaine were injected subcutaneously as well as deeply into the tissue of each site.  First, a supraumbilical incision of 5-mm was made, and the 5 mm trocar was inserted into the incision until peritoneal entry was gained and confirmed. Carbon dioxide was insufflated through the port until an adequate pneumoperitoneum of no greater than 15 mm Hg was obtained.  The laparoscopic camera was inserted through this port for visualization of all subsequent port placement.  Two 8 mm ports were place on bilaterally to the supraumbilical port, each approximately 8-1-0 cm lateral, along the mid clavicular line at the  level of the umbilicus, at least 2 cm cephalad and medial of the anterior superior iliac spine. Each 8 mm trocar was inserted under direct visualization without significant trauma.   An 8 mm airseal port was placed in the right upper quadrant, superior to the umbilicus and midway between the right lower quadrant port and the umbilicus in a triangulated fashion. A third 8 mm port was placed at the left side, midway between the left lower quadrant port and the umbilicus.  The 5 mm umbilical camera port was then exchanged for a 8 mm robotic camera port.  There were no complications with these port placements. A total of 5 ports had been placed, including the supraumbilical port for the camera. The robot was then docked.      We began the case with total laparoscopic hysterectomy.  The ureters were visualized bilaterally.  Decision had previously been made to remove bilateral tubes and ovaries.  Sequentially, the right infundibulopelvic, round, and broad ligaments were electrodessicated with bipolar cautery.  The anterior and posterior sheaths of the round ligament were gently , the uterine vessels were exposed, and electrodessicated.  Using sharp dissection, the vesicouterine fold was created, and the bladder was carefully dissected off the anterior vagina.   Next, these same steps were repeated along the patient's left side.  Excellent hemostasis was noted.  Using the monopolar zenaida, the anterior colpotomy was created along the KOH device and considered circumferentially until the entire uterine specimen was freed from the pelvis.  The specimen was then handed to pathology for further evaluation.  The vaginal cuff was closed with several figure-of-eight stitches of 0-vicryl.  Excellent hemostasis was noted.     We then proceeded with sacral colpopexy. We gently retracted the sigmoid colon to the patient's left so that we could easily visualize the sacral promontory.  The peritoneum was opened over the  sacral  promontory, and the presacral space was developed.  Care was used to avoid any trauma to the vasculature or nerve supply underlying this space during this process, and excellent hemostasis was noted throughout. Peritoneal dissection was continued over the sacral promontory to provide adequate space for attachment of the GyneMesh. This process was aided with blunt dissection using Kittners. The peritoneum was then opened cadually all the way to the vaginal cuff.  With the EEA sizers in the vagina and in the rectum, the rectovaginal space was demonstrated. The peritoneum overlying this spaced was opened and the space bluntly dissected down to approximately the distal 1/3 of the vagina. Hemostasis was maintained with electrocautery. After retrograde filling the bladder to demonstrate the most cephalad portion of the bladder, the bladder was dissected off the vaginal cuff and anterior vagina.  Again, excellent hemostasis was noted.    Two pieces of Gynemesh each about 4 cm in width x 15 cm in length were cut. These were broadly affixed to the vagina anteriorly and posteriorly with several rows of interrupted sutures of 2-0 PDS, rajan 6 sutures on the posterior vagina and 6 sutures on the anterior vagina. With the EEA sizer deviating the vagina to the right pararectal space, the pieces of mesh were tensioned appropriately and sutured to the anterior ligament overlying the presacral space at rajan the level of S1 and S2 using 2 x 0-Ethibond sutures. Care was taken to make sure that the mesh was not placed under tension. Excellent hemostasis was noted during this process.  Care was used to avoid trauma to the presacral vasculature during this step. Excess mesh was removed. Interrupted stitches of 0-Vicryl was used to close the peritoneum over the sacral promontory and along the entire extent of the GyneMesh to the vaginal cuff.  Excellent hemostasis was noted at the end of the procedure. All needles and suture pieces  were removed from the pelvis laparoscopically throughout the procedure.  Needles, sponge, and lap counts were correct x 2 at the end of the procedure. At the end of the robotic portion of the case, another pelvic survey was completed.  The pelvis was irrigated, all irrigants removed. All operative areas were noted to be hemostatic.      Next, cystourethroscopy was performed.  Findings were as noted above.   There were  no other abnormalities noted.  Ureteral orifices were noted to have good efflux bilaterally.  The bladder was drained, and a Parsons catheter was replaced.    At this point, laparoscopic portion of the procedure was completed. The pelvis was irrigated, and all irrigants were removed. Interceed was placed along all planes of dissection and suture line to discourage future adhesion formation.  All abdominal incisions were <1 cm, and fascial closure was unnecessary.  The abdomen was deflated and all laparoscopic sleeves and other instruments were removed from the abdomen. All laparoscopic skin incisions were closed with subcuticular stitches of 4-0 Monocryl and secured with steri strips and band-aids.  Excellent cosmesis and hemostasis was noted.          Attention was then turned vaginally, where excellent support of all walls was noted.  No further repairs were needed.  Rectal exam was normal as above noted.  Therefore, we then proceeded with placement of the Advantage Fit midurethral sling. The skin was marked just above the symphysis pubis, 2 cm laterally on either side of the midline indicating the exit sites for the trocars.  The Parsons catheter was palpated at the urethrovesical junction, and 2 Allis clamps were placed at the anterior vaginal wall along the midurethra. The Parsons catheter was removed from the patient's bladder. The vaginal epithelium between the two Allis clamps was injected with the 1% lidocaine with epinephrine.  Metzenbaum scissors were used to dissect the vaginal epithelium off  the underlying pubocervical muscular tissue in the direction of the angle formed between the ischiopubic ramus and the pubic bone bilaterally. The rigid catheter guide was used to deviate the bladder neck and urethra to the patient's left while the right trocar was advanced to the dissected tract in the patient's right side.  Once the urogenital membrane was perforated, the trocar and handle were reoriented in the sagittal plane and the tip of the trocar was advanced through the retropubic space in intimate proximity to the pubic bone.  The trocar was then advanced through the skin approximately 2 cm to the right of the midline just above the pubic symphysis. The passage of the Advantage Fit trocar was repeated on the patient's left hand side in a similar fashion, using the catheter guide to deviate the bladder neck to the right.  At this point, cystourethroscopy was performed.  After initial passage of the sling trocars, a small area of trocar perforation was noted through the right bladder dome.  The trocar was removed and repassed, and there was no injury or perforation noted from either trocar at this point. Cystoscopy was negative for injury after infusion of at least 300 mL in the bladder.  The ureteral orifices were noted to have good efflux bilaterally.  The bladder was then drained. With a #10 Hegar dilator placed between the sling mesh and the urethra, the arms of the sling were elevated above the pubic symphysis and the plastic sheaths were removed from the mesh arms.  Excess mesh was trimmed beneath the suprapubic skin.  The Hegar dilator ensured that the mesh sling was placed in a tension-free manner.  The vaginal mucosa overlying the sling mesh was closed with a several mattress stitches of 2-0 Vicryl with excellent hemostasis noted.  The suprapubic incisions were closed with dermabond.    At the close of the case, all counts were correct x2.  The vagina was irrigated, and all irrigants were removed.   The vagina was packed with a role of Kerlix, coated with Premarin cream for assurance of immediate postop hemostasis.  The Parsons was in place and draining well.  The patient was awakened from general endotracheal anesthesia and was taken to the Recovery Room in stable condition.  She tolerated the procedure well.    I was present and scrubbed for the entire procedure.

## 2024-05-27 NOTE — OR NURSING
VSS on RA. Pt denies pain. Dressings C/D/I, packing intact. PIV C/D/I. Parsons intact and draining. Mets PACU discharge criteria, ready for transfer to . Report given to Grisel, enid RN. Family notified of transfer.

## 2024-05-27 NOTE — INTERVAL H&P NOTE
The patient has been examined and the H&P has been reviewed:    I concur with the findings and changes have been noted since the H&P was written:      Karina Mathur is 73 y.o.  presenting for scheduled procedure.    Temp:  [97.5 °F (36.4 °C)] 97.5 °F (36.4 °C)  Pulse:  [61] 61  Resp:  [18] 18  SpO2:  [98 %] 98 %  BP: (99)/(41) 99/41    General: NAD, alert, oriented, cooperative  HEENT: NCAT, EOM grossly intact  Lungs: Normal WOB  Heart: regular rate    Consents in chart. Pre-operative heparin given at 0545 . All questions answered and concerns addressed. To OR for planned procedure.    Surgery risks, benefits and alternative options discussed and understood by patient/family.    Patricia Badillo MD/MPH  OB/GYN PGY4            There are no hospital problems to display for this patient.

## 2024-05-28 ENCOUNTER — TELEPHONE (OUTPATIENT)
Dept: UROGYNECOLOGY | Facility: CLINIC | Age: 74
End: 2024-05-28

## 2024-05-28 VITALS
BODY MASS INDEX: 21.77 KG/M2 | RESPIRATION RATE: 18 BRPM | WEIGHT: 115.31 LBS | SYSTOLIC BLOOD PRESSURE: 113 MMHG | HEIGHT: 61 IN | DIASTOLIC BLOOD PRESSURE: 62 MMHG | HEART RATE: 73 BPM | OXYGEN SATURATION: 94 % | TEMPERATURE: 99 F

## 2024-05-28 LAB
BASOPHILS # BLD AUTO: 0.03 K/UL (ref 0–0.2)
BASOPHILS NFR BLD: 0.3 % (ref 0–1.9)
DIFFERENTIAL METHOD BLD: ABNORMAL
EOSINOPHIL # BLD AUTO: 0.1 K/UL (ref 0–0.5)
EOSINOPHIL NFR BLD: 0.9 % (ref 0–8)
ERYTHROCYTE [DISTWIDTH] IN BLOOD BY AUTOMATED COUNT: 13.2 % (ref 11.5–14.5)
HCT VFR BLD AUTO: 35.8 % (ref 37–48.5)
HGB BLD-MCNC: 12 G/DL (ref 12–16)
IMM GRANULOCYTES # BLD AUTO: 0.03 K/UL (ref 0–0.04)
IMM GRANULOCYTES NFR BLD AUTO: 0.3 % (ref 0–0.5)
LYMPHOCYTES # BLD AUTO: 2.1 K/UL (ref 1–4.8)
LYMPHOCYTES NFR BLD: 20.9 % (ref 18–48)
MCH RBC QN AUTO: 30.4 PG (ref 27–31)
MCHC RBC AUTO-ENTMCNC: 33.5 G/DL (ref 32–36)
MCV RBC AUTO: 91 FL (ref 82–98)
MONOCYTES # BLD AUTO: 0.7 K/UL (ref 0.3–1)
MONOCYTES NFR BLD: 6.5 % (ref 4–15)
NEUTROPHILS # BLD AUTO: 7.1 K/UL (ref 1.8–7.7)
NEUTROPHILS NFR BLD: 71.1 % (ref 38–73)
NRBC BLD-RTO: 0 /100 WBC
PLATELET # BLD AUTO: 190 K/UL (ref 150–450)
PMV BLD AUTO: 10.3 FL (ref 9.2–12.9)
RBC # BLD AUTO: 3.95 M/UL (ref 4–5.4)
WBC # BLD AUTO: 9.93 K/UL (ref 3.9–12.7)

## 2024-05-28 PROCEDURE — 36415 COLL VENOUS BLD VENIPUNCTURE: CPT | Performed by: STUDENT IN AN ORGANIZED HEALTH CARE EDUCATION/TRAINING PROGRAM

## 2024-05-28 PROCEDURE — 25000003 PHARM REV CODE 250: Performed by: OBSTETRICS & GYNECOLOGY

## 2024-05-28 PROCEDURE — 94799 UNLISTED PULMONARY SVC/PX: CPT

## 2024-05-28 PROCEDURE — 94761 N-INVAS EAR/PLS OXIMETRY MLT: CPT

## 2024-05-28 PROCEDURE — 85025 COMPLETE CBC W/AUTO DIFF WBC: CPT | Performed by: STUDENT IN AN ORGANIZED HEALTH CARE EDUCATION/TRAINING PROGRAM

## 2024-05-28 PROCEDURE — 25000003 PHARM REV CODE 250: Performed by: STUDENT IN AN ORGANIZED HEALTH CARE EDUCATION/TRAINING PROGRAM

## 2024-05-28 RX ORDER — PHENAZOPYRIDINE HYDROCHLORIDE 200 MG/1
200 TABLET, FILM COATED ORAL 3 TIMES DAILY PRN
Qty: 30 TABLET | Refills: 0 | Status: SHIPPED | OUTPATIENT
Start: 2024-05-28 | End: 2024-06-07

## 2024-05-28 RX ORDER — OXYCODONE HYDROCHLORIDE 5 MG/1
5 TABLET ORAL EVERY 4 HOURS PRN
Qty: 30 TABLET | Refills: 0 | Status: CANCELLED | OUTPATIENT
Start: 2024-05-28

## 2024-05-28 RX ORDER — IBUPROFEN 600 MG/1
600 TABLET ORAL EVERY 6 HOURS PRN
Qty: 30 TABLET | Refills: 3 | Status: SHIPPED | OUTPATIENT
Start: 2024-05-28

## 2024-05-28 RX ORDER — DOCUSATE SODIUM 100 MG/1
100 CAPSULE, LIQUID FILLED ORAL 2 TIMES DAILY PRN
Qty: 30 CAPSULE | Refills: 3 | Status: SHIPPED | OUTPATIENT
Start: 2024-05-28

## 2024-05-28 RX ORDER — CEPHALEXIN 500 MG/1
500 CAPSULE ORAL EVERY 12 HOURS
Qty: 14 CAPSULE | Refills: 0 | Status: SHIPPED | OUTPATIENT
Start: 2024-05-28 | End: 2024-06-04

## 2024-05-28 RX ORDER — OXYCODONE AND ACETAMINOPHEN 5; 325 MG/1; MG/1
1 TABLET ORAL EVERY 4 HOURS PRN
Qty: 30 TABLET | Refills: 0 | Status: SHIPPED | OUTPATIENT
Start: 2024-05-28

## 2024-05-28 RX ORDER — DEXTROMETHORPHAN HYDROBROMIDE, GUAIFENESIN 5; 100 MG/5ML; MG/5ML
650 LIQUID ORAL EVERY 6 HOURS PRN
Qty: 30 TABLET | Refills: 3 | Status: CANCELLED | OUTPATIENT
Start: 2024-05-28

## 2024-05-28 RX ADMIN — LEVOTHYROXINE SODIUM 125 MCG: 125 TABLET ORAL at 09:05

## 2024-05-28 RX ADMIN — IBUPROFEN 400 MG: 400 TABLET ORAL at 05:05

## 2024-05-28 RX ADMIN — CEPHALEXIN 500 MG: 500 CAPSULE ORAL at 09:05

## 2024-05-28 RX ADMIN — ACETAMINOPHEN 1000 MG: 500 TABLET ORAL at 05:05

## 2024-05-28 RX ADMIN — MUPIROCIN: 20 OINTMENT TOPICAL at 09:05

## 2024-05-28 RX ADMIN — DOCUSATE SODIUM 100 MG: 100 CAPSULE, LIQUID FILLED ORAL at 09:05

## 2024-05-28 RX ADMIN — IBUPROFEN 400 MG: 400 TABLET ORAL at 04:05

## 2024-05-28 RX ADMIN — PHENAZOPYRIDINE 100 MG: 100 TABLET ORAL at 09:05

## 2024-05-28 RX ADMIN — PHENAZOPYRIDINE 100 MG: 100 TABLET ORAL at 04:05

## 2024-05-28 NOTE — PLAN OF CARE
Patient will discharge home.Patient appointments scheduled and added to AVS. Patient family to transport patient home. All CM needs have been met.    05/28/24 1403   Final Note   Assessment Type Final Discharge Note   Anticipated Discharge Disposition Home   Hospital Resources/Appts/Education Provided Provided patient/caregiver with written discharge plan information;Appointments scheduled and added to AVS   Post-Acute Status   Discharge Delays None known at this time     [facility] ([address])  Discharge Final Note    Primary Care Provider: Mohan Davalos DO    Expected Discharge Date: 5/28/2024    Final Discharge Note (most recent)       Final Note - 05/28/24 1403          Final Note    Assessment Type Final Discharge Note     Anticipated Discharge Disposition Home or Self Care     Hospital Resources/Appts/Education Provided Provided patient/caregiver with written discharge plan information;Appointments scheduled and added to AVS (P)         Post-Acute Status    Discharge Delays None known at this time (P)                      Important Message from Medicare             Contact Info       Wendy Griffin MD   Specialty: UroGynecology , Gynecology    [address] [place] [address]   Phone: [phone]       Next Steps: Schedule an appointment as soon as possible for a visit in 6 week(s)    Instructions: Postoperative visit    Wendy Griffin MD   Specialty: UroGynecology , Gynecology    [facility] [address]   Phone: [phone]       Next Steps: Schedule an appointment as soon as possible for a visit on 5/31/2024    Instructions: Void Trial

## 2024-05-28 NOTE — NURSING
Notified Dr. Badillo and on-call urology gynecology about patient temp of 99.3 at 1600; patient cleared for discharge.  Discussed with patient if temp greater than 100.4 to notify the doctor; garcias care teaching provided as well as supplies given to patient.

## 2024-05-28 NOTE — PLAN OF CARE
Problem: Adult Inpatient Plan of Care  Goal: Plan of Care Review  Outcome: Progressing  Goal: Patient-Specific Goal (Individualized)  Outcome: Progressing     Problem: Wound  Goal: Absence of Infection Signs and Symptoms  Outcome: Progressing  Goal: Optimal Pain Control and Function  Outcome: Progressing  Goal: Optimal Wound Healing  Outcome: Progressing     Problem: Fall Injury Risk  Goal: Absence of Fall and Fall-Related Injury  Outcome: Progressing

## 2024-05-28 NOTE — DISCHARGE SUMMARY
Discharge Summary  Gynecology      Admit Date: 2024    Discharge Date and Time: 2024 2:54 PM    Attending Physician: Wendy Griffin MD    Principal Diagnoses: S/P robot-assisted surgical procedure    Active Hospital Problems    Diagnosis  POA    *s/p RA-TLH/BSO/SCP/MUS/cysto [Z98.890]  Not Applicable    Uterovaginal prolapse [N81.4]  Yes    Other hyperlipidemia [E78.49]  Yes     on simvastatin        Resolved Hospital Problems   No resolved problems to display.       Procedures: Procedure(s) (LRB):  XI ROBOTIC HYSTERECTOMY,WITH SALPINGO-OOPHORECTOMY (N/A)  XI ROBOTIC SACROCOLPOPEXY, ABDOMINAL (N/A)  SLING, MIDURETHRAL (N/A)  CYSTOSCOPY (N/A)    Discharged Condition: good    Hospital Course:   Karina Mathur is a 73 y.o. y.o.  female who presented on 2024   for above procedures for the treatment of POP. Patient tolerated procedure; MUS was complicated by bladder perforation. Post-operative course was complicated by fever of 100.6 on POD#0. However, patient was asymptomatic and fever resolved without intervention.  On day of discharge, patient was ambulating, and tolerating a regular diet without difficulty. Parsons catheter remained in placed 2/2 bladder perforation. Pain was well controlled on PO medication. She was discharged home on POD#1 in stable condition with instructions to follow up with Dr. Griffin in 1 weeks.     Consults: None    Significant Diagnostic Studies:  Recent Labs   Lab 24  0413   WBC 9.93   HGB 12.0   HCT 35.8*   MCV 91           Treatments:   1. Surgery as above    Disposition: Home or Self Care    Patient Instructions:   Current Discharge Medication List        START taking these medications    Details   cephALEXin (KEFLEX) 500 MG capsule Take 1 capsule (500 mg total) by mouth every 12 (twelve) hours. for 7 days  Qty: 14 capsule, Refills: 0      docusate sodium (COLACE) 100 MG capsule Take 1 capsule (100 mg total) by mouth 2 (two) times daily as needed  for Constipation.  Qty: 30 capsule, Refills: 3      ibuprofen (ADVIL,MOTRIN) 600 MG tablet Take 1 tablet (600 mg total) by mouth every 6 (six) hours as needed for Pain.  Qty: 30 tablet, Refills: 3      oxyCODONE-acetaminophen (PERCOCET) 5-325 mg per tablet Take 1 tablet by mouth every 4 (four) hours as needed for Pain.  Qty: 30 tablet, Refills: 0    Comments: Quantity prescribed more than 7 day supply? No      phenazopyridine (PYRIDIUM) 200 MG tablet Take 1 tablet (200 mg total) by mouth 3 (three) times daily as needed for Pain.  Qty: 30 tablet, Refills: 0           CONTINUE these medications which have NOT CHANGED    Details   ascorbic acid, vitamin C, (VITAMIN C) 500 MG tablet Take 500 mg by mouth once daily.      BIOTIN ORAL Take by mouth.      calcium citrate/vitamin D3 (CITRACAL REGULAR ORAL) Take by mouth.      levocetirizine (XYZAL) 5 MG tablet Take 1 tablet (5 mg total) by mouth every evening.  Qty: 30 tablet, Refills: 3    Associated Diagnoses: Dysfunction of right eustachian tube      levothyroxine (SYNTHROID) 125 MCG tablet Take 1 tablet (125 mcg total) by mouth every other day.  Qty: 90 tablet, Refills: 3      multivitamin (THERAGRAN) per tablet Take 1 tablet by mouth once daily.      simvastatin (ZOCOR) 20 MG tablet Take 1 tablet (20 mg total) by mouth every evening.  Qty: 90 tablet, Refills: 3    Associated Diagnoses: Other hyperlipidemia      vitamin D 1000 units Tab Take 1,000 Units by mouth once daily.       estradioL (ESTRACE) 0.01 % (0.1 mg/gram) vaginal cream 0.5 grams with applicator or dime-sized amount with finger in vagina nightly x 2 weeks, then twice a week thereafter  Qty: 42.5 g, Refills: 11    Associated Diagnoses: Vaginal atrophy             Discharge Procedure Orders   Diet Adult Regular     No driving until:   Order Comments: No driving until not taking narcotic pain medication.     Pelvic Rest   Order Comments: Pelvic rest until 6 weeks after discharge. Nothing in vagina -no sex,  tampons, douching, etc.     Notify your health care provider if you experience any of the following:  temperature >100.4     Notify your health care provider if you experience any of the following:  persistent nausea and vomiting or diarrhea     Notify your health care provider if you experience any of the following:  severe uncontrolled pain     Notify your health care provider if you experience any of the following:  redness, tenderness, or signs of infection (pain, swelling, redness, odor or green/yellow discharge around incision site)     Notify your health care provider if you experience any of the following:  difficulty breathing or increased cough     Notify your health care provider if you experience any of the following:  severe persistent headache     Notify your health care provider if you experience any of the following:  worsening rash     Notify your health care provider if you experience any of the following:  persistent dizziness, light-headedness, or visual disturbances     Notify your health care provider if you experience any of the following:  increased confusion or weakness     Notify your health care provider if you experience any of the following:   Order Comments: Heavy vaginal bleeding saturating more than 1 pad per hr for at least consecutive 2 hrs.     Activity as tolerated        Follow-up Information       Wendy Griffin MD. Schedule an appointment as soon as possible for a visit in 6 week(s).    Specialties: UroGynecology , Gynecology  Why: Postoperative visit  Contact information:  [address]  [facility] [address]  [phone]               Wendy Griffin MD. Schedule an appointment as soon as possible for a visit on 5/31/2024.    Specialties: UroGynecology , Gynecology  Why: Void Trial  Contact information:  [address]  [facility] [address]  [phone]                             Patricia Badillo MD/MPH  OB/GYN PGY4

## 2024-05-28 NOTE — PROGRESS NOTES
Progress Note  UroGynecology    Admit Date: 2024  LOS: 0    Reason for Admission:  S/P robot-assisted surgical procedure    SUBJECTIVE:     Karina Mathur is a 73 y.o.  who is POD#1 s/p RA-TLH/BSO/SCP/MUS/Cysto for the treatment of POP.  Pt doing well this morning. Pain is well controlled. She is tolerating a regular diet without N/V. Not yet ambulating. Voiding without difficulty via garcias. Passing flatus. She is not yet having bowel movements.    OBJECTIVE:     Vital Signs   Temp:  [97.5 °F (36.4 °C)-100.6 °F (38.1 °C)] 99 °F (37.2 °C)  Pulse:  [58-77] 60  Resp:  [13-18] 14  SpO2:  [94 %-98 %] 97 %  BP: ()/(41-61) 106/51      Intake/Output Summary (Last 24 hours) at 2024 0545  Last data filed at 2024 0044  Gross per 24 hour   Intake 2162 ml   Output 1405 ml   Net 757 ml       Physical Exam:  Gen: A&Ox3, NAD  CV: regular rate  Pulm: normal respiratory effort  Abd: soft, non-distended, appropriately tender to palpation without rebound or guarding, bowel sounds present  Inc: 5 robotic port incisions covered with steri strips and band-aids, 2 suprapubic incisions covered with dermabond, all incisions clean/dry/intact   Ext: PPP, no peripheral edema, TEDs/SCDs in place  : Garcias in place draining clear yellow urine, packing removed with <25% saturation    Laboratory:  Recent Results (from the past 24 hour(s))   CBC auto differential    Collection Time: 24  4:13 AM   Result Value Ref Range    WBC 9.93 3.90 - 12.70 K/uL    RBC 3.95 (L) 4.00 - 5.40 M/uL    Hemoglobin 12.0 12.0 - 16.0 g/dL    Hematocrit 35.8 (L) 37.0 - 48.5 %    MCV 91 82 - 98 fL    MCH 30.4 27.0 - 31.0 pg    MCHC 33.5 32.0 - 36.0 g/dL    RDW 13.2 11.5 - 14.5 %    Platelets 190 150 - 450 K/uL    MPV 10.3 9.2 - 12.9 fL    Immature Granulocytes 0.3 0.0 - 0.5 %    Gran # (ANC) 7.1 1.8 - 7.7 K/uL    Immature Grans (Abs) 0.03 0.00 - 0.04 K/uL    Lymph # 2.1 1.0 - 4.8 K/uL    Mono # 0.7 0.3 - 1.0 K/uL    Eos # 0.1 0.0 - 0.5  K/uL    Baso # 0.03 0.00 - 0.20 K/uL    nRBC 0 0 /100 WBC    Gran % 71.1 38.0 - 73.0 %    Lymph % 20.9 18.0 - 48.0 %    Mono % 6.5 4.0 - 15.0 %    Eosinophil % 0.9 0.0 - 8.0 %    Basophil % 0.3 0.0 - 1.9 %    Differential Method Automated      ASSESSMENT/PLAN:     Active Hospital Problems    Diagnosis  POA    *s/p RA-TLH/BSO/SCP/MUS/cysto [Z98.890]  Not Applicable    Uterovaginal prolapse [N81.4]  Yes    Other hyperlipidemia [E78.49]  Yes     on simvastatin        Resolved Hospital Problems   No resolved problems to display.       Assessment: 73 y.o.  POD#1 s/p RA-TLH/BSO/SCP/MUS    Plan:   1. Post-op   - Routine post-op advances  - Continue PRN pain medications, did not require medications overnight  - Encourage ambulation   - Encourage IS  - CBC stable, H/H 12/36  - UOP adequate, 0.85 cc/kg/hr  - Will discontinue IVF, as tolerating regular diet  - Antiemetics prn nausea/vomiting    2. Hypothyroid  - continue home synthroid    3. HLD  - continue home statin    4. MUS- Bladder perforation  - perforation noted at time of sling placement, sling replacement without issue  - Will keep garcias in place for discharge  - Patient will follow up with Dr. Griffin in outpatient setting for void trial  - Will discharge home with pyridium and keflex    5. Fever  - Fever of 100.6 overnight around 1900  - Patient asymptomatic and other vital signs stable  - Low suspicion for infectious cause  - Will closely monitor throughout the day    Dispo: As patient meets appropriate post-op milestones, plan for discharge to home.      Patricia Badillo MD/MPH  OB/GYN PGY4

## 2024-05-28 NOTE — TELEPHONE ENCOUNTER
Notified patient of scheduled appointment.     ----- Message from Wendy Griffin MD sent at 5/28/2024  1:58 PM CDT -----  Regarding: can you please help patient make postop voiding trial appt?  can you please help patient make postop voiding trial appt? She would like to come to [facility].    Can we make it for Fri 5-31 or Mon 6-4?  Please call her and let her know-- Thanks!

## 2024-05-29 LAB
FINAL PATHOLOGIC DIAGNOSIS: NORMAL
GROSS: NORMAL
Lab: NORMAL

## 2024-05-31 ENCOUNTER — CLINICAL SUPPORT (OUTPATIENT)
Dept: UROGYNECOLOGY | Facility: CLINIC | Age: 74
End: 2024-05-31
Payer: MEDICARE

## 2024-05-31 VITALS
DIASTOLIC BLOOD PRESSURE: 60 MMHG | WEIGHT: 112 LBS | BODY MASS INDEX: 21.14 KG/M2 | HEART RATE: 66 BPM | SYSTOLIC BLOOD PRESSURE: 101 MMHG | HEIGHT: 61 IN

## 2024-05-31 DIAGNOSIS — Z46.6 ENCOUNTER FOR FOLEY CATHETER REMOVAL: Primary | ICD-10-CM

## 2024-05-31 PROCEDURE — 99999 PR PBB SHADOW E&M-EST. PATIENT-LVL III: CPT | Mod: PBBFAC,,,

## 2024-05-31 NOTE — PROGRESS NOTES
Patient presented for voiding trial. Patient placed in lithotomy position, tubing removed from indwelling catheter. 50 CC syringe placed on end of indwelling catheter. 250 mL instilled into bladder. 10 CC balloon deflated and catheter removed. Hat placed in toilet and patient instructed to void. Patient voided 275 mL of clear, yellow/orange urine. Patient tolerated well. Patient instructed to call office if unable to void within 3 hours, also educated on importance to drink plenty of fluids. Patient verbalized understanding.

## 2024-06-14 ENCOUNTER — OFFICE VISIT (OUTPATIENT)
Dept: PLASTIC SURGERY | Facility: CLINIC | Age: 74
End: 2024-06-14
Payer: MEDICARE

## 2024-06-14 VITALS
WEIGHT: 112 LBS | BODY MASS INDEX: 21.14 KG/M2 | DIASTOLIC BLOOD PRESSURE: 60 MMHG | HEIGHT: 61 IN | SYSTOLIC BLOOD PRESSURE: 101 MMHG | HEART RATE: 66 BPM

## 2024-06-14 DIAGNOSIS — Z09 SURGERY FOLLOW-UP EXAMINATION: Primary | ICD-10-CM

## 2024-06-14 PROCEDURE — 99024 POSTOP FOLLOW-UP VISIT: CPT | Mod: S$GLB,,, | Performed by: SURGERY

## 2024-06-14 PROCEDURE — 1159F MED LIST DOCD IN RCRD: CPT | Mod: CPTII,S$GLB,, | Performed by: SURGERY

## 2024-06-14 PROCEDURE — 3074F SYST BP LT 130 MM HG: CPT | Mod: CPTII,S$GLB,, | Performed by: SURGERY

## 2024-06-14 PROCEDURE — 3078F DIAST BP <80 MM HG: CPT | Mod: CPTII,S$GLB,, | Performed by: SURGERY

## 2024-06-14 PROCEDURE — 99999 PR PBB SHADOW E&M-EST. PATIENT-LVL III: CPT | Mod: PBBFAC,,, | Performed by: SURGERY

## 2024-06-14 NOTE — PROGRESS NOTES
Patient presents to the Plastic Surgery clinic after undergoing a left sided brow lift and upper blepharoplasty. Pt reports that she is very pleased with her results. She would like recommendations on scar creams. Incision is nicely healed - no swelling, signs of infections. Pt was given scar cream recommendations. Pt can follow up as needed.

## 2024-07-08 ENCOUNTER — OFFICE VISIT (OUTPATIENT)
Dept: UROGYNECOLOGY | Facility: CLINIC | Age: 74
End: 2024-07-08
Payer: MEDICARE

## 2024-07-08 VITALS
SYSTOLIC BLOOD PRESSURE: 95 MMHG | BODY MASS INDEX: 19.94 KG/M2 | DIASTOLIC BLOOD PRESSURE: 54 MMHG | WEIGHT: 105.63 LBS | HEIGHT: 61 IN | HEART RATE: 63 BPM

## 2024-07-08 DIAGNOSIS — R39.15 URINARY URGENCY: Primary | ICD-10-CM

## 2024-07-08 DIAGNOSIS — R19.5 LOOSE STOOLS: ICD-10-CM

## 2024-07-08 DIAGNOSIS — R35.1 NOCTURIA MORE THAN TWICE PER NIGHT: ICD-10-CM

## 2024-07-08 DIAGNOSIS — Z98.890 S/P ROBOT-ASSISTED SURGICAL PROCEDURE: ICD-10-CM

## 2024-07-08 PROCEDURE — 87086 URINE CULTURE/COLONY COUNT: CPT | Performed by: OBSTETRICS & GYNECOLOGY

## 2024-07-08 PROCEDURE — 1101F PT FALLS ASSESS-DOCD LE1/YR: CPT | Mod: CPTII,S$GLB,, | Performed by: OBSTETRICS & GYNECOLOGY

## 2024-07-08 PROCEDURE — 99999 PR PBB SHADOW E&M-EST. PATIENT-LVL IV: CPT | Mod: PBBFAC,,, | Performed by: OBSTETRICS & GYNECOLOGY

## 2024-07-08 PROCEDURE — 1159F MED LIST DOCD IN RCRD: CPT | Mod: CPTII,S$GLB,, | Performed by: OBSTETRICS & GYNECOLOGY

## 2024-07-08 PROCEDURE — 1160F RVW MEDS BY RX/DR IN RCRD: CPT | Mod: CPTII,S$GLB,, | Performed by: OBSTETRICS & GYNECOLOGY

## 2024-07-08 PROCEDURE — 99024 POSTOP FOLLOW-UP VISIT: CPT | Mod: S$GLB,,, | Performed by: OBSTETRICS & GYNECOLOGY

## 2024-07-08 PROCEDURE — 1126F AMNT PAIN NOTED NONE PRSNT: CPT | Mod: CPTII,S$GLB,, | Performed by: OBSTETRICS & GYNECOLOGY

## 2024-07-08 PROCEDURE — 3288F FALL RISK ASSESSMENT DOCD: CPT | Mod: CPTII,S$GLB,, | Performed by: OBSTETRICS & GYNECOLOGY

## 2024-07-08 PROCEDURE — 3074F SYST BP LT 130 MM HG: CPT | Mod: CPTII,S$GLB,, | Performed by: OBSTETRICS & GYNECOLOGY

## 2024-07-08 PROCEDURE — 3078F DIAST BP <80 MM HG: CPT | Mod: CPTII,S$GLB,, | Performed by: OBSTETRICS & GYNECOLOGY

## 2024-07-08 RX ORDER — MIRABEGRON 25 MG/1
50 TABLET, FILM COATED, EXTENDED RELEASE ORAL DAILY
Qty: 60 TABLET | Refills: 11 | Status: SHIPPED | OUTPATIENT
Start: 2024-07-08 | End: 2025-07-08

## 2024-07-08 NOTE — PROGRESS NOTES
"Urogyn follow up  07/08/2024    [facility] - UROGYNECOLOGY  [address] [address]    Karina Mathur  [id number]  1950      Karina Mathur is a 73 y.o. here for postop.     Date of Operation: 05/27/2024     Title of Operation:  1)  Robotic-assisted total laparoscopic hysterectomy with bilateral salpingo-oophorectomy  2)  Robotic-assisted laparoscopic sacral colpopexy with polypropylene mesh  3)  Placement of retropubic tension-free midurethral sling, Advantage Fit (Hytle)  4)  Cystourethroscopy     Indications for Surgery:  Last HPI from 03/11/2024     1)  UI:  (+) JADE (rare) > (+) UUI.  (+) pads "in case", usually min wetness.  1 pad/night "in case."  Daytime frequency: Q 2 hours.  Nocturia: Yes: 2+/night--disrupts sleep.   (--) dysuria,  (--) hematuria,  (--) frequent UTIs.  (+) complete bladder emptying. +PV urgency with small 2nd volume. +splayed stream.      2)  POP:  Present. below introitus, significant.  Symptoms:(+) pressure, general bother.  (--) vaginal bleeding. (--) vaginal discharge. (+) sexually active.  (+) dyspareunia due to dryness.  (+)  Vaginal dryness--uses vaseline.  (--) vaginal estrogen use.   --POP-Q:  Aa +3; Ba +3; C -2; Ap -1; Bp -1; D -5.  Genital hiatus 3, perineal body 2, total vaginal length 10-11 (standing).      3)  BM:  (--) constipation/straining.  (+) chronic diarrhea--seems related to nerves. (--) hematochezia.  (--) fecal incontinence.  (+) fecal smearing/urgency x 1.  (+) complete evacuation.      Changes from last visit:  Rare UUI.  Voiding every 2-3 hours during the day and 2-3/night  Denies constipation     Pelvic US 2/2024:  Impression:  1. Endometrial stripe measures 4 mm, normal for a postmenopausal patient.  There is a 7 mm endometrial cyst present within the endometrial canal of the uterine fundus.  2. Nonvisualization of the ovaries     5/16/2024 UDS:  3.  URETHRAL FUNCTION/STORAGE PHASE:  a.  WITH prolapse " "reduction:  CLPP (150 mL): Positive  at  23 cm H20  VLPP (150 mL): Negative  at  91 cm H20   CLPP (300 mL): Positive  at  104 cm H20  VLPP (300 mL): Negative  at  80 cm H20   These findings are consistent with Positive urodynamic stress incontinence.  Assessment:  UF unable to be assessed.   PF prolonged with concern for voiding dysfunction (Valsalva assist).  Compliance normal.  Max capacity 309 mL.  DO (--).  JADE (+).       5/16/2024 cysto: normal with decreased coaptation.     Issues include:  Patient Active Problem List   Diagnosis    Other hyperlipidemia    Vitamin D insufficiency    Postablative hypothyroidism    Cervicalgia    Chronic midline low back pain    Hav (hallux abducto valgus), unspecified laterality    Hammer toes of both feet    Pain in both feet    Heloma molle    Memory change    Anxiety    Chronic insomnia    Cystocele, midline- with urgency    Major depressive disorder, single episode, mild    Urinary incontinence, mixed    Vaginal atrophy    Rectocele, female    Uterovaginal prolapse    Nocturia more than twice per night    Loose stools    s/p RA-TLH/BSO/SCP/MUS/cysto    Urinary urgency       History since last visit:   GYN: no s/sx POP, VB, discharge, pain.   Bladder issues:   --baseline: (+) JADE (rare) > (+) UUI.  (+) pads "in case", usually min wetness.  1 pad/night "in case."  Daytime frequency: Q 2 hours.  Nocturia: Yes: 2+/night--disrupts sleep.      --currently: no JADE; min wetness, mostly urge-related; has episodes of urgency; still has some PV urgency with small 2nd volume  Bowel issues: no major straining/constipation/loose stool.      Well woman:  Pap test: 2024 normal/2023 HPV NEG.  History of abnormal paps: No.  History of STIs:  No  Mammogram: Date of last: 5/2023.  Result: Normal  Colonoscopy: Date of last: 2024.  Result:  diverticulosis.  Repeat due:  2034.    DEXA:  Date of last: 2022.  Result:  normal.  Repeat due:  per PCP.     Medications:    Current Outpatient " "Medications:     ascorbic acid, vitamin C, (VITAMIN C) 500 MG tablet, Take 500 mg by mouth once daily., Disp: , Rfl:     BIOTIN ORAL, Take by mouth., Disp: , Rfl:     calcium citrate/vitamin D3 (CITRACAL REGULAR ORAL), Take by mouth., Disp: , Rfl:     ibuprofen (ADVIL,MOTRIN) 600 MG tablet, Take 1 tablet (600 mg total) by mouth every 6 (six) hours as needed for Pain., Disp: 30 tablet, Rfl: 3    levocetirizine (XYZAL) 5 MG tablet, Take 1 tablet (5 mg total) by mouth every evening., Disp: 30 tablet, Rfl: 3    levothyroxine (SYNTHROID) 125 MCG tablet, Take 1 tablet (125 mcg total) by mouth every other day., Disp: 90 tablet, Rfl: 3    multivitamin (THERAGRAN) per tablet, Take 1 tablet by mouth once daily., Disp: , Rfl:     simvastatin (ZOCOR) 20 MG tablet, Take 1 tablet (20 mg total) by mouth every evening., Disp: 90 tablet, Rfl: 3    vitamin D 1000 units Tab, Take 1,000 Units by mouth once daily. , Disp: , Rfl:     docusate sodium (COLACE) 100 MG capsule, Take 1 capsule (100 mg total) by mouth 2 (two) times daily as needed for Constipation., Disp: 30 capsule, Rfl: 3    estradioL (ESTRACE) 0.01 % (0.1 mg/gram) vaginal cream, 0.5 grams with applicator or dime-sized amount with finger in vagina nightly x 2 weeks, then twice a week thereafter (Patient not taking: Reported on 5/31/2024), Disp: 42.5 g, Rfl: 11    mirabegron (MYRBETRIQ) 25 mg Tb24 ER tablet, Take 2 tablets (50 mg total) by mouth once daily., Disp: 60 tablet, Rfl: 11    oxyCODONE-acetaminophen (PERCOCET) 5-325 mg per tablet, Take 1 tablet by mouth every 4 (four) hours as needed for Pain., Disp: 30 tablet, Rfl: 0    ROS:  As per HPI.      Exam  BP (!) 95/54   Pulse 63   Ht 5' 1" (1.549 m)   Wt 47.9 kg (105 lb 9.6 oz)   BMI 19.95 kg/m²   General: alert and oriented, no acute distress  Respiratory: normal respiratory effort  Abd: soft, non-tender, non-distended  INC: LSC well-healed    Pelvic  Ext. Genitalia: normal external genitalia. Normal bartholins and " skenes glands  Vagina: + atrophy. Normal vaginal mucosa without lesions. No discharge noted.   Non-tender bladder base without palpable mass. Sling path NT, no mesh visible/palpable. Cuff well-healing, no mesh visible/palpable.   Cervix: absent  Uterus:  surgically absent, vaginal cuff well healed   Urethra: no masses or tenderness  Urethral meatus: no lesions, caruncle or prolapse.    POP-Q: deferred.  No POP with valsalva.     Impression  1. Urinary urgency  Urine culture    mirabegron (MYRBETRIQ) 25 mg Tb24 ER tablet      2. s/p RA-TLH/BSO/SCP/MUS/cysto        3. Loose stools        4. Nocturia more than twice per night          We reviewed the above issues and discussed options for short-term versus long-term management of their problems.   Plan:   Postop state: healing well.  Start add back normal activity.  Wait 4 more weeks for intercourse.  Always get help lifting 50 lbs or more.    Restart vaginal estrogen cream: 0.5 g 2x/week at night before bed.  This can help reduce urinary urgency/frequency/UTIs and keep surgical site healthy.  Always try to avoid straining with bowel movements.  Try to keep them from being too loose.  Post-void urgency:  Urine C&S  Restart vaginal estrogen.   Empty bladder every 2-3 hours.  Empty well: wait a minute, lean forward on toilet.    Avoid dietary irritants (see sheet).  Keep diary x 3-5 days to determine your irritants.  KEGELS: do 10 in AM and 10 in PM, holding each x 10 seconds.  When you feel urge to go, STOP, KEGEL, and when urge has passed, then go to bathroom.  Consider PT in future.    If you'd like to try pelvic floor PT in [place], let us know.  Send me a portal message.  URGE: consider mirabegron 50 mg daily.  Takes 2-4 weeks to see if will have effect.  For dry mouth: get sour, sugar free lozenge or gum.   Let us know if too expensive.    They will follow up with us in 11/2024 for 6 month PO.   20 minutes were spent in face to face time with this patient  90 % of  this time was spent in counseling and/or coordination of care     Wendy Griffin MD  [facility]  Division of Female Pelvic Medicine and Reconstructive Surgery  Department of Obstetrics & Gynecology

## 2024-07-08 NOTE — PATIENT INSTRUCTIONS
Bladder Irritants  Certain foods and drinks have been associated with worsening symptoms of urinary frequency, urgency, urge incontinence, or bladder pain. If you suffer from any of these conditions, you may wish to try eliminating one or more of these foods from your diet and see if your symptoms improve. If bladder symptoms are related to dietary factors, strict adherence to a diet thateliminates the food should bring marked relief in 10 days. Once you are feeling better, you can begin to add foods back into your diet, one at a time. If symptoms return, you will be able to identify the irritant. As you add foods back to your diet it is very important that you drink significant amounts of water.    -----------------------------------------------------------------------------------------------  List of Common Bladder Irritants*  Alcoholic beverages  Apples and apple juice  Cantaloupe  Carbonated beverages  Chili and spicy foods  Chocolate  Citrus fruit  Coffee (including decaffeinated)  Cranberries and cranberry juice  Grapes  Guava  Milk Products: milk, cheese, cottage cheese, yogurt, ice cream  Peaches  Pineapple  Plums  Strawberries  Sugar especially artificial sweeteners, saccharin, aspartame, corn sweeteners, honey, fructose, sucrose, lactose  Tea  Tomatoes and tomato juice  Vitamin B complex  Vinegar  *Most people are not sensitive to ALL of these products; your goal is to find the foods that make YOUR symptoms worse.  ---------------------------------------------------------------------------------------------------    Low-acid fruit substitutions include apricots, papaya, pears and watermelon. Coffee drinkers can drink Kava or other lowacid instant drinks. Tea drinkers can substitute non-citrus herbal and sun brewed teas. Calcium carbonate co-buffered with calcium ascorbate can be substituted for Vitamin C. Prelief is a dietary supplement that works as an acid blocker for the bladder.    Where to get more  information:        Overcoming Bladder Disorders by Mamta Santiago and Chu Flanagan, 1990        You Dont Have to Live with Cystitis! By Marylu Nj, 1988  http://www.urologymanagement.org/oab  ----------------------------------  Postop state: healing well.  Start add back normal activity.  Wait 4 more weeks for intercourse.  Always get help lifting 50 lbs or more.    Restart vaginal estrogen cream: 0.5 g 2x/week at night before bed.  This can help reduce urinary urgency/frequency/UTIs and keep surgical site healthy.  Always try to avoid straining with bowel movements.  Try to keep them from being too loose.  Post-void urgency:  Urine C&S  Restart vaginal estrogen.   Empty bladder every 2-3 hours.  Empty well: wait a minute, lean forward on toilet.    Avoid dietary irritants (see sheet).  Keep diary x 3-5 days to determine your irritants.  KEGELS: do 10 in AM and 10 in PM, holding each x 10 seconds.  When you feel urge to go, STOP, KEGEL, and when urge has passed, then go to bathroom.  Consider PT in future.    If you'd like to try pelvic floor PT in [place], let us know.  Send me a portal message.  URGE: consider mirabegron 50 mg daily.  Takes 2-4 weeks to see if will have effect.  For dry mouth: get sour, sugar free lozenge or gum.   Let us know if too expensive.    They will follow up with us in 11/2024 for 6 month PO.

## 2024-07-09 LAB
BACTERIA UR CULT: NORMAL
BACTERIA UR CULT: NORMAL

## 2024-07-15 ENCOUNTER — PATIENT MESSAGE (OUTPATIENT)
Dept: UROGYNECOLOGY | Facility: CLINIC | Age: 74
End: 2024-07-15
Payer: MEDICARE

## 2024-07-20 DIAGNOSIS — R39.15 URINARY URGENCY: ICD-10-CM

## 2024-07-20 DIAGNOSIS — N39.46 URINARY INCONTINENCE, MIXED: ICD-10-CM

## 2024-07-20 DIAGNOSIS — Z98.890 S/P ROBOT-ASSISTED SURGICAL PROCEDURE: Primary | ICD-10-CM

## 2024-07-23 ENCOUNTER — CLINICAL SUPPORT (OUTPATIENT)
Dept: REHABILITATION | Facility: HOSPITAL | Age: 74
End: 2024-07-23
Attending: OBSTETRICS & GYNECOLOGY
Payer: MEDICARE

## 2024-07-23 DIAGNOSIS — R39.15 URINARY URGENCY: ICD-10-CM

## 2024-07-23 DIAGNOSIS — N39.46 URINARY INCONTINENCE, MIXED: ICD-10-CM

## 2024-07-23 DIAGNOSIS — M62.89 PELVIC FLOOR DYSFUNCTION: Primary | ICD-10-CM

## 2024-07-23 DIAGNOSIS — Z98.890 S/P ROBOT-ASSISTED SURGICAL PROCEDURE: ICD-10-CM

## 2024-07-23 PROCEDURE — 97162 PT EVAL MOD COMPLEX 30 MIN: CPT | Mod: PO

## 2024-07-23 PROCEDURE — 97112 NEUROMUSCULAR REEDUCATION: CPT | Mod: PO

## 2024-07-23 PROCEDURE — 97530 THERAPEUTIC ACTIVITIES: CPT | Mod: PO

## 2024-07-23 NOTE — PATIENT INSTRUCTIONS
"Home Exercise Program: 07/23/2024        Drink your water!! :) you can do it! Try to slowly increase- small sips throughout the day. Let's make the goal to get up to 5 small bottles before you come back in     DOUBLE VOIDING - Double voiding is a technique that may assist the bladder to empty more effectively when  urine is left in the bladder. It involves passing  urine more than once each time that you go to  the toilet. This makes sure that the bladder is  completely empty.    Here are 3 strategies you can try to fully empty your bladder.  You do not have to do all of these things every single time. Find which ones work best for you.     Check to make sure your pelvic floor is relaxed   Do a body scan - make sure your legs, buttocks, and abdominals are relaxed.  Take a couple deep, slow breaths to encourage your pelvic floor muscles to DROP (ie. Try Diaphragmatic Breathing).  Gently apply pressure over your bladder.  Change your pelvic position: lean forward, rock your pelvis forward and backward, stand up then sit back down.     Wait at least 30 seconds to 1 minute to see if a second urine stream begins.     Do not stop your urine stream or push/strain!      Home Exercise Program: 07/23/2024    "Roll for Control"  Strategies to Delay Voiding    What to do when you feel like you "gotta go gotta go!"     Stop what you are doing.    Take a few good deep breaths (this settles down your nervous system and relaxes your bladder).    WHILE YOU CONTINUE DEEP BREATHING, activate your pelvic floor by performing several quick contractions and releases.  (Pelvic floor contractions send a message to the bladder to relax and hold urine.)  Sitting: Roll thigh in and out slowly or squeeze knees together  Standing: Roll thigh in/out slowly and gently    As you do the above, you can also count backwards from 100 (to help get your mind off the urge!).       After the urge to void has quietly, you may be able to process with your " "activity OR if it has been approximately 3 hours since you've voided, you may choose to go to the bathroom and void.      Remember......"Control your bladder before it controls YOU!"      Positioning and techniques for elimination     Bowel Movement Mechanics  1. Sit on the toilet comfortably with legs and buttocks relaxed.  2. Put your feet on a waste basket or squatty potty  3. Lean forward while keeping your back straight, forearms rest on knees.  4. Keep your knees apart.  5. Fully relax pelvic floor muscles - think about softening, opening, dropping  6. Use gentle belly breaths and bulge lower abdominals like making a beer belly  7. Keep belly big on exhalation  8. Exhale like blowing out birthday candles  9. Keep breathing like this through entire bowel movement  10. Make sure to give enough time, ok for it to take several minutes     Do not strain and Do not hold your breath.       (Search "Squatty Potty" on [employer])          (Nuvilex)    Initially it may be useful to practice squeezing at different layers of your pelvic floor muscles to help you find them, as each layer plays an important role in pelvic floor function. Eventually you want to be doing your kegel contractions to include squeezing at all 3 layers, and this should become one smooth movement.    To isolate layer 1: think about closing your openings (around vagina and anus) and nodding the clitoris    To isolate layer 2: imagine a drawstring in your urethra that you are pulling up inside or that you are telescoping the urethra in on itself.     To isolate layer 3: pull your tailbone up and forward towards your pubic bone    When putting it all together, it can be helpful to think about closing your openings and lifting up and in.      So when practicing this at home:   1. Inhale and let the muscles relax   2. Exhale and perform a kegel (closing your openings and lifting up and in)   3. Hold for 5 seconds without holding your breath "   4. Inhale and release. Drop these muscles away fully before repeating   5. Perform 2x10 spread throughout the day

## 2024-07-23 NOTE — PLAN OF CARE
[facility]   Physical Therapy Initial Evaluation        Date: 2024   Name: Karina Mathur  Clinic Number: [id number]    Therapy Diagnosis:   Encounter Diagnoses   Name Primary?    s/p RA-TLH/BSO/SCP/MUS/cysto     Urinary incontinence, mixed     Urinary urgency     Pelvic floor dysfunction Yes     Physician: Wendy Griffin MD    Physician Orders: PT Eval and Treat   Medical Diagnosis from Referral: S/P robot-assisted surgical procedure [Z98.890], Urinary incontinence, mixed [N39.46], Urinary urgency [R39.15]   Evaluation Date: 2024  Authorization Period Expiration: 24  Plan of Care Expiration: 10/15/2024  Progress Note Due: 2024  Visit # / Visits authorized:    FOTO: 1/3    Precautions: Standard - surgery on 4/15/2024    Time In: 10:50 am  Time Out: 11:50 am  Total Appointment Time (timed & untimed codes): 60 minutes    SUBJECTIVE       Date of onset: over a year before surgery (surgery was 04/15/2024)    History of current condition - Karina reports: the surgery was successful; she had a bulge that needed to be repaired. States she did well with the hysterectomy. However, since then she is still leaking. States that the urge is strong. She was told to start doing Kegel's in her post op visit. She has times waiting 5 hours to void and others she will go frequently (especially when she is thinking about it).     OB/GYN History:    vaginal delivery and episiotomy x 3 with good healing  Hysterectomy? Yes  Using vaginal estrogen cream: Yes; started in 2024; resumed two weeks ago post-op  Sexually active? Yes- cleared for return to intercourse on 2024  Pelvic Pain with: none reported  Pelvic pressure? Not after surgery     Bladder/Bowel History:   Frequency of urination:   Daytime: varies from every 30 mins to every 5 hours. Maybe 8-10 times day           Nighttime: 2x/night - does not sleep well (this has been for a number of years)  Difficulty  "initiating urine stream: No  Do you use the bathroom "just in case"? Yes  Urine stream: strong  Complete emptying: yes with leaning forward  Post-micturition dribble? Yes  Bladder leakage: Yes  Activities that cause leakage: urgency  Frequency of incidents: a few times per week   Amount leaked (urine): small squirt  mostly and full emptying  Urinary Urgency: Yes.  Able to delay the urge for at least 10 minute(s).  Pain with delaying the urge to urinate: No     Frequency of bowel movements: once a day  Difficulty initiating BM: No  Quality/Shape of BM: soft and easy to pass  Does Patient Feel Empty after BM? Yes  Colon leakage: No    Form of protection: pad  Number of pads required in 24 hours: 1-2/day (will change to freshen up)    Types of fluid intake: "I do not drink a lot" maybe 2-3 of the 8 ounce cups. Will also drink 1 cup of coffee   Diet: Standard  Habitus: well developed, well nourished  Abuse/Neglect: Pt denies a history of physical or emotional abuse at this visit.       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     Surgical History: Karina Mathur  has a past surgical history that includes Inguinal hernia repair; Colonoscopy (N/A, 2/21/2024); blepharoplasty, upper eyelid (Left, 4/15/2024); repair,brow ptosis (Left, 4/15/2024); xi robotic hysterectomy, with salpingo-oophorectomy (N/A, 5/27/2024); Robot-assisted laparoscopic abdominal sacrocolpopexy using da Evelyn Xi (N/A, 5/27/2024); Insertion of midurethral sling (N/A, 5/27/2024); and Cystoscopy (N/A, 5/27/2024).    Medications: Karina has a current medication list which includes the " following prescription(s): ascorbic acid (vitamin c), biotin, calcium citrate/vitamin d3, docusate sodium, estradiol, ibuprofen, levocetirizine, levothyroxine, mirabegron, multivitamin, oxycodone-acetaminophen, simvastatin, and vitamin d.    Allergies: Review of patient's allergies indicates:  No Known Allergies     Imaging: None reported for this condition     Prior Therapy/Previous treatment included: None reported for this condition   Social History/Living Arrangements: lives with their spouse and their adult son  Current exercise: used to walk but has not been faithful within the last 5-6 months  Occupation: retired from [employer]   Prior Level of Function: Pt was independent with all ADLs and iADLs without pain, no reports of incontinence of bowel or bladder.  Current Level of Function: Urinary incontinence impacting ADLs and iADLs.     Constitutional Symptoms Review: The patient denies having any constitutional symptoms.     Flags Screening  Red Flags:The patient was screened for red flags and none were identified.      Pts goals: to get back into walking and improve urinary urgency/leakage    OBJECTIVE     See EMR under MEDIA for written consent provided 7/23/2024  Chaperone: declined    ORTHO SCREEN  Posture in sitting: slouched  and sits squarely   Posture in standing: forward head, forward and rounded shoulders , and increased kyphosis  Pelvic alignment: Not assessed today    Adductor Palpation: hypotonic     Gait Analysis: decreased afshan      ABDOMINAL WALL ASSESSMENT  Palpation: hypotonic    Abdominal strength:      Transverse abdominus: great activation  Scarring: observable scarring from prior surgeries with some tissue restriction- minimal  Pelvic Floor Muscle and Transverse Abdominus Synergy: present  Diastasis: absent     BREATHING MECHANICS ASSESSMENT   Thorax Assessment During Quiet Respiration: WNL excursion of ribcage and WNL excursion of abdominal wall  Thorax Assessment During Deep  Respiration: WNL excursion of ribcage, Decreased excursion of anterior ribs, and Decreased excursion of posterior ribs    VAGINAL PELVIC FLOOR EXAM    EXTERNAL ASSESSMENT  Introitus: WNL  Skin condition: WNL  Scarring: no observable scarring today   Sensation: WNL   Pain: right ischio/bulbocavernosus and STP mildly uncomfortable with sensation of increased pressure compared to left  Voluntary contraction: visible lift  Voluntary relaxation: visible drop  Involuntary contraction: visible drop  Bearing down: glute activation  Perineal descent: mild  Comments: good coordination with Kegel; patient tends to breath hold with bearing down and squeezing. Could not hold greater than 3 seconds when cued to avoid breath holding      Intake Outcome Measures for FOTO Urinary Problem Survey    Therapist reviewed FOTO scores for Karina Mathur on 7/23/2024.     FOTO report- see Media section in Epic or account episode details in FOTO.      Intake Score:   59         TREATMENT        Treatment Time In: 11:24 am  Treatment Time Out: 11:50 am  Total Treatment time (time-based codes) separate from Evaluation: 26 minutes    Neuromuscular Re-education to develop Coordination, Control, Proprioception, and Sense for 10 minutes including:   [x]pelvic floor relaxation/bulging training, adbominal bracing, bearing down with abdominal release, diaphragmatic breathing with Kegel, and diaphragmatic breathing     Therapeutic Activity Patient participated in dynamic functional therapeutic activities to improve functional performance for 16 minutes. Including: Education as described below.     [x] bladder irritants, anatomy/physiology of pelvic floor, posture/body mechanices, diaphragmatic breathing, double voiding techniques, isometric abdominal exercises, kegels, proper bearing down techniques, fluid intake/dietary modifications, behavior modifications, and urge suppression technique    [x] Instruction on diaphragmatic breathing   [x]  Education on visual cues/mental imagery for pelvic floor relaxation  [x] Education on visual cues/mental imagery for pelvic floor activation   [x] Instruction on the Knack for reduction of stress urinary incontinence  [x] Pelvic anatomy edu and impact on current level of function   [x] HEP building      Written Home Exercises and Education Provided: yes. Exercises were reviewed and Karina was able to demonstrate them prior to the end of the session.  Karina demonstrated good  understanding of the education provided. See EMR under Patient Instructions for exercises and education provided during therapy sessions.      ASSESSMENT     Karina is a 73 y.o. female referred to outpatient Physical Therapy with a medical diagnosis of S/P robot-assisted surgical procedure [Z98.890], Urinary incontinence, mixed [N39.46], Urinary urgency [R39.15] . Pt presents with altered posture, decreased pelvic muscle strength, decreased endurance of the pelvic muscles, increased frequency of urination, increased nocturia, poor fluid intake, and dysfunctional voiding. Upon assessment, patient demonstrates great synergy between transverse abdominis and pelvic floor with Kegel squeeze. Patient does demonstrate straining and breath holding with attempts to bear down which will need to be addressed to optimize outcome of recent surgery for pelvic organ prolapse. Deferred internal assessment at this date until clearance on 08/08/2024. However, good information obtained with external assessment as noted above. Pt will benefit from skilled outpatient Physical Therapy to address the deficits stated above and in the chart below, provide pt/family education, and to maximize pt's level of independence.     Pt prognosis is Excellent.   Pt will benefit from skilled outpatient Physical Therapy to address the deficits stated above and in the chart below, provide pt/family education, and to maximize pt's level of independence.     Plan of care discussed  with patient: Yes  Pt's spiritual, cultural and educational needs considered and patient is agreeable to the plan of care and goals as stated below:     Anticipated Barriers for therapy: scheduling- vacation    Medical Necessity is demonstrated by the following  History  Co-morbidities and personal factors that may impact the plan of care [] LOW: no personal factors / co-morbidities  [] MODERATE: 1-2 personal factors / co-morbidities  [x] HIGH: 3+ personal factors / co-morbidities    Moderate / High Support Documentation:   Co-morbidities affecting plan of care: memory change, anxiety, major depressive disorder, hyperlipidemia, vitamin D insufficiency, postablative hypothyroidism, chronic insomnia    Personal Factors:   lifestyle     Examination  Body Structures and Functions, activity limitations and participation restrictions that may impact the plan of care [] LOW: addressing 1-2 elements  [] MODERATE: 3+ elements  [x] HIGH: 4+ elements (please support below)    Moderate / High Support Documentation: cervicalgia, chronic midline low back pain, hallux valgus, hammer toes B, pain in B feet     Clinical Presentation [] LOW: stable  [x] MODERATE: Evolving  [] HIGH: Unstable     Decision Making/ Complexity Score: moderate       Goals:(PROGRESSING 7/23/2024 )  Short Term Goals: 6 weeks   - Pt will demonstrate excellent knowledge and adherence to HEP to facilitate optimal recovery.  - Pt will demonstrate proper PFM contraction, relaxation, and lengthening coordinated with TA and breath for improved muscle coordination needed for functional activity.    Long Term Goals: 12 weeks   - Pt will demonstrate excellent knowledge and adherence to HEP for continued self-maintenance of symptoms.  - Pt will report FOTO score of 66% or better indicating clinically relevant increase in function.  - Pt will report voiding interval of 2-3 hours for improved ADL tolerance.  - Pt will report ability to delay urinary urge for at least 15  minutes to maintain continence with ADL/IADLs.   - Pt will report no incidence of urinary or  incontinence 7/7 days for improved hygiene and ADL/IADL tolerance.   - Pt will report needing </= 0-1 pad/day indicating improved PFM function needed to maintain continence  - Pt will demonstrate PFM strength of at least 3/5 MMT for improved strength needed to maintain continence.   - Pt will report bearing down appropriately 100% of the time for improved bowel function and decreased stress on adjacent pelvic structures.   - Pt will demonstrate independence with pressure-management strategies to decreased stress on adjacent pelvic structures.       PLAN     Plan of care Certification: 7/23/2024 to 10/15/2024.    Outpatient Physical Therapy 1-2 times weekly for 12 weeks to include the following interventions: therapeutic exercises, therapeutic activity, neuromuscular re-education, gait training, manual therapy, modalities PRN, patient/family education, and self care/home management, and  dry needling.       Next visit: ultrasound for pelvic coordination, review urge suppression with use of RUSI. Can assess bladder emptying    Gini Salamanca, PT

## 2024-08-14 ENCOUNTER — CLINICAL SUPPORT (OUTPATIENT)
Dept: REHABILITATION | Facility: HOSPITAL | Age: 74
End: 2024-08-14
Payer: MEDICARE

## 2024-08-14 DIAGNOSIS — M62.89 PELVIC FLOOR DYSFUNCTION: Primary | ICD-10-CM

## 2024-08-14 PROCEDURE — 97112 NEUROMUSCULAR REEDUCATION: CPT | Mod: PO

## 2024-08-14 NOTE — PROGRESS NOTES
Pelvic Health Physical Therapy   Treatment Note     Name: Karina Mathur  Clinic Number: [id number]    Therapy Diagnosis:   Encounter Diagnosis   Name Primary?    Pelvic floor dysfunction Yes     Physician: Wendy Griffin MD    Visit Date: 8/14/2024    Physician Orders: PT Eval and Treat   Medical Diagnosis from Referral: S/P robot-assisted surgical procedure [Z98.890], Urinary incontinence, mixed [N39.46], Urinary urgency [R39.15]   Evaluation Date: 7/23/2024  Authorization Period Expiration: 12-31-24  Plan of Care Expiration: 10/15/2024  Progress Note Due: 08/23/2024  Visit # / Visits authorized: 2 total   FOTO: 1/3     Precautions: Standard - surgery on 5/27/2024     Time In: 9:15  Time Out: 10:00  Total Appointment Time (timed & untimed codes): 45 minutes    Subjective     Pt reports: doesn't feel much different yet. Was given a prescription that she didn't fill, wants to try to avoid medication.  Still doesn't feel like she has much success with the Kegels - feels like she's using more abdominal than vaginal muscles.   Does Kegels every time she is on the toilet     She was compliant with home exercise program.  Response to previous treatment: tolerated well   Functional change: ongoing     Pain: 0/10  Location:  not applicable      Objective   Informed verbal consent provided 8/14/2024 prior to external vaginal assessment.  Chaperone: zion Collins participated in neuromuscular re-education activities to develop Coordination and Control for 45 minutes including:   External observation of pelvic floor muscles with verbal cues for coordinating proper contraction;   Attempted RUSI assessment of pelvic floor muscles but bladder lacked necessary volume.     Home Exercises Provided and Patient Education Provided     Education provided:   - anatomy/physiology of pelvic floor, diaphragmatic breathing, and kegels  Discussed progression of plan of care with patient; educated pt in activity modification;  reviewed HEP with pt. Pt demonstrated and verbalized understanding of all instruction and was provided with a handout of HEP (see Patient Instructions).      Written Home Exercises Provided: Patient instructed to cont prior HEP.  Exercises were reviewed and Karina was able to demonstrate them prior to the end of the session.  Karina demonstrated good  understanding of the education provided.     See EMR under Patient Instructions for exercises provided 8/14/2024.    Assessment     Patient consented to external perineal observation. External observation of pelvic floor muscles with verbal cues for coordinating proper contraction; patient able to produce proper lift but has difficulty with endurance. Patient also tends to take a deep breath in while simultaneously doing Kegel - explained why this will not be effective. Also explained why doing Kegels on toilet can lead to voiding dysfunction. Reviewed home exercise program.   Attempted RUSI assessment of pelvic floor muscles but bladder lacked necessary volume.   Karina Is progressing well towards her goals.   Pt prognosis is Good.     Pt will continue to benefit from skilled outpatient physical therapy to address the deficits listed in the problem list box on initial evaluation, provide pt/family education and to maximize pt's level of independence in the home and community environment.     Pt's spiritual, cultural and educational needs considered and pt agreeable to plan of care and goals.     Anticipated barriers to physical therapy: none     Goals:(PROGRESSING )  Short Term Goals: 6 weeks   - Pt will demonstrate excellent knowledge and adherence to HEP to facilitate optimal recovery.  - Pt will demonstrate proper PFM contraction, relaxation, and lengthening coordinated with TA and breath for improved muscle coordination needed for functional activity.     Long Term Goals: 12 weeks   - Pt will demonstrate excellent knowledge and adherence to HEP for continued  self-maintenance of symptoms.  - Pt will report FOTO score of 66% or better indicating clinically relevant increase in function.  - Pt will report voiding interval of 2-3 hours for improved ADL tolerance.  - Pt will report ability to delay urinary urge for at least 15 minutes to maintain continence with ADL/IADLs.   - Pt will report no incidence of urinary or  incontinence 7/7 days for improved hygiene and ADL/IADL tolerance.   - Pt will report needing </= 0-1 pad/day indicating improved PFM function needed to maintain continence  - Pt will demonstrate PFM strength of at least 3/5 MMT for improved strength needed to maintain continence.   - Pt will report bearing down appropriately 100% of the time for improved bowel function and decreased stress on adjacent pelvic structures.   - Pt will demonstrate independence with pressure-management strategies to decreased stress on adjacent pelvic structures.     Plan     Cont per plan of care     Brenna Parker, PT

## 2024-08-20 DIAGNOSIS — H69.91 DYSFUNCTION OF RIGHT EUSTACHIAN TUBE: ICD-10-CM

## 2024-08-21 ENCOUNTER — CLINICAL SUPPORT (OUTPATIENT)
Dept: REHABILITATION | Facility: HOSPITAL | Age: 74
End: 2024-08-21
Payer: MEDICARE

## 2024-08-21 DIAGNOSIS — M62.89 PELVIC FLOOR DYSFUNCTION: Primary | ICD-10-CM

## 2024-08-21 PROCEDURE — 97112 NEUROMUSCULAR REEDUCATION: CPT | Mod: PO,CQ

## 2024-08-21 RX ORDER — LEVOCETIRIZINE DIHYDROCHLORIDE 5 MG/1
5 TABLET, FILM COATED ORAL NIGHTLY
Qty: 30 TABLET | Refills: 3 | Status: SHIPPED | OUTPATIENT
Start: 2024-08-21

## 2024-08-21 NOTE — PROGRESS NOTES
"  Pelvic Health Physical Therapy   Treatment Note     Name: Karina Mathur  Clinic Number: [id number]    Therapy Diagnosis:   Encounter Diagnosis   Name Primary?    Pelvic floor dysfunction Yes     Physician: Wendy Griffin MD    Visit Date: 8/21/2024    Physician Orders: PT Eval and Treat   Medical Diagnosis from Referral: S/P robot-assisted surgical procedure [Z98.890], Urinary incontinence, mixed [N39.46], Urinary urgency [R39.15]   Evaluation Date: 7/23/2024  Authorization Period Expiration: 12-31-24  Plan of Care Expiration: 10/15/2024  Progress Note Due: 08/23/2024  Visit # / Visits authorized: 3 total   FOTO: 1/3     Precautions: Standard - surgery on 5/27/2024     Time In: 9:15  Time Out: 10:00  Total Appointment Time (timed & untimed codes): 45 minutes    Subjective     Pt reports: Still can't tell if she is doing kegels properly or not. "I only feel my abdominals I don't feel anything in my vagina muscles when I am trying to do that urge suppression while washing dishes."     She was compliant with home exercise program.  Response to previous treatment: tolerated well   Functional change: ongoing     Pain: 0/10  Location:  not applicable      Objective   Informed verbal consent provided 8/21/2024 prior to external vaginal assessment.  Chaperone: zion Collins participated in neuromuscular re-education activities to develop Coordination and Control for 45 minutes including:     SEMG :   PF Electrode placement: external perianal  Pt. Position: supine     SEMG Finding: steady baseline,decreased hold ability, decent return to baseline, occasional elevated baseline between contractions at times,      No skin irritation, erythema, or other adverse effects observed from electrode placement.     Treatment: Worked on pelvic floor coordination using sEMG assist.      [id number]  [id number]    Diaphragmatic breathing      Home Exercises Provided and Patient Education Provided     Education provided:   - " anatomy/physiology of pelvic floor, diaphragmatic breathing, and kegels  Discussed progression of plan of care with patient; educated pt in activity modification; reviewed HEP with pt. Pt demonstrated and verbalized understanding of all instruction and was provided with a handout of HEP (see Patient Instructions).      Written Home Exercises Provided: Patient instructed to cont prior HEP.  Exercises were reviewed and Karina was able to demonstrate them prior to the end of the session.  Karina demonstrated good  understanding of the education provided.     See EMR under Patient Instructions for exercises provided 8/14/2024.    Assessment     Due to reports of continued confusion about pelvic floor awareness of activation/relaxation utilized SEMG this date. Karina noted with poor synergy between pelvic floor and transverse abdominus when attempting to perform a kegel.  She noted with fair holds/control and was able to show improvement with cueing. She eventually was able to achieve isolation of PFM without compensation of glutes. Karina verbalized improved hopefulness following SEMG this date. Unable to attempt working on bearing down mechanics this date due to time spent on improving coordination. Will continue to progress as able.        Karina Is progressing well towards her goals.   Pt prognosis is Good.     Pt will continue to benefit from skilled outpatient physical therapy to address the deficits listed in the problem list box on initial evaluation, provide pt/family education and to maximize pt's level of independence in the home and community environment.     Pt's spiritual, cultural and educational needs considered and pt agreeable to plan of care and goals.     Anticipated barriers to physical therapy: none     Goals:(PROGRESSING )  Short Term Goals: 6 weeks   - Pt will demonstrate excellent knowledge and adherence to HEP to facilitate optimal recovery.  - Pt will demonstrate proper PFM contraction,  relaxation, and lengthening coordinated with TA and breath for improved muscle coordination needed for functional activity.     Long Term Goals: 12 weeks   - Pt will demonstrate excellent knowledge and adherence to HEP for continued self-maintenance of symptoms.  - Pt will report FOTO score of 66% or better indicating clinically relevant increase in function.  - Pt will report voiding interval of 2-3 hours for improved ADL tolerance.  - Pt will report ability to delay urinary urge for at least 15 minutes to maintain continence with ADL/IADLs.   - Pt will report no incidence of urinary or  incontinence 7/7 days for improved hygiene and ADL/IADL tolerance.   - Pt will report needing </= 0-1 pad/day indicating improved PFM function needed to maintain continence  - Pt will demonstrate PFM strength of at least 3/5 MMT for improved strength needed to maintain continence.   - Pt will report bearing down appropriately 100% of the time for improved bowel function and decreased stress on adjacent pelvic structures.   - Pt will demonstrate independence with pressure-management strategies to decreased stress on adjacent pelvic structures.     Plan     Cont per plan of care     Conchis Kessler, PTA

## 2024-08-28 ENCOUNTER — CLINICAL SUPPORT (OUTPATIENT)
Dept: REHABILITATION | Facility: HOSPITAL | Age: 74
End: 2024-08-28
Payer: MEDICARE

## 2024-08-28 DIAGNOSIS — M62.89 PELVIC FLOOR DYSFUNCTION: Primary | ICD-10-CM

## 2024-08-28 PROCEDURE — 97112 NEUROMUSCULAR REEDUCATION: CPT | Mod: PO

## 2024-08-28 NOTE — PATIENT INSTRUCTIONS
""Gentle Shrink-wrap"     - start laying down   - exhale like you are blowing out a candle as you "gently shrink wrap" your lower abdomen   - do 10 reps (you can take a few deep breaths to relax in between)     Once you feel comfortable doing this laying down you can do sitting/standing   "

## 2024-08-28 NOTE — PROGRESS NOTES
"  Pelvic Health Physical Therapy   Treatment Note     Name: Karina Mathur  Clinic Number: [id number]    Therapy Diagnosis:   Encounter Diagnosis   Name Primary?    Pelvic floor dysfunction Yes       Physician: Wendy Griffin MD    Visit Date: 8/28/2024    Physician Orders: PT Eval and Treat   Medical Diagnosis from Referral: S/P robot-assisted surgical procedure [Z98.890], Urinary incontinence, mixed [N39.46], Urinary urgency [R39.15]   Evaluation Date: 7/23/2024  Authorization Period Expiration: 12-31-24  Plan of Care Expiration: 10/15/2024  Progress Note Due: 08/23/2024  Visit # / Visits authorized: 3 total   FOTO: 1/3     Precautions: Standard - surgery on 5/27/2024     Time In: 9:15  Time Out: 10:00  Total Appointment Time (timed & untimed codes): 45 minutes    Subjective     Pt reports: felt better after last session doing the biofeedback. She's still leaking - it's not a lot and she's doing her exercises as she's supposed to but she's still having "dribbles" - sometimes she may be washing dishes and she gets the urge, she'll do a Kegel but still has some leakage.   Yesterday had a few drops when she sneezed - that's not common.   She is able to hold her bladder for 3-4 hrs when she's out in public but     She was compliant with home exercise program.  Response to previous treatment: tolerated well   Functional change: ongoing     Pain: 0/10  Location:  not applicable      Objective   Informed verbal consent provided 8/28/2024 prior to external vaginal assessment.  Chaperone: zion Collins participated in neuromuscular re-education activities to develop Coordination and Control for 45 minutes including:   RUSI - utilized as form of biofeedback for patient to visualize coordination of pelvic floor muscles and transverse abdominis with contraction, relaxation, and breathing     NOT TODAY:  Treatment: Worked on pelvic floor coordination using sEMG assist.   [id number]  [id number]        Home Exercises " Provided and Patient Education Provided     Education provided:   - anatomy/physiology of pelvic floor, diaphragmatic breathing, and kegels  Discussed progression of plan of care with patient; educated pt in activity modification; reviewed HEP with pt. Pt demonstrated and verbalized understanding of all instruction and was provided with a handout of HEP (see Patient Instructions).      Written Home Exercises Provided: Patient instructed to cont prior HEP.  Exercises were reviewed and Karina was able to demonstrate them prior to the end of the session.  Karina demonstrated good  understanding of the education provided.     See EMR under Patient Instructions for exercises provided  8/28/2024 .    Assessment     RUSI utilized again today as form of biofeedback for patient to visualize coordination of pelvic floor muscles and transverse abdominis with contraction, relaxation, and breathing. Patient demonstrates deep inhalation with verbal cues for contraction as well as significant oblique dominance, resulting in net downward force on pelvic floor muscles with attempts to contract and lift pelvic floor muscles. Extensive time used to teach patient to not coordinate inhale with contraction and to coordinate transverse abdominis/pelvic floor muscles without overactivity of internal oblique - improved form at end of session. Will add hip strengthening next visit.       aKrina Is progressing well towards her goals.   Pt prognosis is Good.     Pt will continue to benefit from skilled outpatient physical therapy to address the deficits listed in the problem list box on initial evaluation, provide pt/family education and to maximize pt's level of independence in the home and community environment.     Pt's spiritual, cultural and educational needs considered and pt agreeable to plan of care and goals.     Anticipated barriers to physical therapy: none     Goals:(PROGRESSING )  Short Term Goals: 6 weeks   - Pt will  demonstrate excellent knowledge and adherence to HEP to facilitate optimal recovery.  - Pt will demonstrate proper PFM contraction, relaxation, and lengthening coordinated with TA and breath for improved muscle coordination needed for functional activity.     Long Term Goals: 12 weeks   - Pt will demonstrate excellent knowledge and adherence to HEP for continued self-maintenance of symptoms.  - Pt will report FOTO score of 66% or better indicating clinically relevant increase in function.  - Pt will report voiding interval of 2-3 hours for improved ADL tolerance.  - Pt will report ability to delay urinary urge for at least 15 minutes to maintain continence with ADL/IADLs.   - Pt will report no incidence of urinary or  incontinence 7/7 days for improved hygiene and ADL/IADL tolerance.   - Pt will report needing </= 0-1 pad/day indicating improved PFM function needed to maintain continence  - Pt will demonstrate PFM strength of at least 3/5 MMT for improved strength needed to maintain continence.   - Pt will report bearing down appropriately 100% of the time for improved bowel function and decreased stress on adjacent pelvic structures.   - Pt will demonstrate independence with pressure-management strategies to decreased stress on adjacent pelvic structures.     Plan     Cont per plan of care     Brenna Parker, PT

## 2024-09-04 ENCOUNTER — DOCUMENTATION ONLY (OUTPATIENT)
Dept: REHABILITATION | Facility: HOSPITAL | Age: 74
End: 2024-09-04
Payer: MEDICARE

## 2024-09-04 ENCOUNTER — CLINICAL SUPPORT (OUTPATIENT)
Dept: REHABILITATION | Facility: HOSPITAL | Age: 74
End: 2024-09-04
Payer: MEDICARE

## 2024-09-04 DIAGNOSIS — M62.89 PELVIC FLOOR DYSFUNCTION: Primary | ICD-10-CM

## 2024-09-04 PROCEDURE — 97112 NEUROMUSCULAR REEDUCATION: CPT | Mod: PO,CQ

## 2024-09-04 NOTE — PROGRESS NOTES
Pelvic Health Physical Therapy   Treatment Note     Name: Karina Mathur  Clinic Number: [id number]    Therapy Diagnosis:   Encounter Diagnosis   Name Primary?    Pelvic floor dysfunction Yes       Physician: Wendy Griffin MD    Visit Date: 9/4/2024    Physician Orders: PT Eval and Treat   Medical Diagnosis from Referral: S/P robot-assisted surgical procedure [Z98.890], Urinary incontinence, mixed [N39.46], Urinary urgency [R39.15]   Evaluation Date: 7/23/2024  Authorization Period Expiration: 12-31-24  Plan of Care Expiration: 10/15/2024  Progress Note Due: 08/23/2024  Visit # / Visits authorized: 4 total   FOTO: 1/3     Precautions: Standard - surgery on 5/27/2024     Time In: 9:15  Time Out: 10:00  Total Appointment Time (timed & untimed codes): 45 minutes    Subjective     Pt reports: she is still not sure if she is performing kegels correctly. Reports she tends to hold her breath.    She was compliant with home exercise program.  Response to previous treatment: tolerated well   Functional change: ongoing     Pain: 0/10  Location:  not applicable      Objective   Informed verbal consent provided 9/4/2024 prior to external vaginal assessment.  Chaperone: declined       Karina participated in neuromuscular re-education activities to develop Coordination and Control for 45 minutes including:   RUSI - utilized as form of biofeedback for patient to visualize coordination of pelvic floor muscles and transverse abdominis with contraction, relaxation, and breathing Not performed today     Sequential contraction of Pelvic Floor musculature 5 minutes   Bridges with kegel and exhale 3 x 10   Clams 3 x 10     Home Exercises Provided and Patient Education Provided     Education provided:   - anatomy/physiology of pelvic floor, diaphragmatic breathing, and kegels  Discussed progression of plan of care with patient; educated pt in activity modification; reviewed HEP with pt. Pt demonstrated and verbalized understanding  of all instruction and was provided with a handout of HEP (see Patient Instructions).      Written Home Exercises Provided: Patient instructed to cont prior HEP.  Exercises were reviewed and Karina was able to demonstrate them prior to the end of the session.  Karina demonstrated good  understanding of the education provided.     See EMR under Patient Instructions for exercises provided  8/28/2024 .    Assessment     Extensive time used to teach patient to coordinate Pelvic Floor muscle contraction with exhale particularly when performing work. Additional time spent ensuring patient was comfortable performing sequential contractions of Pelvic Floor muscles. Home exercise program was updated to include some of these activities. Carryover to be assessed at next visit.        Karina Is progressing well towards her goals.   Pt prognosis is Good.     Pt will continue to benefit from skilled outpatient physical therapy to address the deficits listed in the problem list box on initial evaluation, provide pt/family education and to maximize pt's level of independence in the home and community environment.     Pt's spiritual, cultural and educational needs considered and pt agreeable to plan of care and goals.     Anticipated barriers to physical therapy: none     Goals:(PROGRESSING )  Short Term Goals: 6 weeks   - Pt will demonstrate excellent knowledge and adherence to HEP to facilitate optimal recovery.  - Pt will demonstrate proper PFM contraction, relaxation, and lengthening coordinated with TA and breath for improved muscle coordination needed for functional activity.     Long Term Goals: 12 weeks   - Pt will demonstrate excellent knowledge and adherence to HEP for continued self-maintenance of symptoms.  - Pt will report FOTO score of 66% or better indicating clinically relevant increase in function.  - Pt will report voiding interval of 2-3 hours for improved ADL tolerance.  - Pt will report ability to delay  urinary urge for at least 15 minutes to maintain continence with ADL/IADLs.   - Pt will report no incidence of urinary or  incontinence 7/7 days for improved hygiene and ADL/IADL tolerance.   - Pt will report needing </= 0-1 pad/day indicating improved PFM function needed to maintain continence  - Pt will demonstrate PFM strength of at least 3/5 MMT for improved strength needed to maintain continence.   - Pt will report bearing down appropriately 100% of the time for improved bowel function and decreased stress on adjacent pelvic structures.   - Pt will demonstrate independence with pressure-management strategies to decreased stress on adjacent pelvic structures.     Plan     Cont per plan of care     Jannet Gonzalez, JEANNINE

## 2024-09-17 ENCOUNTER — CLINICAL SUPPORT (OUTPATIENT)
Dept: REHABILITATION | Facility: HOSPITAL | Age: 74
End: 2024-09-17
Payer: MEDICARE

## 2024-09-17 DIAGNOSIS — M62.89 PELVIC FLOOR DYSFUNCTION: Primary | ICD-10-CM

## 2024-09-17 PROCEDURE — 97112 NEUROMUSCULAR REEDUCATION: CPT | Mod: KX,PO,CQ

## 2024-09-17 NOTE — PROGRESS NOTES
Pelvic Health Physical Therapy   Treatment Note     Name: Karina Mathur  Clinic Number: [id number]    Therapy Diagnosis:   Encounter Diagnosis   Name Primary?    Pelvic floor dysfunction Yes       Physician: Wendy Griffin MD    Visit Date: 9/17/2024    Physician Orders: PT Eval and Treat   Medical Diagnosis from Referral: S/P robot-assisted surgical procedure [Z98.890], Urinary incontinence, mixed [N39.46], Urinary urgency [R39.15]   Evaluation Date: 7/23/2024  Authorization Period Expiration: 12-31-24  Plan of Care Expiration: 10/15/2024  Progress Note Due: 08/23/2024  Visit # / Visits authorized: 5 total   FOTO: 1/3     Precautions: Standard - surgery on 5/27/2024     Time In: 10:45  Time Out: 11:30  Total Appointment Time (timed & untimed codes): 45 minutes    Subjective     Pt reports: she is still not sure if she is performing kegels correctly. Wants to review home exercise program     She was compliant with home exercise program.  Response to previous treatment: tolerated well   Functional change: ongoing     Pain: 0/10  Location:  not applicable      Objective   Informed verbal consent provided 9/17/2024 prior to external vaginal assessment.  Chaperone: declined       Karina participated in neuromuscular re-education activities to develop Coordination and Control for 45 minutes including:   RUSI - utilized as form of biofeedback for patient to visualize coordination of pelvic floor muscles and transverse abdominis with contraction, relaxation, and breathing Not performed today     Sequential contraction of Pelvic Floor musculature 5 minutes   Bridges with kegel and exhale 3 x 10   Clams 3 x 10     Home Exercises Provided and Patient Education Provided     Education provided:   - anatomy/physiology of pelvic floor, diaphragmatic breathing, and kegels  Discussed progression of plan of care with patient; educated pt in activity modification; reviewed HEP with pt. Pt demonstrated and verbalized  understanding of all instruction and was provided with a handout of HEP (see Patient Instructions).      Written Home Exercises Provided: Patient instructed to cont prior HEP.  Exercises were reviewed and Karina was able to demonstrate them prior to the end of the session.  Karina demonstrated good  understanding of the education provided.     See EMR under Patient Instructions for exercises provided  8/28/2024 .    Assessment     Extensive time used to teach patient to coordinate Pelvic Floor muscle contraction with exhale particularly when performing work. Additional time spent ensuring patient was comfortable performing sequential contractions of Pelvic Floor muscles. Home exercise program was reviewed. Patient stated that she feels more comfortable with performance of these exercises.       Karina Is progressing well towards her goals.   Pt prognosis is Good.     Pt will continue to benefit from skilled outpatient physical therapy to address the deficits listed in the problem list box on initial evaluation, provide pt/family education and to maximize pt's level of independence in the home and community environment.     Pt's spiritual, cultural and educational needs considered and pt agreeable to plan of care and goals.     Anticipated barriers to physical therapy: none     Goals:(PROGRESSING )  Short Term Goals: 6 weeks   - Pt will demonstrate excellent knowledge and adherence to HEP to facilitate optimal recovery.  - Pt will demonstrate proper PFM contraction, relaxation, and lengthening coordinated with TA and breath for improved muscle coordination needed for functional activity.     Long Term Goals: 12 weeks   - Pt will demonstrate excellent knowledge and adherence to HEP for continued self-maintenance of symptoms.  - Pt will report FOTO score of 66% or better indicating clinically relevant increase in function.  - Pt will report voiding interval of 2-3 hours for improved ADL tolerance.  - Pt will report  ability to delay urinary urge for at least 15 minutes to maintain continence with ADL/IADLs.   - Pt will report no incidence of urinary or  incontinence 7/7 days for improved hygiene and ADL/IADL tolerance.   - Pt will report needing </= 0-1 pad/day indicating improved PFM function needed to maintain continence  - Pt will demonstrate PFM strength of at least 3/5 MMT for improved strength needed to maintain continence.   - Pt will report bearing down appropriately 100% of the time for improved bowel function and decreased stress on adjacent pelvic structures.   - Pt will demonstrate independence with pressure-management strategies to decreased stress on adjacent pelvic structures.     Plan     Cont per plan of care     Jannet Gonzalez, PTA

## 2024-09-24 ENCOUNTER — CLINICAL SUPPORT (OUTPATIENT)
Dept: REHABILITATION | Facility: HOSPITAL | Age: 74
End: 2024-09-24
Payer: MEDICARE

## 2024-09-24 DIAGNOSIS — M62.89 PELVIC FLOOR DYSFUNCTION: Primary | ICD-10-CM

## 2024-09-24 PROCEDURE — 97112 NEUROMUSCULAR REEDUCATION: CPT | Mod: PO,CQ

## 2024-09-24 NOTE — PROGRESS NOTES
Pelvic Health Physical Therapy   Treatment Note     Name: Karina Mathur  Clinic Number: [id number]    Therapy Diagnosis:   Encounter Diagnosis   Name Primary?    Pelvic floor dysfunction Yes       Physician: Wendy Griffin MD    Visit Date: 9/24/2024    Physician Orders: PT Eval and Treat   Medical Diagnosis from Referral: S/P robot-assisted surgical procedure [Z98.890], Urinary incontinence, mixed [N39.46], Urinary urgency [R39.15]   Evaluation Date: 7/23/2024  Authorization Period Expiration: 12-31-24  Plan of Care Expiration: 10/15/2024  Progress Note Due: 08/23/2024  Visit # / Visits authorized: 5 total   FOTO: 2/3   (9/24/24)      Precautions: Standard - surgery on 5/27/2024     Time In: 7:00  Time Out: 7:55  Total Appointment Time (timed & untimed codes): 55 minutes    Subjective     Pt reports: no leakage for several weeks other than a little dribble when she sneezed recently. She wants her next appointment to be her last one.     She was compliant with home exercise program.  Response to previous treatment: tolerated well   Functional change: improved urinary control     Pain: 0/10  Location:  not applicable      Objective   Informed verbal consent provided 9/24/2024 prior to external vaginal assessment.  Chaperone: zion Collins participated in neuromuscular re-education activities to develop Coordination and Control for 55 minutes including:   RUSI - utilized as form of biofeedback for patient to visualize coordination of pelvic floor muscles and transverse abdominis with contraction, relaxation, and breathing Not performed today     Sequential contraction of Pelvic Floor musculature 5 minutes   Bridges with kegel and exhale 3 x 10   Clams 3 x 10   Wall planks 10 sec x 3   Standing rows 20 x   Standing lumbar extensions 20     Home Exercises Provided and Patient Education Provided     Education provided:   - anatomy/physiology of pelvic floor, diaphragmatic breathing, and kegels  Discussed  progression of plan of care with patient; educated pt in activity modification; reviewed HEP with pt. Pt demonstrated and verbalized understanding of all instruction and was provided with a handout of HEP (see Patient Instructions).      Written Home Exercises Provided: Patient instructed to cont prior HEP.  Exercises were reviewed and Karina was able to demonstrate them prior to the end of the session.  Kraina demonstrated good  understanding of the education provided.     See EMR under Patient Instructions for exercises provided  8/28/2024 .    Assessment     Patient is doing very well with urinary control but is frustrated trying to coordinate breathing with activities. She was instructed to focus on breathing only when lifting or with true exertion and to simply focus on the form for the strengthening exercises. She was issued an updated home exercise program with additional exercise for global hip and core strength. Patient puts forth excellent effort with her activities and is expected to make continuous gains with strength as she progresses through her home exercise program.         Karina Is progressing well towards her goals.   Pt prognosis is Good.     Pt will continue to benefit from skilled outpatient physical therapy to address the deficits listed in the problem list box on initial evaluation, provide pt/family education and to maximize pt's level of independence in the home and community environment.     Pt's spiritual, cultural and educational needs considered and pt agreeable to plan of care and goals.     Anticipated barriers to physical therapy: none     Goals:(PROGRESSING )  Short Term Goals: 6 weeks   - Pt will demonstrate excellent knowledge and adherence to HEP to facilitate optimal recovery.  - Pt will demonstrate proper PFM contraction, relaxation, and lengthening coordinated with TA and breath for improved muscle coordination needed for functional activity.     Long Term Goals: 12 weeks    - Pt will demonstrate excellent knowledge and adherence to HEP for continued self-maintenance of symptoms.  - Pt will report FOTO score of 66% or better indicating clinically relevant increase in function.  - Pt will report voiding interval of 2-3 hours for improved ADL tolerance.  - Pt will report ability to delay urinary urge for at least 15 minutes to maintain continence with ADL/IADLs.   - Pt will report no incidence of urinary or  incontinence 7/7 days for improved hygiene and ADL/IADL tolerance.   - Pt will report needing </= 0-1 pad/day indicating improved PFM function needed to maintain continence  - Pt will demonstrate PFM strength of at least 3/5 MMT for improved strength needed to maintain continence.   - Pt will report bearing down appropriately 100% of the time for improved bowel function and decreased stress on adjacent pelvic structures.   - Pt will demonstrate independence with pressure-management strategies to decreased stress on adjacent pelvic structures.     Plan     Cont per plan of care     Jannet Gonzalez, JEANNINE

## 2024-09-30 ENCOUNTER — CLINICAL SUPPORT (OUTPATIENT)
Dept: REHABILITATION | Facility: HOSPITAL | Age: 74
End: 2024-09-30
Payer: MEDICARE

## 2024-09-30 DIAGNOSIS — M62.89 PELVIC FLOOR DYSFUNCTION: Primary | ICD-10-CM

## 2024-09-30 PROCEDURE — 97530 THERAPEUTIC ACTIVITIES: CPT | Mod: PO

## 2024-09-30 NOTE — PROGRESS NOTES
Pelvic Health Physical Therapy   Discharge Summary     Name: Karina Mathur  Clinic Number: [id number]    Therapy Diagnosis:   Encounter Diagnosis   Name Primary?    Pelvic floor dysfunction Yes         Physician: Wendy Griffin MD    Visit Date: 9/30/2024    Physician Orders: PT Eval and Treat   Medical Diagnosis from Referral: S/P robot-assisted surgical procedure [Z98.890], Urinary incontinence, mixed [N39.46], Urinary urgency [R39.15]   Evaluation Date: 7/23/2024  Authorization Period Expiration: 12-31-24  Plan of Care Expiration: 10/15/2024  Progress Note Due: 08/23/2024  Visit # / Visits authorized: 6 total   FOTO: 2/3   (9/24/24)      Precautions: Standard - surgery on 5/27/2024     Time In: 9:20 am  Time Out: 9:58 am  Total Appointment Time (timed & untimed codes): 38 minutes    Subjective     Pt reports: she is very glad she tried therapy. Last week, she felt more confident, but this past week she had a few incidences of leakage.     She was compliant with home exercise program.  Response to previous treatment: tolerated well   Functional change: improved urinary control     Pain: 0/10  Location:  not applicable      Objective   Informed verbal consent provided 9/30/2024 prior to external vaginal assessment.  Chaperone: zion Collins participated in neuromuscular re-education activities to develop Coordination and Control for 0 minutes including:   RUSI - utilized as form of biofeedback for patient to visualize coordination of pelvic floor muscles and transverse abdominis with contraction, relaxation, and breathing Not performed today     Sequential contraction of Pelvic Floor musculature 5 minutes   Bridges with kegel and exhale 3 x 10   Clams 3 x 10   Wall planks 10 sec x 3   Standing rows 20 x   Standing lumbar extensions 20     Therapeutic Activity Patient participated in dynamic functional therapeutic activities to improve functional performance for 38 minutes. Including: Education as  described below.     Reviewed progress, prognosis, all HEP previously provided  Education on cervical mobility and appropriate cervical positioning with provided tasks (included chin tucks, review with education and execution)  Review of urgency, incontinence, independence    Home Exercises Provided and Patient Education Provided     Education provided:   - anatomy/physiology of pelvic floor, diaphragmatic breathing, and kegels  Discussed progression of plan of care with patient; educated pt in activity modification; reviewed HEP with pt. Pt demonstrated and verbalized understanding of all instruction and was provided with a handout of HEP (see Patient Instructions).      Written Home Exercises Provided: Patient instructed to cont prior HEP.  Exercises were reviewed and Karina was able to demonstrate them prior to the end of the session.  Karina demonstrated good  understanding of the education provided.     See EMR under Patient Instructions for exercises provided  8/28/2024 .    PHYSICAL THERAPY DISCHARGE SUMMARY     Status Towards Goals Met: All short term and 7 of 9 long term goals met. Patient has made great progress in understanding tasks provided. However, she will continue to require habit forming behaviors, incorporating abdominopelvic coordination. Patient is expected to continue progressing well independently with education to return or call should she have any further questions.     Goals:(PROGRESSING )  Short Term Goals: 6 weeks   - Pt will demonstrate excellent knowledge and adherence to HEP to facilitate optimal recovery. (MET 9/30/2024)  - Pt will demonstrate proper PFM contraction, relaxation, and lengthening coordinated with TA and breath for improved muscle coordination needed for functional activity.( MET 9/30/2024)     Long Term Goals: 12 weeks   - Pt will demonstrate excellent knowledge and adherence to HEP for continued self-maintenance of symptoms.( MET 9/30/2024)  - Pt will report FOTO  score of 66% or better indicating clinically relevant increase in function.( MET 9/30/2024)  - Pt will report voiding interval of 2-3 hours for improved ADL tolerance.(MET 9/30/2024) Can go 3-5 hours now   - Pt will report ability to delay urinary urge for at least 15 minutes to maintain continence with ADL/IADLs. ( MET 9/30/2024)  - Pt will report no incidence of urinary or  incontinence 7/7 days for improved hygiene and ADL/IADL tolerance. (PART MET 9/30/2024) had one week of no leakage and then had 3x this week   - Pt will report needing </= 0-1 pad/day indicating improved PFM function needed to maintain continence( MET 9/30/2024) wearing 1   - Pt will demonstrate PFM strength of at least 3/5 MMT for improved strength needed to maintain continence. ( MET 9/30/2024)  - Pt will report bearing down appropriately 100% of the time for improved bowel function and decreased stress on adjacent pelvic structures. ( MET 9/30/2024)  - Pt will demonstrate independence with pressure-management strategies to decreased stress on adjacent pelvic structures(PART MET 9/30/2024) able to verbalize what to do, still working on consistency     Goals Not achieved and why:   Patient continued to have minimal leakage, although steadily improving. She also continues to struggle remembering the intra-abdominal pressure management techniques at times, but understands what she is supposed to do to build correct habits.       Discharge reason : Patient has maximized benefit from PT at this time    PLAN   This patient is discharged from Outpatient Physical Therapy Services.     Gini Salamanca, PT  09/30/2024

## 2024-10-01 ENCOUNTER — OFFICE VISIT (OUTPATIENT)
Dept: OPTOMETRY | Facility: CLINIC | Age: 74
End: 2024-10-01
Payer: MEDICARE

## 2024-10-01 DIAGNOSIS — Z98.890 H/O BLEPHAROPLASTY: ICD-10-CM

## 2024-10-01 DIAGNOSIS — H52.203 HYPEROPIA WITH ASTIGMATISM AND PRESBYOPIA, BILATERAL: ICD-10-CM

## 2024-10-01 DIAGNOSIS — H52.4 HYPEROPIA WITH ASTIGMATISM AND PRESBYOPIA, BILATERAL: ICD-10-CM

## 2024-10-01 DIAGNOSIS — H43.813 POSTERIOR VITREOUS DETACHMENT, BILATERAL: ICD-10-CM

## 2024-10-01 DIAGNOSIS — Z13.5 GLAUCOMA SCREENING: ICD-10-CM

## 2024-10-01 DIAGNOSIS — H52.03 HYPEROPIA WITH ASTIGMATISM AND PRESBYOPIA, BILATERAL: ICD-10-CM

## 2024-10-01 DIAGNOSIS — H25.13 NUCLEAR SCLEROSIS, BILATERAL: Primary | ICD-10-CM

## 2024-10-01 DIAGNOSIS — H04.123 DRY EYES, BILATERAL: ICD-10-CM

## 2024-10-01 PROCEDURE — 92015 DETERMINE REFRACTIVE STATE: CPT | Mod: S$GLB,,, | Performed by: OPTOMETRIST

## 2024-10-01 PROCEDURE — 99999 PR PBB SHADOW E&M-EST. PATIENT-LVL III: CPT | Mod: PBBFAC,,, | Performed by: OPTOMETRIST

## 2024-10-01 PROCEDURE — 1159F MED LIST DOCD IN RCRD: CPT | Mod: CPTII,S$GLB,, | Performed by: OPTOMETRIST

## 2024-10-01 PROCEDURE — 3288F FALL RISK ASSESSMENT DOCD: CPT | Mod: CPTII,S$GLB,, | Performed by: OPTOMETRIST

## 2024-10-01 PROCEDURE — 1101F PT FALLS ASSESS-DOCD LE1/YR: CPT | Mod: CPTII,S$GLB,, | Performed by: OPTOMETRIST

## 2024-10-01 PROCEDURE — 1126F AMNT PAIN NOTED NONE PRSNT: CPT | Mod: CPTII,S$GLB,, | Performed by: OPTOMETRIST

## 2024-10-01 PROCEDURE — 92014 COMPRE OPH EXAM EST PT 1/>: CPT | Mod: S$GLB,,, | Performed by: OPTOMETRIST

## 2024-10-01 NOTE — PATIENT INSTRUCTIONS
"DRY EYES -- BURNING OR MARK ANTHONY SYMPTOMS:  Use Over The Counter artificial tears as needed for dry eye symptoms.   Some common brands include:  Systane, Optive, Refresh, and Thera-Tears.  These drops can be used as frequently as desired, but may be most helpful use during long periods of concentrated work.  For example, reading / working at the computer. Start with 3-4x per day.     Nighttime Ophthalmic gel or ointments are available: Refresh PM, Genteal, and Lacrilube.    Avoid drops that "get redness out" (Visine, Murine, Clear Eyes), as these may contain medication that could further irritate the eyes, especially with chronic use.    ALLERGY EYES -- ITCHING SYMPTOMS:  Over the counter medications include--Pataday, Zaditor, and Alaway.  Use as directed 1-2 drops daily for symptoms of itching / watering eyes.  These drops will not help for dry eye or exposure symptoms.    REDNESS RELIEF:  Lumify---is a good redness reliever that will not cause irritation if used chronically.        FLASHES / FLOATERS / POSTERIOR VITREOUS DETACHMENT    Call the clinic if you have any further changes in symptoms.  Including:  Increased numbers of floaters or flashing lights, dimness or darkness that moves through or stays constant in your vision, or any pain in the eye (s).    You may sometimes see small specks or clouds moving in your field of vision.  They are called FLOATERS.  You can often see them when looking at a plain background, like a blank wall or blue debra.  Floaters are actually tiny clumps of gel or cells inside the VITREOUS, the clear jelly-like fluid that fills the inside of your eye.    While these objects look like they are in front of your eye, they are actually floating inside.  What you see are the shadows they cast on the RETINA, the nerve layer at the back of the eye that senses light and allows you to see.      POSTERIOR VITREOUS DETACHMENT    The appearance of new floaters may be alarming.  If you suddenly " develop new floaters, you should contact your eye care professional  right away.    The retina can tear if the shrinking vitreous pulls away from the wall of the eye.  This sometimes causes a small amount of bleeding in the eye that may appear as new floaters.    A torn retina is always a serious problem, since it can lead to a retinal detachment.  You should see your eye care professional as soon as possible if:    even one new floater appears suddenly;  you see sudden flashes of light;  you notice other symptoms, like the loss of side vision, or a curtain closes down in your vision        POSTERIOR VITREOUS DETACHMENT is more common for people who:    are nearsighted;  have had cataract surgery;  have had YAG laser surgery of the eye;  have had inflammation inside the eye;  are over age 60.      While some floaters may remain visible, many of them will fade over time and become less noticeable.  Even if you've had some floaters for years, you should have your eyes checked as soon as possible if you notice new ones.    FLASHING LIGHTS    When the vitreous gel rubs or pulls on the retina, you may see what look like flashing lights or lightning streaks.  These flashes can appear off and on for several weeks or months.      Some people experience flashes of light that appear as jagged lines or heat waves in both eyes, lasting 10-20 minutes.  These flashes are caused by a spasm of blood vessels in the brain, which is called a migraine.    If a headache follows these flashes, it's called a migraine headache.  If   no headache occurs, these flashes are called Ophthalmic or Ocular Migraine.           Early Cataracts--not visually significant for surgery consultation.    What Are Cataracts?  A clear lens in the eye focuses light. This lets the eye see images sharply. With age, the lens slowly becomes cloudy. The cloudy lens is a cataract. A cataract scatters light and makes it hard for the eye to focus. Cataracts often  form in both eyes. But one lens may cloud faster than the other.      The Aging of Your Lens    Your lens may cloud so slowly that you don`t notice any vision changes at first. But as the cataract gets worse, the eye has a harder time focusing. In early stages, glasses may help you see better. As the lens gets cloudier, your doctor may recommend surgery to restore your vision.

## 2024-10-01 NOTE — PROGRESS NOTES
HPI    Pet here for annual eye exam. Last exam- 1 year    Pt had blepharoplasty OU in April with Dr. Morton. Pt sts she needs an   updated rx. C/o photophobia and glare. Pt denies flashes/floaters/pain. Pt   using Systane for dryness PRN.   Last edited by Kayla De Oliveira on 10/1/2024  3:18 PM.            Assessment /Plan     For exam results, see Encounter Report.    Nuclear sclerosis, bilateral    Posterior vitreous detachment, bilateral    Glaucoma screening    Dry eyes, bilateral    H/O blepharoplasty    Hyperopia with astigmatism and presbyopia, bilateral      Vis sig NS cataracts, OD>OS. Not ready for consult, gave info, cautions driving especially at night. CE possible in 1-2 yrs   RD precautions given and reviewed. Patient knows to call/ message if any further changes in symptoms occur.  Not suspect   Continued use of ATs tid-qid, lokesh if near / computer work increase   S/p bilateral bleph w/ Dr Morton 2023 ---good result / stable OU   Updated specs rx gave copy, discussed options // fill prn --ok continue with current     Discussed and educated patient on current findings /plan.  RTC 1 year, prn if any changes / issues

## 2024-10-12 NOTE — TELEPHONE ENCOUNTER
No care due was identified.  [facility] Embedded Care Due Messages. Reference number: [id number].   10/12/2024 3:22:42 PM CDT

## 2024-10-13 RX ORDER — LEVOTHYROXINE SODIUM 125 UG/1
125 TABLET ORAL EVERY OTHER DAY
Qty: 45 TABLET | Refills: 1 | Status: SHIPPED | OUTPATIENT
Start: 2024-10-13

## 2024-11-06 ENCOUNTER — PATIENT MESSAGE (OUTPATIENT)
Dept: FAMILY MEDICINE | Facility: CLINIC | Age: 74
End: 2024-11-06
Payer: MEDICARE

## 2024-11-11 ENCOUNTER — OFFICE VISIT (OUTPATIENT)
Dept: UROGYNECOLOGY | Facility: CLINIC | Age: 74
End: 2024-11-11
Payer: MEDICARE

## 2024-11-11 VITALS
SYSTOLIC BLOOD PRESSURE: 74 MMHG | DIASTOLIC BLOOD PRESSURE: 54 MMHG | WEIGHT: 116.19 LBS | BODY MASS INDEX: 21.94 KG/M2 | HEIGHT: 61 IN

## 2024-11-11 DIAGNOSIS — N39.46 URINARY INCONTINENCE, MIXED: ICD-10-CM

## 2024-11-11 DIAGNOSIS — Z98.890 S/P ROBOT-ASSISTED SURGICAL PROCEDURE: Primary | ICD-10-CM

## 2024-11-11 DIAGNOSIS — N95.2 VAGINAL ATROPHY: ICD-10-CM

## 2024-11-11 PROCEDURE — 99999 PR PBB SHADOW E&M-EST. PATIENT-LVL III: CPT | Mod: PBBFAC,,, | Performed by: NURSE PRACTITIONER

## 2024-11-11 NOTE — PROGRESS NOTES
"  Urogyn follow up  11/11/2024    [facility] - UROGYNECOLOGY  [address] [address]    Karina Mathur  [id number]  1950      Karina Mathur is a 74 y.o. here for postop.     Date of Operation: 05/27/2024     Title of Operation:  1)  Robotic-assisted total laparoscopic hysterectomy with bilateral salpingo-oophorectomy  2)  Robotic-assisted laparoscopic sacral colpopexy with polypropylene mesh  3)  Placement of retropubic tension-free midurethral sling, Advantage Fit ([employer])  4)  Cystourethroscopy     Indications for Surgery:  Last HPI from 03/11/2024     1)  UI:  (+) JADE (rare) > (+) UUI.  (+) pads "in case", usually min wetness.  1 pad/night "in case."  Daytime frequency: Q 2 hours.  Nocturia: Yes: 2+/night--disrupts sleep.   (--) dysuria,  (--) hematuria,  (--) frequent UTIs.  (+) complete bladder emptying. +PV urgency with small 2nd volume. +splayed stream.      2)  POP:  Present. below introitus, significant.  Symptoms:(+) pressure, general bother.  (--) vaginal bleeding. (--) vaginal discharge. (+) sexually active.  (+) dyspareunia due to dryness.  (+)  Vaginal dryness--uses vaseline.  (--) vaginal estrogen use.   --POP-Q:  Aa +3; Ba +3; C -2; Ap -1; Bp -1; D -5.  Genital hiatus 3, perineal body 2, total vaginal length 10-11 (standing).      3)  BM:  (--) constipation/straining.  (+) chronic diarrhea--seems related to nerves. (--) hematochezia.  (--) fecal incontinence.  (+) fecal smearing/urgency x 1.  (+) complete evacuation.      Changes from last visit:  Rare UUI.  Voiding every 2-3 hours during the day and 2-3/night  Denies constipation     Pelvic US 2/2024:  Impression:  1. Endometrial stripe measures 4 mm, normal for a postmenopausal patient.  There is a 7 mm endometrial cyst present within the endometrial canal of the uterine fundus.  2. Nonvisualization of the ovaries     5/16/2024 UDS:  3.  URETHRAL FUNCTION/STORAGE PHASE:  a.  WITH prolapse " "reduction:  CLPP (150 mL): Positive  at  23 cm H20  VLPP (150 mL): Negative  at  91 cm H20   CLPP (300 mL): Positive  at  104 cm H20  VLPP (300 mL): Negative  at  80 cm H20   These findings are consistent with Positive urodynamic stress incontinence.  Assessment:  UF unable to be assessed.   PF prolonged with concern for voiding dysfunction (Valsalva assist).  Compliance normal.  Max capacity 309 mL.  DO (--).  JADE (+).       5/16/2024 cysto: normal with decreased coaptation.     Issues include:  Patient Active Problem List   Diagnosis    Other hyperlipidemia    Vitamin D insufficiency    Postablative hypothyroidism    Cervicalgia    Chronic midline low back pain    Hav (hallux abducto valgus), unspecified laterality    Hammer toes of both feet    Pain in both feet    Heloma molle    Memory change    Anxiety    Chronic insomnia    Cystocele, midline- with urgency    Major depressive disorder, single episode, mild    Urinary incontinence, mixed    Vaginal atrophy    Rectocele, female    Uterovaginal prolapse    Nocturia more than twice per night    Loose stools    s/p RA-TLH/BSO/SCP/MUS/cysto    Urinary urgency       07/08/2024  GYN: no s/sx POP, VB, discharge, pain.   Bladder issues:   --baseline: (+) JADE (rare) > (+) UUI.  (+) pads "in case", usually min wetness.  1 pad/night "in case."  Daytime frequency: Q 2 hours.  Nocturia: Yes: 2+/night--disrupts sleep.      --currently: no JADE; min wetness, mostly urge-related; has episodes of urgency; still has some PV urgency with small 2nd volume  Bowel issues: no major straining/constipation/loose stool.      Changes since last visit:  --completed pelvic floor PT  --if home can wear one pad and have not UI.  --did not fill myrbetriq.  --using one pad/ days -- not really bothered by it.   Denies constipation or straining  Using vaginal estrogen cream twice weekly    Well woman:  Pap test: 2024 normal/2023 HPV NEG.  History of abnormal paps: No.  History of STIs:  " "No  Mammogram: Date of last: 5/2023.  Result: Normal  Colonoscopy: Date of last: 2024.  Result:  diverticulosis.  Repeat due:  2034.    DEXA:  Date of last: 2022.  Result:  normal.  Repeat due:  per PCP.     Medications:    Current Outpatient Medications:     ascorbic acid, vitamin C, (VITAMIN C) 500 MG tablet, Take 500 mg by mouth once daily., Disp: , Rfl:     calcium citrate/vitamin D3 (CITRACAL REGULAR ORAL), Take by mouth., Disp: , Rfl:     coffee xt/phosphatidyl serine (NEURIVA ORIGINAL ORAL), , Disp: , Rfl:     levocetirizine (XYZAL) 5 MG tablet, Take 1 tablet (5 mg total) by mouth every evening., Disp: 30 tablet, Rfl: 3    levothyroxine (SYNTHROID) 125 MCG tablet, TAKE 1 TABLET BY MOUTH EVERY OTHER DAY, Disp: 45 tablet, Rfl: 1    multivitamin (THERAGRAN) per tablet, Take 1 tablet by mouth once daily., Disp: , Rfl:     simvastatin (ZOCOR) 20 MG tablet, Take 1 tablet (20 mg total) by mouth every evening., Disp: 90 tablet, Rfl: 3    vitamin D 1000 units Tab, Take 1,000 Units by mouth once daily. , Disp: , Rfl:     estradioL (ESTRACE) 0.01 % (0.1 mg/gram) vaginal cream, 0.5 grams with applicator or dime-sized amount with finger in vagina nightly x 2 weeks, then twice a week thereafter (Patient not taking: Reported on 5/31/2024), Disp: 42.5 g, Rfl: 11    ROS:  As per HPI.      Exam  BP (!) 74/54 (BP Location: Right arm, Patient Position: Sitting)   Ht 5' 1" (1.549 m)   Wt 52.7 kg (116 lb 2.9 oz)   BMI 21.95 kg/m²   General: alert and oriented, no acute distress  Respiratory: normal respiratory effort  Abd: soft, non-tender, non-distended  INC: LSC well-healed    Pelvic  Ext. Genitalia: normal external genitalia. Normal bartholins and skenes glands  Vagina: + atrophy. Normal vaginal mucosa without lesions. No discharge noted.   Non-tender bladder base without palpable mass. Sling path NT, no mesh visible/palpable. Cuff well-healing, no mesh visible/palpable.   Cervix: absent  Uterus:  surgically absent, vaginal " cuff well healed   Urethra: no masses or tenderness  Urethral meatus: no lesions, caruncle or prolapse.    POP-Q: deferred.  No POP with valsalva.     Impression  1. s/p RA-TLH/BSO/SCP/MUS/cysto        2. Urinary incontinence, mixed        3. Vaginal atrophy            We reviewed the above issues and discussed options for short-term versus long-term management of their problems.   Plan:   Always get help lifting 50 lbs or more.    Continue vaginal estrogen cream: 0.5 g 2x/week at night before bed.  This can help reduce urinary urgency/frequency/UTIs and keep surgical site healthy.  Always try to avoid straining with bowel movements.  Try to keep them from being too loose.  Post-void urgency:  Continue vaginal estrogen.   Empty bladder every 2-3 hours.  Empty well: wait a minute, lean forward on toilet.    Avoid dietary irritants (see sheet).  Keep diary x 3-5 days to determine your irritants.  KEGELS: do 10 in AM and 10 in PM, holding each x 10 seconds.  When you feel urge to go, STOP, KEGEL, and when urge has passed, then go to bathroom.  Consider PT in future.    If you'd like to try pelvic floor PT in [place], let us know.  Send me a portal message.  URGE: consider mirabegron 50 mg daily.  Takes 2-4 weeks to see if will have effect.  For dry mouth: get sour, sugar free lozenge or gum.   Let us know if too expensive.    RTC in 05/2025    I spent a total of 20 minutes on the day of the visit.  This includes face to face time and non-face to face time preparing to see the patient (eg, review of tests), obtaining and/or reviewing separately obtained history, documenting clinical information in the electronic or other health record, independently interpreting results and communicating results to the patient/family/caregiver, or care coordinator.      Shwetha Guaman NP  [facility]  Division of Female Pelvic Medicine and Reconstructive Surgery  Department of Obstetrics & Gynecology

## 2024-11-11 NOTE — PATIENT INSTRUCTIONS
Always get help lifting 50 lbs or more.    Continue vaginal estrogen cream: 0.5 g 2x/week at night before bed.  This can help reduce urinary urgency/frequency/UTIs and keep surgical site healthy.  Always try to avoid straining with bowel movements.  Try to keep them from being too loose.  Post-void urgency:  Continue vaginal estrogen.   Empty bladder every 2-3 hours.  Empty well: wait a minute, lean forward on toilet.    Avoid dietary irritants (see sheet).  Keep diary x 3-5 days to determine your irritants.  KEGELS: do 10 in AM and 10 in PM, holding each x 10 seconds.  When you feel urge to go, STOP, KEGEL, and when urge has passed, then go to bathroom.  Consider PT in future.    If you'd like to try pelvic floor PT in [place], let us know.  Send me a portal message.  URGE: consider mirabegron 50 mg daily.  Takes 2-4 weeks to see if will have effect.  For dry mouth: get sour, sugar free lozenge or gum.   Let us know if too expensive.    RTC in 05/2025

## 2024-11-13 ENCOUNTER — OFFICE VISIT (OUTPATIENT)
Dept: FAMILY MEDICINE | Facility: CLINIC | Age: 74
End: 2024-11-13
Payer: MEDICARE

## 2024-11-13 VITALS
WEIGHT: 115.31 LBS | DIASTOLIC BLOOD PRESSURE: 64 MMHG | SYSTOLIC BLOOD PRESSURE: 98 MMHG | HEIGHT: 61 IN | HEART RATE: 59 BPM | BODY MASS INDEX: 21.77 KG/M2 | OXYGEN SATURATION: 98 %

## 2024-11-13 DIAGNOSIS — F32.0 MAJOR DEPRESSIVE DISORDER, SINGLE EPISODE, MILD: ICD-10-CM

## 2024-11-13 DIAGNOSIS — E89.0 POSTABLATIVE HYPOTHYROIDISM: ICD-10-CM

## 2024-11-13 DIAGNOSIS — E78.49 OTHER HYPERLIPIDEMIA: ICD-10-CM

## 2024-11-13 DIAGNOSIS — F51.04 CHRONIC INSOMNIA: Primary | ICD-10-CM

## 2024-11-13 PROCEDURE — 3288F FALL RISK ASSESSMENT DOCD: CPT | Mod: CPTII,S$GLB,, | Performed by: INTERNAL MEDICINE

## 2024-11-13 PROCEDURE — 99214 OFFICE O/P EST MOD 30 MIN: CPT | Mod: S$GLB,,, | Performed by: INTERNAL MEDICINE

## 2024-11-13 PROCEDURE — 3008F BODY MASS INDEX DOCD: CPT | Mod: CPTII,S$GLB,, | Performed by: INTERNAL MEDICINE

## 2024-11-13 PROCEDURE — 3078F DIAST BP <80 MM HG: CPT | Mod: CPTII,S$GLB,, | Performed by: INTERNAL MEDICINE

## 2024-11-13 PROCEDURE — G2211 COMPLEX E/M VISIT ADD ON: HCPCS | Mod: S$GLB,,, | Performed by: INTERNAL MEDICINE

## 2024-11-13 PROCEDURE — 1160F RVW MEDS BY RX/DR IN RCRD: CPT | Mod: CPTII,S$GLB,, | Performed by: INTERNAL MEDICINE

## 2024-11-13 PROCEDURE — 3074F SYST BP LT 130 MM HG: CPT | Mod: CPTII,S$GLB,, | Performed by: INTERNAL MEDICINE

## 2024-11-13 PROCEDURE — 1159F MED LIST DOCD IN RCRD: CPT | Mod: CPTII,S$GLB,, | Performed by: INTERNAL MEDICINE

## 2024-11-13 PROCEDURE — 1101F PT FALLS ASSESS-DOCD LE1/YR: CPT | Mod: CPTII,S$GLB,, | Performed by: INTERNAL MEDICINE

## 2024-11-13 PROCEDURE — 1126F AMNT PAIN NOTED NONE PRSNT: CPT | Mod: CPTII,S$GLB,, | Performed by: INTERNAL MEDICINE

## 2024-11-13 PROCEDURE — 99999 PR PBB SHADOW E&M-EST. PATIENT-LVL IV: CPT | Mod: PBBFAC,,, | Performed by: INTERNAL MEDICINE

## 2024-11-13 RX ORDER — LEVOTHYROXINE SODIUM 125 UG/1
125 TABLET ORAL EVERY OTHER DAY
Qty: 45 TABLET | Refills: 3 | Status: SHIPPED | OUTPATIENT
Start: 2024-11-13

## 2024-11-13 RX ORDER — SIMVASTATIN 20 MG/1
20 TABLET, FILM COATED ORAL NIGHTLY
Qty: 90 TABLET | Refills: 3 | Status: SHIPPED | OUTPATIENT
Start: 2024-11-13
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
## 2024-11-25 ENCOUNTER — PATIENT MESSAGE (OUTPATIENT)
Dept: FAMILY MEDICINE | Facility: CLINIC | Age: 74
End: 2024-11-25
Payer: MEDICARE

## 2025-02-25 ENCOUNTER — OFFICE VISIT (OUTPATIENT)
Dept: OTOLARYNGOLOGY | Facility: CLINIC | Age: 75
End: 2025-02-25
Payer: MEDICARE

## 2025-02-25 VITALS — RESPIRATION RATE: 16 BRPM | HEIGHT: 61 IN | WEIGHT: 115.31 LBS | BODY MASS INDEX: 21.77 KG/M2

## 2025-02-25 DIAGNOSIS — H69.93 DYSFUNCTION OF BOTH EUSTACHIAN TUBES: ICD-10-CM

## 2025-02-25 DIAGNOSIS — H61.22 IMPACTED CERUMEN OF LEFT EAR: Primary | ICD-10-CM

## 2025-02-25 PROCEDURE — 99213 OFFICE O/P EST LOW 20 MIN: CPT | Mod: 25,S$GLB,, | Performed by: PHYSICIAN ASSISTANT

## 2025-02-25 PROCEDURE — 1160F RVW MEDS BY RX/DR IN RCRD: CPT | Mod: CPTII,S$GLB,, | Performed by: PHYSICIAN ASSISTANT

## 2025-02-25 PROCEDURE — 3288F FALL RISK ASSESSMENT DOCD: CPT | Mod: CPTII,S$GLB,, | Performed by: PHYSICIAN ASSISTANT

## 2025-02-25 PROCEDURE — 99999 PR PBB SHADOW E&M-EST. PATIENT-LVL III: CPT | Mod: PBBFAC,,, | Performed by: PHYSICIAN ASSISTANT

## 2025-02-25 PROCEDURE — 1126F AMNT PAIN NOTED NONE PRSNT: CPT | Mod: CPTII,S$GLB,, | Performed by: PHYSICIAN ASSISTANT

## 2025-02-25 PROCEDURE — 3008F BODY MASS INDEX DOCD: CPT | Mod: CPTII,S$GLB,, | Performed by: PHYSICIAN ASSISTANT

## 2025-02-25 PROCEDURE — 1159F MED LIST DOCD IN RCRD: CPT | Mod: CPTII,S$GLB,, | Performed by: PHYSICIAN ASSISTANT

## 2025-02-25 PROCEDURE — 69210 REMOVE IMPACTED EAR WAX UNI: CPT | Mod: S$GLB,,, | Performed by: PHYSICIAN ASSISTANT

## 2025-02-25 PROCEDURE — 1101F PT FALLS ASSESS-DOCD LE1/YR: CPT | Mod: CPTII,S$GLB,, | Performed by: PHYSICIAN ASSISTANT

## 2025-02-25 RX ORDER — LEVOCETIRIZINE DIHYDROCHLORIDE 5 MG/1
5 TABLET, FILM COATED ORAL NIGHTLY
Qty: 30 TABLET | Refills: 11 | Status: SHIPPED | OUTPATIENT
Start: 2025-02-25 | End: 2026-02-25

## 2025-02-25 RX ORDER — FLUTICASONE PROPIONATE 50 MCG
1 SPRAY, SUSPENSION (ML) NASAL 2 TIMES DAILY
Qty: 16 G | Refills: 11 | Status: SHIPPED | OUTPATIENT
Start: 2025-02-25

## 2025-02-25 NOTE — PROCEDURES
Ear Cerumen Removal    Date/Time: 2/25/2025 2:00 PM    Performed by: Beto Farfan PA-C  Authorized by: Beto Farfan PA-C      Local anesthetic:  None  Location details:  Left ear  Procedure type: curette    Procedure type comment:  Curette, suction and alligator forceps  Cerumen  Removal Results:  Cerumen completely removed  Patient tolerance:  Patient tolerated the procedure well with no immediate complications

## 2025-02-25 NOTE — PROGRESS NOTES
[facility]    Subjective:      Patient: Karina Mathur Patient PCP: Mohan Davalos DO         :  1950     Sex:  female      MRN:  [id number]          Date of Visit: 2025      Chief Complaint: Bilateral aural fullness/left cerumen impaction    Patient ID 2022: Karina Mathur is a 74 y.o. female who was self-referred for  fluid in ears . Pt states that she had aural fullness last Friday R>L. Pt states that it is been gradually worsening. Pt states that when she mariana over, she hears a swishing noise in the right side. Pt did ear irriations and swimmer's ear drops and did not have any ear wax coming out. Pt states that she has had dysequilibrium the past few days without true vertigo. Pt state this was the first incident happened while pt was washing her hair. This was the first incident. It happened 3 times this week and has lasted for a few seconds. Pt endorses high-pitched bilateral non-pulsatile tinnitus x 2 years, prior to her recent issues. Pt wears  at night for teeth grinding. 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 Pt denies subjective hearing loss, fluctuations in hearing, or h/o SHL. Pt states that she has really good hearing. There is not a family history of hearing loss at a young age. There is not a prior history of ear surgery. There is not a prior history of ear infections. Pt still has tonsils and adenoids. She denies a history of significant noise exposure. She does not wear hearing aids currently. She has not had a hearing test recently. Pt denies h/o migraine or head trauma.     Interval History 2022: Pt seen at last visit and had noted bilateral TM retraction with bilateral serous otitis media. Pt placed on prednisone 40mg PO x 7 days and Flonase 1 puff to each nostril twice daily and take oral claritin once daily fo 6 weeks and advised to pop her ears 4-6 times a day x 2 weeks. Pt states that her ears are feeling better today in office. Pt denies fever/chills,  aural fullness. Pt states she continues to have bilateral high-pitched non-pulsatile tinnitus. Pt denies subjective hearing loss.      Interval History 03/13/2023: Pt states that she has bilateral ear pressure/fullness, which started yesterday with right ear pain. Pt states she has had sore throat that started on Thursday of last week and has also had scratchiness in her throat. Pt denies fever/chills. Pt denies recent sick contact. Pt states she has been doing saltwater gargles, using flonase and taking mucinex. Pt states that she has had issues with voice change and thickening mucous taking mucinex.     Interval History 04/11/2023: Pt states that she started having right ear fullness on Sunday and developed voice change with coarse/hoarse voice. Pt denies issues with acid reflux. Pt denies issues with seasonal allergies. Pt has had right aural fullness with muffled hearing. Pt denies fever/chills, sore throat, dysphagia or ear pain/discharge. No vertigo. Pt has not had any recent sick contacts.     Interval History 08/30/2023: Pt presents to office with ongoing right aural fullness. Pt has had long-standing high-pitched bilateral non-pulsatile tinnitus prior to issues with aural fullness. Pt has attempted to valsalva, but is unable to. Pt has been using flonase 1 spray to each nostril twice daily and taking xyzal 5mg in the evening. She denies issues with seasonal allergies. Pt denies ear pain/discharge, hearing loss, fluctuations in hearing, or vertigo.     Interval History 09/15/2023: Pt recently had her grandchild who was sick and she recently go sick from her grandchild. Pt states that she had fever yesterday 100F and right aural fullness worsened. Pt was popping ears 6 times a day and she could not open up her right ear. Pt states that she is just having clear runny nose. Pt is not have sinus pressure/pain today in office. Pt has had associated clear runny nose with nasal congestion with her recent illness.  Pt has 97.7F temp today. She last took tylenol at midnight last night. Pt denies seasonal allergies. Pt is using flonase and xyzal as prescribed.     Interval History 02/25/2025: Pt presents to office for ear cleaning for cerumen impaction. Pt has bilateral aural fullness. 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 Pt had nasal endoscopy 09/15/2023 that was negative for nasopharyngeal mass in setting of chronic ETD. Possibility of PE tubes discussed with pt at last OV, but she does not feel the need for PE tubes at this time. Pt does not feel need for audiogram at this time. Pt also presents to office for xyzal and flonase refill.    Past Medical History  She has a past medical history of Dry eyes, bilateral, Hyperlipidemia, Hypothyroidism, Osteopenia, Prolapse of female bladder, acquired, and PVD (posterior vitreous detachment), bilateral.    Family History  Her family history includes Dementia in her father; Hypertension in her father and sister; Hypothyroidism in her sister; Rheum arthritis in her mother.    Past Surgical History:   Procedure Laterality Date    BLEPHAROPLASTY, UPPER EYELID Left 4/15/2024    Procedure: BLEPHAROPLASTY, UPPER EYELID;  Surgeon: Maico Morton MD;  Location: [address];  Service: Plastics;  Laterality: Left;    COLONOSCOPY N/A 2/21/2024    Procedure: COLONOSCOPY;  Surgeon: LAMAR Luong MD;  Location: [facility];  Service: Endoscopy;  Laterality: N/A;    CYSTOSCOPY N/A 5/27/2024    Procedure: CYSTOSCOPY;  Surgeon: Wendy Griffin MD;  Location: [facility];  Service: OB/GYN;  Laterality: N/A;    INGUINAL HERNIA REPAIR      INSERTION OF MIDURETHRAL SLING N/A 5/27/2024    Procedure: SLING, MIDURETHRAL;  Surgeon: Wendy Griffin MD;  Location: [facility];  Service: OB/GYN;  Laterality: N/A;    REPAIR,BROW PTOSIS Left 4/15/2024    Procedure: REPAIR,BROW PTOSIS;  Surgeon: Maico Morton MD;  Location: [address];  Service: Plastics;  Laterality:  "Left;    ROBOT-ASSISTED LAPAROSCOPIC ABDOMINAL SACROCOLPOPEXY USING DA ELROY XI N/A 5/27/2024    Procedure: XI ROBOTIC SACROCOLPOPEXY, ABDOMINAL;  Surgeon: Wendy Griffin MD;  Location: [facility];  Service: OB/GYN;  Laterality: N/A;    XI ROBOTIC HYSTERECTOMY, WITH SALPINGO-OOPHORECTOMY N/A 5/27/2024    Procedure: XI ROBOTIC HYSTERECTOMY,WITH SALPINGO-OOPHORECTOMY;  Surgeon: Wendy Griffin MD;  Location: [facility];  Service: OB/GYN;  Laterality: N/A;     Social History     Tobacco Use    Smoking status: Never    Smokeless tobacco: Never   Substance and Sexual Activity    Alcohol use: Yes     Alcohol/week: 3.0 standard drinks of alcohol     Types: 3 Glasses of wine per week     Comment: 3 a week    Drug use: No    Sexual activity: Not Currently     Medications  She has a current medication list which includes the following prescription(s): ascorbic acid (vitamin c), calcium citrate/vitamin d3, coffee xt/phosphatidyl serine, estradiol, levothyroxine, multivitamin, simvastatin, vitamin d, fluticasone propionate, and levocetirizine.    Review of patient's allergies indicates:  No Known Allergies  All medications, allergies, and past history have been reviewed.    Objective:      Vitals:      11/11/2024    11:21 AM 11/13/2024     9:57 AM 2/25/2025     2:03 PM   Vitals - 1 value per visit   SYSTOLIC 74 98    DIASTOLIC 54 64    Pulse  59    Resp   16   SPO2  98 %    Weight (lb) 116.18 115.3 115.3   Weight (kg) 52.7 52.3 52.3   Height 5' 1" (1.549 m) 5' 1" (1.549 m) 5' 1" (1.549 m)   BMI (Calculated) 22 21.8 21.8   Pain Score Zero Zero Zero       Body surface area is 1.5 meters squared.    Physical Exam  Constitutional:       General: She is not in acute distress.     Appearance: Normal appearance. She is not ill-appearing.   HENT:      Head: Normocephalic and atraumatic.      Right Ear: Ear canal and external ear normal. No middle ear effusion. Tympanic membrane is retracted (mild). Tympanic membrane is not scarred, " perforated, erythematous or bulging.      Left Ear: Ear canal and external ear normal.  No middle ear effusion. There is impacted cerumen. Tympanic membrane is retracted (mild). Tympanic membrane is not scarred, perforated, erythematous or bulging.      Nose: Septal deviation (right with anterior nasal septal bone spur) present.      Mouth/Throat:      Lips: Pink. No lesions.      Mouth: Mucous membranes are moist. No oral lesions.      Tongue: No lesions.      Palate: No lesions.      Pharynx: Oropharynx is clear. Uvula midline. No pharyngeal swelling, oropharyngeal exudate, posterior oropharyngeal erythema or uvula swelling.   Eyes:      General:         Right eye: No discharge.         Left eye: No discharge.      Extraocular Movements: Extraocular movements intact.      Conjunctiva/sclera: Conjunctivae normal.   Pulmonary:      Effort: Pulmonary effort is normal.   Neurological:      General: No focal deficit present.      Mental Status: She is alert and oriented to person, place, and time. Mental status is at baseline.   Psychiatric:         Mood and Affect: Mood normal.         Behavior: Behavior normal.         Thought Content: Thought content normal.         Judgment: Judgment normal.       Ear Cerumen Removal     Date/Time: 2/25/2025 2:00 PM     Performed by: Beto Farfan PA-C  Authorized by: Beto Farfan PA-C       Local anesthetic:  None  Location details:  Left ear  Procedure type: curette    Procedure type comment:  Curette, suction and alligator forceps  Cerumen  Removal Results:  Cerumen completely removed  Patient tolerance:  Patient tolerated the procedure well with no immediate complications     Labs:  WBC   Date Value Ref Range Status   05/28/2024 9.93 3.90 - 12.70 K/uL Final     Platelets   Date Value Ref Range Status   05/28/2024 190 150 - 450 K/uL Final     Creatinine   Date Value Ref Range Status   05/06/2024 0.7 0.5 - 1.4 mg/dL Final     TSH   Date Value Ref Range Status    05/06/2024 0.804 0.400 - 4.000 uIU/mL Final     Glucose   Date Value Ref Range Status   05/06/2024 99 70 - 110 mg/dL Final     Hemoglobin A1C   Date Value Ref Range Status   07/16/2020 5.2 4.0 - 5.6 % Final     Comment:     ADA Screening Guidelines:  5.7-6.4%  Consistent with prediabetes  >or=6.5%  Consistent with diabetes  High levels of fetal hemoglobin interfere with the HbA1C  assay. Heterozygous hemoglobin variants (HbS, HgC, etc)do  not significantly interfere with this assay.   However, presence of multiple variants may affect accuracy.     Nasal/sinus endoscopy     Date/Time: 9/15/2023 9:15 AM     Performed by: Beto Farfan PA-C  Authorized by: Beto Farfan PA-C    Consent Done?:  Yes (Verbal)  Anesthesia:     Local anesthetic:  Lidocaine 4% and Angel-Synephrine 1/2%    Location: bilateral nares.    Type of Endoscope:  Flexible (disposable ambu scope)    Patient tolerance:  Patient tolerated the procedure well with no immediate complications  Nose:     Procedure Performed:  Nasal Endoscopy  External:      No external nasal deformity  Intranasal:      Mucosa no polyps     Mucosa ulcers not present     No mucosa lesions present     Turbinates not enlarged     Septum gross deformity  Nasopharynx:      No mucosa lesions     Posterior choanae patent     Eustachian tube patent        All lab results and imaging results have been reviewed.    Assessment:        ICD-10-CM ICD-9-CM   1. Impacted cerumen of left ear  H61.22 380.4   2. Dysfunction of both eustachian tubes  H69.93 381.81          Plan:      Left ear cleaning performed today in office. Discussed with pt about ETD and possibility of PE tubes in the future. She will let us know if she decides to proceed with updated comprehensive audiogram in the future. Refills sent for flonase 1 spray to each nostril twice daily and xyzal 5mg in the evening. Pt is to follow up annually for medication refills or as needed for ENT issues/concerns.

## 2025-03-07 ENCOUNTER — PATIENT MESSAGE (OUTPATIENT)
Dept: UROGYNECOLOGY | Facility: CLINIC | Age: 75
End: 2025-03-07
Payer: MEDICARE

## 2025-03-24 DIAGNOSIS — Z00.00 ENCOUNTER FOR MEDICARE ANNUAL WELLNESS EXAM: ICD-10-CM

## 2025-03-25 ENCOUNTER — OFFICE VISIT (OUTPATIENT)
Dept: PODIATRY | Facility: CLINIC | Age: 75
End: 2025-03-25
Payer: MEDICARE

## 2025-03-25 VITALS — BODY MASS INDEX: 21.77 KG/M2 | WEIGHT: 115.31 LBS | HEIGHT: 61 IN

## 2025-03-25 DIAGNOSIS — M21.42 ACQUIRED PES PLANOVALGUS OF LEFT FOOT: ICD-10-CM

## 2025-03-25 DIAGNOSIS — M20.12 VALGUS DEFORMITY OF BOTH GREAT TOES: Primary | ICD-10-CM

## 2025-03-25 DIAGNOSIS — M21.41 ACQUIRED PES PLANOVALGUS OF RIGHT FOOT: ICD-10-CM

## 2025-03-25 DIAGNOSIS — L60.0 INGROWN NAIL OF GREAT TOE OF RIGHT FOOT: ICD-10-CM

## 2025-03-25 DIAGNOSIS — M20.11 VALGUS DEFORMITY OF BOTH GREAT TOES: Primary | ICD-10-CM

## 2025-03-25 DIAGNOSIS — L84 CORN OR CALLUS: ICD-10-CM

## 2025-03-25 DIAGNOSIS — M20.41 HAMMER TOES OF BOTH FEET: ICD-10-CM

## 2025-03-25 DIAGNOSIS — M20.42 HAMMER TOES OF BOTH FEET: ICD-10-CM

## 2025-03-25 PROCEDURE — 3288F FALL RISK ASSESSMENT DOCD: CPT | Mod: CPTII,S$GLB,, | Performed by: PODIATRIST

## 2025-03-25 PROCEDURE — 1159F MED LIST DOCD IN RCRD: CPT | Mod: CPTII,S$GLB,, | Performed by: PODIATRIST

## 2025-03-25 PROCEDURE — 1160F RVW MEDS BY RX/DR IN RCRD: CPT | Mod: CPTII,S$GLB,, | Performed by: PODIATRIST

## 2025-03-25 PROCEDURE — 99213 OFFICE O/P EST LOW 20 MIN: CPT | Mod: S$GLB,,, | Performed by: PODIATRIST

## 2025-03-25 PROCEDURE — 3008F BODY MASS INDEX DOCD: CPT | Mod: CPTII,S$GLB,, | Performed by: PODIATRIST

## 2025-03-25 PROCEDURE — 99999 PR PBB SHADOW E&M-EST. PATIENT-LVL III: CPT | Mod: PBBFAC,,, | Performed by: PODIATRIST

## 2025-03-25 PROCEDURE — 1126F AMNT PAIN NOTED NONE PRSNT: CPT | Mod: CPTII,S$GLB,, | Performed by: PODIATRIST

## 2025-03-25 PROCEDURE — 1101F PT FALLS ASSESS-DOCD LE1/YR: CPT | Mod: CPTII,S$GLB,, | Performed by: PODIATRIST

## 2025-03-25 NOTE — PROGRESS NOTES
Subjective:      Patient ID: Karina Mathur is a 74 y.o. female.    Chief Complaint: Ingrown Toenail and Bunions    Karina is a 74 y.o. female who presents to the podiatry clinic  with complaint of  right foot pain at the second toe with callus formation at the interspace. Onset of the symptoms was several years ago. Precipitating event: none known. Current symptoms include: ability to bear weight, but with some pain and worsening symptoms after a period of activity. Aggravating factors: any weight bearing. Symptoms have gradually worsened. Patient has had prior foot problems. Evaluation to date:  seen about 3 years ago with Dr. Teague who gave her some toe spacers . Patients rates pain 6/10 on pain scale.    3/27/24: Patient returns with pain in the ball of the foot bilateral with weight bearing    3/25/25: Patient returns with right great toe ingrown nail causing pain with pressure and shoe wear, she also has the calluses and pain in the ball of the foot that has returned    Review of Systems   Constitutional: Negative for chills and fever.   Cardiovascular:  Negative for claudication and leg swelling.   Respiratory:  Negative for shortness of breath.    Skin:  Negative for itching, nail changes and rash.        calluses   Musculoskeletal:  Negative for muscle cramps, muscle weakness and myalgias.        Bunions and hammertoes   Gastrointestinal:  Negative for nausea and vomiting.   Neurological:  Negative for focal weakness, loss of balance, numbness and paresthesias.           Objective:      Physical Exam  Constitutional:       General: She is not in acute distress.     Appearance: She is well-developed. She is not diaphoretic.   Cardiovascular:      Pulses:           Dorsalis pedis pulses are 2+ on the right side and 2+ on the left side.        Posterior tibial pulses are 2+ on the right side and 2+ on the left side.      Comments: < 3 sec capillary refill time to toes 1-5 bilateral. Toes and feet are warm  to touch proximally with normal distal cooling b/l. There is some hair growth on the feet and toes b/l. There is no edema b/l. No spider veins or varicosities present b/l.     Musculoskeletal:      Comments: Equinus noted b/l ankles with < 10 deg DF noted. MMT 5/5 in DF/PF/Inv/Ev resistance with no reproduction of pain in any direction. Passive range of motion of ankle and pedal joints is painless b/l.      Painful medial 1st MTPJ exostosis bilateral. Lateral deviation of hallux, somewhat trackbound. Bilateral hammertoe deformities semi reducible     Flat non reducible arches bilateral with noted fat pad atrophy to the plantar feet and callus formation sub second met head bilateral     Skin:     General: Skin is warm and dry.      Coloration: Skin is not pale.      Findings: Lesion present. No abrasion, bruising, burn, ecchymosis, erythema, laceration, petechiae or rash.      Nails: There is no clubbing.      Comments: Skin temperature, texture and turgor within normal limits.    Right 1-2 toes interspace kissing hyperkeratotic lesions with pain to palpation    medial hallux nail margin of the right foot with ingrown nail plate. Surrounding erythema and minimal edema is noted there is No granuloma formation noted. No drainage or malodor        Neurological:      Mental Status: She is alert and oriented to person, place, and time.      Sensory: No sensory deficit.      Motor: No tremor, atrophy or abnormal muscle tone.      Comments: Negative tinel sign bilateral.   Psychiatric:         Behavior: Behavior normal.               Assessment:       Encounter Diagnoses   Name Primary?    Valgus deformity of both great toes Yes    Hammer toes of both feet     Corn or callus     Acquired pes planovalgus of left foot     Acquired pes planovalgus of right foot     Ingrown nail of great toe of right foot              Plan:       Karina was seen today for ingrown toenail and bunions.    Diagnoses and all orders for this  visit:    Valgus deformity of both great toes    Hammer toes of both feet    Corn or callus    Acquired pes planovalgus of left foot    Acquired pes planovalgus of right foot    Ingrown nail of great toe of right foot          I counseled the patient on her conditions, their implications and medical management.    Recommend toe spacers with shoes to prevent rubbing of the 1-2 toes    Wide supportive shoes at all times    Recommend custom orthotic inserts, prescription was dispensed    Use ebanel cream to the calluses daily    Utilizing sterile toenail clippers I aggressively debrided the offending nail border approximately 3 mm from its edge and carried the nail plate incision down at an angle in order to wedge out the offending cryptotic portion of the nail plate. The offending border was then removed in toto. No blood was drawn. Patient tolerated the procedure well and related significant relief.      Return PRN    Terence Dubois DPM

## 2025-03-27 DIAGNOSIS — E89.0 POSTABLATIVE HYPOTHYROIDISM: ICD-10-CM

## 2025-03-28 RX ORDER — LEVOTHYROXINE SODIUM 125 UG/1
125 TABLET ORAL EVERY OTHER DAY
Qty: 45 TABLET | Refills: 0 | Status: SHIPPED | OUTPATIENT
Start: 2025-03-28

## 2025-03-28 NOTE — TELEPHONE ENCOUNTER
Care Due:                  Date            Visit Type   Department     Provider  --------------------------------------------------------------------------------                                EP -                              [facility]  Last Visit: 11-      CARE ([facility])   LIBRADO Davalos                              EP -                              PRIMARY      [facility]  Next Visit: 05-      CARE ([facility])   MEDICINE       Mohan Davalos                                                            Last  Test          Frequency    Reason                     Performed    Due Date  --------------------------------------------------------------------------------    CMP.........  12 months..  simvastatin..............  05- 05-    Lipid Panel.  12 months..  simvastatin..............  05- 05-    TSH.........  12 months..  levothyroxine............  05- 05-    [facility] Embedded Care Due Messages. Reference number: [id number].   3/27/2025 11:49:07 PM CDT

## 2025-03-28 NOTE — TELEPHONE ENCOUNTER
Refill Decision Note   Karina Mathur  is requesting a refill authorization.  Brief Assessment and Rationale for Refill:  Approve     Medication Therapy Plan:  FLOS      Comments:     Note composed:9:10 AM 03/28/2025

## 2025-03-31 ENCOUNTER — OFFICE VISIT (OUTPATIENT)
Dept: DERMATOLOGY | Facility: CLINIC | Age: 75
End: 2025-03-31
Payer: MEDICARE

## 2025-03-31 DIAGNOSIS — L71.9 ROSACEA: Primary | ICD-10-CM

## 2025-03-31 DIAGNOSIS — L57.0 AK (ACTINIC KERATOSIS): ICD-10-CM

## 2025-03-31 PROCEDURE — 17000 DESTRUCT PREMALG LESION: CPT | Mod: S$GLB,,, | Performed by: DERMATOLOGY

## 2025-03-31 PROCEDURE — 99999 PR PBB SHADOW E&M-EST. PATIENT-LVL III: CPT | Mod: PBBFAC,,, | Performed by: DERMATOLOGY

## 2025-03-31 PROCEDURE — 1101F PT FALLS ASSESS-DOCD LE1/YR: CPT | Mod: CPTII,S$GLB,, | Performed by: DERMATOLOGY

## 2025-03-31 PROCEDURE — 99213 OFFICE O/P EST LOW 20 MIN: CPT | Mod: 25,S$GLB,, | Performed by: DERMATOLOGY

## 2025-03-31 PROCEDURE — 1126F AMNT PAIN NOTED NONE PRSNT: CPT | Mod: CPTII,S$GLB,, | Performed by: DERMATOLOGY

## 2025-03-31 PROCEDURE — 17003 DESTRUCT PREMALG LES 2-14: CPT | Mod: S$GLB,,, | Performed by: DERMATOLOGY

## 2025-03-31 PROCEDURE — 3288F FALL RISK ASSESSMENT DOCD: CPT | Mod: CPTII,S$GLB,, | Performed by: DERMATOLOGY

## 2025-03-31 NOTE — PROGRESS NOTES
Subjective:      Patient ID:  Karina Mathur is a 74 y.o. female who presents for   Chief Complaint   Patient presents with    Lesion     Lesion - Initial  Affected locations: nose  Severity: mild to moderate  Timing: constant  Aggravated by: nothing  Relieving factors/Treatments tried: nothing      Lesion developed initially after doing yardwork.  Saw Dr. Peralta who diagnosed her with rosacea.  Lesion on nose has recurred.    Review of Systems   Constitutional: Negative.    HENT: Negative.     Respiratory: Negative.     Musculoskeletal: Negative.    Skin:  Positive for activity-related sunscreen use.       Objective:   Physical Exam   Constitutional: She appears well-developed and well-nourished.   Neurological: She is alert and oriented to person, place, and time.   Psychiatric: She has a normal mood and affect.   Skin:   Areas Examined (abnormalities noted in diagram):   Head / Face Inspection Performed  RUE Inspected  LUE Inspection Performed                 Diagram Legend     Erythematous scaling macule/papule c/w actinic keratosis       Vascular papule c/w angioma      Pigmented verrucoid papule/plaque c/w seborrheic keratosis      Yellow umbilicated papule c/w sebaceous hyperplasia      Irregularly shaped tan macule c/w lentigo     1-2 mm smooth white papules consistent with Milia      Movable subcutaneous cyst with punctum c/w epidermal inclusion cyst      Subcutaneous movable cyst c/w pilar cyst      Firm pink to brown papule c/w dermatofibroma      Pedunculated fleshy papule(s) c/w skin tag(s)      Evenly pigmented macule c/w junctional nevus     Mildly variegated pigmented, slightly irregular-bordered macule c/w mildly atypical nevus      Flesh colored to evenly pigmented papule c/w intradermal nevus       Pink pearly papule/plaque c/w basal cell carcinoma      Erythematous hyperkeratotic cursted plaque c/w SCC      Surgical scar with no sign of skin cancer recurrence      Open and closed comedones       Inflammatory papules and pustules      Verrucoid papule consistent consistent with wart     Erythematous eczematous patches and plaques     Dystrophic onycholytic nail with subungual debris c/w onychomycosis     Umbilicated papule    Erythematous-base heme-crusted tan verrucoid plaque consistent with inflamed seborrheic keratosis     Erythematous Silvery Scaling Plaque c/w Psoriasis     See annotation      Assessment / Plan:        Rosacea  May continue metrocream as prescribed.  Patient instructed in importance of daily broad spectrum sunscreen use with spf at least 30. Sun avoidance and topical protection/protective clothing discussed.    AK (actinic keratosis)  Cryosurgery Procedure Note    Verbal consent from the patient is obtained including, but not limited to, risk of hypopigmentation/hyperpigmentation, scar, recurrence of lesion. The patient is aware of the precancerous quality and need for treatment of these lesions. Liquid nitrogen cryosurgery is applied to the 2 actinic keratoses, as detailed in the physical exam, to produce a freeze injury. The patient is aware that blisters may form and is instructed on wound care with gentle cleansing and use of vaseline ointment to keep moist until healed. The patient is supplied a handout on cryosurgery and is instructed to call if lesions do not completely resolve.    RTC 6 months or sooner for any concerns

## 2025-03-31 NOTE — PATIENT INSTRUCTIONS
CRYOSURGERY      Your doctor has used a method called cryosurgery to treat your skin condition. Cryosurgery refers to the use of very cold substances to treat a variety of skin conditions such as warts, pre-skin cancers, molluscum contagiosum, sun spots, and several benign growths. The substance we use in cryosurgery is liquid nitrogen and is so cold (-195 degrees Celsius) that is burns when administered.     Following treatment in the office, the skin may immediately burn and become red. You may find the area around the lesion is affected as well. It is sometimes necessary to treat not only the lesion, but a small area of the surrounding normal skin to achieve a good response.     A blister, and even a blood filled blister, may form after treatment.   This is a normal response. If the blister is painful, it is acceptable to sterilize a needle and with rubbing alcohol and gently pop the blister. It is important that you gently wash the area with soap and warm water as the blister fluid may contain wart virus if a wart was treated. Do no remove the roof of the blister.     The area treated can take anywhere from 1-3 weeks to heal. Healing time depends on the kind skin lesion treated, the location, and how aggressively the lesion was treated. It is recommended that the areas treated are covered with Vaseline or bacitracin ointment and a band-aid. If a band-aid is not practical, just ointment applied several times per day will do. Keeping these areas moist will speed the healing time.    Treatment with liquid nitrogen can leave a scar. In dark skin, it may be a light or dark scar, in light skin it may be a white or pink scar. These will generally fade with time, but may never go away completely.     If you have any concerns after your treatment, please feel free to call the office.       [address] [place] [address]/ [phone] FAX/ [email or web address]     Sun Protection      The [facility]  [facility] would like to remind you of the importance of sun protection all year round and particularly during the summer when the suns rays are the strongest. It has been proven that both acute and chronic sun exposure damages our cells and leads to skin cancer. Beyond skin cancer, the sun causes 90% of the symptoms of premature skin aging, including wrinkles, lentigines (brown spots), and thin, easily bruised skin. Proper sun protection can help prevent these unwanted conditions.    Many patients report that they dont go in the sun. It has been shown that the average person receives 18 hours of incidental sun exposure per week during activities such as walking through parking lots, driving, or sitting next to windows. This accumulates to several bad sunburns per year!    In choosing sunscreen, you want one that protects against both UVA and UVB rays (broad spectrum). It is recommended that you use one of SPF 30 or higher. It is important to apply the sunscreen about 20 minutes prior to sun exposure. Most sunscreens are chemical sunscreens and a reaction must take place in the skin so that they are effective. If they are applied and then you are immediately exposed to the sun or start sweating, this reaction has not had time to take place and you are therefore unprotected. Sunscreen needs to be reapplied every 2 hours if you are participating in water sports or sweating. We recommend Elta MD or CeraVe sunscreens for daily use; however there are many options and it is most important for you to find one that you will use on a consistent basis.    If you have sensitive skin, you may do best with a sunscreen that contains only physical blockers in the active ingredient section. The only physical blockers available in the [place] currently are titanium dioxide or zinc oxide. These are typically thicker and harder to apply, however they afford very good protection. Neutrogena Sensitive Skin, Blue Lizard  Sensitive Skin (pink top) or Neutrogena Pure and Free are popular ones.     Aside from sunscreen, clothes with UV protection (UPF), wide brimmed hats, and sunglasses are other means of sun protection that we recommend.      Based on a recent study (6/2021) and out of an abundance of caution, we are recommending that you AVOID the following sunscreens as they may contain the carcinogen, benzene:    Spray and gel sunscreens  Any [employer] or [employer] brands as well as Max Block and TopCare brands   Neutrogena Ultra Sheer Dry-touch Water Resistant Sunscreen LOTION SPF 70   Neutrogena Sheer Zinc Dry-touch Face Sunscreen LOTION SPF 50   5.   Aveeno Baby Continuous Protection Sensitive Skin Sunscreen LOTION - Broad Spectrum SPF 50    Please note that Benzene is not an ingredient or the degradation product of any ingredient in any sunscreen. This study suggested that the findings are a result of contamination in the manufacturing process. At this point, we don't know how effectively Benzene gets through the skin, if it gets absorbed systemically, and what effects it may have.     We do know that ultraviolet radiation is a well-established carcinogen. Please use daily sun protection/avoidance and use of at least SPF 30, broad-spectrum sunscreen not listed above.                       [facility]  [facility] - DERMATOLOGY [address]  [facility] [address]  Dept: [phone]  Dept Fax: [phone]

## 2025-04-02 ENCOUNTER — TELEPHONE (OUTPATIENT)
Dept: UROGYNECOLOGY | Facility: CLINIC | Age: 75
End: 2025-04-02

## 2025-04-02 ENCOUNTER — OFFICE VISIT (OUTPATIENT)
Dept: UROGYNECOLOGY | Facility: CLINIC | Age: 75
End: 2025-04-02
Payer: MEDICARE

## 2025-04-02 VITALS
WEIGHT: 112.44 LBS | HEART RATE: 70 BPM | SYSTOLIC BLOOD PRESSURE: 97 MMHG | HEIGHT: 61 IN | BODY MASS INDEX: 21.23 KG/M2 | DIASTOLIC BLOOD PRESSURE: 53 MMHG

## 2025-04-02 DIAGNOSIS — N39.46 URINARY INCONTINENCE, MIXED: ICD-10-CM

## 2025-04-02 DIAGNOSIS — N95.2 VAGINAL ATROPHY: ICD-10-CM

## 2025-04-02 DIAGNOSIS — Z98.890 S/P ROBOT-ASSISTED SURGICAL PROCEDURE: Primary | ICD-10-CM

## 2025-04-02 PROCEDURE — 99999 PR PBB SHADOW E&M-EST. PATIENT-LVL IV: CPT | Mod: PBBFAC,,,

## 2025-04-02 PROCEDURE — 87086 URINE CULTURE/COLONY COUNT: CPT

## 2025-04-02 RX ORDER — VIBEGRON 75 MG/1
75 TABLET, FILM COATED ORAL NIGHTLY
Qty: 30 TABLET | Refills: 11 | Status: SHIPPED | OUTPATIENT
Start: 2025-04-02

## 2025-04-02 NOTE — PROGRESS NOTES
"Urogyn follow up  04/02/2025    .  [facility] - UROGYNECOLOGY  [address] [address]    Karina Mathur  [id number]  1950      Karina Mathur is a 74 y.o. here for a urogyn follow up.    Date of Operation: 05/27/2024     Title of Operation:  1)  Robotic-assisted total laparoscopic hysterectomy with bilateral salpingo-oophorectomy  2)  Robotic-assisted laparoscopic sacral colpopexy with polypropylene mesh  3)  Placement of retropubic tension-free midurethral sling, Advantage Fit (Cotendo)  4)  Cystourethroscopy     Indications for Surgery:  Last HPI from 03/11/2024     1)  UI:  (+) JADE (rare) > (+) UUI.  (+) pads "in case", usually min wetness.  1 pad/night "in case."  Daytime frequency: Q 2 hours.  Nocturia: Yes: 2+/night--disrupts sleep.   (--) dysuria,  (--) hematuria,  (--) frequent UTIs.  (+) complete bladder emptying. +PV urgency with small 2nd volume. +splayed stream.      2)  POP:  Present. below introitus, significant.  Symptoms:(+) pressure, general bother.  (--) vaginal bleeding. (--) vaginal discharge. (+) sexually active.  (+) dyspareunia due to dryness.  (+)  Vaginal dryness--uses vaseline.  (--) vaginal estrogen use.   --POP-Q:  Aa +3; Ba +3; C -2; Ap -1; Bp -1; D -5.  Genital hiatus 3, perineal body 2, total vaginal length 10-11 (standing).      3)  BM:  (--) constipation/straining.  (+) chronic diarrhea--seems related to nerves. (--) hematochezia.  (--) fecal incontinence.  (+) fecal smearing/urgency x 1.  (+) complete evacuation.      Changes from last visit:  Rare UUI.  Voiding every 2-3 hours during the day and 2-3/night  Denies constipation     Pelvic US 2/2024:  Impression:  1. Endometrial stripe measures 4 mm, normal for a postmenopausal patient.  There is a 7 mm endometrial cyst present within the endometrial canal of the uterine fundus.  2. Nonvisualization of the ovaries     5/16/2024 UDS:  3.  URETHRAL FUNCTION/STORAGE PHASE:  a.  WITH prolapse " "reduction:  CLPP (150 mL): Positive  at  23 cm H20  VLPP (150 mL): Negative  at  91 cm H20   CLPP (300 mL): Positive  at  104 cm H20  VLPP (300 mL): Negative  at  80 cm H20   These findings are consistent with Positive urodynamic stress incontinence.  Assessment:  UF unable to be assessed.   PF prolonged with concern for voiding dysfunction (Valsalva assist).  Compliance normal.  Max capacity 309 mL.  DO (--).  JADE (+).       5/16/2024 cysto: normal with decreased coaptation.    07/08/2024  GYN: no s/sx POP, VB, discharge, pain.   Bladder issues:              --baseline: (+) JADE (rare) > (+) UUI.  (+) pads "in case", usually min wetness.  1 pad/night "in case."  Daytime frequency: Q 2 hours.  Nocturia: Yes: 2+/night--disrupts sleep.                 --currently: no JADE; min wetness, mostly urge-related; has episodes of urgency; still has some PV urgency with small 2nd volume  Bowel issues: no major straining/constipation/loose stool.       11/11/2024:  --completed pelvic floor PT  --if home can wear one pad and have not UI.  --did not fill myrbetriq.  --using one pad/ days -- not really bothered by it.   Denies constipation or straining  Using vaginal estrogen cream twice weekly    Changes from last visit (11/11/2024):  -- finished PT but has not continued exercises at home  -- GYN: no s/sx POP, VB, discharge, pain.   -- Bladder issues:              --baseline: (+) JADE (rare) > (+) UUI.  (+) pads "in case", usually min wetness.  1 pad/night "in case."  Daytime frequency: Q 2 hours.  Nocturia: Yes: 2+/night--disrupts sleep.                 --currently: + JADE, rare < (+) UUI. Wears pad for safety, but not always wet. Nocturia: Yes, 2+ times per night  --Bowel issues: no major straining/constipation/loose stool.    --occasional vaginal spotting x 2 months    Past Medical History:   Diagnosis Date    Dry eyes, bilateral     Hyperlipidemia     on simvastatin    Hypothyroidism     hx of iodine cocktail around 1988 for " goiter.    Osteopenia     1/18/17; L fem neck w Tscore -1.7; Lsp -1.2; DEXA in 2 yrs.    Prolapse of female bladder, acquired     PVD (posterior vitreous detachment), bilateral        Past Surgical History:   Procedure Laterality Date    BLEPHAROPLASTY, UPPER EYELID Left 4/15/2024    Procedure: BLEPHAROPLASTY, UPPER EYELID;  Surgeon: Maico Morton MD;  [facility];  Service: Plastics;  Laterality: Left;    COLONOSCOPY N/A 2/21/2024    Procedure: COLONOSCOPY;  Surgeon: LAMAR Luong MD;  Location: [facility] ENDO;  Service: Endoscopy;  Laterality: N/A;    CYSTOSCOPY N/A 5/27/2024    Procedure: CYSTOSCOPY;  Surgeon: Wendy Griffin MD;  Location: [facility];  Service: OB/GYN;  Laterality: N/A;    INGUINAL HERNIA REPAIR      INSERTION OF MIDURETHRAL SLING N/A 5/27/2024    Procedure: SLING, MIDURETHRAL;  Surgeon: Wendy Griffin MD;  Location: [facility];  Service: OB/GYN;  Laterality: N/A;    REPAIR,BROW PTOSIS Left 4/15/2024    Procedure: REPAIR,BROW PTOSIS;  Surgeon: Maico Morton MD;  Location: [facility] OR [address] [place] [address];  Service: Plastics;  Laterality: Left;    ROBOT-ASSISTED LAPAROSCOPIC ABDOMINAL SACROCOLPOPEXY USING DA ELROY XI N/A 5/27/2024    Procedure: XI ROBOTIC SACROCOLPOPEXY, ABDOMINAL;  Surgeon: Wendy Griffin MD;  Location: [facility];  Service: OB/GYN;  Laterality: N/A;    XI ROBOTIC HYSTERECTOMY, WITH SALPINGO-OOPHORECTOMY N/A 5/27/2024    Procedure: XI ROBOTIC HYSTERECTOMY,WITH SALPINGO-OOPHORECTOMY;  Surgeon: Wendy Griffin MD;  Location: [facility];  Service: OB/GYN;  Laterality: N/A;       Family History   Problem Relation Name Age of Onset    Rheum arthritis Mother      Hypertension Father      Dementia Father      Hypertension Sister      Hypothyroidism Sister      Glaucoma Neg Hx      Macular degeneration Neg Hx         Social History     Socioeconomic History    Marital status:    Tobacco Use    Smoking status: Never    Smokeless tobacco: Never   Substance and  "Sexual Activity    Alcohol use: Yes     Alcohol/week: 3.0 standard drinks of alcohol     Types: 3 Glasses of wine per week     Comment: 3 a week    Drug use: No    Sexual activity: Not Currently     Social Drivers of Health     Financial Resource Strain: Low Risk  (3/27/2025)    Overall Financial Resource Strain (CARDIA)     Difficulty of Paying Living Expenses: Not very hard   Food Insecurity: No Food Insecurity (3/27/2025)    Hunger Vital Sign     Worried About Running Out of Food in the Last Year: Never true     Ran Out of Food in the Last Year: Never true   Transportation Needs: No Transportation Needs (3/27/2025)    PRAPARE - Transportation     Lack of Transportation (Medical): No     Lack of Transportation (Non-Medical): No   Physical Activity: Insufficiently Active (3/27/2025)    Exercise Vital Sign     Days of Exercise per Week: 4 days     Minutes of Exercise per Session: 20 min   Stress: Stress Concern Present (3/27/2025)    [employer] - Occupational Stress Questionnaire     Feeling of Stress : To some extent   Housing Stability: Low Risk  (3/27/2025)    Housing Stability Vital Sign     Unable to Pay for Housing in the Last Year: No     Homeless in the Last Year: No       Current Medications[1]    Review of patient's allergies indicates:  No Known Allergies    Well woman:  Pap test: 2024 normal/2023 HPV NEG.  History of abnormal paps: No.  History of STIs:  No  Mammogram: Date of last: 5/2023.  Result: Normal  Colonoscopy: Date of last: 2024.  Result:  diverticulosis.  Repeat due:  2034.    DEXA:  Date of last: 2022.  Result:  normal.  Repeat due:  per PCP.     ROS:  As per HPI.      Exam  BP (!) 97/53 (BP Location: Right arm, Patient Position: Sitting)   Pulse 70   Ht 5' 1" (1.549 m)   Wt 51 kg (112 lb 7 oz)   BMI 21.24 kg/m²   General: alert and oriented, no acute distress  Respiratory: normal respiratory effort  Abd: soft, non-tender, non-distended    Pelvic  Ext. " Genitalia: normal external genitalia. Normal bartholin's and skeens glands  Vagina: + atrophy. Normal vaginal mucosa without lesions. No discharge noted.   Non-tender bladder base without palpable mass.  Cervix: absent  Uterus:  surgically absent, vaginal cuff well healed   Urethra: no masses or tenderness  Urethral meatus: no lesions, caruncle or prolapse.    POP-Q:  No obvious POP with valsalva    (-) JADE with cough    PVR: 10 mL    Impression  1. s/p RA-TLH/BSO/SCP/MUS/cysto        2. Urinary incontinence, mixed  Urine Culture High Risk      3. Vaginal atrophy          We reviewed the above issues and discussed options for short-term versus long-term management of her problems.   Plan:   Post-op state  -- Always get help lifting 50 lbs or more.    -- Continue vaginal estrogen cream: 0.5 g 2x/week at night before bed.  -- This can help reduce urinary urgency/frequency/UTIs and keep surgical site healthy.  -- Always try to avoid straining with bowel movements.  Try to keep them from being too loose.    2)  Mixed urinary incontinence, urge > stress:    --Empty bladder every 2-3 hours even if you don't have the urge.  Empty well: wait a minute, lean forward on toilet.    --Try not to go more frequently than once an hour. When you get the urge to urinate after you have just gone, distract yourself until the urge passes.   --Avoid dietary irritants (see sheet).  Keep diary x 3-5 days to determine your irritants.  --KEGELS: do 10 in AM and 10 in PM, holding each x 10 seconds.  When you feel urge to go, STOP, KEGEL, and when urge has passed, then go to bathroom.    --Consider Pelvic Floor Physical Therapy (PT) in future.    --URGE LEAKAGE: trial gemtesa 75 mg daily.  Takes 2-4 weeks to see if will have effect.  For dry mouth: get sour, sugar free lozenge or gum.   - Let us know if too expensive.    --STRESS LEAKAGE (leak with laugh/cough/sneeze):  Pessary vs. Mid-urethral sling surgery vs Impressa.   -- PVR (post void  residual): 5 mL    I spent a total of 30 minutes on the day of the visit.  This includes face to face time and non-face to face time preparing to see the patient (eg, review of tests), obtaining and/or reviewing separately obtained history, documenting clinical information in the electronic or other health record, independently interpreting results and communicating results to the patient/family/caregiver, or care coordinator.      Zahida Hilton PA-C  [facility]  Division of Female Pelvic Medicine and Reconstructive Surgery  Department of Obstetrics & Gynecology       [1]   Current Outpatient Medications   Medication Sig Dispense Refill    ascorbic acid, vitamin C, (VITAMIN C) 500 MG tablet Take 500 mg by mouth once daily.      calcium citrate/vitamin D3 (CITRACAL REGULAR ORAL) Take by mouth.      estradioL (ESTRACE) 0.01 % (0.1 mg/gram) vaginal cream 0.5 grams with applicator or dime-sized amount with finger in vagina nightly x 2 weeks, then twice a week thereafter 42.5 g 11    fluticasone propionate (FLONASE) 50 mcg/actuation nasal spray 1 spray (50 mcg total) by Each Nostril route 2 (two) times daily. Point up and slightly outward toward ear when spraying to avoid irritating nasal septum. 16 g 11    levocetirizine (XYZAL) 5 MG tablet Take 1 tablet (5 mg total) by mouth every evening. 30 tablet 11    levothyroxine (SYNTHROID) 125 MCG tablet Take 1 tablet (125 mcg total) by mouth every other day. 45 tablet 0    multivitamin (THERAGRAN) per tablet Take 1 tablet by mouth once daily.      simvastatin (ZOCOR) 20 MG tablet Take 1 tablet (20 mg total) by mouth every evening. 90 tablet 3    vitamin D 1000 units Tab Take 1,000 Units by mouth once daily.       coffee xt/phosphatidyl serine (NEURIVA ORIGINAL ORAL)       vibegron (GEMTESA) 75 mg Tab Take 1 tablet (75 mg total) by mouth every evening. 30 tablet 11     No current facility-administered medications for this visit.

## 2025-04-02 NOTE — PATIENT INSTRUCTIONS
Post-op state  -- Always get help lifting 50 lbs or more.    -- Continue vaginal estrogen cream: 0.5 g 2x/week at night before bed.  -- This can help reduce urinary urgency/frequency/UTIs and keep surgical site healthy.  -- Always try to avoid straining with bowel movements.  Try to keep them from being too loose.    2)  Mixed urinary incontinence, urge > stress:    --Empty bladder every 2-3 hours even if you don't have the urge.  Empty well: wait a minute, lean forward on toilet.    --Try not to go more frequently than once an hour. When you get the urge to urinate after you have just gone, distract yourself until the urge passes.   --Avoid dietary irritants (see sheet).  Keep diary x 3-5 days to determine your irritants.  --KEGELS: do 10 in AM and 10 in PM, holding each x 10 seconds.  When you feel urge to go, STOP, KEGEL, and when urge has passed, then go to bathroom.    --Consider Pelvic Floor Physical Therapy (PT) in future.    --URGE LEAKAGE: consider gemtesa 75 mg daily.  Takes 2-4 weeks to see if will have effect.  For dry mouth: get sour, sugar free lozenge or gum.   - Let us know if too expensive.    --STRESS LEAKAGE (leak with laugh/cough/sneeze):  Pessary vs. Mid-urethral sling surgery vs Impressa.   -- PVR (post void residual): 5 mL    3) RTC in 3 months

## 2025-04-04 ENCOUNTER — PATIENT MESSAGE (OUTPATIENT)
Dept: UROGYNECOLOGY | Facility: CLINIC | Age: 75
End: 2025-04-04
Payer: MEDICARE

## 2025-04-04 ENCOUNTER — RESULTS FOLLOW-UP (OUTPATIENT)
Dept: UROGYNECOLOGY | Facility: CLINIC | Age: 75
End: 2025-04-04

## 2025-04-04 LAB — BACTERIA UR CULT: NO GROWTH

## 2025-04-08 NOTE — PROGRESS NOTES
Non-compliant [facility] report chart review - Chart review completed for the following HM test if overdue  Colonoscopy,   Care Everywhere and Media reports - updates requested and reviewed.      COLON CANCER SCREENING    HAVE YET TO RETURN KIT that was dispensed .  Reminder sent     Renaldo Royal is calling to request a refill on the following medication(s):    Last Visit Date (If Applicable):  2025    Next Visit Date:    Visit date not found    Medication Request:  Requested Prescriptions     Pending Prescriptions Disp Refills    fluticasone (FLONASE) 50 MCG/ACT nasal spray 16 g 2     Si spray by Each Nostril route daily

## 2025-04-21 ENCOUNTER — OFFICE VISIT (OUTPATIENT)
Dept: OTOLARYNGOLOGY | Facility: CLINIC | Age: 75
End: 2025-04-21
Payer: MEDICARE

## 2025-04-21 ENCOUNTER — RESULTS FOLLOW-UP (OUTPATIENT)
Dept: OTOLARYNGOLOGY | Facility: CLINIC | Age: 75
End: 2025-04-21

## 2025-04-21 ENCOUNTER — LAB VISIT (OUTPATIENT)
Dept: LAB | Facility: HOSPITAL | Age: 75
End: 2025-04-21
Attending: PHYSICIAN ASSISTANT
Payer: MEDICARE

## 2025-04-21 VITALS — RESPIRATION RATE: 16 BRPM | WEIGHT: 110.44 LBS | BODY MASS INDEX: 20.85 KG/M2 | HEIGHT: 61 IN

## 2025-04-21 DIAGNOSIS — Z86.39 HISTORY OF NON ANEMIC VITAMIN B12 DEFICIENCY: ICD-10-CM

## 2025-04-21 DIAGNOSIS — H90.3 SENSORINEURAL HEARING LOSS (SNHL) OF BOTH EARS: Primary | ICD-10-CM

## 2025-04-21 DIAGNOSIS — R09.89 THROAT CLEARING: ICD-10-CM

## 2025-04-21 DIAGNOSIS — H69.93 CHRONIC DYSFUNCTION OF BOTH EUSTACHIAN TUBES: ICD-10-CM

## 2025-04-21 DIAGNOSIS — K14.4 ATROPHIC GLOSSITIS: ICD-10-CM

## 2025-04-21 LAB
FOLATE SERPL-MCNC: 15.8 NG/ML (ref 4–24)
VIT B12 SERPL-MCNC: <148 PG/ML (ref 210–950)

## 2025-04-21 PROCEDURE — 82746 ASSAY OF FOLIC ACID SERUM: CPT

## 2025-04-21 PROCEDURE — 36415 COLL VENOUS BLD VENIPUNCTURE: CPT

## 2025-04-21 PROCEDURE — 99999 PR PBB SHADOW E&M-EST. PATIENT-LVL III: CPT | Mod: PBBFAC,,, | Performed by: PHYSICIAN ASSISTANT

## 2025-04-21 PROCEDURE — 82607 VITAMIN B-12: CPT

## 2025-04-21 NOTE — PROCEDURES
Laryngoscopy    Date/Time: 4/21/2025 11:15 AM    Performed by: Beto Farfan PA-C  Authorized by: Beto Farfan PA-C    Anesthesia:     Local anesthetic:  Lidocaine 4% and Angel-Synephrine 1/2%    Patient tolerance:  Patient tolerated the procedure well with no immediate complications    Decongestion performed?: Yes    Laryngoscopy:     Areas examined:  Nasal cavities, nasopharynx, oropharynx, hypopharynx, larynx and vocal cords    Laryngoscope size: disposable ambu flexible scope.  Nose External:      No external nasal deformity  Nose Intranasal:      Mucosa no polyps     Mucosa ulcers not present     No mucosa lesions present     No septum gross deformity     Turbinates not enlarged  Nasopharynx:      No mucosa lesions     Adenoids present     Posterior choanae patent     Eustachian tube patent  Larynx/hypopharynx:      No epiglottis lesions     No epiglottis edema     No AE folds lesions     No vocal cord polyps     Equal and normal bilateral     No hypopharynx lesions     No piriform sinus pooling     No piriform sinus lesions     No post cricoid edema     No post cricoid erythema     Good adduction of true vocal cords with phonation. Good abduction of true vocal cords with inspiration. No vocal cord pareses/paralysis.

## 2025-04-21 NOTE — PROGRESS NOTES
[facility]    Subjective:      Patient: Karina Mathur Patient PCP: Mohan Davalos DO         :  1950     Sex:  female      MRN:  [id number]          Date of Visit: 2025      Chief Complaint: Bilateral aural fullness    Patient ID [id number]: Karina Mathur is a 74 y.o. female non-smoker who presents to office for aural fullness. Pt has h/o recurrent OM bilaterally and recurrent issues with ETD AU. Pt had nasal endoscopy 09/15/2023 that showed patent ET openings AU. We have discussed possibility of PE tubes in the past for chronic ETD. Pt deferred at that time, but now would like to possibly proceed with PE tubes. Pt had comprehensive audiogram 09/15/2023 that showed mild high frequency SNHL AU. 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 Pt has been using flonase 1 spray to each nostril twice daily and xyzal 5mg in the evening. Pt has tried to pop her ear, but states that they will not pop. Pt states that she feels like she wants to proceed with PE tubes due to chronic issues with bilateral aural fullness. Pt denies significant ear issues as a child. She has just had issues as an adult. She has had ongoing bilateral high-pitched non-pulsatile tinnitus for years. Pt denies ear pain/discharge. She denies h/o otologic surgery.    Pt states that she had issues with smooth tongue in the past and was found to have vitamin B12 deficiency and had weekly B12 injections x 1 month and had improvement of tongue issues.     Pt has had issues with throat clearing. She denies significant issues with sore throat. She does not think that she has post-nasal drip. We have discussed this in the past. She denies issues with acid reflux. She still has throat clearing handout from one of our prior appointments.     Past Medical History  She has a past medical history of Dry eyes, bilateral, Hyperlipidemia, Hypothyroidism, Osteopenia, Prolapse of female bladder, acquired, and PVD (posterior vitreous  detachment), bilateral.    Family History  Her family history includes Dementia in her father; Hypertension in her father and sister; Hypothyroidism in her sister; Rheum arthritis in her mother.    Past Surgical History:   Procedure Laterality Date    BLEPHAROPLASTY, UPPER EYELID Left 4/15/2024    Procedure: BLEPHAROPLASTY, UPPER EYELID;  Surgeon: Maico Morton MD;  [facility];  Service: Plastics;  Laterality: Left;    COLONOSCOPY N/A 2/21/2024    Procedure: COLONOSCOPY;  Surgeon: LAMAR Luong MD;  Location: [facility];  Service: Endoscopy;  Laterality: N/A;    CYSTOSCOPY N/A 5/27/2024    Procedure: CYSTOSCOPY;  Surgeon: Wendy Griffin MD;  Location: [facility];  Service: OB/GYN;  Laterality: N/A;    INGUINAL HERNIA REPAIR      INSERTION OF MIDURETHRAL SLING N/A 5/27/2024    Procedure: SLING, MIDURETHRAL;  Surgeon: Wendy Griffin MD;  Location: [facility];  Service: OB/GYN;  Laterality: N/A;    REPAIR,BROW PTOSIS Left 4/15/2024    Procedure: REPAIR,BROW PTOSIS;  Surgeon: Maico Morton MD;  Location: [facility] [address] [place] [address];  Service: Plastics;  Laterality: Left;    ROBOT-ASSISTED LAPAROSCOPIC ABDOMINAL SACROCOLPOPEXY USING DA ELROY XI N/A 5/27/2024    Procedure: XI ROBOTIC SACROCOLPOPEXY, ABDOMINAL;  Surgeon: Wendy Griffin MD;  Location: [facility];  Service: OB/GYN;  Laterality: N/A;    XI ROBOTIC HYSTERECTOMY, WITH SALPINGO-OOPHORECTOMY N/A 5/27/2024    Procedure: XI ROBOTIC HYSTERECTOMY,WITH SALPINGO-OOPHORECTOMY;  Surgeon: Wendy Griffin MD;  Location: [facility];  Service: OB/GYN;  Laterality: N/A;     Social History     Tobacco Use    Smoking status: Never    Smokeless tobacco: Never   Substance and Sexual Activity    Alcohol use: Yes     Alcohol/week: 3.0 standard drinks of alcohol     Types: 3 Glasses of wine per week     Comment: 3 a week    Drug use: No    Sexual activity: Not Currently     Medications  She has a current medication list which includes the following  "prescription(s): ascorbic acid (vitamin c), calcium citrate/vitamin d3, coffee xt/phosphatidyl serine, estradiol, fluticasone propionate, levocetirizine, levothyroxine, multivitamin, simvastatin, gemtesa, and vitamin d.    Review of patient's allergies indicates:  No Known Allergies  All medications, allergies, and past history have been reviewed.    Objective:      Vitals:      3/31/2025    10:53 AM 4/2/2025     2:53 PM 4/21/2025    11:13 AM   Vitals - 1 value per visit   SYSTOLIC  97    DIASTOLIC  53    Pulse  70    Resp   16   Weight (lb)  112.43 110.45   Weight (kg)  51 50.1   Height  5' 1" (1.549 m) 5' 1" (1.549 m)   BMI (Calculated)  21.3 20.9   Pain Score Zero Zero Zero       Body surface area is 1.47 meters squared.    Physical Exam  Constitutional:       General: She is not in acute distress.     Appearance: Normal appearance. She is not ill-appearing.   HENT:      Head: Normocephalic and atraumatic.      Right Ear: Ear canal and external ear normal. No middle ear effusion. Tympanic membrane is retracted (mild). Tympanic membrane is not scarred, perforated, erythematous or bulging.      Left Ear: Ear canal and external ear normal.  No middle ear effusion. Tympanic membrane is retracted (mild). Tympanic membrane is not scarred, perforated, erythematous or bulging.      Nose: Septal deviation present.      Mouth/Throat:      Lips: Pink. No lesions.      Mouth: Mucous membranes are moist. No oral lesions.      Palate: No lesions.      Pharynx: Oropharynx is clear. Uvula midline. No pharyngeal swelling, oropharyngeal exudate, posterior oropharyngeal erythema or uvula swelling.      Comments: Atrophic changes to tongue: shiny and smooth. No erythema or exudate/plaque.  Eyes:      General:         Right eye: No discharge.         Left eye: No discharge.      Extraocular Movements: Extraocular movements intact.      Conjunctiva/sclera: Conjunctivae normal.   Pulmonary:      Effort: Pulmonary effort is normal. "   Neurological:      General: No focal deficit present.      Mental Status: She is alert and oriented to person, place, and time. Mental status is at baseline.   Psychiatric:         Mood and Affect: Mood normal.         Behavior: Behavior normal.         Thought Content: Thought content normal.         Judgment: Judgment normal.     Laryngoscopy     Date/Time: 4/21/2025 11:15 AM     Performed by: Beto Farfan PA-C  Authorized by: Beto Farfan PA-C    Anesthesia:     Local anesthetic:  Lidocaine 4% and Angel-Synephrine 1/2%    Patient tolerance:  Patient tolerated the procedure well with no immediate complications    Decongestion performed?: Yes    Laryngoscopy:     Areas examined:  Nasal cavities, nasopharynx, oropharynx, hypopharynx, larynx and vocal cords    Laryngoscope size: disposable ambu flexible scope.  Nose External:      No external nasal deformity  Nose Intranasal:      Mucosa no polyps     Mucosa ulcers not present     No mucosa lesions present     No septum gross deformity     Turbinates not enlarged  Nasopharynx:      No mucosa lesions     Adenoids present     Posterior choanae patent     Eustachian tube patent  Larynx/hypopharynx:      No epiglottis lesions     No epiglottis edema     No AE folds lesions     No vocal cord polyps     Equal and normal bilateral     No hypopharynx lesions     No piriform sinus pooling     No piriform sinus lesions     No post cricoid edema     No post cricoid erythema     Good adduction of true vocal cords with phonation. Good abduction of true vocal cords with inspiration. No vocal cord pareses/paralysis.    Left ET opening    Right ET opening      Labs:  WBC   Date Value Ref Range Status   05/28/2024 9.93 3.90 - 12.70 K/uL Final     Platelets   Date Value Ref Range Status   05/28/2024 190 150 - 450 K/uL Final     Creatinine   Date Value Ref Range Status   05/06/2024 0.7 0.5 - 1.4 mg/dL Final     TSH   Date Value Ref Range Status   05/06/2024 0.804  0.400 - 4.000 uIU/mL Final     Glucose   Date Value Ref Range Status   05/06/2024 99 70 - 110 mg/dL Final     Hemoglobin A1C   Date Value Ref Range Status   07/16/2020 5.2 4.0 - 5.6 % Final     Comment:     ADA Screening Guidelines:  5.7-6.4%  Consistent with prediabetes  >or=6.5%  Consistent with diabetes  High levels of fetal hemoglobin interfere with the HbA1C  assay. Heterozygous hemoglobin variants (HbS, HgC, etc)do  not significantly interfere with this assay.   However, presence of multiple variants may affect accuracy.             All lab results and imaging results have been reviewed.    Assessment:        ICD-10-CM ICD-9-CM   1. Sensorineural hearing loss (SNHL) of both ears  H90.3 389.18   2. Chronic dysfunction of both eustachian tubes  H69.93 381.81   3. History of non anemic vitamin B12 deficiency  Z86.39 V12.1   4. Atrophic glossitis  K14.4 529.4   5. Throat clearing  R09.89 786.09         Plan:      Sensorineural hearing loss (SNHL) of both ears  -     Pt overdue for annual audiogram. Will hold on comprehensive audiogram until she sees ENT MD for possible PE tube placement.     Chronic dysfunction of both eustachian tubes  -     Nasopharyngoscopy performed today in office. Pt appears to have mucoid drainage from right ET opening. She does not appear to have EMMA today in office. Audiologist was out today, so I proceeded with tympanogram in office, which showed type C tymps AU.   - This has been an ongoing issue for the past couple of years. Pt has been advised about possibility of PE tubes in the past and would now like to pursue them. Her  is about to have surgery in May 2025, so she will follow up with ENT MD in June 2025 with same day comprehensive audiogram to discuss possibility of PE tubes. She would like to go to [facility].   - Continue using flonase 1 spray to each nostril twice daily and xyzal 5mg in the evening. She may continue to attempt nasal valsalva.   - We discussed PE tubes  and pt advised that they usually stay in on average for around 12 months (6-18 months). We discussed that they can sometimes fall out early or stay in past the usual 1 year. We discussed permanent tubes as well.     History of non anemic vitamin B12 deficiency/Atrophic glossitis  -     Pt states that she had issues with smooth tongue in the past and was found to have vitamin B12 deficiency and had weekly B12 injections x 1 month and had improvement of tongue issues. Orders placed for vitamin B12 and folate labs. If deficient, she will reach out to PCP for management.     Throat clearing  -     Nasopharyngoscopy today unremarkable in setting of throat clearing. We have discussed reflux and throat clearing in the past. Pt denies issues with acid reflux. We will proceed with conservative measures of flonase 1 spray to each nostril twice daily and xyzal 5mg in the evening. She has throat clearing handout from prior appt at home.     Pt will follow up with ENT MD with same day comprehensive audiogram June 2025 (she has to delay due to her  having a surgery scheduled in May 2025) for possible PE tube placement due to issues with chronic ET dysfunction AU. She may follow up sooner as needed for ENT issues/concerns prior to her follow up with ENT MD.

## 2025-04-24 ENCOUNTER — TELEPHONE (OUTPATIENT)
Dept: FAMILY MEDICINE | Facility: CLINIC | Age: 75
End: 2025-04-24
Payer: MEDICARE

## 2025-04-24 DIAGNOSIS — E53.8 B12 DEFICIENCY: Primary | ICD-10-CM

## 2025-04-24 RX ORDER — CYANOCOBALAMIN 1000 UG/ML
1000 INJECTION, SOLUTION INTRAMUSCULAR; SUBCUTANEOUS
Status: SHIPPED | OUTPATIENT
Start: 2025-05-22 | End: 2025-07-21

## 2025-04-24 RX ORDER — CYANOCOBALAMIN 1000 UG/ML
1000 INJECTION, SOLUTION INTRAMUSCULAR; SUBCUTANEOUS WEEKLY
Status: SHIPPED | OUTPATIENT
Start: 2025-04-24 | End: 2025-05-22

## 2025-04-24 NOTE — TELEPHONE ENCOUNTER
Please have her look up on the Internet high vitamin B12 foods and incorporate those into her diet  Set up nurse visit for B12 injections.  There are 2 sets.  The 1st set is for weekly injections for 4 weeks, followed by monthly injections for 2 months.  After that time period, she is to take vitamin B12 orally 1000 mcg daily.  (This is an over-the-counter medication).   Repeat B12 level in 4 months.  Also make sure she is eating 3 meals a day with adequate protein.

## 2025-04-24 NOTE — TELEPHONE ENCOUNTER
----- Message from Balbina sent at 4/24/2025  8:27 AM CDT -----  Contact: Self  Type: Needs Medical AdviceWho Called:  Christopher Call Back Number: [phone] Information: Pt is calling in regards to the labs she had done for the ENT JOSHUA Garcia, stated he did a B-12 test and it shows she has a B-12 deficiency and he told her to reach out to Dr Davalos to advise treatment for this, stated it usually is treated with injections. Can we please check on this and call pt back to advise where to start. Thank You.

## 2025-04-28 ENCOUNTER — CLINICAL SUPPORT (OUTPATIENT)
Dept: FAMILY MEDICINE | Facility: CLINIC | Age: 75
End: 2025-04-28
Payer: MEDICARE

## 2025-04-28 DIAGNOSIS — E53.8 B12 DEFICIENCY: Primary | ICD-10-CM

## 2025-04-28 PROCEDURE — 96372 THER/PROPH/DIAG INJ SC/IM: CPT | Mod: S$GLB,,, | Performed by: INTERNAL MEDICINE

## 2025-04-28 RX ADMIN — CYANOCOBALAMIN 1000 MCG: 1000 INJECTION, SOLUTION INTRAMUSCULAR; SUBCUTANEOUS at 09:04

## 2025-04-28 NOTE — PROGRESS NOTES
After obtaining consent, and per orders of Dr. Davalos, injection of cyanocobalamin given by Vic Lam to the right deltoid. Patient instructed to remain in clinic for 20 minutes afterwards, and to report any adverse reaction to me immediately. Patient verbalize understanding.

## 2025-04-29 ENCOUNTER — OFFICE VISIT (OUTPATIENT)
Dept: OPTOMETRY | Facility: CLINIC | Age: 75
End: 2025-04-29
Payer: MEDICARE

## 2025-04-29 DIAGNOSIS — H16.223 KERATOCONJUNCTIVITIS SICCA OF BOTH EYES NOT DUE TO SJOGREN'S SYNDROME: ICD-10-CM

## 2025-04-29 DIAGNOSIS — H52.203 HYPEROPIA WITH ASTIGMATISM AND PRESBYOPIA, BILATERAL: ICD-10-CM

## 2025-04-29 DIAGNOSIS — H25.13 AGE-RELATED NUCLEAR CATARACT, BILATERAL: Primary | ICD-10-CM

## 2025-04-29 DIAGNOSIS — H52.4 HYPEROPIA WITH ASTIGMATISM AND PRESBYOPIA, BILATERAL: ICD-10-CM

## 2025-04-29 DIAGNOSIS — H52.03 HYPEROPIA WITH ASTIGMATISM AND PRESBYOPIA, BILATERAL: ICD-10-CM

## 2025-04-29 PROCEDURE — 3288F FALL RISK ASSESSMENT DOCD: CPT | Mod: CPTII,S$GLB,,

## 2025-04-29 PROCEDURE — 99999 PR PBB SHADOW E&M-EST. PATIENT-LVL III: CPT | Mod: PBBFAC,,,

## 2025-04-29 PROCEDURE — 99214 OFFICE O/P EST MOD 30 MIN: CPT | Mod: S$GLB,,,

## 2025-04-29 PROCEDURE — 1101F PT FALLS ASSESS-DOCD LE1/YR: CPT | Mod: CPTII,S$GLB,,

## 2025-04-29 PROCEDURE — 1126F AMNT PAIN NOTED NONE PRSNT: CPT | Mod: CPTII,S$GLB,,

## 2025-04-29 PROCEDURE — 1159F MED LIST DOCD IN RCRD: CPT | Mod: CPTII,S$GLB,,

## 2025-04-29 NOTE — PROGRESS NOTES
HPI    Office visit SHALONDA 10/01/24    Pt complains of blurred near va, pt stated has to use over head light to   aid near.   Pt complains of fbs OS, dull irritation, redness. Pt uses otc AT. Pt hx of   eyelid sx w dr green and noticed slight change in va after but no eye   pain. Denies floaters, flashes.   Last edited by Sadie Rivera, OD on 4/29/2025  3:22 PM.            Assessment /Plan     For exam results, see Encounter Report.    Age-related nuclear cataract, bilateral    Keratoconjunctivitis sicca of both eyes not due to Sjogren's syndrome  -     fluorometholone (EFLONE) 0.1 % DrpS; Place 1 drop into both eyes 4 (four) times daily. for 7 days  Dispense: 5 mL; Refill: 1    Hyperopia with astigmatism and presbyopia, bilateral      Longstanding, slight myopic shift with reduced BCVA OS>OD. Pt got new specs and feels like vision did not improve significantly. Ed pt that due to cataracts, DVA not correctable to 20/20. Ed pt that she will likely need cataract surgery in the next year or so and discussed the symptoms of worsening cataracts including reduced BCVA and glare. Will recheck in 6 months, sooner prn.  Pt has been noticing foreign body sensation and severe photophobia OS>OD. Pt has longstanding history of dry eye and presented with dense, diffuse SPK OS>OD. Discussed findings with pt and Rx'd FML drops to use QID OU x 7 days, then QD prn for flare-ups. Advised pt to increase use of Systane to BID-QID OU and incorporate gel drop QHS OU. May consider Restasis/Xiidra if symptoms persist. Ed pt to RTC if any worsening signs or symptoms. Otherwise, monitor.   See above. Pt wanting prescription sunglasses; ed pt that DVA reduced secondary to both cataracts and dry eye. Refraction done and released final spec rx to pt. Ed pt on minimal change and adaptation.    RTC: ~6 months for annual

## 2025-05-06 ENCOUNTER — CLINICAL SUPPORT (OUTPATIENT)
Dept: FAMILY MEDICINE | Facility: CLINIC | Age: 75
End: 2025-05-06
Payer: MEDICARE

## 2025-05-06 DIAGNOSIS — E53.8 B12 DEFICIENCY: Primary | ICD-10-CM

## 2025-05-06 PROCEDURE — 96372 THER/PROPH/DIAG INJ SC/IM: CPT | Mod: S$GLB,,, | Performed by: INTERNAL MEDICINE

## 2025-05-06 RX ADMIN — CYANOCOBALAMIN 1000 MCG: 1000 INJECTION, SOLUTION INTRAMUSCULAR; SUBCUTANEOUS at 09:05

## 2025-05-06 NOTE — PROGRESS NOTES
After obtaining consent, and per orders of , injection of B12 given by Vidhya Roberts. Patient instructed to remain in clinic for 20 minutes afterwards, and to report any adverse reaction to me immediately.

## 2025-05-09 ENCOUNTER — TELEPHONE (OUTPATIENT)
Dept: FAMILY MEDICINE | Facility: CLINIC | Age: 75
End: 2025-05-09
Payer: MEDICARE

## 2025-05-09 DIAGNOSIS — F32.0 MAJOR DEPRESSIVE DISORDER, SINGLE EPISODE, MILD: ICD-10-CM

## 2025-05-09 DIAGNOSIS — E89.0 POSTABLATIVE HYPOTHYROIDISM: Primary | ICD-10-CM

## 2025-05-09 DIAGNOSIS — E78.49 OTHER HYPERLIPIDEMIA: ICD-10-CM

## 2025-05-09 NOTE — TELEPHONE ENCOUNTER
Patient had to cancel her labs that was suppose to be done yesterday, labs pended please sign so that I can schedule.

## 2025-05-12 ENCOUNTER — CLINICAL SUPPORT (OUTPATIENT)
Dept: FAMILY MEDICINE | Facility: CLINIC | Age: 75
End: 2025-05-12
Payer: MEDICARE

## 2025-05-12 DIAGNOSIS — E53.8 B12 DEFICIENCY: Primary | ICD-10-CM

## 2025-05-12 PROCEDURE — 96372 THER/PROPH/DIAG INJ SC/IM: CPT | Mod: S$GLB,,, | Performed by: INTERNAL MEDICINE

## 2025-05-12 PROCEDURE — 99212 OFFICE O/P EST SF 10 MIN: CPT | Mod: 25,S$GLB,, | Performed by: INTERNAL MEDICINE

## 2025-05-12 RX ADMIN — CYANOCOBALAMIN 1000 MCG: 1000 INJECTION, SOLUTION INTRAMUSCULAR; SUBCUTANEOUS at 11:05

## 2025-05-12 NOTE — PROGRESS NOTES
After obtaining consent, and per orders of Dr. Davalos, injection of B12 given by Vidhya Roberts. Patient instructed to remain in clinic for 20 minutes afterwards, and to report any adverse reaction to me immediately.

## 2025-05-16 NOTE — PROGRESS NOTES
After obtaining consent, and per orders of  ***, injection of *** given by Henrry Lam. Patient instructed to remain in clinic for 20 minutes afterwards, and to report any adverse reaction to me immediately.

## 2025-05-19 ENCOUNTER — CLINICAL SUPPORT (OUTPATIENT)
Dept: FAMILY MEDICINE | Facility: CLINIC | Age: 75
End: 2025-05-19
Payer: MEDICARE

## 2025-05-19 DIAGNOSIS — E53.8 B12 DEFICIENCY: Primary | ICD-10-CM

## 2025-05-19 PROCEDURE — 96372 THER/PROPH/DIAG INJ SC/IM: CPT | Mod: S$GLB,,, | Performed by: INTERNAL MEDICINE

## 2025-05-19 RX ADMIN — CYANOCOBALAMIN 1000 MCG: 1000 INJECTION, SOLUTION INTRAMUSCULAR; SUBCUTANEOUS at 09:05

## 2025-05-19 NOTE — PROGRESS NOTES
After obtaining consent, and per orders of Dr. Davalos, injection of B-12 given by Nicolle Leal. Patient instructed to remain in clinic for 20 minutes afterwards, and to report any adverse reaction to me immediately.

## 2025-05-23 ENCOUNTER — LAB VISIT (OUTPATIENT)
Dept: LAB | Facility: HOSPITAL | Age: 75
End: 2025-05-23
Attending: INTERNAL MEDICINE
Payer: MEDICARE

## 2025-05-23 DIAGNOSIS — E78.49 OTHER HYPERLIPIDEMIA: ICD-10-CM

## 2025-05-23 DIAGNOSIS — E89.0 POSTABLATIVE HYPOTHYROIDISM: ICD-10-CM

## 2025-05-23 DIAGNOSIS — E53.8 B12 DEFICIENCY: ICD-10-CM

## 2025-05-23 DIAGNOSIS — F32.0 MAJOR DEPRESSIVE DISORDER, SINGLE EPISODE, MILD: ICD-10-CM

## 2025-05-23 LAB
ABSOLUTE EOSINOPHIL (SMH): 0.19 K/UL
ABSOLUTE MONOCYTE (SMH): 0.39 K/UL (ref 0.3–1)
ABSOLUTE NEUTROPHIL COUNT (SMH): 2.9 K/UL (ref 1.8–7.7)
BASOPHILS # BLD AUTO: 0.05 K/UL
BASOPHILS NFR BLD AUTO: 1 %
CHOLEST SERPL-MCNC: 221 MG/DL (ref 120–199)
CHOLEST/HDLC SERPL: 2.8 {RATIO} (ref 2–5)
ERYTHROCYTE [DISTWIDTH] IN BLOOD BY AUTOMATED COUNT: 13.5 % (ref 11.5–14.5)
HCT VFR BLD AUTO: 40.5 % (ref 37–48.5)
HDLC SERPL-MCNC: 79 MG/DL (ref 40–75)
HDLC SERPL: 35.7 % (ref 20–50)
HGB BLD-MCNC: 13.3 GM/DL (ref 12–16)
IMM GRANULOCYTES # BLD AUTO: 0.02 K/UL (ref 0–0.04)
IMM GRANULOCYTES NFR BLD AUTO: 0.4 % (ref 0–0.5)
LDLC SERPL CALC-MCNC: 129 MG/DL (ref 63–159)
LYMPHOCYTES # BLD AUTO: 1.58 K/UL (ref 1–4.8)
MCH RBC QN AUTO: 30.4 PG (ref 27–31)
MCHC RBC AUTO-ENTMCNC: 32.8 G/DL (ref 32–36)
MCV RBC AUTO: 93 FL (ref 82–98)
NONHDLC SERPL-MCNC: 142 MG/DL
NUCLEATED RBC (/100WBC) (SMH): 0 /100 WBC
PLATELET # BLD AUTO: 214 K/UL (ref 150–450)
PMV BLD AUTO: 10 FL (ref 9.2–12.9)
RBC # BLD AUTO: 4.38 M/UL (ref 4–5.4)
RELATIVE EOSINOPHIL (SMH): 3.7 % (ref 0–8)
RELATIVE LYMPHOCYTE (SMH): 31 % (ref 18–48)
RELATIVE MONOCYTE (SMH): 7.7 % (ref 4–15)
RELATIVE NEUTROPHIL (SMH): 56.2 % (ref 38–73)
TRIGL SERPL-MCNC: 65 MG/DL (ref 30–150)
TSH SERPL-ACNC: 1.18 UIU/ML (ref 0.4–4)
VIT B12 SERPL-MCNC: 452 PG/ML (ref 210–950)
WBC # BLD AUTO: 5.09 K/UL (ref 3.9–12.7)

## 2025-05-23 PROCEDURE — 80061 LIPID PANEL: CPT

## 2025-05-23 PROCEDURE — 85025 COMPLETE CBC W/AUTO DIFF WBC: CPT

## 2025-05-23 PROCEDURE — 82607 VITAMIN B-12: CPT

## 2025-05-23 PROCEDURE — 36415 COLL VENOUS BLD VENIPUNCTURE: CPT

## 2025-05-23 PROCEDURE — 84443 ASSAY THYROID STIM HORMONE: CPT

## 2025-05-26 ENCOUNTER — OFFICE VISIT (OUTPATIENT)
Dept: FAMILY MEDICINE | Facility: CLINIC | Age: 75
End: 2025-05-26
Payer: MEDICARE

## 2025-05-26 VITALS
HEIGHT: 61 IN | BODY MASS INDEX: 21.61 KG/M2 | OXYGEN SATURATION: 95 % | HEART RATE: 60 BPM | DIASTOLIC BLOOD PRESSURE: 58 MMHG | SYSTOLIC BLOOD PRESSURE: 90 MMHG | WEIGHT: 114.44 LBS

## 2025-05-26 DIAGNOSIS — F51.04 CHRONIC INSOMNIA: ICD-10-CM

## 2025-05-26 DIAGNOSIS — Z78.0 POST-MENOPAUSAL: ICD-10-CM

## 2025-05-26 DIAGNOSIS — E78.49 OTHER HYPERLIPIDEMIA: ICD-10-CM

## 2025-05-26 DIAGNOSIS — Z12.31 SCREENING MAMMOGRAM FOR BREAST CANCER: ICD-10-CM

## 2025-05-26 DIAGNOSIS — E89.0 POSTABLATIVE HYPOTHYROIDISM: ICD-10-CM

## 2025-05-26 DIAGNOSIS — E53.8 B12 DEFICIENCY: Primary | ICD-10-CM

## 2025-05-26 PROCEDURE — G2211 COMPLEX E/M VISIT ADD ON: HCPCS | Mod: S$GLB,,, | Performed by: INTERNAL MEDICINE

## 2025-05-26 PROCEDURE — 3008F BODY MASS INDEX DOCD: CPT | Mod: CPTII,S$GLB,, | Performed by: INTERNAL MEDICINE

## 2025-05-26 PROCEDURE — 99214 OFFICE O/P EST MOD 30 MIN: CPT | Mod: S$GLB,,, | Performed by: INTERNAL MEDICINE

## 2025-05-26 PROCEDURE — 99999 PR PBB SHADOW E&M-EST. PATIENT-LVL IV: CPT | Mod: PBBFAC,,, | Performed by: INTERNAL MEDICINE

## 2025-05-26 PROCEDURE — 3074F SYST BP LT 130 MM HG: CPT | Mod: CPTII,S$GLB,, | Performed by: INTERNAL MEDICINE

## 2025-05-26 PROCEDURE — 1126F AMNT PAIN NOTED NONE PRSNT: CPT | Mod: CPTII,S$GLB,, | Performed by: INTERNAL MEDICINE

## 2025-05-26 PROCEDURE — 1101F PT FALLS ASSESS-DOCD LE1/YR: CPT | Mod: CPTII,S$GLB,, | Performed by: INTERNAL MEDICINE

## 2025-05-26 PROCEDURE — 1159F MED LIST DOCD IN RCRD: CPT | Mod: CPTII,S$GLB,, | Performed by: INTERNAL MEDICINE

## 2025-05-26 PROCEDURE — 3078F DIAST BP <80 MM HG: CPT | Mod: CPTII,S$GLB,, | Performed by: INTERNAL MEDICINE

## 2025-05-26 PROCEDURE — 1160F RVW MEDS BY RX/DR IN RCRD: CPT | Mod: CPTII,S$GLB,, | Performed by: INTERNAL MEDICINE

## 2025-05-26 PROCEDURE — 3288F FALL RISK ASSESSMENT DOCD: CPT | Mod: CPTII,S$GLB,, | Performed by: INTERNAL MEDICINE

## 2025-05-26 RX ORDER — LEVOTHYROXINE SODIUM 125 UG/1
125 TABLET ORAL EVERY OTHER DAY
Qty: 45 TABLET | Refills: 3 | Status: SHIPPED | OUTPATIENT
Start: 2025-05-26

## 2025-05-26 NOTE — PROGRESS NOTES
Patient ID: Karina Mathur is a 74 y.o. female.    Chief Complaint: Follow-up       HPI / Assessment and plan     Problem  HPI  Plan   Insomnia  May have improved some with b12 supplementation.  Monitor   Post ablative hypothyroid Controlled  Continue Synthroid 125 mcg every other day   Hyperlipidemia  Controlled Continue simvastatin   Depression / anxiety  Stable Monitor   B12 deficiency Had 4 injections. Going to monthly.  Start oral b12 1000 mcg in addition to injections. Repeat b12 in 2 mo    Urinary incontinence  Status post Placement of retropubic tension-free midurethral sling, [employer] Fit ([employer]) (may 2024)    Stable  Follow-up uro gyn           Medications, recent labs, health maintenance, and diet has been reviewed.     Exercise- walking   Alcohol- 3 glasses of wine/ week   Tobacco- none         Review of Systems   Constitutional:  Negative for fever.   Respiratory:  Negative for shortness of breath.    Cardiovascular:  Negative for chest pain.   Gastrointestinal:  Negative for abdominal pain.       I personally reviewed past medical, family and social history.     ORDERS   B12 deficiency  -     Vitamin B12; Future; Expected date: 05/26/2025    Postablative hypothyroidism  -     levothyroxine (SYNTHROID) 125 MCG tablet; Take 1 tablet (125 mcg total) by mouth every other day.  Dispense: 45 tablet; Refill: 3    Screening mammogram for breast cancer  -     Mammo Digital Screening Bilat w/ Parth (XPD); Future; Expected date: 05/26/2025    Post-menopausal  -     DXA Bone Density Axial Skeleton 1 or more sites; Future; Expected date: 05/26/2025    Other hyperlipidemia    Chronic insomnia        Objective    Vitals:    05/26/25 1450   BP: (!) 90/58   Pulse: 60      Wt Readings from Last 3 Encounters:   05/26/25 1450 51.9 kg (114 lb 6.7 oz)   04/21/25 1113 50.1 kg (110 lb 7.2 oz)   04/02/25 1453 51 kg (112 lb 7 oz)      Body mass index is 21.62 kg/m².     Physical Exam  Cardiovascular:      Rate  and Rhythm: Normal rate and regular rhythm.      Heart sounds: No murmur heard.     No gallop.   Pulmonary:      Breath sounds: Normal breath sounds. No wheezing or rhonchi.   Abdominal:      Palpations: Abdomen is soft.      Tenderness: There is no abdominal tenderness.        Reference     : Visit today included increased complexity associated with the care of the episodic problem B12 deficiency [E53.8] addressed and managing the longitudinal care of the patient due to the serious and/or complex managed problem(s)    Active Problem List with Overview Notes    Diagnosis Date Noted    Urinary incontinence, mixed 03/11/2024    Major depressive disorder, single episode, mild 05/10/2023    Anxiety 12/20/2021    Chronic insomnia 12/20/2021    Vitamin D insufficiency 08/15/2018    Postablative hypothyroidism 08/15/2018    Other hyperlipidemia      on simvastatin      Urinary urgency 07/08/2024    s/p RA-TLH/BSO/SCP/MUS/cysto 05/27/2024    Vaginal atrophy 03/11/2024    Rectocele, female 03/11/2024    Uterovaginal prolapse 03/11/2024    Nocturia more than twice per night 03/11/2024    Loose stools 03/11/2024    Cystocele, midline- with urgency 01/23/2023    Memory change 12/17/2021    Heloma molle 08/31/2020     Podiatry       Hav (hallux abducto valgus), unspecified laterality 07/19/2020    Hammer toes of both feet 07/19/2020    Pain in both feet 07/19/2020    Cervicalgia 07/01/2019    Chronic midline low back pain 07/01/2019              Hyperlipidemia Medications              simvastatin (ZOCOR) 20 MG tablet Take 1 tablet (20 mg total) by mouth every evening.           Medication List with Changes/Refills   Current Medications    ASCORBIC ACID, VITAMIN C, (VITAMIN C) 500 MG TABLET    Take 500 mg by mouth once daily.    CALCIUM CITRATE/VITAMIN D3 (CITRACAL REGULAR ORAL)    Take by mouth.    ESTRADIOL (ESTRACE) 0.01 % (0.1 MG/GRAM) VAGINAL CREAM    0.5 grams with applicator or dime-sized amount with finger in vagina  nightly x 2 weeks, then twice a week thereafter    FLUTICASONE PROPIONATE (FLONASE) 50 MCG/ACTUATION NASAL SPRAY    1 spray (50 mcg total) by Each Nostril route 2 (two) times daily. Point up and slightly outward toward ear when spraying to avoid irritating nasal septum.    LEVOCETIRIZINE (XYZAL) 5 MG TABLET    Take 1 tablet (5 mg total) by mouth every evening.    MULTIVITAMIN (THERAGRAN) PER TABLET    Take 1 tablet by mouth once daily.    SIMVASTATIN (ZOCOR) 20 MG TABLET    Take 1 tablet (20 mg total) by mouth every evening.    VITAMIN D 1000 UNITS TAB    Take 1,000 Units by mouth once daily.    Changed and/or Refilled Medications    Modified Medication Previous Medication    LEVOTHYROXINE (SYNTHROID) 125 MCG TABLET levothyroxine (SYNTHROID) 125 MCG tablet       Take 1 tablet (125 mcg total) by mouth every other day.    Take 1 tablet (125 mcg total) by mouth every other day.   Discontinued Medications    COFFEE XT/PHOSPHATIDYL SERINE (NEURIVA ORIGINAL ORAL)        VIBEGRON (GEMTESA) 75 MG TAB    Take 1 tablet (75 mg total) by mouth every evening.

## 2025-06-18 ENCOUNTER — TELEPHONE (OUTPATIENT)
Dept: FAMILY MEDICINE | Facility: CLINIC | Age: 75
End: 2025-06-18
Payer: MEDICARE

## 2025-06-18 NOTE — TELEPHONE ENCOUNTER
Copied from CRM #[id number]. Topic: General Inquiry - Patient Advice  >> Jun 18, 2025  8:55 AM Julita wrote:  Type:  Needs Medical Advice    Pt   Would the patient rather a call back or a response via [facility]? Call back   Best Call Back Number: [phone]   Additional Information: wants to come in at 12 tomorrow for b12 shot

## 2025-06-18 NOTE — TELEPHONE ENCOUNTER
Copied from CRM #[id number]. Topic: General Inquiry - Patient Advice  >> Jun 18, 2025  3:32 PM Jv Reymundo wrote:  Type:  Needs Medical Advice    Who Called: pt    Symptoms (please be specific): Rescheduled B-12 Injection Appt Time     How long has patient had these symptoms:  na  Pharmacy name and phone #:  na    Would the patient rather a call back or a response via [facility]? Call back    Best Call Back Number: [phone]    Additional Information: Pt called this morning to request her Appt for 6/19 9:00am to be rescheduled to 12pm; she has not received a response; please call back to reschedule.

## 2025-06-19 ENCOUNTER — CLINICAL SUPPORT (OUTPATIENT)
Dept: FAMILY MEDICINE | Facility: CLINIC | Age: 75
End: 2025-06-19
Payer: MEDICARE

## 2025-06-19 VITALS — RESPIRATION RATE: 18 BRPM

## 2025-06-19 DIAGNOSIS — E53.8 B12 DEFICIENCY: Primary | ICD-10-CM

## 2025-06-19 PROCEDURE — 99999 PR PBB SHADOW E&M-EST. PATIENT-LVL I: CPT | Mod: PBBFAC,,,

## 2025-06-19 RX ADMIN — CYANOCOBALAMIN 1000 MCG: 1000 INJECTION, SOLUTION INTRAMUSCULAR; SUBCUTANEOUS at 09:06

## 2025-06-19 NOTE — PROGRESS NOTES
After obtaining consent, and per orders of Dr. Davalos, injection of cyanocobalamin 1,000 mcg given by Bubba Benson. Patient instructed to remain in clinic for 20 minutes afterwards, and to report any adverse reaction to me immediately.  PtDave Chen.

## 2025-06-24 ENCOUNTER — OFFICE VISIT (OUTPATIENT)
Dept: OTOLARYNGOLOGY | Facility: CLINIC | Age: 75
End: 2025-06-24
Payer: MEDICARE

## 2025-06-24 ENCOUNTER — CLINICAL SUPPORT (OUTPATIENT)
Dept: AUDIOLOGY | Facility: CLINIC | Age: 75
End: 2025-06-24
Payer: MEDICARE

## 2025-06-24 VITALS — HEIGHT: 61 IN | WEIGHT: 114.44 LBS | BODY MASS INDEX: 21.61 KG/M2

## 2025-06-24 DIAGNOSIS — H91.93 HIGH FREQUENCY HEARING LOSS OF BOTH EARS: Primary | ICD-10-CM

## 2025-06-24 DIAGNOSIS — H90.3 SENSORINEURAL HEARING LOSS (SNHL) OF BOTH EARS: ICD-10-CM

## 2025-06-24 DIAGNOSIS — H69.93 CHRONIC DYSFUNCTION OF BOTH EUSTACHIAN TUBES: Primary | ICD-10-CM

## 2025-06-24 PROCEDURE — 1101F PT FALLS ASSESS-DOCD LE1/YR: CPT | Mod: CPTII,S$GLB,, | Performed by: OTOLARYNGOLOGY

## 2025-06-24 PROCEDURE — 1159F MED LIST DOCD IN RCRD: CPT | Mod: CPTII,S$GLB,, | Performed by: OTOLARYNGOLOGY

## 2025-06-24 PROCEDURE — 99999 PR PBB SHADOW E&M-EST. PATIENT-LVL II: CPT | Mod: PBBFAC,,, | Performed by: OTOLARYNGOLOGY

## 2025-06-24 PROCEDURE — 92567 TYMPANOMETRY: CPT | Mod: S$GLB,,,

## 2025-06-24 PROCEDURE — 99214 OFFICE O/P EST MOD 30 MIN: CPT | Mod: S$GLB,,, | Performed by: OTOLARYNGOLOGY

## 2025-06-24 PROCEDURE — 3288F FALL RISK ASSESSMENT DOCD: CPT | Mod: CPTII,S$GLB,, | Performed by: OTOLARYNGOLOGY

## 2025-06-24 PROCEDURE — 1160F RVW MEDS BY RX/DR IN RCRD: CPT | Mod: CPTII,S$GLB,, | Performed by: OTOLARYNGOLOGY

## 2025-06-24 PROCEDURE — 1126F AMNT PAIN NOTED NONE PRSNT: CPT | Mod: CPTII,S$GLB,, | Performed by: OTOLARYNGOLOGY

## 2025-06-24 PROCEDURE — 3008F BODY MASS INDEX DOCD: CPT | Mod: CPTII,S$GLB,, | Performed by: OTOLARYNGOLOGY

## 2025-06-24 PROCEDURE — 92557 COMPREHENSIVE HEARING TEST: CPT | Mod: S$GLB,,,

## 2025-06-24 NOTE — PROGRESS NOTES
Karina Mathur was seen on 06/24/2025 for an audiological evaluation. Patient was alone during today's visit. Patient has a history of chronic eustachian tube dysfunction. Previous hearing evaluation on 9/15/2023 was consistent with mild sensorineural hearing loss for the right ear and  mild sensorineural hearing loss for the left ear.   Patient denies history of loud noise exposure and denies early onset of genetic family history of hearing loss. Otoscopy revealed minimal cerumen in both ears. The tympanic membrane was visualized AU prior to proceeding with the hearing testing.     Results reveal a mild-to-moderate high frequency hearing loss for the right ear and  mild-to-moderate high frequency hearing loss for the left ear.    Speech Reception Thresholds were  15 dBHL for the right ear and 10 dBHL for the left ear.    Word recognition scores were excellent for the right ear and excellent for the left ear.  Of note, there was a slight decrease of hearing sensitivity in both ears when compared to previous audiogram. Tympanograms were Type A for the right ear and Type A for the left ear.    The recommendations were as follows:  (1) Hearing aid consultation following medical clearance if further clarity is needed  (2) Hearing evaluation in one year or sooner if hearing decrease is noted    (3) Ear protection in loud noise      Today's test results and recommendations were discussed with the patient.  Results will be reviewed by the referring provider at the completion of this note.

## 2025-06-24 NOTE — PROGRESS NOTES
Subjective:       Patient ID: Karina Mathur is a 74 y.o. female.    Chief Complaint: Ear Problem (Discuss tubes)    Karina is here for follow-up.   Here today for chronic Eustachian tube dysfunction and bilateral sensorineural hearing loss.  She last saw Arun in [place] in April 25.  The HPI was reviewed by me - she reports extensive issues with Eustachian tube dysfunction.  She has not had tubes in the past.    She does feel a sensation of fluid in her ears. She cannot pop her ears.  Her main complaints of intermittent balance issues and tinnitus.     Patient validated questionnaires (if applicable):      %            No data to display                   No data to display                   No data to display                     Review of Systems   Constitutional: Negative for activity change and appetite change.   Respiratory: Negative for difficulty breathing and wheezing   Cardiovascular: Negative for chest pain.      Objective:        Constitutional:   Vital signs are normal. She appears well-developed and well-nourished.     Head:  Normocephalic and atraumatic.     Ears:  Hearing normal to normal and whispered voice; external ear normal without scars, lesions, or masses; ear canal, tympanic membrane, and middle ear normal..     Nose:  Nose normal including turbinates, nasal mucosa, sinuses and nasal septum.     Mouth/Throat  Oropharynx clear and moist without lesions or asymmetry.     Neck:  Neck normal without thyromegaly masses, asymmetry, normal tracheal structure, crepitus, and tenderness.         Tests / Results:  Audiogram reviewed from 2023  Type C tympanograms.  Mild symmetric high-frequency hearing loss        Assessment:       1. Chronic dysfunction of both eustachian tubes    2. Sensorineural hearing loss (SNHL) of both ears          Plan:         We reviewed Eustachian tube dysfunction and sensorineural hearing loss.  We discussed options at length.  She would like to proceed with tympanostomy  tube placement but defers placement for today.  She would like to schedule in the near future    We discussed the risks: need for additional procedures (including replacement/removal of tubes), perforation( which may require repair at a later date), persistent drainage, bleeding and pain.

## 2025-07-03 ENCOUNTER — PROCEDURE VISIT (OUTPATIENT)
Dept: OTOLARYNGOLOGY | Facility: CLINIC | Age: 75
End: 2025-07-03
Payer: MEDICARE

## 2025-07-03 VITALS — BODY MASS INDEX: 21.62 KG/M2 | WEIGHT: 114.44 LBS

## 2025-07-03 DIAGNOSIS — H90.3 SENSORINEURAL HEARING LOSS (SNHL) OF BOTH EARS: ICD-10-CM

## 2025-07-03 DIAGNOSIS — H69.93 CHRONIC DYSFUNCTION OF BOTH EUSTACHIAN TUBES: Primary | ICD-10-CM

## 2025-07-03 PROCEDURE — 99499 UNLISTED E&M SERVICE: CPT | Mod: S$GLB,,, | Performed by: OTOLARYNGOLOGY

## 2025-07-03 PROCEDURE — 69433 CREATE EARDRUM OPENING: CPT | Mod: 50,S$GLB,, | Performed by: OTOLARYNGOLOGY

## 2025-07-03 NOTE — PROGRESS NOTES
Karina Mathur   [id number],   : 1950      Procedure date:7/3/2025  Patient's medications, allergies, past medical, surgical, social and family histories were reviewed and updated as appropriate.    Pre-procedure diagnosis: Chronic dysfunction of both eustachian tubes [H69.93]     Procedure: bilateral  tympanostomy, with ventilation tube placement     Procedure in detail: Risks, benefits, and alternatives of the procedure were discussed with the patient, and consent was obtained to perform for both ears.  The procedure required a high level of expertise and use of an operating microscope and multiple micro-instruments.     With the patient in the supine position, the operating microscope was used to examine the ear(s)  A variety of sterile, micro-instruments were utilized to remove the cerumen atraumatically. I applied a small amount of phenol topically (local analgesic) to the tympanic membrane.  The tympanic membrane was incised radially in the appropriate location.  I suctioned the middle ear through the incision. A ventilation tube was placed (position & patency confirmed).  I performed the procedure. The patient tolerated the procedure well and there were no complications.    Findings:   Right ear : no effusion    Left ear  : no effusion

## 2025-07-16 ENCOUNTER — PATIENT MESSAGE (OUTPATIENT)
Dept: OTOLARYNGOLOGY | Facility: CLINIC | Age: 75
End: 2025-07-16
Payer: MEDICARE

## 2025-07-18 ENCOUNTER — CLINICAL SUPPORT (OUTPATIENT)
Dept: FAMILY MEDICINE | Facility: CLINIC | Age: 75
End: 2025-07-18
Payer: MEDICARE

## 2025-07-18 DIAGNOSIS — E53.8 B12 DEFICIENCY: ICD-10-CM

## 2025-07-18 DIAGNOSIS — E53.8 B12 DEFICIENCY: Primary | ICD-10-CM

## 2025-07-18 PROCEDURE — 99999 PR PBB SHADOW E&M-EST. PATIENT-LVL I: CPT | Mod: PBBFAC,,,

## 2025-07-18 RX ORDER — CYANOCOBALAMIN 1000 UG/ML
1000 INJECTION, SOLUTION INTRAMUSCULAR; SUBCUTANEOUS
Status: SHIPPED | OUTPATIENT
Start: 2025-07-18 | End: 2026-06-13

## 2025-07-18 RX ADMIN — CYANOCOBALAMIN 1000 MCG: 1000 INJECTION, SOLUTION INTRAMUSCULAR; SUBCUTANEOUS at 09:07

## 2025-07-21 ENCOUNTER — OFFICE VISIT (OUTPATIENT)
Dept: OTOLARYNGOLOGY | Facility: CLINIC | Age: 75
End: 2025-07-21
Payer: MEDICARE

## 2025-07-21 VITALS — WEIGHT: 114.44 LBS | BODY MASS INDEX: 21.62 KG/M2

## 2025-07-21 DIAGNOSIS — H93.8X1 EAR FULLNESS, RIGHT: Primary | ICD-10-CM

## 2025-07-21 DIAGNOSIS — S03.40XS SPRAIN OF TEMPOROMANDIBULAR JOINT, SEQUELA: ICD-10-CM

## 2025-07-21 DIAGNOSIS — Z45.89 TYMPANOSTOMY TUBE CHECK: ICD-10-CM

## 2025-07-21 PROCEDURE — 99999 PR PBB SHADOW E&M-EST. PATIENT-LVL III: CPT | Mod: PBBFAC,,, | Performed by: OTOLARYNGOLOGY

## 2025-07-21 PROCEDURE — 3008F BODY MASS INDEX DOCD: CPT | Mod: CPTII,S$GLB,, | Performed by: OTOLARYNGOLOGY

## 2025-07-21 PROCEDURE — 1126F AMNT PAIN NOTED NONE PRSNT: CPT | Mod: CPTII,S$GLB,, | Performed by: OTOLARYNGOLOGY

## 2025-07-21 PROCEDURE — 1159F MED LIST DOCD IN RCRD: CPT | Mod: CPTII,S$GLB,, | Performed by: OTOLARYNGOLOGY

## 2025-07-21 PROCEDURE — 1160F RVW MEDS BY RX/DR IN RCRD: CPT | Mod: CPTII,S$GLB,, | Performed by: OTOLARYNGOLOGY

## 2025-07-21 PROCEDURE — 99213 OFFICE O/P EST LOW 20 MIN: CPT | Mod: S$GLB,,, | Performed by: OTOLARYNGOLOGY

## 2025-07-21 PROCEDURE — 3288F FALL RISK ASSESSMENT DOCD: CPT | Mod: CPTII,S$GLB,, | Performed by: OTOLARYNGOLOGY

## 2025-07-21 PROCEDURE — 1101F PT FALLS ASSESS-DOCD LE1/YR: CPT | Mod: CPTII,S$GLB,, | Performed by: OTOLARYNGOLOGY

## 2025-07-21 NOTE — PROGRESS NOTES
Subjective:       Patient ID: Karina Mathur is a 74 y.o. female.    Chief Complaint: Follow-up    Karina is here for follow-up.   Here today for follow-up of Eustachian tube dysfunction.   I place tubes in both ears approximately 3 weeks ago.  Left ear has improved since the procedure; however, the right ear has felt different and has not improved.  She feels a vague fullness on this side which is asymmetric to the left.  Still mildly bothersome to her  She does have a history of right-sided TMJ issues and dentist prescribed a  but does not wear regularly.  Denies any current jaw pain  She does also feel worsening of tinnitus bilaterally.      Patient validated questionnaires (if applicable):      %            No data to display                   No data to display                   No data to display                     Review of Systems   Constitutional: Negative for activity change and appetite change.   Respiratory: Negative for difficulty breathing and wheezing   Cardiovascular: Negative for chest pain.      Objective:        Constitutional:   Vital signs are normal. She appears well-developed and well-nourished.     Head:  Normocephalic and atraumatic.   Crepitus of the right TMJ without pain     Ears:  Hearing normal to normal and whispered voice; external ear normal without scars, lesions, or masses; ear canal, tympanic membrane, and middle ear normal..   Right Ear: A PE tube is seen.   Left Ear: A PE tube is seen.   Both tubes are in place and patent    Nose:  Nose normal including turbinates, nasal mucosa, sinuses and nasal septum.     Mouth/Throat  Oropharynx clear and moist without lesions or asymmetry.     Neck:  Neck normal without thyromegaly masses, asymmetry, normal tracheal structure, crepitus, and tenderness.         Tests / Results:  Audiogram from a few months ago reviewed showing normal sloping to mild high-frequency sensorineural hearing loss.  There is very subtle difference  between the right and the left    Assessment:       1. Ear fullness, right    2. Tympanostomy tube check    3. Sprain of temporomandibular joint, sequela          Plan:         We discussed tubes are both in place and patent.  Unsure exactly why the right ear has not felt better but there may be issues related to TMJ or very mild difference in hearing level which she perceives as the asymmetry.    Monitor for tube extrusion and at this time, would not recommend replacement of the right tube when it does extrude    Discussed melatonin for tinnitus ok at night.

## 2025-07-30 ENCOUNTER — LAB VISIT (OUTPATIENT)
Dept: LAB | Facility: HOSPITAL | Age: 75
End: 2025-07-30
Attending: INTERNAL MEDICINE
Payer: MEDICARE

## 2025-07-30 DIAGNOSIS — E53.8 B12 DEFICIENCY: ICD-10-CM

## 2025-07-30 LAB — VIT B12 SERPL-MCNC: 691 PG/ML (ref 210–950)

## 2025-07-30 PROCEDURE — 82607 VITAMIN B-12: CPT

## 2025-07-30 PROCEDURE — 36415 COLL VENOUS BLD VENIPUNCTURE: CPT

## 2025-08-04 ENCOUNTER — HOSPITAL ENCOUNTER (OUTPATIENT)
Dept: RADIOLOGY | Facility: HOSPITAL | Age: 75
Discharge: HOME | End: 2025-08-04
Attending: INTERNAL MEDICINE
Payer: MEDICARE

## 2025-08-04 DIAGNOSIS — Z78.0 POST-MENOPAUSAL: ICD-10-CM

## 2025-08-04 DIAGNOSIS — Z12.31 SCREENING MAMMOGRAM FOR BREAST CANCER: ICD-10-CM

## 2025-08-04 PROCEDURE — 77092 TBS I&R FX RSK QHP: CPT | Mod: ,,, | Performed by: RADIOLOGY

## 2025-08-04 PROCEDURE — 77067 SCR MAMMO BI INCL CAD: CPT | Mod: TC,PO

## 2025-08-04 PROCEDURE — 77080 DXA BONE DENSITY AXIAL: CPT | Mod: TC,PO

## 2025-08-04 PROCEDURE — 77063 BREAST TOMOSYNTHESIS BI: CPT | Mod: 26,,, | Performed by: RADIOLOGY

## 2025-08-04 PROCEDURE — 77080 DXA BONE DENSITY AXIAL: CPT | Mod: 26,,, | Performed by: RADIOLOGY

## 2025-08-04 PROCEDURE — 77067 SCR MAMMO BI INCL CAD: CPT | Mod: 26,,, | Performed by: RADIOLOGY

## 2025-08-16 ENCOUNTER — PATIENT MESSAGE (OUTPATIENT)
Dept: FAMILY MEDICINE | Facility: CLINIC | Age: 75
End: 2025-08-16
Payer: MEDICARE

## 2025-09-03 ENCOUNTER — TELEPHONE (OUTPATIENT)
Dept: DERMATOLOGY | Facility: CLINIC | Age: 75
End: 2025-09-03
Payer: MEDICARE

## (undated) DEVICE — SOL IRR BSS OPHTH 500ML STRL

## (undated) DEVICE — IRRIGATOR ENDOSCOPY DISP.

## (undated) DEVICE — TOWEL OR DISP STRL BLUE 4/PK

## (undated) DEVICE — SUT 0 8-27IN VICRYL PL CT-1

## (undated) DEVICE — CATH FOL IC 3WAY 16F 5CCLF

## (undated) DEVICE — SYR 10CC LUER LOCK

## (undated) DEVICE — SOL IRR SOD CHL .9% POUR

## (undated) DEVICE — PAD PREP CUFFED NS 24X48IN

## (undated) DEVICE — ELECTRODE NEEDLE 2.8IN

## (undated) DEVICE — ELECTRODE REM PLYHSV RETURN 9

## (undated) DEVICE — GAUZE CNFRM STRL 4INX4.1YD

## (undated) DEVICE — SOL POVIDONE SCRUB IODINE 4 OZ

## (undated) DEVICE — SYR 3CC LUER LOC

## (undated) DEVICE — MARKER FN REG DUAL UTIL RULER

## (undated) DEVICE — SOL POVIDONE PREP IODINE 4 OZ

## (undated) DEVICE — GOWN AERO CHROME W/ TOWEL XL

## (undated) DEVICE — TIP RUMI MANIPULATOR LAVENDER

## (undated) DEVICE — BLADE SURG STAINLESS STEEL #11

## (undated) DEVICE — GLOVE SENSICARE PI GRN 7

## (undated) DEVICE — OCCLUDER COLPO-PNEUMO STERILE

## (undated) DEVICE — SYS SEE SHARP SCP ANTIFG LNG

## (undated) DEVICE — SYR SALINE FLSH PRFL STRL 10ML

## (undated) DEVICE — SUT ETHILON 6-0.PLAIN

## (undated) DEVICE — GLOVE SENSICARE PI SURG 6.5

## (undated) DEVICE — DRAPE UND BUTT W/POUCH 40X44IN

## (undated) DEVICE — KIT WING PAD POSITIONING

## (undated) DEVICE — ADHESIVE DERMABOND ADVANCED

## (undated) DEVICE — DRAPE ARM DAVINCI XI

## (undated) DEVICE — BANDAGE ELAS SOFTWRAP ST 4X5YD

## (undated) DEVICE — SUT VICRYL PLUS 0 CT1 36IN

## (undated) DEVICE — DRAPE COLUMN DAVINCI XI

## (undated) DEVICE — SUT ETHIBOND EXCEL 0 CT2 30

## (undated) DEVICE — TRAY DO THE ROBOT

## (undated) DEVICE — TRAY SKIN SCRUB WET PREMIUM

## (undated) DEVICE — SUT VICRYL 0 27 CT-2

## (undated) DEVICE — SEAL UNIVERSAL 5MM-8MM XI

## (undated) DEVICE — PUNCH BIOPSY DERMAL 3MM

## (undated) DEVICE — SET CYSTO IRR DRP CHMBR 84IN

## (undated) DEVICE — DEVICE SNAPSECURE FOL CATH

## (undated) DEVICE — GLOVE SENSICARE PI SURG 7

## (undated) DEVICE — GOWN SURGICAL X-LARGE

## (undated) DEVICE — NDL STRAIGHT 4CM LEIBINGER

## (undated) DEVICE — SOL NORMAL USPCA 0.9%

## (undated) DEVICE — SOL ELECTROLUBE ANTI-STIC

## (undated) DEVICE — NDL HYPO A BEVEL 30X1/2

## (undated) DEVICE — FORCEP CURVED DISP

## (undated) DEVICE — JELLY SURGILUBE 5GR

## (undated) DEVICE — DRAPE HALF SURGICAL 40X58IN

## (undated) DEVICE — APPLICATOR COT TIP WD STRL 3IN

## (undated) DEVICE — SUT VICRYL+ 27 UR-6 VIOL

## (undated) DEVICE — SYR IRRIGATION BULB STER 60ML

## (undated) DEVICE — BLADE SURG STAINLESS STEEL #15

## (undated) DEVICE — TRAY MINOR GEN SURG OMC

## (undated) DEVICE — BLADE SURGICAL 15C

## (undated) DEVICE — NDL HYPO REG 25G X 1 1/2

## (undated) DEVICE — GLOVE SENSICARE PI GRN 6.5

## (undated) DEVICE — NDL 22GA X1 1/2 REG BEVEL

## (undated) DEVICE — BLADE SURG #15 CARBON STEEL

## (undated) DEVICE — SOL STRL WATER INJ 1000ML BG

## (undated) DEVICE — SUT PDS II 2-0 CT-2 VIL

## (undated) DEVICE — SUT VICRYL CTD 2-0 GI 27 SH

## (undated) DEVICE — CORD BIPOLAR 12 FOOT

## (undated) DEVICE — SUT SILK 6-0 BLK BR P-1 P-1

## (undated) DEVICE — SUT 5/0 18IN PLAIN FAST AB

## (undated) DEVICE — SUT MCRYL PLUS 4-0 PS2 27IN

## (undated) DEVICE — Device

## (undated) DEVICE — DRAPE EENT SPLIT STERILE

## (undated) DEVICE — TIP RUMI BLUE DISPOSABLE 5/BX

## (undated) DEVICE — COVER TIP CURVED SCISSORS XI

## (undated) DEVICE — SET TRI-LUMEN FILTERED TUBE

## (undated) DEVICE — SUT 5/0 18IN PROLENE BL MO

## (undated) DEVICE — SOL IRRI STRL WATER 1000ML

## (undated) DEVICE — TROCAR ENDOPATH XCEL 5X100MM

## (undated) DEVICE — OBTURATOR BLADELESS 8MM XI CLR

## (undated) DEVICE — GOWN NONREINF SET-IN SLV XL

## (undated) DEVICE — INSERT CUSHIONPRONE VIEW LARGE

## (undated) DEVICE — SUT PROLENE 6-0 BL MONOPC-1

## (undated) DEVICE — TRAY DRY SKIN SCRUB PREP

## (undated) DEVICE — PORT ACCESS 8MM W/120MM LOW

## (undated) DEVICE — POSITIONER HEEL FOAM CONVOLTD

## (undated) DEVICE — DRAPE STERI INSTRUMENT 1018

## (undated) DEVICE — TRAY CATH 1-LYR URIMTR 16FR